# Patient Record
Sex: FEMALE | Race: WHITE | Employment: OTHER | URBAN - NONMETROPOLITAN AREA
[De-identification: names, ages, dates, MRNs, and addresses within clinical notes are randomized per-mention and may not be internally consistent; named-entity substitution may affect disease eponyms.]

---

## 2017-01-19 ENCOUNTER — HOSPITAL ENCOUNTER (OUTPATIENT)
Dept: PAIN MANAGEMENT | Age: 57
Discharge: HOME | End: 2017-01-19
Payer: COMMERCIAL

## 2017-01-19 VITALS
RESPIRATION RATE: 18 BRPM | SYSTOLIC BLOOD PRESSURE: 185 MMHG | HEIGHT: 67 IN | TEMPERATURE: 97.5 F | HEART RATE: 85 BPM | OXYGEN SATURATION: 97 % | WEIGHT: 230 LBS | BODY MASS INDEX: 36.1 KG/M2 | DIASTOLIC BLOOD PRESSURE: 98 MMHG

## 2017-01-19 DIAGNOSIS — G89.29 CHRONIC PAIN OF BOTH KNEES: ICD-10-CM

## 2017-01-19 DIAGNOSIS — M25.561 CHRONIC PAIN OF BOTH KNEES: ICD-10-CM

## 2017-01-19 DIAGNOSIS — M25.562 CHRONIC PAIN OF BOTH KNEES: ICD-10-CM

## 2017-01-19 DIAGNOSIS — B02.21 NEURALGIA, GENICULATE: ICD-10-CM

## 2017-01-19 DIAGNOSIS — M51.9 LUMBAR DISC DISEASE: ICD-10-CM

## 2017-01-19 PROCEDURE — 99213 OFFICE O/P EST LOW 20 MIN: CPT

## 2017-01-19 RX ORDER — HYDROCODONE BITARTRATE AND ACETAMINOPHEN 10; 325 MG/1; MG/1
1 TABLET ORAL 3 TIMES DAILY PRN
Qty: 90 TABLET | Refills: 0 | Status: SHIPPED | OUTPATIENT
Start: 2017-01-19 | End: 2017-02-20 | Stop reason: SDUPTHER

## 2017-01-19 RX ORDER — ZOLPIDEM TARTRATE 12.5 MG/1
12.5 TABLET, FILM COATED, EXTENDED RELEASE ORAL NIGHTLY PRN
Qty: 30 TABLET | Refills: 2 | Status: SHIPPED | OUTPATIENT
Start: 2017-01-19 | End: 2017-04-21 | Stop reason: SDUPTHER

## 2017-01-19 RX ORDER — TIZANIDINE 4 MG/1
4 TABLET ORAL 3 TIMES DAILY PRN
Qty: 45 TABLET | Refills: 2 | Status: SHIPPED | OUTPATIENT
Start: 2017-01-19 | End: 2017-08-08 | Stop reason: SDUPTHER

## 2017-01-19 RX ORDER — ZOLPIDEM TARTRATE 12.5 MG/1
12.5 TABLET, FILM COATED, EXTENDED RELEASE ORAL NIGHTLY PRN
Qty: 30 TABLET | Refills: 2 | Status: SHIPPED | OUTPATIENT
Start: 2017-01-19 | End: 2017-01-19 | Stop reason: SDUPTHER

## 2017-01-19 RX ORDER — PREGABALIN 150 MG/1
150 CAPSULE ORAL 3 TIMES DAILY
Qty: 90 CAPSULE | Refills: 3 | Status: SHIPPED | OUTPATIENT
Start: 2017-01-19 | End: 2017-08-08 | Stop reason: SDUPTHER

## 2017-01-19 RX ORDER — PREGABALIN 150 MG/1
150 CAPSULE ORAL 3 TIMES DAILY
Qty: 90 CAPSULE | Refills: 3 | Status: SHIPPED | OUTPATIENT
Start: 2017-01-19 | End: 2017-01-19 | Stop reason: SDUPTHER

## 2017-01-19 ASSESSMENT — PAIN DESCRIPTION - ONSET: ONSET: ON-GOING

## 2017-01-19 ASSESSMENT — PAIN DESCRIPTION - ORIENTATION: ORIENTATION: LOWER

## 2017-01-19 ASSESSMENT — PAIN DESCRIPTION - LOCATION: LOCATION: BACK;HIP;KNEE;SHOULDER

## 2017-01-19 ASSESSMENT — PAIN SCALES - GENERAL: PAINLEVEL_OUTOF10: 5

## 2017-01-19 ASSESSMENT — PAIN DESCRIPTION - PROGRESSION: CLINICAL_PROGRESSION: NOT CHANGED

## 2017-01-19 ASSESSMENT — PAIN DESCRIPTION - PAIN TYPE: TYPE: CHRONIC PAIN

## 2017-01-19 ASSESSMENT — PAIN DESCRIPTION - DESCRIPTORS: DESCRIPTORS: CONSTANT;ACHING;RADIATING

## 2017-01-19 ASSESSMENT — PAIN DESCRIPTION - FREQUENCY: FREQUENCY: CONTINUOUS

## 2017-02-21 RX ORDER — HYDROCODONE BITARTRATE AND ACETAMINOPHEN 10; 325 MG/1; MG/1
1 TABLET ORAL 3 TIMES DAILY PRN
Qty: 90 TABLET | Refills: 0 | Status: SHIPPED | OUTPATIENT
Start: 2017-02-23 | End: 2017-03-21 | Stop reason: SDUPTHER

## 2017-03-21 ENCOUNTER — HOSPITAL ENCOUNTER (OUTPATIENT)
Dept: PAIN MANAGEMENT | Age: 57
Discharge: HOME | End: 2017-03-21
Payer: COMMERCIAL

## 2017-03-21 VITALS
RESPIRATION RATE: 20 BRPM | SYSTOLIC BLOOD PRESSURE: 158 MMHG | BODY MASS INDEX: 38.3 KG/M2 | WEIGHT: 244 LBS | TEMPERATURE: 97.9 F | HEIGHT: 67 IN | DIASTOLIC BLOOD PRESSURE: 66 MMHG | OXYGEN SATURATION: 94 % | HEART RATE: 76 BPM

## 2017-03-21 DIAGNOSIS — M51.9 LUMBAR DISC DISEASE: ICD-10-CM

## 2017-03-21 DIAGNOSIS — M51.36 LUMBAR DEGENERATIVE DISC DISEASE: ICD-10-CM

## 2017-03-21 DIAGNOSIS — B02.21 NEURALGIA, GENICULATE: ICD-10-CM

## 2017-03-21 PROCEDURE — 6360000002 HC RX W HCPCS

## 2017-03-21 PROCEDURE — 80307 DRUG TEST PRSMV CHEM ANLYZR: CPT

## 2017-03-21 PROCEDURE — 20553 NJX 1/MLT TRIGGER POINTS 3/>: CPT

## 2017-03-21 PROCEDURE — 2580000003 HC RX 258

## 2017-03-21 PROCEDURE — 2500000003 HC RX 250 WO HCPCS

## 2017-03-21 PROCEDURE — 3209999900 FLUORO FOR SURGICAL PROCEDURES

## 2017-03-21 PROCEDURE — 62323 NJX INTERLAMINAR LMBR/SAC: CPT

## 2017-03-21 RX ORDER — TRIAMCINOLONE ACETONIDE 40 MG/ML
INJECTION, SUSPENSION INTRA-ARTICULAR; INTRAMUSCULAR
Status: COMPLETED | OUTPATIENT
Start: 2017-03-21 | End: 2017-03-21

## 2017-03-21 RX ORDER — BUPIVACAINE HYDROCHLORIDE 5 MG/ML
INJECTION, SOLUTION EPIDURAL; INTRACAUDAL
Status: COMPLETED | OUTPATIENT
Start: 2017-03-21 | End: 2017-03-21

## 2017-03-21 RX ORDER — METHYLPREDNISOLONE ACETATE 80 MG/ML
INJECTION, SUSPENSION INTRA-ARTICULAR; INTRALESIONAL; INTRAMUSCULAR; SOFT TISSUE
Status: COMPLETED | OUTPATIENT
Start: 2017-03-21 | End: 2017-03-21

## 2017-03-21 RX ORDER — 0.9 % SODIUM CHLORIDE 0.9 %
VIAL (ML) INJECTION
Status: COMPLETED | OUTPATIENT
Start: 2017-03-21 | End: 2017-03-21

## 2017-03-21 RX ORDER — LIDOCAINE HYDROCHLORIDE 10 MG/ML
INJECTION, SOLUTION EPIDURAL; INFILTRATION; INTRACAUDAL; PERINEURAL
Status: COMPLETED | OUTPATIENT
Start: 2017-03-21 | End: 2017-03-21

## 2017-03-21 RX ORDER — HYDROCODONE BITARTRATE AND ACETAMINOPHEN 10; 325 MG/1; MG/1
1 TABLET ORAL 3 TIMES DAILY PRN
Qty: 90 TABLET | Refills: 0 | Status: SHIPPED | OUTPATIENT
Start: 2017-03-29 | End: 2017-04-21 | Stop reason: SDUPTHER

## 2017-03-21 RX ORDER — BUPIVACAINE HYDROCHLORIDE 2.5 MG/ML
INJECTION, SOLUTION EPIDURAL; INFILTRATION; INTRACAUDAL
Status: COMPLETED | OUTPATIENT
Start: 2017-03-21 | End: 2017-03-21

## 2017-03-21 RX ADMIN — BUPIVACAINE HYDROCHLORIDE 9.5 ML: 5 INJECTION, SOLUTION EPIDURAL; INTRACAUDAL at 16:24

## 2017-03-21 RX ADMIN — METHYLPREDNISOLONE ACETATE 80 MG: 80 INJECTION, SUSPENSION INTRA-ARTICULAR; INTRALESIONAL; INTRAMUSCULAR; SOFT TISSUE at 16:24

## 2017-03-21 RX ADMIN — TRIAMCINOLONE ACETONIDE 20 MG: 40 INJECTION, SUSPENSION INTRA-ARTICULAR; INTRAMUSCULAR at 16:25

## 2017-03-21 RX ADMIN — LIDOCAINE HYDROCHLORIDE 3 ML: 10 INJECTION, SOLUTION EPIDURAL; INFILTRATION; INTRACAUDAL; PERINEURAL at 16:22

## 2017-03-21 RX ADMIN — Medication 4 ML: at 16:24

## 2017-03-21 RX ADMIN — BUPIVACAINE HYDROCHLORIDE 5 ML: 2.5 INJECTION, SOLUTION EPIDURAL; INFILTRATION; INTRACAUDAL at 16:24

## 2017-03-21 ASSESSMENT — PAIN DESCRIPTION - ORIENTATION: ORIENTATION: LOWER

## 2017-03-21 ASSESSMENT — PAIN DESCRIPTION - DESCRIPTORS: DESCRIPTORS: CONSTANT;ACHING;RADIATING

## 2017-03-21 ASSESSMENT — PAIN SCALES - GENERAL: PAINLEVEL_OUTOF10: 6

## 2017-03-21 ASSESSMENT — PAIN DESCRIPTION - PROGRESSION: CLINICAL_PROGRESSION: NOT CHANGED

## 2017-03-21 ASSESSMENT — PAIN DESCRIPTION - PAIN TYPE: TYPE: CHRONIC PAIN

## 2017-03-21 ASSESSMENT — PAIN DESCRIPTION - LOCATION: LOCATION: BACK;HIP;LEG;NECK

## 2017-03-21 ASSESSMENT — PAIN DESCRIPTION - FREQUENCY: FREQUENCY: CONTINUOUS

## 2017-03-21 ASSESSMENT — PAIN DESCRIPTION - ONSET: ONSET: ON-GOING

## 2017-03-29 ENCOUNTER — HOSPITAL ENCOUNTER (OUTPATIENT)
Dept: PAIN MANAGEMENT | Age: 57
Discharge: HOME | End: 2017-03-29
Payer: COMMERCIAL

## 2017-03-29 VITALS
OXYGEN SATURATION: 96 % | SYSTOLIC BLOOD PRESSURE: 152 MMHG | WEIGHT: 233 LBS | DIASTOLIC BLOOD PRESSURE: 65 MMHG | TEMPERATURE: 97.9 F | BODY MASS INDEX: 36.57 KG/M2 | HEIGHT: 67 IN | HEART RATE: 82 BPM | RESPIRATION RATE: 18 BRPM

## 2017-03-29 DIAGNOSIS — B02.21 NEURALGIA, GENICULATE: ICD-10-CM

## 2017-03-29 DIAGNOSIS — G89.29 CHRONIC PAIN OF BOTH KNEES: ICD-10-CM

## 2017-03-29 DIAGNOSIS — M51.9 LUMBAR DISC DISEASE: ICD-10-CM

## 2017-03-29 DIAGNOSIS — M25.562 CHRONIC PAIN OF BOTH KNEES: ICD-10-CM

## 2017-03-29 DIAGNOSIS — M25.561 CHRONIC PAIN OF BOTH KNEES: ICD-10-CM

## 2017-03-29 PROBLEM — G51.8 NEURALGIA, GENICULATE: Chronic | Status: ACTIVE | Noted: 2017-03-29

## 2017-03-29 LAB
AMPHETAMINES, URINE: NEGATIVE NG/ML
BARBITURATES, URINE: NEGATIVE NG/ML
BENZODIAZEPINES, URINE: NEGATIVE NG/ML
CANNABINOIDS, URINE: NEGATIVE NG/ML
COCAINE METABOLITE, URINE: NEGATIVE NG/ML
CODEINE, URINE: NEGATIVE
CREATININE, URINE: 66.5 MG/DL (ref 20–300)
ETHANOL U, QUAN: NEGATIVE %
FENTANYL URINE: NEGATIVE PG/ML
HYDROCODONE, UR CONF: 1580 NG/ML
HYDROCODONE, URINE: POSITIVE
HYDROMORPHONE, URINE: NEGATIVE
MEPERIDINE, UR: NEGATIVE NG/ML
METHADONE SCREEN, URINE: NEGATIVE NG/ML
MORPHINE URINE: NEGATIVE
OPIATES, URINE: ABNORMAL NG/ML
OPIATES, URINE: POSITIVE NG/ML
OXYCODONE/OXYMORPHONE, UR: NEGATIVE NG/ML
PH, URINE: 5.7 (ref 4.5–8.9)
PHENCYCLIDINE, URINE: NEGATIVE NG/ML
PROPOXYPHENE, URINE: NEGATIVE NG/ML

## 2017-03-29 PROCEDURE — 20610 DRAIN/INJ JOINT/BURSA W/O US: CPT

## 2017-03-29 PROCEDURE — 2500000003 HC RX 250 WO HCPCS

## 2017-03-29 ASSESSMENT — PAIN - FUNCTIONAL ASSESSMENT: PAIN_FUNCTIONAL_ASSESSMENT: 0-10

## 2017-03-29 ASSESSMENT — PAIN DESCRIPTION - DESCRIPTORS: DESCRIPTORS: ACHING;CONSTANT;THROBBING;NUMBNESS

## 2017-03-29 ASSESSMENT — ACTIVITIES OF DAILY LIVING (ADL): EFFECT OF PAIN ON DAILY ACTIVITIES: DAILY CHORES AND ACTIVITIES

## 2017-04-24 RX ORDER — ZOLPIDEM TARTRATE 12.5 MG/1
12.5 TABLET, FILM COATED, EXTENDED RELEASE ORAL NIGHTLY PRN
Qty: 30 TABLET | Refills: 2 | OUTPATIENT
Start: 2017-04-24 | End: 2017-08-08 | Stop reason: SDUPTHER

## 2017-04-24 RX ORDER — HYDROCODONE BITARTRATE AND ACETAMINOPHEN 10; 325 MG/1; MG/1
1 TABLET ORAL 3 TIMES DAILY PRN
Qty: 90 TABLET | Refills: 0 | Status: SHIPPED | OUTPATIENT
Start: 2017-04-28 | End: 2017-08-08 | Stop reason: SDUPTHER

## 2017-08-08 ENCOUNTER — HOSPITAL ENCOUNTER (OUTPATIENT)
Dept: PAIN MANAGEMENT | Age: 57
Discharge: HOME | End: 2017-08-08
Payer: COMMERCIAL

## 2017-08-08 VITALS
SYSTOLIC BLOOD PRESSURE: 127 MMHG | TEMPERATURE: 96.3 F | OXYGEN SATURATION: 95 % | DIASTOLIC BLOOD PRESSURE: 57 MMHG | HEART RATE: 79 BPM | HEIGHT: 67 IN | RESPIRATION RATE: 18 BRPM | BODY MASS INDEX: 37.2 KG/M2 | WEIGHT: 237 LBS

## 2017-08-08 DIAGNOSIS — M51.36 LUMBAR DEGENERATIVE DISC DISEASE: ICD-10-CM

## 2017-08-08 DIAGNOSIS — M51.36 DDD (DEGENERATIVE DISC DISEASE), LUMBAR: Chronic | ICD-10-CM

## 2017-08-08 PROCEDURE — 20552 NJX 1/MLT TRIGGER POINT 1/2: CPT

## 2017-08-08 PROCEDURE — 2500000003 HC RX 250 WO HCPCS

## 2017-08-08 PROCEDURE — 62323 NJX INTERLAMINAR LMBR/SAC: CPT

## 2017-08-08 PROCEDURE — 3209999900 FLUORO FOR SURGICAL PROCEDURES

## 2017-08-08 PROCEDURE — 2580000003 HC RX 258

## 2017-08-08 PROCEDURE — 6360000002 HC RX W HCPCS

## 2017-08-08 RX ORDER — BUPIVACAINE HYDROCHLORIDE 5 MG/ML
INJECTION, SOLUTION EPIDURAL; INTRACAUDAL
Status: COMPLETED | OUTPATIENT
Start: 2017-08-08 | End: 2017-08-08

## 2017-08-08 RX ORDER — METHYLPREDNISOLONE ACETATE 80 MG/ML
INJECTION, SUSPENSION INTRA-ARTICULAR; INTRALESIONAL; INTRAMUSCULAR; SOFT TISSUE
Status: COMPLETED | OUTPATIENT
Start: 2017-08-08 | End: 2017-08-08

## 2017-08-08 RX ORDER — TRIAMCINOLONE ACETONIDE 40 MG/ML
INJECTION, SUSPENSION INTRA-ARTICULAR; INTRAMUSCULAR
Status: COMPLETED | OUTPATIENT
Start: 2017-08-08 | End: 2017-08-08

## 2017-08-08 RX ORDER — 0.9 % SODIUM CHLORIDE 0.9 %
VIAL (ML) INJECTION
Status: COMPLETED | OUTPATIENT
Start: 2017-08-08 | End: 2017-08-08

## 2017-08-08 RX ORDER — BUPIVACAINE HYDROCHLORIDE 2.5 MG/ML
INJECTION, SOLUTION EPIDURAL; INFILTRATION; INTRACAUDAL
Status: COMPLETED | OUTPATIENT
Start: 2017-08-08 | End: 2017-08-08

## 2017-08-08 RX ORDER — LIDOCAINE HYDROCHLORIDE 10 MG/ML
INJECTION, SOLUTION EPIDURAL; INFILTRATION; INTRACAUDAL; PERINEURAL
Status: COMPLETED | OUTPATIENT
Start: 2017-08-08 | End: 2017-08-08

## 2017-08-08 RX ADMIN — BUPIVACAINE HYDROCHLORIDE 5 ML: 2.5 INJECTION, SOLUTION EPIDURAL; INFILTRATION; INTRACAUDAL at 16:37

## 2017-08-08 RX ADMIN — LIDOCAINE HYDROCHLORIDE 3 ML: 10 INJECTION, SOLUTION EPIDURAL; INFILTRATION; INTRACAUDAL; PERINEURAL at 16:36

## 2017-08-08 RX ADMIN — TRIAMCINOLONE ACETONIDE 10 MG: 40 INJECTION, SUSPENSION INTRA-ARTICULAR; INTRAMUSCULAR at 16:39

## 2017-08-08 RX ADMIN — BUPIVACAINE HYDROCHLORIDE 4.5 ML: 5 INJECTION, SOLUTION EPIDURAL; INTRACAUDAL at 16:38

## 2017-08-08 RX ADMIN — METHYLPREDNISOLONE ACETATE 80 MG: 80 INJECTION, SUSPENSION INTRA-ARTICULAR; INTRALESIONAL; INTRAMUSCULAR; SOFT TISSUE at 16:38

## 2017-08-08 RX ADMIN — Medication 4 ML: at 16:37

## 2017-08-08 ASSESSMENT — PAIN - FUNCTIONAL ASSESSMENT
PAIN_FUNCTIONAL_ASSESSMENT: 0-10

## 2017-08-08 ASSESSMENT — PAIN DESCRIPTION - DESCRIPTORS: DESCRIPTORS: ACHING;CONSTANT;THROBBING;NUMBNESS

## 2017-08-08 ASSESSMENT — ACTIVITIES OF DAILY LIVING (ADL): EFFECT OF PAIN ON DAILY ACTIVITIES: ADL'S

## 2017-09-21 ENCOUNTER — HOSPITAL ENCOUNTER (OUTPATIENT)
Dept: PAIN MANAGEMENT | Age: 57
Discharge: HOME | End: 2017-09-21
Payer: COMMERCIAL

## 2017-09-21 VITALS
DIASTOLIC BLOOD PRESSURE: 77 MMHG | SYSTOLIC BLOOD PRESSURE: 149 MMHG | HEART RATE: 74 BPM | BODY MASS INDEX: 37.04 KG/M2 | RESPIRATION RATE: 18 BRPM | TEMPERATURE: 97.8 F | OXYGEN SATURATION: 95 % | HEIGHT: 67 IN | WEIGHT: 236 LBS

## 2017-09-21 DIAGNOSIS — M25.562 CHRONIC PAIN OF BOTH KNEES: ICD-10-CM

## 2017-09-21 DIAGNOSIS — G89.29 CHRONIC PAIN OF BOTH KNEES: ICD-10-CM

## 2017-09-21 DIAGNOSIS — M51.9 LUMBAR DISC DISEASE: ICD-10-CM

## 2017-09-21 DIAGNOSIS — M79.18 MYOFASCIAL PAIN: ICD-10-CM

## 2017-09-21 DIAGNOSIS — M51.36 DDD (DEGENERATIVE DISC DISEASE), LUMBAR: ICD-10-CM

## 2017-09-21 DIAGNOSIS — B02.21 NEURALGIA, GENICULATE: ICD-10-CM

## 2017-09-21 DIAGNOSIS — M25.561 CHRONIC PAIN OF BOTH KNEES: ICD-10-CM

## 2017-09-21 PROCEDURE — 99214 OFFICE O/P EST MOD 30 MIN: CPT

## 2017-09-21 RX ORDER — HYDROCODONE BITARTRATE AND ACETAMINOPHEN 10; 325 MG/1; MG/1
1 TABLET ORAL 3 TIMES DAILY PRN
Qty: 90 TABLET | Refills: 0 | Status: SHIPPED | OUTPATIENT
Start: 2017-09-21 | End: 2017-11-08 | Stop reason: SDUPTHER

## 2017-09-21 ASSESSMENT — PAIN DESCRIPTION - FREQUENCY: FREQUENCY: CONTINUOUS

## 2017-09-21 ASSESSMENT — PAIN DESCRIPTION - DIRECTION: RADIATING_TOWARDS: INTO BILATERAL HIPS

## 2017-09-21 ASSESSMENT — PAIN DESCRIPTION - PAIN TYPE: TYPE: CHRONIC PAIN

## 2017-09-21 ASSESSMENT — PAIN DESCRIPTION - ORIENTATION: ORIENTATION: LOWER;RIGHT;LEFT

## 2017-09-21 ASSESSMENT — PAIN DESCRIPTION - ONSET: ONSET: ON-GOING

## 2017-09-21 ASSESSMENT — PAIN DESCRIPTION - LOCATION: LOCATION: BACK;HIP;NECK

## 2017-09-21 ASSESSMENT — ACTIVITIES OF DAILY LIVING (ADL): EFFECT OF PAIN ON DAILY ACTIVITIES: LIMITS ACTIVITY

## 2017-09-21 ASSESSMENT — PAIN DESCRIPTION - PROGRESSION: CLINICAL_PROGRESSION: NOT CHANGED

## 2017-09-21 ASSESSMENT — PAIN DESCRIPTION - DESCRIPTORS: DESCRIPTORS: ACHING;CONSTANT;THROBBING

## 2017-09-21 ASSESSMENT — PAIN SCALES - GENERAL: PAINLEVEL_OUTOF10: 5

## 2017-11-08 RX ORDER — ZOLPIDEM TARTRATE 12.5 MG/1
12.5 TABLET, FILM COATED, EXTENDED RELEASE ORAL NIGHTLY PRN
Qty: 30 TABLET | Refills: 2 | Status: SHIPPED | OUTPATIENT
Start: 2017-11-09 | End: 2018-01-08 | Stop reason: SDUPTHER

## 2017-11-08 RX ORDER — HYDROCODONE BITARTRATE AND ACETAMINOPHEN 10; 325 MG/1; MG/1
1 TABLET ORAL 3 TIMES DAILY PRN
Qty: 90 TABLET | Refills: 0 | Status: SHIPPED | OUTPATIENT
Start: 2017-11-09 | End: 2018-01-08 | Stop reason: SDUPTHER

## 2017-11-14 ENCOUNTER — HOSPITAL ENCOUNTER (OUTPATIENT)
Dept: PAIN MANAGEMENT | Age: 57
Discharge: HOME | End: 2017-11-14
Payer: COMMERCIAL

## 2017-11-14 VITALS
HEIGHT: 67 IN | DIASTOLIC BLOOD PRESSURE: 40 MMHG | RESPIRATION RATE: 16 BRPM | BODY MASS INDEX: 38.14 KG/M2 | WEIGHT: 243 LBS | OXYGEN SATURATION: 94 % | HEART RATE: 72 BPM | TEMPERATURE: 98.6 F | SYSTOLIC BLOOD PRESSURE: 117 MMHG

## 2017-11-14 DIAGNOSIS — B02.21 NEURALGIA, GENICULATE: ICD-10-CM

## 2017-11-14 DIAGNOSIS — M51.9 LUMBAR DISC DISEASE: ICD-10-CM

## 2017-11-14 DIAGNOSIS — M51.16 DISPLACEMENT OF LUMBAR DISC WITH RADICULOPATHY: Chronic | ICD-10-CM

## 2017-11-14 DIAGNOSIS — M51.36 LUMBAR DEGENERATIVE DISC DISEASE: ICD-10-CM

## 2017-11-14 DIAGNOSIS — M51.36 DDD (DEGENERATIVE DISC DISEASE), LUMBAR: ICD-10-CM

## 2017-11-14 PROCEDURE — 3209999900 FLUORO FOR SURGICAL PROCEDURES

## 2017-11-14 PROCEDURE — 20552 NJX 1/MLT TRIGGER POINT 1/2: CPT

## 2017-11-14 PROCEDURE — 62323 NJX INTERLAMINAR LMBR/SAC: CPT

## 2017-11-14 PROCEDURE — 2580000003 HC RX 258

## 2017-11-14 PROCEDURE — 2500000003 HC RX 250 WO HCPCS

## 2017-11-14 PROCEDURE — 20553 NJX 1/MLT TRIGGER POINTS 3/>: CPT

## 2017-11-14 PROCEDURE — 6360000002 HC RX W HCPCS

## 2017-11-14 RX ORDER — BUPIVACAINE HYDROCHLORIDE 5 MG/ML
INJECTION, SOLUTION EPIDURAL; INTRACAUDAL
Status: COMPLETED | OUTPATIENT
Start: 2017-11-14 | End: 2017-11-14

## 2017-11-14 RX ORDER — LIDOCAINE HYDROCHLORIDE 10 MG/ML
INJECTION, SOLUTION EPIDURAL; INFILTRATION; INTRACAUDAL; PERINEURAL
Status: COMPLETED | OUTPATIENT
Start: 2017-11-14 | End: 2017-11-14

## 2017-11-14 RX ORDER — METHYLPREDNISOLONE ACETATE 80 MG/ML
INJECTION, SUSPENSION INTRA-ARTICULAR; INTRALESIONAL; INTRAMUSCULAR; SOFT TISSUE
Status: COMPLETED | OUTPATIENT
Start: 2017-11-14 | End: 2017-11-14

## 2017-11-14 RX ORDER — BUPIVACAINE HYDROCHLORIDE 2.5 MG/ML
INJECTION, SOLUTION EPIDURAL; INFILTRATION; INTRACAUDAL
Status: COMPLETED | OUTPATIENT
Start: 2017-11-14 | End: 2017-11-14

## 2017-11-14 RX ORDER — TRIAMCINOLONE ACETONIDE 40 MG/ML
INJECTION, SUSPENSION INTRA-ARTICULAR; INTRAMUSCULAR
Status: COMPLETED | OUTPATIENT
Start: 2017-11-14 | End: 2017-11-14

## 2017-11-14 RX ORDER — 0.9 % SODIUM CHLORIDE 0.9 %
VIAL (ML) INJECTION
Status: COMPLETED | OUTPATIENT
Start: 2017-11-14 | End: 2017-11-14

## 2017-11-14 RX ADMIN — Medication 4 ML: at 15:06

## 2017-11-14 RX ADMIN — BUPIVACAINE HYDROCHLORIDE 5 ML: 2.5 INJECTION, SOLUTION EPIDURAL; INFILTRATION; INTRACAUDAL at 15:06

## 2017-11-14 RX ADMIN — METHYLPREDNISOLONE ACETATE 80 MG: 80 INJECTION, SUSPENSION INTRA-ARTICULAR; INTRALESIONAL; INTRAMUSCULAR; SOFT TISSUE at 15:07

## 2017-11-14 RX ADMIN — BUPIVACAINE HYDROCHLORIDE 4.5 ML: 5 INJECTION, SOLUTION EPIDURAL; INTRACAUDAL at 15:07

## 2017-11-14 RX ADMIN — LIDOCAINE HYDROCHLORIDE 3 ML: 10 INJECTION, SOLUTION EPIDURAL; INFILTRATION; INTRACAUDAL; PERINEURAL at 15:04

## 2017-11-14 RX ADMIN — TRIAMCINOLONE ACETONIDE 10 MG: 40 INJECTION, SUSPENSION INTRA-ARTICULAR; INTRAMUSCULAR at 15:08

## 2017-11-14 ASSESSMENT — PAIN DESCRIPTION - LOCATION: LOCATION: BACK;HIP;NECK

## 2017-11-14 ASSESSMENT — PAIN - FUNCTIONAL ASSESSMENT: PAIN_FUNCTIONAL_ASSESSMENT: 0-10

## 2017-11-14 ASSESSMENT — PAIN DESCRIPTION - PAIN TYPE: TYPE: CHRONIC PAIN

## 2017-11-14 ASSESSMENT — PAIN DESCRIPTION - FREQUENCY: FREQUENCY: CONTINUOUS

## 2017-11-14 ASSESSMENT — PAIN DESCRIPTION - ONSET: ONSET: ON-GOING

## 2017-11-14 ASSESSMENT — PAIN SCALES - GENERAL: PAINLEVEL_OUTOF10: 7

## 2017-11-14 ASSESSMENT — PAIN DESCRIPTION - DESCRIPTORS: DESCRIPTORS: ACHING;CONSTANT;THROBBING

## 2017-11-14 ASSESSMENT — PAIN DESCRIPTION - ORIENTATION: ORIENTATION: RIGHT;LEFT;LOWER;UPPER

## 2017-11-14 NOTE — PROGRESS NOTES
Nursing Admission Record    Current Issues / Falls / ER Visits:  No    Percentage of Pain Relief after Last Procedure:  50 %    How long lasted:  2 months    Radiology exams received during the last 12 months: No        MRI exams received in the past 2 years:  No  Physical therapy during the last 6 months: No    Labs during the last 12 months: Yes    Education Provided:  [x] Review of Robel Castro  [] Agreement Review  [] Compliance Issues Discussed    [] Cognitive Behavior Needs [] Exercise [] Review of Test [] Financial Issues  [] Tobacco/Alcohol Use [x] Teaching [] New Patient [] Picture Obtained    Physician Plan:  [] Outgoing Referral  [] Pharmacy Consult  [] Test Ordered   [] Obtained Test Results / Consult Notes  [] UDS due at next visit, verified per EPIC      [] Suspected Physical Abuse or Suicide Risk assessed - IF YES COMPLETE QUESTIONS BELOW    If any of the following questions are answered yes - contact attending physician for referral:    Has been considering harming self to escape stress, pain problems? [] YES  [] NO  Has a suicide plan? [] YES  [] NO  Has attempted suicide in the past?   [] YES  [] NO  Has a close friend or family member who committed suicide? [] YES  [] NO    Patient Referred To :      Additional Notes:    Assessment Completed by:  Electronically signed by Ellen Garcia RN on 11/14/2017 at 2:46 PM

## 2017-11-14 NOTE — PROCEDURES
([] Productive []Unproductive)  HCG Required: [x]No []Yes   Results: []Negative []Positive  Knowledge Level:        [x]Patient/Other verbalized understanding of pre-procedure instructions. [x]Assessment of post-op care needs (transportation, responsible caregiver)        [x]Able to discuss health care problems and how to deal with it. Factors that Affect Teaching:        Language Barrier: [x]No []Yes - why:        Hearing Loss:        [x]No []Yes            Corrective Device:  []Yes [x]No        Vision Loss:           [x]No []Yes            Corrective Device:  []Yes [x]No        Memory Loss:       [x]No []Yes            []Short Term []Long Term  Motivational Level:  [x]Asks Questions                  []Extremely Anxious       [x]Seems Interested               []Seems Uninterested                  []Denies need for Education  Risk for Injury:  [x]Patient oriented to person, place and time  []History of frequent falls/loss of balance  Nutritional:  []Change in appetite   []Weight Gain   []Weight Loss  Functional:      Nursing Admission Record   Current Issues / Falls / ER Visits:  No   Percentage of Pain Relief after Last Procedure:  50 %    How long lasted:  2 months   Radiology exams received during the last 12 months: No      MRI exams received in the past 2 years:  No  Physical therapy during the last 6 months: No   Labs during the last 12 months: Yes       COMMENTS:  Patient complains of Lower back pain that radiates out to the hips, and neck pain. Patient feels that she gained 50% relief that has lasted her 2 months from last injections. Discussed the risk and benefits of procedure with patient. Will proceed today Lumbar Facet and Trigger Point Injections. PLAN:  [x] Will return to office in 3 month(s) for   [x] Planned Procedure  [] Office Visit.   [] Prescriptions were given today   [] No prescriptions needed today  [] Patient is to call with any questions or concerns which may arise prior to the

## 2017-11-14 NOTE — PROCEDURES
DATE: 11/14/2017      REASON FOR VISIT: Principal Problem:    Displacement of lumbar disc with radiculopathy        PROCEDURE: Lumbar Epidural Steroid Injection. [] Moderate Sedation    DESCRIPTION OF PROCEDURE:              After obtaining informed consent, the patient was taken to the procedure room, positioned prone, and sterilely prepped.  The procedure was performed under fluoroscopic guidance.  First 5 ml of 1% Xylocaine was used at [facility] local anesthesia.  A 19-gauge Imalogixtead needle was advanced to the epidural space.  The was confirmed on the fluoroscope with an injection of [x] 2ml  [] ml Contrast.  Then, [x] 5ml of 0.25% Marcaine, [x] 4ml of Normal Saline, and [x] 80 mg of Depo Medrol was gently injected.      There were no complications. There were no complications. DIAGNOSES:  [] Low Back Pain  [x] *Lumbar Radiculopathy  [x] *Lumbar Degenerative Disc Disease  [] *Lumbar Spinal Stenosis  [] *Lumbar Postlaminectomy Syndrome  [] Other      REASON FOR VISIT: Myofascial Pain Syndrome  PROCEDURE: Trigger Point Injection                          [] Moderate Sedation    DESCRIPTION OF PROCEDURE:    After obtaining informed consent 5 ml of 0.5% Marcaine with 10 mg of Kenalog  Was divided into tense areas of muscle spasm in the   [x] Right   [] Left   [] Bilateral   [] Splenius   [] Rhomboids   [x] Trapezius   [] Latissimus   [] Erector Spinae                 [] Supraspinatus   [] Infraspinatus   [] Paraspinous   [] Other     There were no complications. DIAGNOSES:    [] Muscle tension and spasms   [x] *Myofascial Pain Syndrome   [] Cervicalgia  [] Cephalgia   [] Other       COMMENTS:  Patient complains of           PLAN:  [x] Will return to office in 3 month(s) for   [x] Planned Procedure  [] Office Visit. [] Prescriptions were given today   [] No prescriptions needed today  [] Patient is to call with any questions or concerns which may arise prior to the next office visit.             [] Over 50%of today's appointment was given to discussion, evaluation and counseling. [] Over 50% of today's appointment was given to discussion, evaluation and counseling.

## 2018-01-08 ENCOUNTER — HOSPITAL ENCOUNTER (OUTPATIENT)
Dept: PAIN MANAGEMENT | Age: 58
Discharge: HOME | End: 2018-01-08
Payer: COMMERCIAL

## 2018-01-08 VITALS
BODY MASS INDEX: 38.61 KG/M2 | HEART RATE: 80 BPM | HEIGHT: 67 IN | TEMPERATURE: 97.8 F | DIASTOLIC BLOOD PRESSURE: 64 MMHG | WEIGHT: 246 LBS | RESPIRATION RATE: 18 BRPM | OXYGEN SATURATION: 97 % | SYSTOLIC BLOOD PRESSURE: 146 MMHG

## 2018-01-08 DIAGNOSIS — M25.562 CHRONIC PAIN OF BOTH KNEES: ICD-10-CM

## 2018-01-08 DIAGNOSIS — M51.36 DDD (DEGENERATIVE DISC DISEASE), LUMBAR: ICD-10-CM

## 2018-01-08 DIAGNOSIS — M79.18 MYOFASCIAL PAIN: Primary | ICD-10-CM

## 2018-01-08 DIAGNOSIS — M25.561 CHRONIC PAIN OF BOTH KNEES: ICD-10-CM

## 2018-01-08 DIAGNOSIS — G89.29 CHRONIC PAIN OF BOTH KNEES: ICD-10-CM

## 2018-01-08 DIAGNOSIS — M51.16 DISPLACEMENT OF LUMBAR DISC WITH RADICULOPATHY: ICD-10-CM

## 2018-01-08 DIAGNOSIS — B02.21 NEURALGIA, GENICULATE: ICD-10-CM

## 2018-01-08 DIAGNOSIS — M51.9 LUMBAR DISC DISEASE: ICD-10-CM

## 2018-01-08 PROCEDURE — 99213 OFFICE O/P EST LOW 20 MIN: CPT

## 2018-01-08 RX ORDER — HYDROCODONE BITARTRATE AND ACETAMINOPHEN 10; 325 MG/1; MG/1
1 TABLET ORAL 3 TIMES DAILY PRN
Qty: 90 TABLET | Refills: 0 | Status: SHIPPED | OUTPATIENT
Start: 2018-01-08 | End: 2018-02-13 | Stop reason: SDUPTHER

## 2018-01-08 RX ORDER — PREGABALIN 150 MG/1
150 CAPSULE ORAL 3 TIMES DAILY
Qty: 90 CAPSULE | Refills: 2 | Status: SHIPPED | OUTPATIENT
Start: 2018-01-08 | End: 2018-05-15 | Stop reason: SDUPTHER

## 2018-01-08 RX ORDER — ZOLPIDEM TARTRATE 12.5 MG/1
12.5 TABLET, FILM COATED, EXTENDED RELEASE ORAL NIGHTLY PRN
Qty: 30 TABLET | Refills: 2 | Status: SHIPPED | OUTPATIENT
Start: 2018-01-08 | End: 2018-05-10 | Stop reason: SDUPTHER

## 2018-01-08 ASSESSMENT — PAIN DESCRIPTION - ORIENTATION: ORIENTATION: LOWER;UPPER;RIGHT;LEFT

## 2018-01-08 ASSESSMENT — PAIN DESCRIPTION - PROGRESSION: CLINICAL_PROGRESSION: NOT CHANGED

## 2018-01-08 ASSESSMENT — PAIN DESCRIPTION - ONSET: ONSET: ON-GOING

## 2018-01-08 ASSESSMENT — PAIN DESCRIPTION - DIRECTION: RADIATING_TOWARDS: INTO BILATERAL HIPS

## 2018-01-08 ASSESSMENT — ACTIVITIES OF DAILY LIVING (ADL): EFFECT OF PAIN ON DAILY ACTIVITIES: LIMITS ACTIVITY

## 2018-01-08 ASSESSMENT — PAIN SCALES - GENERAL: PAINLEVEL_OUTOF10: 7

## 2018-01-08 ASSESSMENT — PAIN DESCRIPTION - LOCATION: LOCATION: BACK;NECK;SHOULDER;KNEE

## 2018-01-08 ASSESSMENT — PAIN DESCRIPTION - DESCRIPTORS: DESCRIPTORS: ACHING;CONSTANT;THROBBING

## 2018-01-08 ASSESSMENT — PAIN DESCRIPTION - PAIN TYPE: TYPE: CHRONIC PAIN

## 2018-01-08 ASSESSMENT — PAIN DESCRIPTION - FREQUENCY: FREQUENCY: CONTINUOUS

## 2018-01-08 NOTE — PROGRESS NOTES
Noni Mays/Sangeetha  Patient Pain Assessment    Primary / Referring Physician: Yasmin Enriquez    Chief complaint:   Chief Complaint   Patient presents with    Neck Pain    Shoulder Pain     right    Lower Back Pain    Knee Pain     bilateral       History of Present Illness -   Santiago Troncoso is a 61 y.o. female that presents to the office for follow-up of lumbar epidural injection level L4-5 and cervical trigger point injections right sided. Patient reports she obtained 75% relief for 1 month from injections. She still had improve through the second month and beyond but to a lesser degree. Current Pain Assessment  Pain Assessment  Pain Assessment: 0-10  Pain Level: 7  Pain Type: Chronic pain  Pain Location: Back, Neck, Shoulder, Knee  Pain Orientation: Lower, Upper, Right, Left  Pain Radiating Towards: into bilateral hips  Pain Descriptors: Aching, Constant, Throbbing  Pain Frequency: Continuous  Pain Onset: On-going  Clinical Progression: Not changed  Effect of Pain on Daily Activities: limits activity  Patient's Stated Pain Goal: No pain  Pain Intervention(s): Medication (see eMar), Repositioned, Rest  Response to Pain Intervention: Patient Satisfied  Multiple Pain Sites: Yes  POSS Score (Patient Ctrl Analgesia): 1      Pain medication effectiveness:   Reports that pain medication helps to increase activities of daily living    Issues of concern (physical, mental, social) or changes in condition since last visit per patient report: none    BMI: Body mass index is 38.53 kg/m². low fat, low carb diet discussed for weight reduction    Activity: discussed exercise as beneficial to pain reduction, encouraged stretching exercise with a focus on torso strengthening.      Acute Bowel or Bladder Changes: no    Side Effects of Medication: no    Social History  Social History     Social History    Marital status:      Spouse name: Mignon Peacock Number of children: N/A    Years of education: N/A     Social

## 2018-01-29 RX ORDER — TIZANIDINE 4 MG/1
4 TABLET ORAL 3 TIMES DAILY PRN
Qty: 45 TABLET | Refills: 2 | Status: SHIPPED | OUTPATIENT
Start: 2018-01-29 | End: 2018-05-15 | Stop reason: SDUPTHER

## 2018-02-13 ENCOUNTER — HOSPITAL ENCOUNTER (OUTPATIENT)
Dept: PAIN MANAGEMENT | Age: 58
Discharge: HOME | End: 2018-02-13
Payer: COMMERCIAL

## 2018-02-13 VITALS
DIASTOLIC BLOOD PRESSURE: 64 MMHG | HEART RATE: 76 BPM | RESPIRATION RATE: 20 BRPM | WEIGHT: 254 LBS | TEMPERATURE: 97.2 F | HEIGHT: 67 IN | SYSTOLIC BLOOD PRESSURE: 141 MMHG | BODY MASS INDEX: 39.87 KG/M2 | OXYGEN SATURATION: 98 %

## 2018-02-13 DIAGNOSIS — M25.562 ACUTE PAIN OF BOTH KNEES: Primary | ICD-10-CM

## 2018-02-13 DIAGNOSIS — M51.36 DDD (DEGENERATIVE DISC DISEASE), LUMBAR: ICD-10-CM

## 2018-02-13 DIAGNOSIS — M51.9 LUMBAR DISC DISEASE: ICD-10-CM

## 2018-02-13 DIAGNOSIS — M25.561 ACUTE PAIN OF BOTH KNEES: Primary | ICD-10-CM

## 2018-02-13 DIAGNOSIS — B02.21 NEURALGIA, GENICULATE: ICD-10-CM

## 2018-02-13 DIAGNOSIS — M54.59 LUMBAR FACET JOINT PAIN: ICD-10-CM

## 2018-02-13 DIAGNOSIS — M51.16 DISPLACEMENT OF LUMBAR DISC WITH RADICULOPATHY: ICD-10-CM

## 2018-02-13 PROCEDURE — 2580000003 HC RX 258

## 2018-02-13 PROCEDURE — 3209999900 FLUORO FOR SURGICAL PROCEDURES

## 2018-02-13 PROCEDURE — 2500000003 HC RX 250 WO HCPCS

## 2018-02-13 PROCEDURE — 62323 NJX INTERLAMINAR LMBR/SAC: CPT

## 2018-02-13 PROCEDURE — 6360000002 HC RX W HCPCS

## 2018-02-13 PROCEDURE — 20553 NJX 1/MLT TRIGGER POINTS 3/>: CPT

## 2018-02-13 RX ORDER — LIDOCAINE HYDROCHLORIDE 10 MG/ML
INJECTION, SOLUTION EPIDURAL; INFILTRATION; INTRACAUDAL; PERINEURAL
Status: COMPLETED | OUTPATIENT
Start: 2018-02-13 | End: 2018-02-13

## 2018-02-13 RX ORDER — BUPIVACAINE HYDROCHLORIDE 5 MG/ML
INJECTION, SOLUTION EPIDURAL; INTRACAUDAL
Status: COMPLETED | OUTPATIENT
Start: 2018-02-13 | End: 2018-02-13

## 2018-02-13 RX ORDER — 0.9 % SODIUM CHLORIDE 0.9 %
VIAL (ML) INJECTION
Status: COMPLETED | OUTPATIENT
Start: 2018-02-13 | End: 2018-02-13

## 2018-02-13 RX ORDER — METHYLPREDNISOLONE ACETATE 80 MG/ML
INJECTION, SUSPENSION INTRA-ARTICULAR; INTRALESIONAL; INTRAMUSCULAR; SOFT TISSUE
Status: COMPLETED | OUTPATIENT
Start: 2018-02-13 | End: 2018-02-13

## 2018-02-13 RX ORDER — BUPIVACAINE HYDROCHLORIDE 2.5 MG/ML
INJECTION, SOLUTION EPIDURAL; INFILTRATION; INTRACAUDAL
Status: COMPLETED | OUTPATIENT
Start: 2018-02-13 | End: 2018-02-13

## 2018-02-13 RX ORDER — TRIAMCINOLONE ACETONIDE 40 MG/ML
INJECTION, SUSPENSION INTRA-ARTICULAR; INTRAMUSCULAR
Status: COMPLETED | OUTPATIENT
Start: 2018-02-13 | End: 2018-02-13

## 2018-02-13 RX ADMIN — BUPIVACAINE HYDROCHLORIDE 5 ML: 2.5 INJECTION, SOLUTION EPIDURAL; INFILTRATION; INTRACAUDAL at 16:41

## 2018-02-13 RX ADMIN — METHYLPREDNISOLONE ACETATE 80 MG: 80 INJECTION, SUSPENSION INTRA-ARTICULAR; INTRALESIONAL; INTRAMUSCULAR; SOFT TISSUE at 16:42

## 2018-02-13 RX ADMIN — BUPIVACAINE HYDROCHLORIDE 9.5 ML: 5 INJECTION, SOLUTION EPIDURAL; INTRACAUDAL at 16:42

## 2018-02-13 RX ADMIN — TRIAMCINOLONE ACETONIDE 20 MG: 40 INJECTION, SUSPENSION INTRA-ARTICULAR; INTRAMUSCULAR at 16:42

## 2018-02-13 RX ADMIN — LIDOCAINE HYDROCHLORIDE 3 ML: 10 INJECTION, SOLUTION EPIDURAL; INFILTRATION; INTRACAUDAL; PERINEURAL at 16:39

## 2018-02-13 RX ADMIN — Medication 4 ML: at 16:41

## 2018-02-13 ASSESSMENT — PAIN DESCRIPTION - DESCRIPTORS: DESCRIPTORS: ACHING;CONSTANT;THROBBING

## 2018-02-13 ASSESSMENT — ACTIVITIES OF DAILY LIVING (ADL): EFFECT OF PAIN ON DAILY ACTIVITIES: LIMITS ACTIVITIES

## 2018-02-13 ASSESSMENT — PAIN - FUNCTIONAL ASSESSMENT: PAIN_FUNCTIONAL_ASSESSMENT: 0-10

## 2018-02-13 NOTE — PROCEDURES
DATE: 2/13/2018      REASON FOR VISIT: Principal Problem:    DDD (degenerative disc disease), lumbar  Resolved Problems:    * No resolved hospital problems. *       Procedure:  Level of Consciousness: [x]Alert [x]Oriented []Disoriented []Lethargic  Anxiety Level: [x]Calm []Anxious []Depressed []Other  Skin: [x]Warm [x]Dry []Cool []Moist []Intact []Other  Cardiovascular: []Palpitations: [x]Never []Occasionally []Frequently  Chest Pain: [x]No []Yes  Respiratory:  [x]Unlabored []Labored []Cough ([] Productive []Unproductive)  HCG Required: [x]No []Yes   Results: []Negative []Positive  Knowledge Level:        [x]Patient/Other verbalized understanding of pre-procedure instructions. [x]Assessment of post-op care needs (transportation, responsible caregiver)        [x]Able to discuss health care problems and how to deal with it.   Factors that Affect Teaching:        Language Barrier: [x]No []Yes - why:        Hearing Loss:        [x]No []Yes            Corrective Device:  []Yes []No        Vision Loss:           [x]No []Yes            Corrective Device:  []Yes []No        Memory Loss:       [x]No []Yes            []Short Term []Long Term  Motivational Level:  [x]Asks Questions                  []Extremely Anxious       [x]Seems Interested               []Seems Uninterested                  [x]Denies need for Education  Risk for Injury:  [x]Patient oriented to person, place and time  []History of frequent falls/loss of balance  Nutritional:  []Change in appetite   []Weight Gain   []Weight Loss  Functional:  Nursing Admission Record     Current Issues / Marcelline Barthel / ER Visits:  No     Percentage of Pain Relief after Last Procedure:  75 %    How long lasted:  2 months     Radiology exams received during the last 12 months: Yes       [facility]18                                              [facility]       Imaging on chart: No         Imaging records requested: No  MRI exams received in the past 2 years:  No  Physical therapy during the last 6 months: No       Labs during the last 12 months: Yes     Education Provided:  [x] Review of Asad Sarah  [x] Agreement Review  [] Compliance Issues Discussed    [] Cognitive Behavior Needs [x] Exercise [] Review of Test [] Financial Issues  [] Tobacco/Alcohol Use [x] Teaching [] New Patient [] Picture Obtained     Physician Plan:  [] Outgoing Referral  [] Pharmacy Consult  [] Test Ordered   [] Obtained Test Results / Consult Notes  [] UDS due at next visit, verified per EPIC    PROCEDURE: Lumbar Epidural Steroid Injection. [] Moderate Sedation    DESCRIPTION OF PROCEDURE:              After obtaining informed consent, the patient was taken to the procedure room, positioned prone, and sterilely prepped. The procedure was performed under fluoroscopic guidance. First 5 ml of 1% Xylocaine was used at L3 - 4 for local anesthesia. A 19-gauge Whatser needle was advanced to the epidural space. The was confirmed on the fluoroscope with an injection of [x] 2ml  [] ml Contrast.  Then, [x] 5ml of 0.25% Marcaine, [x] 4ml of Normal Saline, and [x] 80 mg of Depo Medrol was gently injected. There were no complications. DIAGNOSES:  [] Low Back Pain  [x] *Lumbar Radiculopathy  [x] *Lumbar Degenerative Disc Disease  [x] *Lumbar Spinal Stenosis  [] *Lumbar Postlaminectomy Syndrome  [] Other        PROCEDURE: Trigger Point Injection                          [] Moderate Sedation    DESCRIPTION OF PROCEDURE:    After obtaining informed consent 7 ml of 0.5% Marcaine with 15 mg of Kenalog  Was divided into tense areas of muscle spasm in the   [x] Right   [] Left   [x] Bilateral   [x] Splenius   [x] Rhomboids   [x] Trapezius   [] Latissimus   [] Erector Spinae                 [] Supraspinatus   [] Infraspinatus   [] Paraspinous   [] Other     There were no complications.     DIAGNOSES:    [] Muscle tension and spasms   [x] *Myofascial Pain Syndrome   [] Cervicalgia  [] Cephalgia   [] Other     PLAN:  [x]

## 2018-02-13 NOTE — PROGRESS NOTES
Procedure:  Level of Consciousness: [x]Alert [x]Oriented []Disoriented []Lethargic  Anxiety Level: [x]Calm []Anxious []Depressed []Other  Skin: [x]Warm [x]Dry []Cool []Moist []Intact []Other  Cardiovascular: []Palpitations: [x]Never []Occasionally []Frequently  Chest Pain: [x]No []Yes  Respiratory:  [x]Unlabored []Labored []Cough ([] Productive []Unproductive)  HCG Required: [x]No []Yes   Results: []Negative []Positive  Knowledge Level:        [x]Patient/Other verbalized understanding of pre-procedure instructions. [x]Assessment of post-op care needs (transportation, responsible caregiver)        [x]Able to discuss health care problems and how to deal with it.   Factors that Affect Teaching:        Language Barrier: [x]No []Yes - why:        Hearing Loss:        [x]No []Yes            Corrective Device:  []Yes []No        Vision Loss:           [x]No []Yes            Corrective Device:  []Yes []No        Memory Loss:       [x]No []Yes            []Short Term []Long Term  Motivational Level:  [x]Asks Questions                  []Extremely Anxious       [x]Seems Interested               []Seems Uninterested                  [x]Denies need for Education  Risk for Injury:  [x]Patient oriented to person, place and time  []History of frequent falls/loss of balance  Nutritional:  []Change in appetite   []Weight Gain   []Weight Loss  Functional:  []Requires assistance with ADL'sNursing Admission Record    Current Issues / Dorthey Damme / ER Visits:  No    Percentage of Pain Relief after Last Procedure:  75 %    How long lasted:  2 months    Radiology exams received during the last 12 months: Yes       [facility]18                                              [facility]       Imaging on chart: No         Imaging records requested: No  MRI exams received in the past 2 years:  No  Physical therapy during the last 6 months: No       Labs during the last 12 months: Yes    Education Provided:  [x] Review of Vandana Ruff  [x] Agreement

## 2018-04-04 ENCOUNTER — HOSPITAL ENCOUNTER (OUTPATIENT)
Dept: PAIN MANAGEMENT | Age: 58
Discharge: HOME | End: 2018-04-04
Payer: COMMERCIAL

## 2018-04-04 VITALS
HEIGHT: 67 IN | SYSTOLIC BLOOD PRESSURE: 184 MMHG | BODY MASS INDEX: 39.55 KG/M2 | WEIGHT: 252 LBS | RESPIRATION RATE: 20 BRPM | HEART RATE: 74 BPM | DIASTOLIC BLOOD PRESSURE: 78 MMHG | TEMPERATURE: 97.5 F | OXYGEN SATURATION: 96 %

## 2018-04-04 DIAGNOSIS — M25.562 CHRONIC PAIN OF BOTH KNEES: Primary | ICD-10-CM

## 2018-04-04 DIAGNOSIS — M25.561 CHRONIC PAIN OF BOTH KNEES: Primary | ICD-10-CM

## 2018-04-04 DIAGNOSIS — G89.29 CHRONIC PAIN OF BOTH KNEES: Primary | ICD-10-CM

## 2018-04-04 PROCEDURE — 99214 OFFICE O/P EST MOD 30 MIN: CPT

## 2018-04-04 PROCEDURE — 80307 DRUG TEST PRSMV CHEM ANLYZR: CPT

## 2018-04-04 ASSESSMENT — PAIN SCALES - GENERAL: PAINLEVEL_OUTOF10: 5

## 2018-04-04 ASSESSMENT — PAIN DESCRIPTION - PAIN TYPE: TYPE: CHRONIC PAIN

## 2018-04-04 ASSESSMENT — PAIN DESCRIPTION - LOCATION: LOCATION: BACK

## 2018-04-04 ASSESSMENT — ACTIVITIES OF DAILY LIVING (ADL): EFFECT OF PAIN ON DAILY ACTIVITIES: LIMITS ACTIVITIES

## 2018-04-04 ASSESSMENT — PAIN DESCRIPTION - ONSET: ONSET: ON-GOING

## 2018-04-04 ASSESSMENT — PAIN DESCRIPTION - PROGRESSION: CLINICAL_PROGRESSION: GRADUALLY WORSENING

## 2018-04-04 ASSESSMENT — PAIN DESCRIPTION - DESCRIPTORS: DESCRIPTORS: ACHING

## 2018-04-04 ASSESSMENT — PAIN DESCRIPTION - FREQUENCY: FREQUENCY: CONTINUOUS

## 2018-04-11 LAB
AMPHETAMINES, URINE: NEGATIVE NG/ML
BARBITURATES, URINE: NEGATIVE NG/ML
BENZODIAZEPINES, URINE: NEGATIVE NG/ML
CANNABINOIDS, URINE: NEGATIVE NG/ML
COCAINE METABOLITE, URINE: NEGATIVE NG/ML
CREATININE, URINE: 55.5 MG/DL (ref 20–300)
ETHANOL U, QUAN: NEGATIVE %
FENTANYL URINE: NEGATIVE PG/ML
MEPERIDINE, UR: NEGATIVE NG/ML
METHADONE SCREEN, URINE: NEGATIVE NG/ML
OPIATES, URINE: NEGATIVE NG/ML
OPIATES, URINE: NORMAL NG/ML
OXYCODONE/OXYMORPHONE, UR: NEGATIVE NG/ML
PH, URINE: 5.8 (ref 4.5–8.9)
PHENCYCLIDINE, URINE: NEGATIVE NG/ML
PROPOXYPHENE, URINE: NEGATIVE NG/ML

## 2018-05-11 RX ORDER — ZOLPIDEM TARTRATE 12.5 MG/1
12.5 TABLET, FILM COATED, EXTENDED RELEASE ORAL NIGHTLY PRN
Qty: 30 TABLET | Refills: 2 | OUTPATIENT
Start: 2018-05-11 | End: 2018-05-15 | Stop reason: SDUPTHER

## 2018-05-15 ENCOUNTER — HOSPITAL ENCOUNTER (OUTPATIENT)
Dept: CT IMAGING | Age: 58
Discharge: HOME | End: 2018-05-15
Payer: COMMERCIAL

## 2018-05-15 ENCOUNTER — HOSPITAL ENCOUNTER (OUTPATIENT)
Dept: PAIN MANAGEMENT | Age: 58
Discharge: HOME | End: 2018-05-15
Payer: COMMERCIAL

## 2018-05-15 VITALS
DIASTOLIC BLOOD PRESSURE: 62 MMHG | HEIGHT: 67 IN | WEIGHT: 254 LBS | TEMPERATURE: 98.2 F | OXYGEN SATURATION: 97 % | SYSTOLIC BLOOD PRESSURE: 155 MMHG | RESPIRATION RATE: 20 BRPM | HEART RATE: 74 BPM | BODY MASS INDEX: 39.87 KG/M2

## 2018-05-15 DIAGNOSIS — M51.36 DDD (DEGENERATIVE DISC DISEASE), LUMBAR: ICD-10-CM

## 2018-05-15 DIAGNOSIS — M25.561 CHRONIC PAIN OF BOTH KNEES: ICD-10-CM

## 2018-05-15 DIAGNOSIS — M25.562 CHRONIC PAIN OF BOTH KNEES: ICD-10-CM

## 2018-05-15 DIAGNOSIS — M51.9 LUMBAR DISC DISEASE: ICD-10-CM

## 2018-05-15 DIAGNOSIS — M51.16 DISPLACEMENT OF LUMBAR DISC WITH RADICULOPATHY: ICD-10-CM

## 2018-05-15 DIAGNOSIS — G89.29 CHRONIC PAIN OF BOTH KNEES: ICD-10-CM

## 2018-05-15 DIAGNOSIS — B02.21 NEURALGIA, GENICULATE: ICD-10-CM

## 2018-05-15 DIAGNOSIS — M51.36 LUMBAR DEGENERATIVE DISC DISEASE: ICD-10-CM

## 2018-05-15 LAB
GFR NON-AFRICAN AMERICAN: >60
PERFORMED ON: NORMAL
POC CREATININE: 0.9 MG/DL (ref 0.3–1.3)
POC SAMPLE TYPE: NORMAL

## 2018-05-15 PROCEDURE — 2500000003 HC RX 250 WO HCPCS

## 2018-05-15 PROCEDURE — 82565 ASSAY OF CREATININE: CPT

## 2018-05-15 PROCEDURE — 6360000002 HC RX W HCPCS

## 2018-05-15 PROCEDURE — 2580000003 HC RX 258

## 2018-05-15 PROCEDURE — 62323 NJX INTERLAMINAR LMBR/SAC: CPT

## 2018-05-15 PROCEDURE — 73701 CT LOWER EXTREMITY W/DYE: CPT

## 2018-05-15 PROCEDURE — 6360000004 HC RX CONTRAST MEDICATION: Performed by: ANESTHESIOLOGY

## 2018-05-15 PROCEDURE — 3209999900 FLUORO FOR SURGICAL PROCEDURES

## 2018-05-15 PROCEDURE — 20553 NJX 1/MLT TRIGGER POINTS 3/>: CPT

## 2018-05-15 PROCEDURE — 20552 NJX 1/MLT TRIGGER POINT 1/2: CPT

## 2018-05-15 PROCEDURE — 73702 CT LWR EXTREMITY W/O&W/DYE: CPT

## 2018-05-15 RX ORDER — BUPIVACAINE HYDROCHLORIDE 2.5 MG/ML
INJECTION, SOLUTION EPIDURAL; INFILTRATION; INTRACAUDAL
Status: COMPLETED | OUTPATIENT
Start: 2018-05-15 | End: 2018-05-15

## 2018-05-15 RX ORDER — TRIAMCINOLONE ACETONIDE 40 MG/ML
INJECTION, SUSPENSION INTRA-ARTICULAR; INTRAMUSCULAR
Status: COMPLETED | OUTPATIENT
Start: 2018-05-15 | End: 2018-05-15

## 2018-05-15 RX ORDER — LIDOCAINE HYDROCHLORIDE 20 MG/ML
INJECTION, SOLUTION EPIDURAL; INFILTRATION; INTRACAUDAL; PERINEURAL
Status: COMPLETED | OUTPATIENT
Start: 2018-05-15 | End: 2018-05-15

## 2018-05-15 RX ORDER — BUPIVACAINE HYDROCHLORIDE 5 MG/ML
INJECTION, SOLUTION EPIDURAL; INTRACAUDAL
Status: COMPLETED | OUTPATIENT
Start: 2018-05-15 | End: 2018-05-15

## 2018-05-15 RX ORDER — 0.9 % SODIUM CHLORIDE 0.9 %
VIAL (ML) INJECTION
Status: COMPLETED | OUTPATIENT
Start: 2018-05-15 | End: 2018-05-15

## 2018-05-15 RX ORDER — METHYLPREDNISOLONE ACETATE 80 MG/ML
INJECTION, SUSPENSION INTRA-ARTICULAR; INTRALESIONAL; INTRAMUSCULAR; SOFT TISSUE
Status: COMPLETED | OUTPATIENT
Start: 2018-05-15 | End: 2018-05-15

## 2018-05-15 RX ADMIN — METHYLPREDNISOLONE ACETATE 80 MG: 80 INJECTION, SUSPENSION INTRA-ARTICULAR; INTRALESIONAL; INTRAMUSCULAR; SOFT TISSUE at 16:36

## 2018-05-15 RX ADMIN — IOPAMIDOL 90 ML: 755 INJECTION, SOLUTION INTRAVENOUS at 15:44

## 2018-05-15 RX ADMIN — BUPIVACAINE HYDROCHLORIDE 9.5 ML: 5 INJECTION, SOLUTION EPIDURAL; INTRACAUDAL at 16:36

## 2018-05-15 RX ADMIN — BUPIVACAINE HYDROCHLORIDE 5 ML: 2.5 INJECTION, SOLUTION EPIDURAL; INFILTRATION; INTRACAUDAL at 16:35

## 2018-05-15 RX ADMIN — LIDOCAINE HYDROCHLORIDE 3 ML: 20 INJECTION, SOLUTION EPIDURAL; INFILTRATION; INTRACAUDAL; PERINEURAL at 16:34

## 2018-05-15 RX ADMIN — Medication 4 ML: at 16:35

## 2018-05-15 RX ADMIN — TRIAMCINOLONE ACETONIDE 20 MG: 40 INJECTION, SUSPENSION INTRA-ARTICULAR; INTRAMUSCULAR at 16:37

## 2018-05-15 ASSESSMENT — PAIN DESCRIPTION - DESCRIPTORS: DESCRIPTORS: ACHING;CONSTANT;THROBBING

## 2018-05-15 ASSESSMENT — PAIN - FUNCTIONAL ASSESSMENT: PAIN_FUNCTIONAL_ASSESSMENT: 0-10

## 2018-05-15 ASSESSMENT — ACTIVITIES OF DAILY LIVING (ADL): EFFECT OF PAIN ON DAILY ACTIVITIES: LIMITS ADL'S

## 2018-06-28 DIAGNOSIS — M51.36 DDD (DEGENERATIVE DISC DISEASE), LUMBAR: Primary | ICD-10-CM

## 2018-06-28 RX ORDER — HYDROCODONE BITARTRATE AND ACETAMINOPHEN 10; 325 MG/1; MG/1
1 TABLET ORAL EVERY 6 HOURS PRN
Qty: 90 TABLET | Refills: 0 | Status: SHIPPED | OUTPATIENT
Start: 2018-07-02 | End: 2018-08-16 | Stop reason: SDUPTHER

## 2018-07-11 ENCOUNTER — HOSPITAL ENCOUNTER (OUTPATIENT)
Dept: PAIN MANAGEMENT | Age: 58
Discharge: HOME | End: 2018-07-11
Payer: COMMERCIAL

## 2018-07-11 VITALS
DIASTOLIC BLOOD PRESSURE: 60 MMHG | SYSTOLIC BLOOD PRESSURE: 101 MMHG | BODY MASS INDEX: 39.24 KG/M2 | HEIGHT: 67 IN | TEMPERATURE: 97.5 F | RESPIRATION RATE: 18 BRPM | WEIGHT: 250 LBS | HEART RATE: 78 BPM | OXYGEN SATURATION: 97 %

## 2018-07-11 DIAGNOSIS — M51.9 LUMBAR DISC DISEASE: ICD-10-CM

## 2018-07-11 DIAGNOSIS — G89.29 CHRONIC PAIN OF BOTH KNEES: ICD-10-CM

## 2018-07-11 DIAGNOSIS — M79.18 MYOFASCIAL PAIN: ICD-10-CM

## 2018-07-11 DIAGNOSIS — M51.36 DDD (DEGENERATIVE DISC DISEASE), LUMBAR: ICD-10-CM

## 2018-07-11 DIAGNOSIS — M25.561 CHRONIC PAIN OF BOTH KNEES: ICD-10-CM

## 2018-07-11 DIAGNOSIS — G47.09 OTHER INSOMNIA: ICD-10-CM

## 2018-07-11 DIAGNOSIS — B02.21 NEURALGIA, GENICULATE: ICD-10-CM

## 2018-07-11 DIAGNOSIS — M54.2 CERVICALGIA: ICD-10-CM

## 2018-07-11 DIAGNOSIS — M25.562 CHRONIC PAIN OF BOTH KNEES: ICD-10-CM

## 2018-07-11 DIAGNOSIS — M51.16 DISPLACEMENT OF LUMBAR DISC WITH RADICULOPATHY: ICD-10-CM

## 2018-07-11 DIAGNOSIS — F40.240 CLAUSTROPHOBIA: ICD-10-CM

## 2018-07-11 DIAGNOSIS — M54.12 CERVICAL RADICULOPATHY: Primary | ICD-10-CM

## 2018-07-11 PROCEDURE — 99213 OFFICE O/P EST LOW 20 MIN: CPT

## 2018-07-11 PROCEDURE — 99214 OFFICE O/P EST MOD 30 MIN: CPT

## 2018-07-11 PROCEDURE — 99214 OFFICE O/P EST MOD 30 MIN: CPT | Performed by: NURSE PRACTITIONER

## 2018-07-11 RX ORDER — DIAZEPAM 5 MG/1
10 TABLET ORAL DAILY PRN
Qty: 4 TABLET | Refills: 0 | Status: SHIPPED | OUTPATIENT
Start: 2018-07-11 | End: 2018-08-31

## 2018-07-11 ASSESSMENT — PAIN DESCRIPTION - PROGRESSION: CLINICAL_PROGRESSION: NOT CHANGED

## 2018-07-11 ASSESSMENT — PAIN DESCRIPTION - FREQUENCY: FREQUENCY: CONTINUOUS

## 2018-07-11 ASSESSMENT — PAIN SCALES - GENERAL: PAINLEVEL_OUTOF10: 4

## 2018-07-11 ASSESSMENT — PAIN DESCRIPTION - DIRECTION: RADIATING_TOWARDS: INTO BILATERAL LOWER EXTREMITIES

## 2018-07-11 ASSESSMENT — PAIN DESCRIPTION - LOCATION: LOCATION: BACK;NECK

## 2018-07-11 ASSESSMENT — PAIN DESCRIPTION - PAIN TYPE: TYPE: CHRONIC PAIN

## 2018-07-11 ASSESSMENT — PAIN DESCRIPTION - DESCRIPTORS: DESCRIPTORS: ACHING;CONSTANT;THROBBING

## 2018-07-11 ASSESSMENT — ENCOUNTER SYMPTOMS
BACK PAIN: 1
CONSTIPATION: 0

## 2018-07-11 ASSESSMENT — PAIN DESCRIPTION - ONSET: ONSET: ON-GOING

## 2018-07-11 ASSESSMENT — ACTIVITIES OF DAILY LIVING (ADL): EFFECT OF PAIN ON DAILY ACTIVITIES: LIMITS ACTIVITY

## 2018-07-11 ASSESSMENT — PAIN DESCRIPTION - ORIENTATION: ORIENTATION: LOWER;UPPER

## 2018-07-11 NOTE — PROGRESS NOTES
Nursing Admission Record    Current Issues / Falls / ER Visits:  No    Percentage of Pain Relief after Last Procedure:  50 %    How long lasted:  2 months    Radiology exams received during the last 12 months: Yes       When 1/2018                                              Where [address] [place] [address] on chart: Yes         Imaging records requested: No  MRI exams received in the past 2 years:  No  Physical therapy during the last 6 months: Yes       When: currently                                             Where  [facility] CHILDREN during the last 12 months: Yes    Education Provided:  [x] Review of Jackelin Barraza  [x] Agreement Review  [] Compliance Issues Discussed    [] Cognitive Behavior Needs [x] Exercise [] Review of Test [] Financial Issues  [x] Tobacco/Alcohol Use [x] Teaching [] New Patient [] Picture Obtained    Physician Plan:  [] Outgoing Referral  [] Pharmacy Consult  [] Test Ordered   [] Obtained Test Results / Consult Notes  [] UDS due at next visit, verified per EPIC      [] Suspected Physical Abuse or Suicide Risk assessed - IF YES COMPLETE QUESTIONS BELOW    If any of the following questions are answered yes - contact attending physician for referral:    Has been considering harming self to escape stress, pain problems? [] YES  [x] NO  Has a suicide plan? [] YES  [x] NO  Has attempted suicide in the past?   [] YES  [x] NO  Has a close friend or family member who committed suicide? [] YES  [x] NO    Patient Referred To :      Additional Notes:    Assessment Completed by:  Electronically signed by Renetta Lockett RN on 7/11/2018 at 2:49 PM

## 2018-07-17 ENCOUNTER — TELEPHONE (OUTPATIENT)
Dept: PAIN MANAGEMENT | Age: 58
End: 2018-07-17

## 2018-08-01 ENCOUNTER — HOSPITAL ENCOUNTER (OUTPATIENT)
Dept: MRI IMAGING | Age: 58
Discharge: HOME | End: 2018-08-01
Payer: COMMERCIAL

## 2018-08-01 DIAGNOSIS — M54.12 CERVICAL RADICULOPATHY: ICD-10-CM

## 2018-08-01 DIAGNOSIS — M79.18 MYOFASCIAL PAIN: ICD-10-CM

## 2018-08-01 DIAGNOSIS — M51.36 DDD (DEGENERATIVE DISC DISEASE), LUMBAR: ICD-10-CM

## 2018-08-01 DIAGNOSIS — M51.9 LUMBAR DISC DISEASE: ICD-10-CM

## 2018-08-01 PROCEDURE — 72156 MRI NECK SPINE W/O & W/DYE: CPT

## 2018-08-01 PROCEDURE — 6360000004 HC RX CONTRAST MEDICATION

## 2018-08-01 PROCEDURE — A9577 INJ MULTIHANCE: HCPCS

## 2018-08-01 PROCEDURE — 72148 MRI LUMBAR SPINE W/O DYE: CPT

## 2018-08-01 RX ADMIN — GADOBENATE DIMEGLUMINE 20 ML: 529 INJECTION, SOLUTION INTRAVENOUS at 19:18

## 2018-08-16 ENCOUNTER — HOSPITAL ENCOUNTER (OUTPATIENT)
Dept: PAIN MANAGEMENT | Age: 58
Discharge: HOME | End: 2018-08-16
Payer: COMMERCIAL

## 2018-08-16 VITALS
OXYGEN SATURATION: 97 % | HEIGHT: 67 IN | TEMPERATURE: 98 F | HEART RATE: 74 BPM | DIASTOLIC BLOOD PRESSURE: 75 MMHG | WEIGHT: 250 LBS | SYSTOLIC BLOOD PRESSURE: 172 MMHG | RESPIRATION RATE: 18 BRPM | BODY MASS INDEX: 39.24 KG/M2

## 2018-08-16 DIAGNOSIS — B02.21 NEURALGIA, GENICULATE: ICD-10-CM

## 2018-08-16 DIAGNOSIS — M51.36 DDD (DEGENERATIVE DISC DISEASE), LUMBAR: ICD-10-CM

## 2018-08-16 DIAGNOSIS — M51.9 LUMBAR DISC DISEASE: ICD-10-CM

## 2018-08-16 DIAGNOSIS — M51.16 DISPLACEMENT OF LUMBAR DISC WITH RADICULOPATHY: ICD-10-CM

## 2018-08-16 PROCEDURE — 20553 NJX 1/MLT TRIGGER POINTS 3/>: CPT | Performed by: NURSE PRACTITIONER

## 2018-08-16 PROCEDURE — 20553 NJX 1/MLT TRIGGER POINTS 3/>: CPT

## 2018-08-16 PROCEDURE — 2500000003 HC RX 250 WO HCPCS

## 2018-08-16 RX ORDER — BUPIVACAINE HYDROCHLORIDE 5 MG/ML
INJECTION, SOLUTION EPIDURAL; INTRACAUDAL
Status: COMPLETED | OUTPATIENT
Start: 2018-08-16 | End: 2018-08-16

## 2018-08-16 RX ORDER — TIZANIDINE 4 MG/1
4 TABLET ORAL 2 TIMES DAILY PRN
Qty: 45 TABLET | Refills: 2 | Status: SHIPPED | OUTPATIENT
Start: 2018-08-16 | End: 2019-03-25

## 2018-08-16 RX ORDER — LIDOCAINE HYDROCHLORIDE 10 MG/ML
INJECTION, SOLUTION EPIDURAL; INFILTRATION; INTRACAUDAL; PERINEURAL
Status: COMPLETED | OUTPATIENT
Start: 2018-08-16 | End: 2018-08-16

## 2018-08-16 RX ORDER — PREGABALIN 150 MG/1
150 CAPSULE ORAL 3 TIMES DAILY
Qty: 90 CAPSULE | Refills: 2 | Status: SHIPPED | OUTPATIENT
Start: 2018-08-16 | End: 2019-03-25

## 2018-08-16 RX ORDER — HYDROCODONE BITARTRATE AND ACETAMINOPHEN 10; 325 MG/1; MG/1
1 TABLET ORAL EVERY 6 HOURS PRN
Qty: 90 TABLET | Refills: 0 | Status: SHIPPED | OUTPATIENT
Start: 2018-08-16 | End: 2018-10-15 | Stop reason: SDUPTHER

## 2018-08-16 RX ADMIN — LIDOCAINE HYDROCHLORIDE 5 ML: 10 INJECTION, SOLUTION EPIDURAL; INFILTRATION; INTRACAUDAL; PERINEURAL at 16:31

## 2018-08-16 RX ADMIN — BUPIVACAINE HYDROCHLORIDE 5 ML: 5 INJECTION, SOLUTION EPIDURAL; INTRACAUDAL at 16:36

## 2018-08-16 RX ADMIN — BUPIVACAINE HYDROCHLORIDE 5 ML: 5 INJECTION, SOLUTION EPIDURAL; INTRACAUDAL at 16:31

## 2018-08-16 RX ADMIN — LIDOCAINE HYDROCHLORIDE 5 ML: 10 INJECTION, SOLUTION EPIDURAL; INFILTRATION; INTRACAUDAL; PERINEURAL at 16:36

## 2018-08-16 ASSESSMENT — PAIN - FUNCTIONAL ASSESSMENT: PAIN_FUNCTIONAL_ASSESSMENT: 0-10

## 2018-08-16 ASSESSMENT — PAIN DESCRIPTION - DESCRIPTORS: DESCRIPTORS: ACHING;CONSTANT;THROBBING

## 2018-08-16 ASSESSMENT — ACTIVITIES OF DAILY LIVING (ADL): EFFECT OF PAIN ON DAILY ACTIVITIES: LIMITS ACTIVITIES

## 2018-08-16 NOTE — PROCEDURES
[facility] Physical & Pain Medicine    Patient Name: Shell Sin    : 1960                    Age: 61 y.o. Sex: female    Date: 2018    Pre-op Diagnosis: Myofascial Pain/ Muscle Spasms/ Cervicalgia    Post-op Diagnosis: Myofascial Pain/ Muscle Spasms/ Cervicalgia    Procedure: Cervical Trigger Point Injections    Performing Procedure: ELAINE Moncada - BC, DAMASO    Previously Had Procedure:  [x] Yes   [] No  What Percentage Did It Help: 50%  How Long Was Relief Obtained: 2 months    Patient Vitals for the past 24 hrs:   BP Temp Temp src Pulse Resp SpO2 Height Weight   18 1613 (!) 172/75 98 °F (36.7 °C) Oral 74 18 97 % 5' 7\" (1.702 m) 250 lb (113.4 kg)       Description of Procedure:    After a brief physical assessment and failure to improve with conservative measures the patient presented for Cervical Trigger Point Injections The indications, limitations and possible complications were discussed with the patient and the patient elected to proceed with the procedure. After voluntary, informed and signed consent obtained the patient was placed in a seated position. Appropriate time out was obtained per policy. The area of maximal tenderness was palpated over the  [x] Right  [] Left  [] Bilateral Cervical  [x] Splenius  [x] Trapezius  [x] Rhomboid. The skin overlying these areas was marked with a skin marker. The skin overlying the proposed injection sites were then sprayed with Gebauer's Solution while protecting patient eyes. The areas were then prepped in a sterile fashion with Chloro-Prep.  Each trigger point of the  [x] Right   [] Left  [] Bilateral Cervical  [x] Splenius  [x] Trapezius  [x] Rhomboid was injected with approximately 2 ml of a solution of 5 ml of 1% Lidocaine Plain and 5 ml of 0.5% Marcaine Plain    [] with Decadron 0.5 ml (10mg/ml)   [] with Toradol 0.5 ml (15mg/ml)  (approximately 20 ml total) using a 25 gauge 1 1/2 inch

## 2018-08-24 ENCOUNTER — HOSPITAL ENCOUNTER (OUTPATIENT)
Dept: PAIN MANAGEMENT | Age: 58
Discharge: HOME | End: 2018-08-24
Payer: COMMERCIAL

## 2018-08-24 VITALS
SYSTOLIC BLOOD PRESSURE: 147 MMHG | RESPIRATION RATE: 18 BRPM | TEMPERATURE: 97.6 F | DIASTOLIC BLOOD PRESSURE: 54 MMHG | HEART RATE: 73 BPM | OXYGEN SATURATION: 94 %

## 2018-08-24 DIAGNOSIS — M51.36 LUMBAR DEGENERATIVE DISC DISEASE: ICD-10-CM

## 2018-08-24 PROCEDURE — 62323 NJX INTERLAMINAR LMBR/SAC: CPT

## 2018-08-24 PROCEDURE — 3209999900 FLUORO FOR SURGICAL PROCEDURES

## 2018-08-24 PROCEDURE — 2500000003 HC RX 250 WO HCPCS

## 2018-08-24 PROCEDURE — 2580000003 HC RX 258

## 2018-08-24 PROCEDURE — 6360000002 HC RX W HCPCS

## 2018-08-24 RX ORDER — LIDOCAINE HYDROCHLORIDE 10 MG/ML
INJECTION, SOLUTION EPIDURAL; INFILTRATION; INTRACAUDAL; PERINEURAL
Status: COMPLETED | OUTPATIENT
Start: 2018-08-24 | End: 2018-08-24

## 2018-08-24 RX ORDER — METHYLPREDNISOLONE ACETATE 80 MG/ML
INJECTION, SUSPENSION INTRA-ARTICULAR; INTRALESIONAL; INTRAMUSCULAR; SOFT TISSUE
Status: COMPLETED | OUTPATIENT
Start: 2018-08-24 | End: 2018-08-24

## 2018-08-24 RX ORDER — 0.9 % SODIUM CHLORIDE 0.9 %
VIAL (ML) INJECTION
Status: COMPLETED | OUTPATIENT
Start: 2018-08-24 | End: 2018-08-24

## 2018-08-24 RX ADMIN — Medication 3 ML: at 09:13

## 2018-08-24 RX ADMIN — LIDOCAINE HYDROCHLORIDE 5 ML: 10 INJECTION, SOLUTION EPIDURAL; INFILTRATION; INTRACAUDAL; PERINEURAL at 09:12

## 2018-08-24 RX ADMIN — METHYLPREDNISOLONE ACETATE 80 MG: 80 INJECTION, SUSPENSION INTRA-ARTICULAR; INTRALESIONAL; INTRAMUSCULAR; SOFT TISSUE at 09:12

## 2018-08-24 RX ADMIN — Medication 2 ML: at 09:12

## 2018-08-24 NOTE — PROGRESS NOTES
Procedure:  Level of Consciousness: [x]Alert []Oriented []Disoriented []Lethargic  Anxiety Level: [x]Calm []Anxious []Depressed []Other  Skin: [x]Warm [x]Dry []Cool []Moist []Intact []Other  Cardiovascular: []Palpitations: [x]Never []Occasionally []Frequently  Chest Pain: [x]No []Yes  Respiratory:  []Unlabored []Labored []Cough ([] Productive []Unproductive)  HCG Required: [x]No []Yes   Results: []Negative []Positive  Knowledge Level:        []Patient/Other verbalized understanding of pre-procedure instructions. []Assessment of post-op care needs (transportation, responsible caregiver)        []Able to discuss health care problems and how to deal with it.   Factors that Affect Teaching:        Language Barrier: []No []Yes - why:        Hearing Loss:        []No []Yes            Corrective Device:  []Yes []No        Vision Loss:           []No []Yes            Corrective Device:  []Yes []No        Memory Loss:       []No []Yes            []Short Term []Long Term  Motivational Level:  [x]Asks Questions                  []Extremely Anxious       []Seems Interested               []Seems Uninterested                  []Denies need for Education  Risk for Injury:  []Patient oriented to person, place and time  []History of frequent falls/loss of balance  Nutritional:  []Change in appetite   []Weight Gain   []Weight Loss  Functional:  []Requires assistance with ADL's

## 2018-08-27 ENCOUNTER — TELEPHONE (OUTPATIENT)
Dept: PAIN MANAGEMENT | Age: 58
End: 2018-08-27

## 2018-08-27 NOTE — TELEPHONE ENCOUNTER
Patient had a LESI with Dr. Maida Cooper on Friday 8/24/18. Called patient to follow-up on what helped, what didn't, and how the patient is feeling. No answer, nor did voicemail . Patient may return phone call to nurse line at [phone], option 3. Please leave message.

## 2018-10-15 ENCOUNTER — HOSPITAL ENCOUNTER (OUTPATIENT)
Dept: PAIN MANAGEMENT | Age: 58
Discharge: HOME | End: 2018-10-15
Payer: COMMERCIAL

## 2018-10-15 VITALS
BODY MASS INDEX: 39.24 KG/M2 | DIASTOLIC BLOOD PRESSURE: 69 MMHG | HEART RATE: 84 BPM | TEMPERATURE: 97.3 F | RESPIRATION RATE: 18 BRPM | SYSTOLIC BLOOD PRESSURE: 148 MMHG | HEIGHT: 67 IN | OXYGEN SATURATION: 97 % | WEIGHT: 250 LBS

## 2018-10-15 DIAGNOSIS — M25.562 CHRONIC PAIN OF BOTH KNEES: ICD-10-CM

## 2018-10-15 DIAGNOSIS — M51.36 DDD (DEGENERATIVE DISC DISEASE), LUMBAR: ICD-10-CM

## 2018-10-15 DIAGNOSIS — M51.9 LUMBAR DISC DISEASE: ICD-10-CM

## 2018-10-15 DIAGNOSIS — M48.02 FORAMINAL STENOSIS OF CERVICAL REGION: ICD-10-CM

## 2018-10-15 DIAGNOSIS — M25.561 CHRONIC PAIN OF BOTH KNEES: ICD-10-CM

## 2018-10-15 DIAGNOSIS — M47.816 FACET ARTHRITIS OF LUMBAR REGION: ICD-10-CM

## 2018-10-15 DIAGNOSIS — B02.21 NEURALGIA, GENICULATE: ICD-10-CM

## 2018-10-15 DIAGNOSIS — G89.29 CHRONIC PAIN OF BOTH KNEES: ICD-10-CM

## 2018-10-15 DIAGNOSIS — M48.00 CENTRAL STENOSIS OF SPINAL CANAL: ICD-10-CM

## 2018-10-15 DIAGNOSIS — M51.16 DISPLACEMENT OF LUMBAR DISC WITH RADICULOPATHY: ICD-10-CM

## 2018-10-15 PROCEDURE — 99214 OFFICE O/P EST MOD 30 MIN: CPT

## 2018-10-15 PROCEDURE — 99214 OFFICE O/P EST MOD 30 MIN: CPT | Performed by: NURSE PRACTITIONER

## 2018-10-15 RX ORDER — HYDROCODONE BITARTRATE AND ACETAMINOPHEN 10; 325 MG/1; MG/1
1 TABLET ORAL EVERY 6 HOURS PRN
Qty: 90 TABLET | Refills: 0 | Status: SHIPPED | OUTPATIENT
Start: 2018-10-19 | End: 2019-03-25

## 2018-10-15 ASSESSMENT — PAIN DESCRIPTION - ONSET: ONSET: ON-GOING

## 2018-10-15 ASSESSMENT — PAIN DESCRIPTION - PAIN TYPE: TYPE: CHRONIC PAIN

## 2018-10-15 ASSESSMENT — PAIN DESCRIPTION - ORIENTATION: ORIENTATION: LOWER;UPPER

## 2018-10-15 ASSESSMENT — PAIN DESCRIPTION - FREQUENCY: FREQUENCY: CONTINUOUS

## 2018-10-15 ASSESSMENT — ENCOUNTER SYMPTOMS
CONSTIPATION: 0
BACK PAIN: 1

## 2018-10-15 ASSESSMENT — PAIN DESCRIPTION - LOCATION: LOCATION: BACK;NECK

## 2018-10-15 ASSESSMENT — PAIN DESCRIPTION - DESCRIPTORS: DESCRIPTORS: ACHING;CONSTANT;THROBBING

## 2018-10-15 ASSESSMENT — ACTIVITIES OF DAILY LIVING (ADL): EFFECT OF PAIN ON DAILY ACTIVITIES: LIMITS ACTIVITY

## 2018-10-15 ASSESSMENT — PAIN SCALES - GENERAL: PAINLEVEL_OUTOF10: 6

## 2018-10-15 ASSESSMENT — PAIN DESCRIPTION - DIRECTION: RADIATING_TOWARDS: INTO BILATERAL HIPS

## 2018-10-15 ASSESSMENT — PAIN DESCRIPTION - PROGRESSION: CLINICAL_PROGRESSION: NOT CHANGED

## 2018-10-15 NOTE — PROGRESS NOTES
Pain as bad as you can imagine              Score____6_______    2. What number best describes how, during the past week, pain has interfered with you enjoyment of life? 0           1           2         3          4       5        6        7        8         9      10   Does not                                                                                              Completely     interfere                                                                                                 interferes            Score____10_______    3. What number best describes how, during the past week, pain has interfered with you general activity?        0     1               2           3          4         5        6        7       8         9       10   Does not                                                                                               Completely    interfere                                                                                                  interferes           Score___10________                                                                                Total Score ÷ 3 = Score                                                                                                 Score___8.7_____
extrinsic pressure on the   thecal sac opposite C3-4 and C4-5 due to disc bulging. There is no evidence of abnormal enhancement of the vertebral bodies,   intervertebral discs or the thecal sac at any level. No abnormal   enhancement in the visualized soft tissues. The visualized indigo, the medulla oblongata and cerebellum appear   normal.       Impression   A moderate spinal stenosis at C4-5. The right neural   foraminal stenosis at C4-5 and C7-T1. Anterior spinal fusion at C5, C6 and C7. Normal alignment. Cervical spondylosis. Signed by Dr Hunter Wyatt on 8/2/2018 8:07 AM         Narrative   Examination. MRI LUMBAR SPINE WO CONTRAST   History: The patient complains of low back pain and bilateral leg   weakness. No history of trauma surgery or malignancy. The multiplanar, multisequence MR imaging of the lumbar spine is   performed. There is no previous study for comparison. There is normal curve and alignment. The vertebral body heights are   normal. The bone marrow signal of the vertebral bodies and the   posterior elements are normal.   There is moderate loss of signal of the intervertebral disc throughout   the lumbar spine without loss of disc heights. L1, space L1-2. There is mild disc bulging. There is bilateral facet   arthropathy. The neural foramina and spinal canal are patent. L2, space L2-3. A mild disc bulging. There is bilateral facet   arthropathy and a mild ligamentum hypertrophy. The neural foramina   spinal canal are patent. L3, space L3-4. There is moderate diffuse disc bulging. There is   bilateral facet arthropathy and ligamentum hypertrophy. The neural   foramina and spinal canal are patent. L4, space L4-5. There is moderate diffuse disc bulging. There is   bilateral facet arthropathy and mild ligamentum hypertrophy. The   neural foramina and spinal canal are patent. L5, space L5-S1. There is mild disc bulging. There is bilateral facet   arthropathy.  The neural foramina

## 2018-11-02 ENCOUNTER — HOSPITAL ENCOUNTER (OUTPATIENT)
Dept: PAIN MANAGEMENT | Age: 58
Discharge: HOME | End: 2018-11-02
Payer: COMMERCIAL

## 2018-11-02 VITALS
SYSTOLIC BLOOD PRESSURE: 141 MMHG | OXYGEN SATURATION: 92 % | RESPIRATION RATE: 18 BRPM | HEART RATE: 77 BPM | DIASTOLIC BLOOD PRESSURE: 61 MMHG | TEMPERATURE: 96.6 F

## 2018-11-02 DIAGNOSIS — M47.816 LUMBAR FACET JOINT SYNDROME: ICD-10-CM

## 2018-11-02 PROCEDURE — 64493 INJ PARAVERT F JNT L/S 1 LEV: CPT

## 2018-11-02 PROCEDURE — 2500000003 HC RX 250 WO HCPCS

## 2018-11-02 PROCEDURE — 64494 INJ PARAVERT F JNT L/S 2 LEV: CPT

## 2018-11-02 PROCEDURE — 3209999900 FLUORO FOR SURGICAL PROCEDURES

## 2018-11-02 PROCEDURE — 6360000002 HC RX W HCPCS

## 2018-11-02 RX ORDER — LIDOCAINE HYDROCHLORIDE 10 MG/ML
INJECTION, SOLUTION EPIDURAL; INFILTRATION; INTRACAUDAL; PERINEURAL
Status: COMPLETED | OUTPATIENT
Start: 2018-11-02 | End: 2018-11-02

## 2018-11-02 RX ORDER — BUPIVACAINE HYDROCHLORIDE 5 MG/ML
INJECTION, SOLUTION EPIDURAL; INTRACAUDAL
Status: COMPLETED | OUTPATIENT
Start: 2018-11-02 | End: 2018-11-02

## 2018-11-02 RX ORDER — METHYLPREDNISOLONE ACETATE 80 MG/ML
INJECTION, SUSPENSION INTRA-ARTICULAR; INTRALESIONAL; INTRAMUSCULAR; SOFT TISSUE
Status: COMPLETED | OUTPATIENT
Start: 2018-11-02 | End: 2018-11-02

## 2018-11-02 RX ADMIN — BUPIVACAINE HYDROCHLORIDE 5 ML: 5 INJECTION, SOLUTION EPIDURAL; INTRACAUDAL at 09:12

## 2018-11-02 RX ADMIN — METHYLPREDNISOLONE ACETATE 80 MG: 80 INJECTION, SUSPENSION INTRA-ARTICULAR; INTRALESIONAL; INTRAMUSCULAR; SOFT TISSUE at 09:12

## 2018-11-02 RX ADMIN — LIDOCAINE HYDROCHLORIDE 20 ML: 10 INJECTION, SOLUTION EPIDURAL; INFILTRATION; INTRACAUDAL; PERINEURAL at 09:11

## 2018-11-08 ENCOUNTER — TELEPHONE (OUTPATIENT)
Dept: PAIN MANAGEMENT | Age: 58
End: 2018-11-08

## 2018-11-12 DIAGNOSIS — F19.982 DRUG-INDUCED INSOMNIA (HCC): Primary | ICD-10-CM

## 2018-11-12 RX ORDER — ZOLPIDEM TARTRATE 12.5 MG/1
12.5 TABLET, FILM COATED, EXTENDED RELEASE ORAL NIGHTLY PRN
Qty: 30 TABLET | Refills: 2 | Status: SHIPPED | OUTPATIENT
Start: 2018-11-12 | End: 2019-02-12 | Stop reason: SDUPTHER

## 2018-12-17 ENCOUNTER — HOSPITAL ENCOUNTER (OUTPATIENT)
Dept: PAIN MANAGEMENT | Age: 58
Discharge: HOME | End: 2018-12-17
Payer: COMMERCIAL

## 2018-12-17 VITALS
SYSTOLIC BLOOD PRESSURE: 117 MMHG | TEMPERATURE: 98.2 F | OXYGEN SATURATION: 96 % | HEART RATE: 78 BPM | BODY MASS INDEX: 38.45 KG/M2 | DIASTOLIC BLOOD PRESSURE: 75 MMHG | HEIGHT: 67 IN | RESPIRATION RATE: 18 BRPM | WEIGHT: 245 LBS

## 2018-12-17 DIAGNOSIS — M25.562 CHRONIC PAIN OF BOTH KNEES: ICD-10-CM

## 2018-12-17 DIAGNOSIS — M51.36 DDD (DEGENERATIVE DISC DISEASE), LUMBAR: ICD-10-CM

## 2018-12-17 DIAGNOSIS — G89.29 CHRONIC PAIN OF BOTH KNEES: ICD-10-CM

## 2018-12-17 DIAGNOSIS — M51.36 BULGING LUMBAR DISC: ICD-10-CM

## 2018-12-17 DIAGNOSIS — M51.16 DISPLACEMENT OF LUMBAR DISC WITH RADICULOPATHY: ICD-10-CM

## 2018-12-17 DIAGNOSIS — B02.21 NEURALGIA, GENICULATE: ICD-10-CM

## 2018-12-17 DIAGNOSIS — M25.561 CHRONIC PAIN OF BOTH KNEES: ICD-10-CM

## 2018-12-17 DIAGNOSIS — M51.9 LUMBAR DISC DISEASE: ICD-10-CM

## 2018-12-17 PROCEDURE — 99213 OFFICE O/P EST LOW 20 MIN: CPT

## 2018-12-17 PROCEDURE — 99213 OFFICE O/P EST LOW 20 MIN: CPT | Performed by: NURSE PRACTITIONER

## 2018-12-17 RX ORDER — HYDROCODONE BITARTRATE AND ACETAMINOPHEN 10; 325 MG/1; MG/1
1 TABLET ORAL EVERY 8 HOURS PRN
Qty: 90 TABLET | Refills: 0 | Status: SHIPPED | OUTPATIENT
Start: 2018-12-17 | End: 2019-02-12 | Stop reason: SDUPTHER

## 2018-12-17 RX ORDER — PREGABALIN 150 MG/1
150 CAPSULE ORAL 3 TIMES DAILY
Qty: 90 CAPSULE | Refills: 2 | Status: SHIPPED | OUTPATIENT
Start: 2018-12-17 | End: 2019-03-25 | Stop reason: SDUPTHER

## 2018-12-17 RX ORDER — LISINOPRIL 20 MG/1
20 TABLET ORAL DAILY
COMMUNITY

## 2018-12-17 RX ORDER — TIZANIDINE 4 MG/1
4 TABLET ORAL 2 TIMES DAILY
Qty: 60 TABLET | Refills: 2 | Status: SHIPPED | OUTPATIENT
Start: 2018-12-17 | End: 2019-03-25 | Stop reason: SDUPTHER

## 2018-12-17 ASSESSMENT — PAIN DESCRIPTION - PAIN TYPE: TYPE: CHRONIC PAIN

## 2018-12-17 ASSESSMENT — PAIN DESCRIPTION - DIRECTION: RADIATING_TOWARDS: INTO LEFT HIP

## 2018-12-17 ASSESSMENT — ENCOUNTER SYMPTOMS
BACK PAIN: 1
CONSTIPATION: 0

## 2018-12-17 ASSESSMENT — PAIN DESCRIPTION - FREQUENCY: FREQUENCY: CONTINUOUS

## 2018-12-17 ASSESSMENT — ACTIVITIES OF DAILY LIVING (ADL): EFFECT OF PAIN ON DAILY ACTIVITIES: LIMITS ACTIVITY

## 2018-12-17 ASSESSMENT — PAIN DESCRIPTION - DESCRIPTORS: DESCRIPTORS: ACHING;CONSTANT;DULL;RADIATING

## 2018-12-17 ASSESSMENT — PAIN SCALES - GENERAL: PAINLEVEL_OUTOF10: 7

## 2018-12-17 ASSESSMENT — PAIN DESCRIPTION - ORIENTATION: ORIENTATION: LOWER

## 2018-12-17 ASSESSMENT — PAIN DESCRIPTION - PROGRESSION: CLINICAL_PROGRESSION: NOT CHANGED

## 2018-12-17 ASSESSMENT — PAIN DESCRIPTION - LOCATION: LOCATION: BACK

## 2018-12-17 ASSESSMENT — PAIN DESCRIPTION - ONSET: ONSET: ON-GOING

## 2018-12-17 NOTE — PROGRESS NOTES
CONTRAST  History: The patient complains of neck pain and bilateral hand  numbness. She has a previous history of cervical spinal fusion. The multiplanar, multisequence MR imaging of the cervical spine is  performed before and after intravenous contrast enhancement. There is no previous study for comparison. There is loss of cervical lordosis with a mild reversal.  The alignment is normal.  There is hardware interbody fusion of C5, C6 and C7. There is normal  alignment. There are magnetic susceptibility artifacts obscuring the  bony and soft tissue structures of these vertebrae and the adjacent  soft tissues. The bone marrow signal of the remaining cervical spinal vertebrae and  the posterior elements appear normal.  The atlantoaxial articulation is normal in intact. The ligaments are  intact with calcification of the transverse ligament. There is loss of height and signal of the intervertebral disks at C3-4  and C4-5 representing chronic degenerative process. C2, space C2-3. There is mild disc bulging. There is bilateral facet  arthropathy. The neural foramina and spinal canal are patent. C3, space C3-4. There is moderate diffuse disc bulging. Moderately  prominent uncinate spurs bilaterally are seen. There is bilateral  facet arthropathy. The neural foramina spinal canal are patent. C4, space C4-5. Moderate diffuse disc bulging. There are posterior  osteophytes and uncinate spurs. There is moderate spinal stenosis and  right neural foraminal stenosis. C5, space C5-C6. There are posterior osteophytes. There is bilateral  facet arthropathy. The neural foramina spinal canal are patent. C6, space C6-C7. There are posterior osteophytes and bilateral facet  arthropathy. The neural foramina spinal canal are patent. C7, space C7-T1. There is mild disc bulging. There are posterior  osteophytes, uncinate spurs and asymmetrical right facetal  arthropathy. There is a resultant right neural foraminal stenosis.

## 2019-02-08 ENCOUNTER — HOSPITAL ENCOUNTER (OUTPATIENT)
Dept: PAIN MANAGEMENT | Age: 59
Discharge: HOME | End: 2019-02-08
Payer: COMMERCIAL

## 2019-02-08 VITALS
TEMPERATURE: 96.9 F | DIASTOLIC BLOOD PRESSURE: 59 MMHG | HEART RATE: 68 BPM | SYSTOLIC BLOOD PRESSURE: 133 MMHG | RESPIRATION RATE: 20 BRPM | OXYGEN SATURATION: 98 %

## 2019-02-08 DIAGNOSIS — M54.12 CERVICAL NEURALGIA: ICD-10-CM

## 2019-02-08 PROCEDURE — 64494 INJ PARAVERT F JNT L/S 2 LEV: CPT

## 2019-02-08 PROCEDURE — 64493 INJ PARAVERT F JNT L/S 1 LEV: CPT

## 2019-02-08 PROCEDURE — 2500000003 HC RX 250 WO HCPCS

## 2019-02-08 PROCEDURE — 6360000002 HC RX W HCPCS

## 2019-02-08 PROCEDURE — 3209999900 FLUORO FOR SURGICAL PROCEDURES

## 2019-02-08 RX ORDER — BUPIVACAINE HYDROCHLORIDE 5 MG/ML
INJECTION, SOLUTION EPIDURAL; INTRACAUDAL
Status: COMPLETED | OUTPATIENT
Start: 2019-02-08 | End: 2019-02-08

## 2019-02-08 RX ORDER — LIDOCAINE HYDROCHLORIDE 10 MG/ML
INJECTION, SOLUTION EPIDURAL; INFILTRATION; INTRACAUDAL; PERINEURAL
Status: COMPLETED | OUTPATIENT
Start: 2019-02-08 | End: 2019-02-08

## 2019-02-08 RX ORDER — METHYLPREDNISOLONE ACETATE 80 MG/ML
INJECTION, SUSPENSION INTRA-ARTICULAR; INTRALESIONAL; INTRAMUSCULAR; SOFT TISSUE
Status: COMPLETED | OUTPATIENT
Start: 2019-02-08 | End: 2019-02-08

## 2019-02-08 RX ADMIN — LIDOCAINE HYDROCHLORIDE 20 ML: 10 INJECTION, SOLUTION EPIDURAL; INFILTRATION; INTRACAUDAL; PERINEURAL at 10:31

## 2019-02-08 RX ADMIN — METHYLPREDNISOLONE ACETATE 80 MG: 80 INJECTION, SUSPENSION INTRA-ARTICULAR; INTRALESIONAL; INTRAMUSCULAR; SOFT TISSUE at 10:32

## 2019-02-08 RX ADMIN — BUPIVACAINE HYDROCHLORIDE 5 ML: 5 INJECTION, SOLUTION EPIDURAL; INTRACAUDAL at 10:32

## 2019-02-08 ASSESSMENT — PAIN - FUNCTIONAL ASSESSMENT: PAIN_FUNCTIONAL_ASSESSMENT: 0-10

## 2019-02-11 ENCOUNTER — TELEPHONE (OUTPATIENT)
Dept: PAIN MANAGEMENT | Age: 59
End: 2019-02-11

## 2019-02-11 NOTE — TELEPHONE ENCOUNTER
Marcus Cannon was called as a follow up from her bilateral lumbar facet injection at the levels of l4-5 and l5-s1 bilateral.  This injection was done on Friday, February 8th. Marcus Cannon did not answer but I left a message.

## 2019-02-12 DIAGNOSIS — F19.982 DRUG-INDUCED INSOMNIA (HCC): ICD-10-CM

## 2019-02-12 DIAGNOSIS — M51.36 DDD (DEGENERATIVE DISC DISEASE), LUMBAR: ICD-10-CM

## 2019-02-12 DIAGNOSIS — M51.16 DISPLACEMENT OF LUMBAR DISC WITH RADICULOPATHY: ICD-10-CM

## 2019-02-13 RX ORDER — HYDROCODONE BITARTRATE AND ACETAMINOPHEN 10; 325 MG/1; MG/1
1 TABLET ORAL EVERY 8 HOURS PRN
Qty: 90 TABLET | Refills: 0 | Status: SHIPPED | OUTPATIENT
Start: 2019-02-13 | End: 2019-03-25 | Stop reason: SDUPTHER

## 2019-02-13 RX ORDER — ZOLPIDEM TARTRATE 12.5 MG/1
12.5 TABLET, FILM COATED, EXTENDED RELEASE ORAL NIGHTLY PRN
Qty: 30 TABLET | Refills: 2 | Status: SHIPPED | OUTPATIENT
Start: 2019-02-13 | End: 2019-03-25 | Stop reason: SDUPTHER

## 2019-02-28 ENCOUNTER — OFFICE VISIT (OUTPATIENT)
Dept: OTOLARYNGOLOGY | Facility: CLINIC | Age: 59
End: 2019-02-28

## 2019-02-28 VITALS — BODY MASS INDEX: 39.08 KG/M2 | HEIGHT: 67 IN | WEIGHT: 249 LBS | RESPIRATION RATE: 17 BRPM

## 2019-02-28 DIAGNOSIS — H92.02 LEFT EAR PAIN: Primary | ICD-10-CM

## 2019-02-28 PROCEDURE — 99203 OFFICE O/P NEW LOW 30 MIN: CPT | Performed by: OTOLARYNGOLOGY

## 2019-02-28 RX ORDER — HYDROCODONE BITARTRATE AND ACETAMINOPHEN 10; 325 MG/1; MG/1
TABLET ORAL
COMMUNITY
Start: 2018-10-19 | End: 2019-03-15

## 2019-02-28 RX ORDER — PREGABALIN 150 MG/1
150 CAPSULE ORAL
COMMUNITY
Start: 2018-08-16 | End: 2019-03-17

## 2019-02-28 RX ORDER — LISINOPRIL 20 MG/1
TABLET ORAL
Status: ON HOLD | COMMUNITY
Start: 2019-02-05 | End: 2020-08-30 | Stop reason: SDUPTHER

## 2019-02-28 RX ORDER — ZOLPIDEM TARTRATE 12.5 MG/1
12.5 TABLET, FILM COATED, EXTENDED RELEASE ORAL
COMMUNITY
Start: 2019-02-13 | End: 2020-02-13

## 2019-02-28 RX ORDER — TIZANIDINE 4 MG/1
5 TABLET ORAL DAILY
COMMUNITY
Start: 2018-08-16

## 2019-02-28 RX ORDER — GLIPIZIDE 10 MG/1
10 TABLET ORAL
Status: ON HOLD | COMMUNITY
End: 2020-08-27

## 2019-02-28 RX ORDER — DIPHENHYDRAMINE HCL 50 MG
50 CAPSULE ORAL EVERY 6 HOURS PRN
Status: ON HOLD | COMMUNITY
End: 2020-08-27

## 2019-03-03 NOTE — PROGRESS NOTES
Subjective   Ade Wilson is a 58 y.o. female.   This is a consultation from Marta ANDREWS  History of Present Illness   Patient is here for evaluation of left-sided otalgia.  States she has had intermittent trouble with her left ear since she had what she describes as an ear infection with rupture at age 14.  Will have acute flareups of ear pain typically not associated with decreased hearing.  Most recent episode lasted about 3 weeks.  She was treated with antibiotics and steroids.  States she is not having much pain today.  She says she can go for weeks between flareups, but when the flareups happen the pain is severe.  Does have a history of a neck fusion operation and apparently is having additional symptoms such that she states she needs to be reevaluated for possible additional surgery.      The following portions of the patient's history were reviewed and updated as appropriate: allergies, current medications, past family history, past medical history, past social history, past surgical history and problem list.      Ade Wilson reports that she has quit smoking. she has never used smokeless tobacco.  Patient is not a tobacco user and has not been counseled for use of tobacco products    Family History   Problem Relation Age of Onset   • Diabetes Mother    • Cancer Father         lung   • Heart disease Maternal Grandfather        Allergies   Allergen Reactions   • Tramadol Hives and Itching         Current Outpatient Medications:   •  diphenhydrAMINE (BENADRYL) 50 MG capsule, Take 50 mg by mouth Every 6 (Six) Hours As Needed for Itching., Disp: , Rfl:   •  glipiZIDE (GLUCOTROL) 10 MG tablet, Take 10 mg by mouth 2 (Two) Times a Day Before Meals., Disp: , Rfl:   •  HYDROcodone-acetaminophen (NORCO)  MG per tablet, Take  by mouth., Disp: , Rfl:   •  pregabalin (LYRICA) 150 MG capsule, Take 150 mg by mouth., Disp: , Rfl:   •  tiZANidine (ZANAFLEX) 4 MG tablet, Take 4 mg by mouth., Disp: , Rfl:    •  zolpidem CR (AMBIEN CR) 12.5 MG CR tablet, Take 12.5 mg by mouth., Disp: , Rfl:   •  aspirin 81 MG tablet, Take 81 mg by mouth., Disp: , Rfl:   •  lisinopril (PRINIVIL,ZESTRIL) 20 MG tablet, , Disp: , Rfl:   •  metFORMIN (GLUCOPHAGE) 500 MG tablet, , Disp: , Rfl:     Past Medical History:   Diagnosis Date   • Diabetes (CMS/HCC)    • Hypertension          Review of Systems   HENT: Positive for ear pain.    Respiratory: Positive for cough.    Musculoskeletal: Positive for arthralgias, back pain and neck pain.   Neurological: Positive for numbness and headaches.   Hematological: Bruises/bleeds easily.   All other systems reviewed and are negative.          Objective   Physical Exam  General: Well-developed well-nourished female in no acute distress.  Alert and oriented x-3. Head: Normocephalic. Face: Symmetrical strength and appearance. PERRL. EOMI. Voice:Strong. Speech:Fluent  Ears: External ears no deformity, canals no discharge, tympanic membranes intact clear and mobile bilaterally.  Nose: Nares show no discharge mass polyp or purulence.  Boggy mucosa is present.  No gross external deformity.  Septum: Midline  Oral cavity: Lips and gums without lesions.  Tongue and floor of mouth without lesions.  Parotid and submandibular ducts unobstructed.  No mucosal lesions on the buccal mucosa or vestibule of the mouth.  Pharynx: No erythema exudate mass or ulcer  Neck: No lymphadenopathy.  No thyromegaly.  Trachea and larynx midline.  No masses in the parotid or submandibular glands.  TMJ is mildly tender to palpation on the left.        Assessment/Plan   Ade was seen today for earache.    Diagnoses and all orders for this visit:    Left ear pain      Plan: Explained my findings to the patient.  The fact that when she has her painful flareups her hearing is not subjectively decreased suggest that these are not flareups of otitis media but more likely either musculoskeletal (TMJ) or cervical neuropathy.  I told her I  could not say this with certainty unless I saw her while she was symptomatic and I advised her to call me the next time she had a flare.  We did discuss temporomandibular joint precautions including use of a soft diet as well as dental evaluation and I advised her to discuss her symptoms with her neurosurgeon if she does indeed contemplate additional surgery on her neck.  I will see her again as needed with her next flareup.    My thanks to Ms. Browning for this consultation

## 2019-03-25 ENCOUNTER — HOSPITAL ENCOUNTER (OUTPATIENT)
Dept: PAIN MANAGEMENT | Age: 59
Discharge: HOME | End: 2019-03-25
Payer: COMMERCIAL

## 2019-03-25 VITALS
DIASTOLIC BLOOD PRESSURE: 76 MMHG | HEART RATE: 85 BPM | RESPIRATION RATE: 18 BRPM | HEIGHT: 67 IN | WEIGHT: 245 LBS | SYSTOLIC BLOOD PRESSURE: 135 MMHG | BODY MASS INDEX: 38.45 KG/M2 | TEMPERATURE: 98.6 F | OXYGEN SATURATION: 94 %

## 2019-03-25 DIAGNOSIS — B02.21 NEURALGIA, GENICULATE: ICD-10-CM

## 2019-03-25 DIAGNOSIS — M25.561 CHRONIC PAIN OF BOTH KNEES: ICD-10-CM

## 2019-03-25 DIAGNOSIS — M51.16 DISPLACEMENT OF LUMBAR DISC WITH RADICULOPATHY: ICD-10-CM

## 2019-03-25 DIAGNOSIS — M25.562 CHRONIC PAIN OF BOTH KNEES: ICD-10-CM

## 2019-03-25 DIAGNOSIS — M51.36 DDD (DEGENERATIVE DISC DISEASE), LUMBAR: ICD-10-CM

## 2019-03-25 DIAGNOSIS — M51.36 BULGING LUMBAR DISC: ICD-10-CM

## 2019-03-25 DIAGNOSIS — G89.29 CHRONIC PAIN OF BOTH KNEES: ICD-10-CM

## 2019-03-25 DIAGNOSIS — M51.9 LUMBAR DISC DISEASE: ICD-10-CM

## 2019-03-25 DIAGNOSIS — F19.982 DRUG-INDUCED INSOMNIA (HCC): ICD-10-CM

## 2019-03-25 PROCEDURE — 99213 OFFICE O/P EST LOW 20 MIN: CPT | Performed by: NURSE PRACTITIONER

## 2019-03-25 PROCEDURE — 99213 OFFICE O/P EST LOW 20 MIN: CPT

## 2019-03-25 RX ORDER — ZOLPIDEM TARTRATE 12.5 MG/1
12.5 TABLET, FILM COATED, EXTENDED RELEASE ORAL NIGHTLY PRN
Qty: 30 TABLET | Refills: 2 | Status: SHIPPED | OUTPATIENT
Start: 2019-03-25 | End: 2019-08-06 | Stop reason: SDUPTHER

## 2019-03-25 RX ORDER — PREGABALIN 150 MG/1
150 CAPSULE ORAL 3 TIMES DAILY
Qty: 90 CAPSULE | Refills: 2 | Status: SHIPPED | OUTPATIENT
Start: 2019-03-25 | End: 2019-08-06 | Stop reason: SDUPTHER

## 2019-03-25 RX ORDER — HYDROCODONE BITARTRATE AND ACETAMINOPHEN 10; 325 MG/1; MG/1
1 TABLET ORAL EVERY 8 HOURS PRN
Qty: 90 TABLET | Refills: 0 | Status: SHIPPED | OUTPATIENT
Start: 2019-03-25 | End: 2019-04-22 | Stop reason: SDUPTHER

## 2019-03-25 RX ORDER — TIZANIDINE 4 MG/1
4 TABLET ORAL 2 TIMES DAILY
Qty: 60 TABLET | Refills: 2 | Status: SHIPPED | OUTPATIENT
Start: 2019-03-25 | End: 2019-08-06 | Stop reason: SDUPTHER

## 2019-03-25 ASSESSMENT — ENCOUNTER SYMPTOMS
BACK PAIN: 1
CONSTIPATION: 0

## 2019-03-25 ASSESSMENT — ACTIVITIES OF DAILY LIVING (ADL): EFFECT OF PAIN ON DAILY ACTIVITIES: LIMITS ACTIVITY

## 2019-03-25 ASSESSMENT — PAIN SCALES - GENERAL: PAINLEVEL_OUTOF10: 6

## 2019-03-25 ASSESSMENT — PAIN DESCRIPTION - FREQUENCY: FREQUENCY: CONTINUOUS

## 2019-03-25 ASSESSMENT — PAIN DESCRIPTION - PROGRESSION: CLINICAL_PROGRESSION: NOT CHANGED

## 2019-03-25 ASSESSMENT — PAIN DESCRIPTION - ORIENTATION: ORIENTATION: LOWER

## 2019-03-25 ASSESSMENT — PAIN - FUNCTIONAL ASSESSMENT: PAIN_FUNCTIONAL_ASSESSMENT: PREVENTS OR INTERFERES SOME ACTIVE ACTIVITIES AND ADLS

## 2019-03-25 ASSESSMENT — PAIN DESCRIPTION - ONSET: ONSET: ON-GOING

## 2019-03-25 ASSESSMENT — PAIN DESCRIPTION - LOCATION: LOCATION: BACK

## 2019-03-25 ASSESSMENT — PAIN DESCRIPTION - PAIN TYPE: TYPE: CHRONIC PAIN

## 2019-03-25 ASSESSMENT — PAIN DESCRIPTION - DESCRIPTORS: DESCRIPTORS: ACHING;CONSTANT;DULL

## 2019-04-22 DIAGNOSIS — M51.16 DISPLACEMENT OF LUMBAR DISC WITH RADICULOPATHY: ICD-10-CM

## 2019-04-23 RX ORDER — HYDROCODONE BITARTRATE AND ACETAMINOPHEN 10; 325 MG/1; MG/1
1 TABLET ORAL EVERY 8 HOURS PRN
Qty: 90 TABLET | Refills: 0 | Status: SHIPPED | OUTPATIENT
Start: 2019-04-24 | End: 2019-08-06 | Stop reason: SDUPTHER

## 2019-05-10 ENCOUNTER — HOSPITAL ENCOUNTER (OUTPATIENT)
Dept: PAIN MANAGEMENT | Age: 59
Discharge: HOME | End: 2019-05-10
Payer: COMMERCIAL

## 2019-05-10 VITALS
RESPIRATION RATE: 18 BRPM | TEMPERATURE: 97.8 F | OXYGEN SATURATION: 96 % | SYSTOLIC BLOOD PRESSURE: 131 MMHG | HEART RATE: 74 BPM | DIASTOLIC BLOOD PRESSURE: 58 MMHG

## 2019-05-10 DIAGNOSIS — G89.29 CHRONIC PAIN OF BOTH KNEES: ICD-10-CM

## 2019-05-10 DIAGNOSIS — M25.561 CHRONIC PAIN OF BOTH KNEES: ICD-10-CM

## 2019-05-10 DIAGNOSIS — M51.16 DISPLACEMENT OF LUMBAR DISC WITH RADICULOPATHY: ICD-10-CM

## 2019-05-10 DIAGNOSIS — M25.562 CHRONIC PAIN OF BOTH KNEES: ICD-10-CM

## 2019-05-10 DIAGNOSIS — M51.9 LUMBAR DISC DISEASE: ICD-10-CM

## 2019-05-10 DIAGNOSIS — M51.36 BULGING LUMBAR DISC: ICD-10-CM

## 2019-05-10 DIAGNOSIS — M51.36 DDD (DEGENERATIVE DISC DISEASE), LUMBAR: ICD-10-CM

## 2019-05-10 DIAGNOSIS — M47.816 LUMBAR FACET JOINT SYNDROME: ICD-10-CM

## 2019-05-10 DIAGNOSIS — B02.21 NEURALGIA, GENICULATE: ICD-10-CM

## 2019-05-10 PROCEDURE — 6360000002 HC RX W HCPCS

## 2019-05-10 PROCEDURE — 3209999900 FLUORO FOR SURGICAL PROCEDURES

## 2019-05-10 PROCEDURE — 64494 INJ PARAVERT F JNT L/S 2 LEV: CPT

## 2019-05-10 PROCEDURE — 2500000003 HC RX 250 WO HCPCS

## 2019-05-10 PROCEDURE — 64493 INJ PARAVERT F JNT L/S 1 LEV: CPT

## 2019-05-10 RX ORDER — METHYLPREDNISOLONE ACETATE 80 MG/ML
INJECTION, SUSPENSION INTRA-ARTICULAR; INTRALESIONAL; INTRAMUSCULAR; SOFT TISSUE
Status: COMPLETED | OUTPATIENT
Start: 2019-05-10 | End: 2019-05-10

## 2019-05-10 RX ORDER — LIDOCAINE HYDROCHLORIDE 10 MG/ML
INJECTION, SOLUTION EPIDURAL; INFILTRATION; INTRACAUDAL; PERINEURAL
Status: COMPLETED | OUTPATIENT
Start: 2019-05-10 | End: 2019-05-10

## 2019-05-10 RX ORDER — BUPIVACAINE HYDROCHLORIDE 5 MG/ML
INJECTION, SOLUTION EPIDURAL; INTRACAUDAL
Status: COMPLETED | OUTPATIENT
Start: 2019-05-10 | End: 2019-05-10

## 2019-05-10 RX ADMIN — METHYLPREDNISOLONE ACETATE 80 MG: 80 INJECTION, SUSPENSION INTRA-ARTICULAR; INTRALESIONAL; INTRAMUSCULAR; SOFT TISSUE at 10:49

## 2019-05-10 RX ADMIN — LIDOCAINE HYDROCHLORIDE 20 ML: 10 INJECTION, SOLUTION EPIDURAL; INFILTRATION; INTRACAUDAL; PERINEURAL at 10:48

## 2019-05-10 RX ADMIN — BUPIVACAINE HYDROCHLORIDE 5 ML: 5 INJECTION, SOLUTION EPIDURAL; INTRACAUDAL at 10:48

## 2019-05-10 ASSESSMENT — PAIN - FUNCTIONAL ASSESSMENT
PAIN_FUNCTIONAL_ASSESSMENT: 0-10
PAIN_FUNCTIONAL_ASSESSMENT: 0-10

## 2019-05-10 ASSESSMENT — ACTIVITIES OF DAILY LIVING (ADL): EFFECT OF PAIN ON DAILY ACTIVITIES: DAILY CHORES AND ACTIVITIES

## 2019-05-10 ASSESSMENT — PAIN DESCRIPTION - DESCRIPTORS: DESCRIPTORS: ACHING;THROBBING;RADIATING

## 2019-08-06 ENCOUNTER — HOSPITAL ENCOUNTER (OUTPATIENT)
Dept: PAIN MANAGEMENT | Age: 59
Discharge: HOME | End: 2019-08-06
Payer: COMMERCIAL

## 2019-08-06 VITALS
TEMPERATURE: 97.7 F | HEART RATE: 73 BPM | OXYGEN SATURATION: 99 % | RESPIRATION RATE: 18 BRPM | BODY MASS INDEX: 39.55 KG/M2 | WEIGHT: 252 LBS | HEIGHT: 67 IN | DIASTOLIC BLOOD PRESSURE: 79 MMHG | SYSTOLIC BLOOD PRESSURE: 147 MMHG

## 2019-08-06 DIAGNOSIS — M79.18 BILATERAL MYOFASCIAL PAIN: Primary | ICD-10-CM

## 2019-08-06 DIAGNOSIS — M48.00 CENTRAL STENOSIS OF SPINAL CANAL: Primary | ICD-10-CM

## 2019-08-06 DIAGNOSIS — F19.982 DRUG-INDUCED INSOMNIA (HCC): ICD-10-CM

## 2019-08-06 DIAGNOSIS — M51.16 DISPLACEMENT OF LUMBAR DISC WITH RADICULOPATHY: ICD-10-CM

## 2019-08-06 PROCEDURE — 99214 OFFICE O/P EST MOD 30 MIN: CPT

## 2019-08-06 PROCEDURE — 99214 OFFICE O/P EST MOD 30 MIN: CPT | Performed by: NURSE PRACTITIONER

## 2019-08-06 RX ORDER — PREGABALIN 150 MG/1
150 CAPSULE ORAL 3 TIMES DAILY
Qty: 90 CAPSULE | Refills: 2 | Status: SHIPPED | OUTPATIENT
Start: 2019-08-06 | End: 2019-10-31 | Stop reason: SDUPTHER

## 2019-08-06 RX ORDER — ZOLPIDEM TARTRATE 12.5 MG/1
12.5 TABLET, FILM COATED, EXTENDED RELEASE ORAL NIGHTLY PRN
Qty: 30 TABLET | Refills: 2 | Status: SHIPPED | OUTPATIENT
Start: 2019-08-06 | End: 2019-10-31 | Stop reason: SDUPTHER

## 2019-08-06 RX ORDER — HYDROCODONE BITARTRATE AND ACETAMINOPHEN 10; 325 MG/1; MG/1
1 TABLET ORAL EVERY 8 HOURS PRN
Qty: 90 TABLET | Refills: 0 | Status: SHIPPED | OUTPATIENT
Start: 2019-08-06 | End: 2019-10-07 | Stop reason: SDUPTHER

## 2019-08-06 RX ORDER — TIZANIDINE 4 MG/1
4 TABLET ORAL 2 TIMES DAILY
Qty: 60 TABLET | Refills: 2 | Status: SHIPPED | OUTPATIENT
Start: 2019-08-06 | End: 2019-10-31 | Stop reason: SDUPTHER

## 2019-08-06 ASSESSMENT — ENCOUNTER SYMPTOMS
BACK PAIN: 1
CONSTIPATION: 0

## 2019-08-06 ASSESSMENT — PAIN SCALES - GENERAL: PAINLEVEL_OUTOF10: 7

## 2019-08-06 ASSESSMENT — PAIN DESCRIPTION - ONSET: ONSET: ON-GOING

## 2019-08-06 ASSESSMENT — PAIN DESCRIPTION - FREQUENCY: FREQUENCY: CONTINUOUS

## 2019-08-06 ASSESSMENT — PAIN DESCRIPTION - ORIENTATION: ORIENTATION: LOWER;UPPER

## 2019-08-06 ASSESSMENT — PAIN DESCRIPTION - LOCATION: LOCATION: BACK;NECK

## 2019-08-06 ASSESSMENT — PAIN DESCRIPTION - PAIN TYPE: TYPE: CHRONIC PAIN

## 2019-08-06 ASSESSMENT — PAIN DESCRIPTION - PROGRESSION: CLINICAL_PROGRESSION: NOT CHANGED

## 2019-08-06 ASSESSMENT — PAIN DESCRIPTION - DESCRIPTORS: DESCRIPTORS: ACHING;THROBBING

## 2019-08-06 ASSESSMENT — PAIN - FUNCTIONAL ASSESSMENT: PAIN_FUNCTIONAL_ASSESSMENT: PREVENTS OR INTERFERES SOME ACTIVE ACTIVITIES AND ADLS

## 2019-08-06 ASSESSMENT — ACTIVITIES OF DAILY LIVING (ADL): EFFECT OF PAIN ON DAILY ACTIVITIES: LIMITS ACTIVITIES

## 2019-08-06 NOTE — PROGRESS NOTES
Gastrointestinal: Negative for constipation. Musculoskeletal: Positive for arthralgias, back pain, myalgias, neck pain and neck stiffness. Psychiatric/Behavioral: Positive for sleep disturbance. Negative for agitation, self-injury and suicidal ideas. The patient is not nervous/anxious. 14 point ROS negative besides that noted in HPI    Physical Exam:    Vitals:    08/06/19 1621   BP: (!) 147/79   Pulse: 73   Resp: 18   Temp: 97.7 °F (36.5 °C)   TempSrc: Oral   SpO2: 99%   Weight: 252 lb (114.3 kg)   Height: 5' 7\" (1.702 m)       Body mass index is 39.47 kg/m². Physical Exam   Constitutional: She is oriented to person, place, and time. She appears well-developed and well-nourished. No distress. HENT:   Head: Normocephalic. Right Ear: External ear normal.   Left Ear: External ear normal.   Nose: Nose normal.   Eyes: Pupils are equal, round, and reactive to light. Conjunctivae and EOM are normal.   Neck: Normal range of motion. Neck supple. Cardiovascular: Normal rate. Pulmonary/Chest: Effort normal.   Abdominal: Soft. Bowel sounds are normal. There is no hepatosplenomegaly. Musculoskeletal:        Right shoulder: She exhibits decreased range of motion and tenderness. Cervical back: She exhibits decreased range of motion, tenderness, pain and spasm. Lumbar back: She exhibits decreased range of motion, tenderness, pain and spasm. Right hand: She exhibits tenderness (with numbness and tingling at times). No change in assessment  Neer test (+) for increased shoulder pain  Hawkin's test (+) for increased shoulder pain  Empty Can test (+) for increased shoulder pain,  Positive on right side  Pain is elicited with flex and extension. Patient has more pain getting from seated to standing position than from standing to seated position. Neurological: She is alert and oriented to person, place, and time. She has normal strength and normal reflexes.  No cranial nerve deficit or sensory deficit. Skin: Skin is warm and dry. Psychiatric: She has a normal mood and affect. Her behavior is normal. Judgment and thought content normal.   Nursing note and vitals reviewed. LAST UDS: salvia today    Labs:  Lab Results   Component Value Date     11/30/2014    K 3.8 11/30/2014    CL 99 11/30/2014    CO2 24 11/30/2014    BUN 11 11/30/2014    CREATININE 0.9 05/15/2018    CREATININE 0.6 11/30/2014    GLUCOSE 212 11/30/2014    CALCIUM 9.7 11/30/2014        Lab Results   Component Value Date    WBC 12.05 (H) 11/30/2014    HGB 13.0 11/30/2014    HCT 40.4 11/30/2014    MCV 67.4 (L) 11/30/2014     11/30/2014       Recent Imaging:  MRI CERVICAL SPINE W WO CONTRAST  History: The patient complains of neck pain and bilateral hand  numbness. She has a previous history of cervical spinal fusion. The multiplanar, multisequence MR imaging of the cervical spine is  performed before and after intravenous contrast enhancement. There is no previous study for comparison. There is loss of cervical lordosis with a mild reversal.  The alignment is normal.  There is hardware interbody fusion of C5, C6 and C7. There is normal  alignment. There are magnetic susceptibility artifacts obscuring the  bony and soft tissue structures of these vertebrae and the adjacent  soft tissues. The bone marrow signal of the remaining cervical spinal vertebrae and  the posterior elements appear normal.  The atlantoaxial articulation is normal in intact. The ligaments are  intact with calcification of the transverse ligament. There is loss of height and signal of the intervertebral disks at C3-4  and C4-5 representing chronic degenerative process. C2, space C2-3. There is mild disc bulging. There is bilateral facet  arthropathy. The neural foramina and spinal canal are patent. C3, space C3-4. There is moderate diffuse disc bulging. Moderately  prominent uncinate spurs bilaterally are seen.  There is bilateral  facet

## 2019-08-07 ENCOUNTER — TELEPHONE (OUTPATIENT)
Dept: NEUROSURGERY | Age: 59
End: 2019-08-07

## 2019-08-07 NOTE — TELEPHONE ENCOUNTER
First attempt to call and schedule a new patient appointment but no answer so I left a voicemail to call back

## 2019-08-09 ENCOUNTER — TELEPHONE (OUTPATIENT)
Dept: NEUROSURGERY | Age: 59
End: 2019-08-09

## 2019-08-12 ENCOUNTER — TELEPHONE (OUTPATIENT)
Dept: NEUROSURGERY | Age: 59
End: 2019-08-12

## 2019-08-16 ENCOUNTER — HOSPITAL ENCOUNTER (OUTPATIENT)
Dept: PAIN MANAGEMENT | Age: 59
Discharge: HOME | End: 2019-08-16
Payer: COMMERCIAL

## 2019-08-16 VITALS
SYSTOLIC BLOOD PRESSURE: 116 MMHG | RESPIRATION RATE: 20 BRPM | DIASTOLIC BLOOD PRESSURE: 59 MMHG | HEART RATE: 65 BPM | OXYGEN SATURATION: 95 % | TEMPERATURE: 97.4 F

## 2019-08-16 DIAGNOSIS — R52 PAIN MANAGEMENT: ICD-10-CM

## 2019-08-16 PROCEDURE — 2580000003 HC RX 258

## 2019-08-16 PROCEDURE — 3209999900 FLUORO FOR SURGICAL PROCEDURES

## 2019-08-16 PROCEDURE — 62321 NJX INTERLAMINAR CRV/THRC: CPT

## 2019-08-16 PROCEDURE — 6360000002 HC RX W HCPCS

## 2019-08-16 PROCEDURE — 2500000003 HC RX 250 WO HCPCS

## 2019-08-16 RX ORDER — 0.9 % SODIUM CHLORIDE 0.9 %
VIAL (ML) INJECTION
Status: COMPLETED | OUTPATIENT
Start: 2019-08-16 | End: 2019-08-16

## 2019-08-16 RX ORDER — METHYLPREDNISOLONE ACETATE 80 MG/ML
INJECTION, SUSPENSION INTRA-ARTICULAR; INTRALESIONAL; INTRAMUSCULAR; SOFT TISSUE
Status: COMPLETED | OUTPATIENT
Start: 2019-08-16 | End: 2019-08-16

## 2019-08-16 RX ORDER — LIDOCAINE HYDROCHLORIDE 10 MG/ML
INJECTION, SOLUTION EPIDURAL; INFILTRATION; INTRACAUDAL; PERINEURAL
Status: COMPLETED | OUTPATIENT
Start: 2019-08-16 | End: 2019-08-16

## 2019-08-16 RX ADMIN — METHYLPREDNISOLONE ACETATE 80 MG: 80 INJECTION, SUSPENSION INTRA-ARTICULAR; INTRALESIONAL; INTRAMUSCULAR; SOFT TISSUE at 08:07

## 2019-08-16 RX ADMIN — LIDOCAINE HYDROCHLORIDE 5 ML: 10 INJECTION, SOLUTION EPIDURAL; INFILTRATION; INTRACAUDAL; PERINEURAL at 08:07

## 2019-08-16 RX ADMIN — Medication 5 ML: at 08:07

## 2019-08-16 ASSESSMENT — PAIN - FUNCTIONAL ASSESSMENT: PAIN_FUNCTIONAL_ASSESSMENT: 0-10

## 2019-08-19 ENCOUNTER — TELEPHONE (OUTPATIENT)
Dept: PAIN MANAGEMENT | Age: 59
End: 2019-08-19

## 2019-08-30 ENCOUNTER — HOSPITAL ENCOUNTER (OUTPATIENT)
Dept: PAIN MANAGEMENT | Age: 59
Discharge: HOME | End: 2019-08-30
Payer: COMMERCIAL

## 2019-08-30 VITALS
HEART RATE: 92 BPM | DIASTOLIC BLOOD PRESSURE: 56 MMHG | TEMPERATURE: 97.8 F | RESPIRATION RATE: 18 BRPM | OXYGEN SATURATION: 95 % | SYSTOLIC BLOOD PRESSURE: 140 MMHG

## 2019-08-30 DIAGNOSIS — B02.21 NEURALGIA, GENICULATE: ICD-10-CM

## 2019-08-30 DIAGNOSIS — M25.561 CHRONIC PAIN OF BOTH KNEES: ICD-10-CM

## 2019-08-30 DIAGNOSIS — M25.562 CHRONIC PAIN OF BOTH KNEES: ICD-10-CM

## 2019-08-30 DIAGNOSIS — M51.36 BULGING LUMBAR DISC: ICD-10-CM

## 2019-08-30 DIAGNOSIS — R52 PAIN MANAGEMENT: ICD-10-CM

## 2019-08-30 DIAGNOSIS — M51.9 LUMBAR DISC DISEASE: ICD-10-CM

## 2019-08-30 DIAGNOSIS — G89.29 CHRONIC PAIN OF BOTH KNEES: ICD-10-CM

## 2019-08-30 DIAGNOSIS — M51.16 DISPLACEMENT OF LUMBAR DISC WITH RADICULOPATHY: ICD-10-CM

## 2019-08-30 PROCEDURE — 2500000003 HC RX 250 WO HCPCS

## 2019-08-30 PROCEDURE — 3209999900 FLUORO FOR SURGICAL PROCEDURES

## 2019-08-30 PROCEDURE — 6360000002 HC RX W HCPCS

## 2019-08-30 PROCEDURE — 64494 INJ PARAVERT F JNT L/S 2 LEV: CPT

## 2019-08-30 PROCEDURE — 64493 INJ PARAVERT F JNT L/S 1 LEV: CPT

## 2019-08-30 RX ORDER — LIDOCAINE HYDROCHLORIDE 10 MG/ML
INJECTION, SOLUTION EPIDURAL; INFILTRATION; INTRACAUDAL; PERINEURAL
Status: COMPLETED | OUTPATIENT
Start: 2019-08-30 | End: 2019-08-30

## 2019-08-30 RX ORDER — METHYLPREDNISOLONE ACETATE 80 MG/ML
INJECTION, SUSPENSION INTRA-ARTICULAR; INTRALESIONAL; INTRAMUSCULAR; SOFT TISSUE
Status: COMPLETED | OUTPATIENT
Start: 2019-08-30 | End: 2019-08-30

## 2019-08-30 RX ORDER — BUPIVACAINE HYDROCHLORIDE 5 MG/ML
INJECTION, SOLUTION EPIDURAL; INTRACAUDAL
Status: COMPLETED | OUTPATIENT
Start: 2019-08-30 | End: 2019-08-30

## 2019-08-30 RX ADMIN — BUPIVACAINE HYDROCHLORIDE 5 ML: 5 INJECTION, SOLUTION EPIDURAL; INTRACAUDAL at 08:09

## 2019-08-30 RX ADMIN — LIDOCAINE HYDROCHLORIDE 20 ML: 10 INJECTION, SOLUTION EPIDURAL; INFILTRATION; INTRACAUDAL; PERINEURAL at 08:09

## 2019-08-30 RX ADMIN — METHYLPREDNISOLONE ACETATE 80 MG: 80 INJECTION, SUSPENSION INTRA-ARTICULAR; INTRALESIONAL; INTRAMUSCULAR; SOFT TISSUE at 08:10

## 2019-08-30 ASSESSMENT — PAIN - FUNCTIONAL ASSESSMENT
PAIN_FUNCTIONAL_ASSESSMENT: 0-10
PAIN_FUNCTIONAL_ASSESSMENT: 0-10

## 2019-08-30 NOTE — INTERVAL H&P NOTE
H&P Update       Patient examined. There has been no change.     Electronically signed by Karlos Guerra on 8/30/19 at 8:13 AM

## 2019-09-03 ENCOUNTER — TELEPHONE (OUTPATIENT)
Dept: PAIN MANAGEMENT | Age: 59
End: 2019-09-03

## 2019-10-07 DIAGNOSIS — M51.16 DISPLACEMENT OF LUMBAR DISC WITH RADICULOPATHY: ICD-10-CM

## 2019-10-07 RX ORDER — HYDROCODONE BITARTRATE AND ACETAMINOPHEN 10; 325 MG/1; MG/1
1 TABLET ORAL EVERY 8 HOURS PRN
Qty: 90 TABLET | Refills: 0 | Status: SHIPPED | OUTPATIENT
Start: 2019-10-07 | End: 2019-10-31 | Stop reason: SDUPTHER

## 2019-10-11 DIAGNOSIS — M48.02 SPINAL STENOSIS IN CERVICAL REGION: Primary | ICD-10-CM

## 2019-10-15 ENCOUNTER — TELEPHONE (OUTPATIENT)
Dept: NEUROSURGERY | Age: 59
End: 2019-10-15

## 2019-10-17 ENCOUNTER — TELEPHONE (OUTPATIENT)
Dept: NEUROSURGERY | Age: 59
End: 2019-10-17

## 2019-10-18 ENCOUNTER — TELEPHONE (OUTPATIENT)
Dept: NEUROSURGERY | Age: 59
End: 2019-10-18

## 2019-10-31 ENCOUNTER — HOSPITAL ENCOUNTER (OUTPATIENT)
Dept: PAIN MANAGEMENT | Age: 59
Discharge: HOME | End: 2019-10-31
Payer: COMMERCIAL

## 2019-10-31 VITALS
RESPIRATION RATE: 18 BRPM | HEIGHT: 67 IN | SYSTOLIC BLOOD PRESSURE: 128 MMHG | HEART RATE: 79 BPM | WEIGHT: 256.5 LBS | OXYGEN SATURATION: 96 % | TEMPERATURE: 97.7 F | BODY MASS INDEX: 40.26 KG/M2 | DIASTOLIC BLOOD PRESSURE: 75 MMHG

## 2019-10-31 DIAGNOSIS — M25.561 CHRONIC PAIN OF BOTH KNEES: ICD-10-CM

## 2019-10-31 DIAGNOSIS — F19.982 DRUG-INDUCED INSOMNIA (HCC): ICD-10-CM

## 2019-10-31 DIAGNOSIS — M51.9 LUMBAR DISC DISEASE: ICD-10-CM

## 2019-10-31 DIAGNOSIS — M51.16 DISPLACEMENT OF LUMBAR DISC WITH RADICULOPATHY: ICD-10-CM

## 2019-10-31 DIAGNOSIS — M79.18 BILATERAL MYOFASCIAL PAIN: ICD-10-CM

## 2019-10-31 DIAGNOSIS — M25.562 CHRONIC PAIN OF BOTH KNEES: ICD-10-CM

## 2019-10-31 DIAGNOSIS — G89.29 CHRONIC PAIN OF BOTH KNEES: ICD-10-CM

## 2019-10-31 DIAGNOSIS — B02.21 NEURALGIA, GENICULATE: ICD-10-CM

## 2019-10-31 DIAGNOSIS — M51.36 BULGING LUMBAR DISC: ICD-10-CM

## 2019-10-31 PROCEDURE — 99214 OFFICE O/P EST MOD 30 MIN: CPT | Performed by: NURSE PRACTITIONER

## 2019-10-31 PROCEDURE — 99215 OFFICE O/P EST HI 40 MIN: CPT

## 2019-10-31 RX ORDER — HYDROCODONE BITARTRATE AND ACETAMINOPHEN 10; 325 MG/1; MG/1
1 TABLET ORAL EVERY 8 HOURS PRN
Qty: 90 TABLET | Refills: 0 | Status: SHIPPED | OUTPATIENT
Start: 2019-11-06 | End: 2019-12-17 | Stop reason: SDUPTHER

## 2019-10-31 RX ORDER — TIZANIDINE 4 MG/1
4 TABLET ORAL 2 TIMES DAILY
Qty: 60 TABLET | Refills: 2 | Status: SHIPPED | OUTPATIENT
Start: 2019-10-31 | End: 2019-12-17 | Stop reason: SDUPTHER

## 2019-10-31 RX ORDER — PREGABALIN 150 MG/1
150 CAPSULE ORAL 3 TIMES DAILY
Qty: 90 CAPSULE | Refills: 2 | Status: SHIPPED | OUTPATIENT
Start: 2019-10-31 | End: 2019-12-17 | Stop reason: SDUPTHER

## 2019-10-31 RX ORDER — ZOLPIDEM TARTRATE 12.5 MG/1
12.5 TABLET, FILM COATED, EXTENDED RELEASE ORAL NIGHTLY PRN
Qty: 30 TABLET | Refills: 2 | Status: SHIPPED | OUTPATIENT
Start: 2019-10-31 | End: 2019-12-17 | Stop reason: SDUPTHER

## 2019-10-31 ASSESSMENT — ACTIVITIES OF DAILY LIVING (ADL): EFFECT OF PAIN ON DAILY ACTIVITIES: LIMITS ACTIVITY

## 2019-10-31 ASSESSMENT — PAIN DESCRIPTION - ONSET: ONSET: ON-GOING

## 2019-10-31 ASSESSMENT — PAIN DESCRIPTION - FREQUENCY: FREQUENCY: CONTINUOUS

## 2019-10-31 ASSESSMENT — PAIN SCALES - GENERAL: PAINLEVEL_OUTOF10: 6

## 2019-10-31 ASSESSMENT — PAIN DESCRIPTION - PAIN TYPE: TYPE: CHRONIC PAIN

## 2019-10-31 ASSESSMENT — ENCOUNTER SYMPTOMS
CONSTIPATION: 0
BACK PAIN: 1

## 2019-10-31 ASSESSMENT — PAIN DESCRIPTION - LOCATION: LOCATION: BACK;NECK

## 2019-10-31 ASSESSMENT — PAIN DESCRIPTION - DESCRIPTORS: DESCRIPTORS: ACHING;THROBBING

## 2019-10-31 ASSESSMENT — PAIN - FUNCTIONAL ASSESSMENT: PAIN_FUNCTIONAL_ASSESSMENT: PREVENTS OR INTERFERES SOME ACTIVE ACTIVITIES AND ADLS

## 2019-10-31 ASSESSMENT — PAIN DESCRIPTION - PROGRESSION: CLINICAL_PROGRESSION: NOT CHANGED

## 2019-10-31 ASSESSMENT — PAIN DESCRIPTION - ORIENTATION: ORIENTATION: UPPER

## 2019-12-02 ENCOUNTER — TELEPHONE (OUTPATIENT)
Dept: PAIN MANAGEMENT | Age: 59
End: 2019-12-02

## 2019-12-10 ENCOUNTER — TELEPHONE (OUTPATIENT)
Dept: PAIN MANAGEMENT | Age: 59
End: 2019-12-10

## 2019-12-17 ENCOUNTER — HOSPITAL ENCOUNTER (OUTPATIENT)
Dept: PAIN MANAGEMENT | Age: 59
Discharge: HOME | End: 2019-12-17
Payer: COMMERCIAL

## 2019-12-17 VITALS
RESPIRATION RATE: 18 BRPM | DIASTOLIC BLOOD PRESSURE: 68 MMHG | TEMPERATURE: 97.7 F | BODY MASS INDEX: 40.18 KG/M2 | OXYGEN SATURATION: 96 % | HEART RATE: 78 BPM | SYSTOLIC BLOOD PRESSURE: 123 MMHG | WEIGHT: 256 LBS | HEIGHT: 67 IN

## 2019-12-17 DIAGNOSIS — M51.9 LUMBAR DISC DISEASE: ICD-10-CM

## 2019-12-17 DIAGNOSIS — M25.562 CHRONIC PAIN OF BOTH KNEES: ICD-10-CM

## 2019-12-17 DIAGNOSIS — M51.16 DISPLACEMENT OF LUMBAR DISC WITH RADICULOPATHY: ICD-10-CM

## 2019-12-17 DIAGNOSIS — M25.561 CHRONIC PAIN OF BOTH KNEES: ICD-10-CM

## 2019-12-17 DIAGNOSIS — F19.982 DRUG-INDUCED INSOMNIA (HCC): ICD-10-CM

## 2019-12-17 DIAGNOSIS — M51.36 BULGING LUMBAR DISC: ICD-10-CM

## 2019-12-17 DIAGNOSIS — G89.29 CHRONIC PAIN OF BOTH KNEES: ICD-10-CM

## 2019-12-17 DIAGNOSIS — B02.21 NEURALGIA, GENICULATE: ICD-10-CM

## 2019-12-17 DIAGNOSIS — M79.18 BILATERAL MYOFASCIAL PAIN: ICD-10-CM

## 2019-12-17 PROCEDURE — 99213 OFFICE O/P EST LOW 20 MIN: CPT | Performed by: NURSE PRACTITIONER

## 2019-12-17 PROCEDURE — 99215 OFFICE O/P EST HI 40 MIN: CPT

## 2019-12-17 RX ORDER — PREGABALIN 150 MG/1
150 CAPSULE ORAL 3 TIMES DAILY
Qty: 90 CAPSULE | Refills: 2 | Status: SHIPPED | OUTPATIENT
Start: 2019-12-17 | End: 2020-03-25 | Stop reason: SDUPTHER

## 2019-12-17 RX ORDER — HYDROCODONE BITARTRATE AND ACETAMINOPHEN 10; 325 MG/1; MG/1
1 TABLET ORAL EVERY 6 HOURS PRN
Qty: 120 TABLET | Refills: 0 | Status: SHIPPED | OUTPATIENT
Start: 2019-12-17 | End: 2020-01-30 | Stop reason: SDUPTHER

## 2019-12-17 RX ORDER — ALPRAZOLAM 0.25 MG/1
0.25 TABLET ORAL ONCE
Qty: 1 TABLET | Refills: 0 | Status: SHIPPED | OUTPATIENT
Start: 2019-12-17 | End: 2019-12-17

## 2019-12-17 RX ORDER — ZOLPIDEM TARTRATE 12.5 MG/1
12.5 TABLET, FILM COATED, EXTENDED RELEASE ORAL NIGHTLY PRN
Qty: 30 TABLET | Refills: 2 | Status: SHIPPED | OUTPATIENT
Start: 2019-12-17 | End: 2020-01-30 | Stop reason: SDUPTHER

## 2019-12-17 RX ORDER — TIZANIDINE 4 MG/1
4 TABLET ORAL 2 TIMES DAILY
Qty: 60 TABLET | Refills: 2 | Status: SHIPPED | OUTPATIENT
Start: 2019-12-17 | End: 2020-03-25 | Stop reason: SDUPTHER

## 2019-12-17 ASSESSMENT — PAIN DESCRIPTION - LOCATION: LOCATION: BACK

## 2019-12-17 ASSESSMENT — PAIN DESCRIPTION - PAIN TYPE: TYPE: CHRONIC PAIN

## 2019-12-17 ASSESSMENT — PAIN SCALES - GENERAL: PAINLEVEL_OUTOF10: 7

## 2019-12-17 ASSESSMENT — PAIN DESCRIPTION - ORIENTATION: ORIENTATION: LOWER

## 2019-12-18 ENCOUNTER — TELEPHONE (OUTPATIENT)
Dept: PAIN MANAGEMENT | Age: 59
End: 2019-12-18

## 2019-12-25 ASSESSMENT — ENCOUNTER SYMPTOMS
BACK PAIN: 1
CONSTIPATION: 0

## 2019-12-26 ENCOUNTER — HOSPITAL ENCOUNTER (OUTPATIENT)
Dept: MRI IMAGING | Age: 59
Discharge: HOME | End: 2019-12-26
Payer: COMMERCIAL

## 2019-12-26 DIAGNOSIS — M51.16 DISPLACEMENT OF LUMBAR DISC WITH RADICULOPATHY: ICD-10-CM

## 2019-12-26 PROCEDURE — 72148 MRI LUMBAR SPINE W/O DYE: CPT

## 2020-01-30 ENCOUNTER — HOSPITAL ENCOUNTER (OUTPATIENT)
Dept: PAIN MANAGEMENT | Age: 60
Discharge: HOME | End: 2020-01-30
Payer: COMMERCIAL

## 2020-01-30 VITALS
BODY MASS INDEX: 41.47 KG/M2 | HEART RATE: 84 BPM | OXYGEN SATURATION: 99 % | DIASTOLIC BLOOD PRESSURE: 70 MMHG | SYSTOLIC BLOOD PRESSURE: 144 MMHG | HEIGHT: 67 IN | RESPIRATION RATE: 18 BRPM | WEIGHT: 264.25 LBS | TEMPERATURE: 97.3 F

## 2020-01-30 DIAGNOSIS — Z91.89 COMPLIANCE WITH MEDICATION REGIMEN: ICD-10-CM

## 2020-01-30 PROBLEM — M53.3 SI (SACROILIAC) JOINT DYSFUNCTION: Status: ACTIVE | Noted: 2020-01-30

## 2020-01-30 PROCEDURE — 99214 OFFICE O/P EST MOD 30 MIN: CPT

## 2020-01-30 PROCEDURE — 99214 OFFICE O/P EST MOD 30 MIN: CPT | Performed by: NURSE PRACTITIONER

## 2020-01-30 RX ORDER — AMITRIPTYLINE HYDROCHLORIDE 25 MG/1
TABLET, FILM COATED ORAL
Qty: 75 TABLET | Refills: 1 | Status: SHIPPED | OUTPATIENT
Start: 2020-01-30 | End: 2020-04-21

## 2020-01-30 RX ORDER — HYDROCODONE BITARTRATE AND ACETAMINOPHEN 10; 325 MG/1; MG/1
1 TABLET ORAL EVERY 6 HOURS PRN
Qty: 120 TABLET | Refills: 0 | Status: SHIPPED | OUTPATIENT
Start: 2020-01-30 | End: 2020-03-25 | Stop reason: SDUPTHER

## 2020-01-30 RX ORDER — ZOLPIDEM TARTRATE 12.5 MG/1
12.5 TABLET, FILM COATED, EXTENDED RELEASE ORAL NIGHTLY PRN
Qty: 30 TABLET | Refills: 2 | Status: SHIPPED | OUTPATIENT
Start: 2020-01-30 | End: 2020-03-25 | Stop reason: SDUPTHER

## 2020-01-30 ASSESSMENT — PAIN DESCRIPTION - PAIN TYPE: TYPE: CHRONIC PAIN

## 2020-01-30 ASSESSMENT — PAIN DESCRIPTION - ORIENTATION: ORIENTATION: LOWER

## 2020-01-30 ASSESSMENT — PAIN SCALES - GENERAL: PAINLEVEL_OUTOF10: 9

## 2020-01-30 ASSESSMENT — PAIN DESCRIPTION - LOCATION: LOCATION: BACK

## 2020-01-30 NOTE — PROGRESS NOTES
Nursing Admission Record    Current Issues / Falls / ER Visits: ov mri follow up     Injection In the Past?  When 08/2019                                              Where [facility]  What Lumbar facets L4-5, L5-S1 and KIM C6-7    Was Percentage of Pain Relief after Last Procedure: n/a      Opioids Prescribed: Yes Norco 10mg Q8hrs prn #90   Was Medication Brought to Appt: No    Hx of any Neck/Back Surgeries? Yes neck surgery    Is Patient currently taking a blood thinner?  Yes aspirin    MRI exams received in the past 2 years:  Yes Lumbar spine and cervical spine       When 12/26/19 lumbar and 2018 cervical                                               Where [facility]       Imaging on chart: Yes         Imaging records requested: No    CT exam received during the last 12 months: No       When na                                              Where na       Imaging on chart: No         Imaging records requested: No    X-ray exam received during the last 12 months: No       When na                                              Where na       Imaging on chart: No         Imaging records requested: No    Nerve Conduction Study/EMG:  No       When na                                              Where na       Imaging on chart: No         Imaging records requested: No    Physical therapy: No       When: na                        Where  na    Behavior Health No       When: na                        Where na    Labs  No       When: na                                             Where  na    PEG Score: 9.3    Last PEG Score: 7.7    Annual ORT Score: 1    Annual PHQ Score: 13    Last UDS Results: 8/6/19 compliant    Education Provided:  [x] Review of Wing Sykes  [] Agreement Review  [x] PEG Score Calculated [] PHQ Score Calculated [] ORT Score Calculated    [] Compliance Issues Discussed [] Cognitive Behavior Needs [x] Exercise [x] Review of Test [] Financial Issues  [x] Tobacco/Alcohol 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 [] YES  [x] NO  Has a suicide plan? [] YES  [x] NO  Has attempted suicide in the past?   [] YES  [x] NO  Has a close friend or family member who committed suicide?   [] YES  [x] NO    Assessment Completed by:  Electronically signed by Regla Adamson RN on 1/30/2020 at 3:38 PM

## 2020-01-30 NOTE — PROGRESS NOTES
[facility] Physical & Pain Medicine  Office Note    Patient Name: Bhavani Luis    MR #: [id number]    Account #: [de-identified]    : 1960    Age: 60 y.o. Sex: female    Date: 2020    PCP: Catia Greenwood    Chief Complaint:   Chief Complaint   Patient presents with    Lower Back Pain       History of Present Illness:     Bhavani Luis is a 60 y.o. female who presents to the office for follow-up of MRI and worsening low back pain. Discussed reviewed with patient that she does have ligamentous hypertrophy with just chronic degenerative changes. Discussed patient's worsening low back pain and symptoms. Patient states that low back does wrap into the groin at times. Patient has pain with flexion and extension. Low back pain cramps and makes it difficulty for her to do anything after she works. Patient states that all she can tolerate is working and going to bed. Her quality of life has diminished and it takes all she can do to work therefore things in her house are not getting done. Back Pain   This is a chronic problem. The current episode started more than 1 year ago. The problem occurs constantly. The pain is present in the lumbar spine, sacro-iliac and gluteal. The quality of the pain is described as aching, cramping, shooting and stabbing. Radiates to: left groin. The pain is worse during the day (due to activity). The symptoms are aggravated by bending, standing and twisting (lifting). Associated symptoms include weakness. Pertinent negatives include no bladder incontinence, bowel incontinence, headaches, numbness or tingling. Neck Pain    This is a chronic problem. The current episode started more than 1 year ago. The problem occurs constantly. The problem has been waxing and waning. The pain is present in the midline. The quality of the pain is described as aching. The symptoms are aggravated by bending (looking up and down). Stiffness is present in the morning.  Associated symptoms include weakness. Pertinent negatives include no headaches, numbness or tingling. Last Procedure:   Injection In the Past?  When 08/2019                                              Where [facility]  What Lumbar facets L4-5, L5-S1 and KIM C6-7    Screening Tools:  PEG Score: 9.3     Last PEG Score: 7.7     Annual ORT Score: 1     Annual PHQ Score: 13    Current Pain Assessment  Pain Assessment  Pain Assessment: 0-10  Pain Level: 9  Patient's Stated Pain Goal: No pain  Pain Type: Chronic pain  Pain Location: Back  Pain Orientation: Lower  POSS Score (Patient Ctrl Analgesia): 1    Past Visit HPI:  12/17/19   presents to the office for annual exam with primary complaints of worsening cervical radiculopathy with pain going down left arm and into last 2 fingers getting numb. Patient is also having worsening low back pain. She states that constant flexion extension as demanding by her job that she will start having pain in her low back and that her legs would become weak. She has had a couple of occasions where she has had electrical sensations going up her back and making it difficult to breathe. Last office appointment patient was scheduled to have MRI of Lumbar spine and will need MRI of cervical spine in the futue. Patient attempted to have MRI at her local hospital however patient barely fit inside of MRI machine. Patient stated that MRI machine was pulling at her arms and that she could not breathe and started having anxiety attack. MRI had to be stopped. Patient was brought back into the office to schedule MRI with sedation.   However patient states she does not need IV sedation if she can get in the open MRI.     Employment: factory    Past Medical Histoy  Past Medical History:   Diagnosis Date    Arthritis     Diabetes mellitus ([address]     Hypertension     Thyroid disease        Surgery History  Past Surgical History:   Procedure Laterality Date   [address]    heart cath   [employer] Problems:    * No resolved hospital problems. *      Plan:  1. Start amitriptyline 25 mg taper for sleep and radicular pain patient may take with Ambien  2. Continue hydrocodone and Ambien as previously prescribed  3. Schedule left SI with Dr. Rubens Shetty using fluoroscopy  4. Patient to follow-up post procedure or sooner if needed    Discussion: Discussed exam findings and plan of care with patient. Patient agreed with POC and questions were asked and answered. Activity: Discussed exercise as beneficial to pain reduction, encouraged daily stretching with a focus on torso strengthening. Goal:  Pain Management Goals of Therapy:   [] Resolution in pain  [x] Decrease in pain level  [x] Improvement in ADL's  [x] Increase in activities with less pain  [] Decrease in medication      Controlled substance monitoring:    [x] Discussed medication side effects, risk of tolerance and/or dependence, and/or alternative treatment  [] Discussed the detrimental effects of long term narcotic use in younger patients especially women of childbearing years.   [x] No signs and symptoms of potential drug abuse or diversion were identified  [] Signs of potential drug abuse or diversion were identified   [] ORT Score   [] UDS non-compliant   [] See Note  [] Random urine drug screen sent today  [x] Random urine drug screen not completed today   [] Deferred New Patient  [x] Compliant  8/6/19  [] Not Compliant see note  [] Medication agreement with provider signed today  [x] Medication agreement with provider on file under media   [] Medication regimen effective and continued   [] New patient continuing current medication while developing POC   [x] On going assessment and evaluation of medication regimen  [] Medication regimen not effective see plan for changes  [x] Parish Coker reviewed & on file under media  [id number]    EMR dragon/transcription disclaimer: Much of this encounter note is electronic transcription/translation of spoken language

## 2020-02-04 ENCOUNTER — TELEPHONE (OUTPATIENT)
Dept: PAIN MANAGEMENT | Age: 60
End: 2020-02-04

## 2020-02-04 LAB
AMPHETAMINES, URINE: NEGATIVE NG/ML
BARBITURATES, URINE: NEGATIVE NG/ML
BENZODIAZEPINES, URINE: NEGATIVE NG/ML
CANNABINOIDS, URINE: NEGATIVE NG/ML
COCAINE METABOLITE, URINE: NEGATIVE NG/ML
CODEINE, URINE: NEGATIVE
CREATININE, URINE: 110.1 MG/DL (ref 20–300)
FENTANYL URINE: NEGATIVE PG/ML
HYDROCODONE, UR CONF: 2741 NG/ML
HYDROCODONE, URINE: POSITIVE
HYDROMORPHONE, URINE: NEGATIVE
MEPERIDINE, UR: NEGATIVE NG/ML
METHADONE SCREEN, URINE: NEGATIVE NG/ML
MORPHINE URINE: NEGATIVE
OPIATES, URINE: ABNORMAL NG/ML
OPIATES, URINE: POSITIVE NG/ML
OXYCODONE/OXYMORPHONE, UR: NEGATIVE NG/ML
PH, URINE: 5.6 (ref 4.5–8.9)
PHENCYCLIDINE, URINE: NEGATIVE NG/ML
PROPOXYPHENE, URINE: NEGATIVE NG/ML

## 2020-02-21 PROBLEM — G89.29 CHRONIC BILATERAL LOW BACK PAIN WITHOUT SCIATICA: Status: ACTIVE | Noted: 2020-02-21

## 2020-02-21 PROBLEM — M54.50 CHRONIC BILATERAL LOW BACK PAIN WITHOUT SCIATICA: Status: ACTIVE | Noted: 2020-02-21

## 2020-02-21 ASSESSMENT — ENCOUNTER SYMPTOMS
CONSTIPATION: 0
BACK PAIN: 1
BOWEL INCONTINENCE: 0

## 2020-03-25 ENCOUNTER — HOSPITAL ENCOUNTER (OUTPATIENT)
Dept: PAIN MANAGEMENT | Age: 60
Discharge: HOME | End: 2020-03-25
Payer: COMMERCIAL

## 2020-03-25 VITALS
HEART RATE: 70 BPM | SYSTOLIC BLOOD PRESSURE: 132 MMHG | RESPIRATION RATE: 18 BRPM | TEMPERATURE: 98.2 F | HEIGHT: 67 IN | OXYGEN SATURATION: 97 % | BODY MASS INDEX: 40.97 KG/M2 | WEIGHT: 261 LBS | DIASTOLIC BLOOD PRESSURE: 76 MMHG

## 2020-03-25 PROCEDURE — 99214 OFFICE O/P EST MOD 30 MIN: CPT | Performed by: NURSE PRACTITIONER

## 2020-03-25 PROCEDURE — G0463 HOSPITAL OUTPT CLINIC VISIT: HCPCS

## 2020-03-25 PROCEDURE — 99213 OFFICE O/P EST LOW 20 MIN: CPT

## 2020-03-25 RX ORDER — TIZANIDINE 4 MG/1
4 TABLET ORAL 2 TIMES DAILY
Qty: 60 TABLET | Refills: 2 | Status: SHIPPED | OUTPATIENT
Start: 2020-03-25 | End: 2020-06-29 | Stop reason: SDUPTHER

## 2020-03-25 RX ORDER — ZOLPIDEM TARTRATE 12.5 MG/1
12.5 TABLET, FILM COATED, EXTENDED RELEASE ORAL NIGHTLY PRN
Qty: 30 TABLET | Refills: 2 | Status: SHIPPED | OUTPATIENT
Start: 2020-03-25 | End: 2020-06-29 | Stop reason: SDUPTHER

## 2020-03-25 RX ORDER — HYDROCODONE BITARTRATE AND ACETAMINOPHEN 10; 325 MG/1; MG/1
1 TABLET ORAL EVERY 6 HOURS PRN
Qty: 120 TABLET | Refills: 0 | Status: SHIPPED | OUTPATIENT
Start: 2020-03-25 | End: 2020-04-21 | Stop reason: SDUPTHER

## 2020-03-25 RX ORDER — PREGABALIN 150 MG/1
150 CAPSULE ORAL 3 TIMES DAILY
Qty: 90 CAPSULE | Refills: 2 | Status: SHIPPED | OUTPATIENT
Start: 2020-03-25 | End: 2020-06-29 | Stop reason: SDUPTHER

## 2020-03-25 ASSESSMENT — PAIN DESCRIPTION - LOCATION: LOCATION: BACK;HIP

## 2020-03-25 ASSESSMENT — PAIN SCALES - GENERAL: PAINLEVEL_OUTOF10: 7

## 2020-03-25 ASSESSMENT — ENCOUNTER SYMPTOMS
BACK PAIN: 1
BOWEL INCONTINENCE: 0
CONSTIPATION: 0

## 2020-03-25 ASSESSMENT — PAIN DESCRIPTION - PAIN TYPE: TYPE: CHRONIC PAIN

## 2020-03-25 ASSESSMENT — PAIN DESCRIPTION - ORIENTATION: ORIENTATION: LOWER;LEFT;RIGHT

## 2020-03-25 NOTE — PROGRESS NOTES
nervous/anxious. 14 point ROS negative besides that noted in HPI    Physical Exam:    Vitals:    03/25/20 1315   BP: 132/76   Pulse: 70   Resp: 18   Temp: 98.2 °F (36.8 °C)   TempSrc: Temporal   SpO2: 97%   Weight: 261 lb (118.4 kg)   Height: 5' 7\" (1.702 m)       Body mass index is 40.88 kg/m². Physical Exam  Vitals signs and nursing note reviewed. Constitutional:       General: She is not in acute distress. Appearance: She is well-developed. HENT:      Head: Normocephalic. Right Ear: External ear normal.      Left Ear: External ear normal.      Nose: Nose normal.   Eyes:      Conjunctiva/sclera: Conjunctivae normal.      Pupils: Pupils are equal, round, and reactive to light. Neck:      Vascular: No JVD. Trachea: No tracheal deviation. Cardiovascular:      Rate and Rhythm: Normal rate. Pulmonary:      Effort: Pulmonary effort is normal.   Abdominal:      General: There is no distension. Tenderness: There is no abdominal tenderness. Musculoskeletal:      Cervical back: She exhibits decreased range of motion, tenderness and spasm. Lumbar back: She exhibits decreased range of motion, tenderness, pain and spasm. Comments: + thigh thrust, compression and teresa's positive on left. Patient is extremely tender in Left SI area. Skin:     General: Skin is warm and dry. Neurological:      Mental Status: She is alert and oriented to person, place, and time. Cranial Nerves: No cranial nerve deficit. Psychiatric:         Behavior: Behavior normal.         Thought Content: Thought content normal.         Judgment: Judgment normal.     MRI exams received in the past 2 years:  Yes Lumbar spine and cervical spine       When 12/26/19 lumbar and 2018 cervical                                               Where [facility]       Imaging on chart: Yes         Imaging records requested:  No     CT exam received during the last 12 months: No       When na Where na       Imaging on chart: No         Imaging records requested: No     X-ray exam received during the last 12 months: No       When na                                              Where na       Imaging on chart: No         Imaging records requested: No     Nerve Conduction Study/EMG:  No       When na                                              Where na       Imaging on chart: No         Imaging records requested: No     Physical therapy: No       When: na                                               Where  na     Behavior Health No       When: na                                               Where na     Labs  No       When: na                                             Where  na    Labs:  Lab Results   Component Value Date     11/30/2014    K 3.8 11/30/2014    CL 99 11/30/2014    CO2 24 11/30/2014    BUN 11 11/30/2014    CREATININE 0.9 05/15/2018    CREATININE 0.6 11/30/2014    GLUCOSE 212 11/30/2014    CALCIUM 9.7 11/30/2014        Lab Results   Component Value Date    WBC 12.05 (H) 11/30/2014    HGB 13.0 11/30/2014    HCT 40.4 11/30/2014    MCV 67.4 (L) 11/30/2014     11/30/2014     Assessment:  Principal Problem:    Chronic bilateral low back pain without sciatica  Active Problems:    Bilateral knee pain    Bulging lumbar disc    Central stenosis of spinal canal    Foraminal stenosis of cervical region    Facet arthritis of lumbar region    SI (sacroiliac) joint dysfunction  Resolved Problems:    * No resolved hospital problems. *      Plan:  1. Stop amitriptyline 25 mg patient did not like the way the medication made her feel. 2. Reorder hydrocodone, pregabalin, tizandidine and Ambien as previously prescribed with refill sent today. 3. Will Schedule left SI with US for next Wednesday. Patient has not had this injection previously but she was scheduled for this injection long before the COVID-19 pandemic. There was a miscommunication in her appointment.  Patient requested sooner office appointment due to progressively worsening pain. Patient states that pain is affecting their ability to complete ADL's and to continue to work and significantly affecting their quality of life. Patient made the states like I cannot continue like this or I don't know how much more I can take. Patient was requesting that something be done for their pain. When patient asked if pain was significant enough to return for a pain relieving measure that could result in the possible exposure to COVID-19, patient did not hesitate to answer \"yes\". 4. Will discuss case with CMO for guidance to determine that this is an urgent need. 5. Patient to follow-up post procedure or sooner if needed      Discussion: Discussed exam findings and plan of care with patient. Patient agreed with POC and questions were asked and answered. Activity: Discussed exercise as beneficial to pain reduction, encouraged daily stretching with a focus on torso strengthening. Goal:  Pain Management Goals of Therapy:   [] Resolution in pain  [x] Decrease in pain level  [x] Improvement in ADL's  [x] Increase in activities with less pain  [] Decrease in medication      Controlled substance monitoring:    [x] Discussed medication side effects, risk of tolerance and/or dependence, and/or alternative treatment  [] Discussed the detrimental effects of long term narcotic use in younger patients especially women of childbearing years.   [x] No signs and symptoms of potential drug abuse or diversion were identified  [] Signs of potential drug abuse or diversion were identified   [] ORT Score   [] UDS non-compliant   [] See Note  [] Random urine drug screen sent today  [x] Random urine drug screen not completed today   [] Deferred New Patient  [x] Compliant  1/30/2020  [] Not Compliant see note  [] Medication agreement with provider signed today  [x] Medication agreement with provider on file under media   [] Medication regimen effective and continued   [] New patient continuing current medication while developing POC   [x] On going assessment and evaluation of medication regimen  [] Medication regimen not effective see plan for changes  [x] Parish Coker reviewed & on file under media      EMR dragon/transcription disclaimer: Much of this encounter note is electronic transcription/translation of spoken language to printed tach. Electronic translation of spoken language may be erroneous, or at times, nonsensical words or phrases may be inadvertently transcribed.  Although, I have reviewed the note for such errors, some may still exist.

## 2020-03-25 NOTE — PROGRESS NOTES
Nursing Admission Record    Current Issues / Falls / ER Visits:  Patient no showed procedure appointment    Opioids Prescribed: Norco 10mg Q6HRS PRN #120    Was Medication Brought to Appt: No    Hx of any Neck/Back Surgeries? Yes, Neck Surgery    Is Patient currently taking a blood thinner? Yes, Aspirin    MRI exams received in the past 2 years:  Yes Lumbar spine and cervical spine       When 12/26/19 lumbar and 2018 cervical                                               Where [facility]       Imaging on chart: Yes         Imaging records requested: No     CT exam received during the last 12 months: No       When na                                              Where na       Imaging on chart: No         Imaging records requested: No     X-ray exam received during the last 12 months: No       When na                                              Where na       Imaging on chart: No         Imaging records requested: No     Nerve Conduction Study/EMG:  No       When na                                              Where na       Imaging on chart: No         Imaging records requested:  No     Physical therapy: No       When: na                                               Where  na     Behavior Health No       When: na                                               Where na     Labs  No       When: na                                             Where  na    PEG Score: 8.7    Last PEG Score: 9.3    Annual ORT Score: 1    Annual PHQ Score: 13    Last UDS Results: 1/30/2020 compliant    Education Provided:  [x] Review of Wing Sykes  [] Agreement Review  [x] PEG Score Calculated [] PHQ Score Calculated [] ORT Score Calculated    [] Compliance Issues Discussed [] Cognitive Behavior Needs [x] Exercise [] Review of Test [] Financial Issues  [x] 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 [] YES  [] NO  Has a suicide plan? [] YES  [] NO  Has attempted suicide in the past?   [] YES  [] NO  Has a close friend or family member who committed suicide?   [] YES  [] NO    Assessment Completed by:  Electronically signed by Bri Sams RN on 3/25/2020 at 12:48 PM

## 2020-04-01 ENCOUNTER — HOSPITAL ENCOUNTER (OUTPATIENT)
Dept: PAIN MANAGEMENT | Age: 60
Discharge: HOME | End: 2020-04-01
Payer: COMMERCIAL

## 2020-04-01 VITALS
SYSTOLIC BLOOD PRESSURE: 157 MMHG | TEMPERATURE: 97.5 F | RESPIRATION RATE: 18 BRPM | HEART RATE: 72 BPM | DIASTOLIC BLOOD PRESSURE: 66 MMHG | OXYGEN SATURATION: 93 %

## 2020-04-01 PROCEDURE — 2500000003 HC RX 250 WO HCPCS

## 2020-04-01 PROCEDURE — 20552 NJX 1/MLT TRIGGER POINT 1/2: CPT | Performed by: NURSE PRACTITIONER

## 2020-04-01 PROCEDURE — 20611 DRAIN/INJ JOINT/BURSA W/US: CPT

## 2020-04-01 PROCEDURE — 6360000002 HC RX W HCPCS

## 2020-04-01 PROCEDURE — 76942 ECHO GUIDE FOR BIOPSY: CPT | Performed by: NURSE PRACTITIONER

## 2020-04-01 RX ORDER — BUPIVACAINE HYDROCHLORIDE 5 MG/ML
INJECTION, SOLUTION EPIDURAL; INTRACAUDAL
Status: COMPLETED | OUTPATIENT
Start: 2020-04-01 | End: 2020-04-01

## 2020-04-01 RX ORDER — LIDOCAINE HYDROCHLORIDE 10 MG/ML
INJECTION, SOLUTION EPIDURAL; INFILTRATION; INTRACAUDAL; PERINEURAL
Status: COMPLETED | OUTPATIENT
Start: 2020-04-01 | End: 2020-04-01

## 2020-04-01 RX ORDER — TRIAMCINOLONE ACETONIDE 40 MG/ML
INJECTION, SUSPENSION INTRA-ARTICULAR; INTRAMUSCULAR
Status: COMPLETED | OUTPATIENT
Start: 2020-04-01 | End: 2020-04-01

## 2020-04-01 RX ADMIN — TRIAMCINOLONE ACETONIDE 40 MG: 40 INJECTION, SUSPENSION INTRA-ARTICULAR; INTRAMUSCULAR at 11:01

## 2020-04-01 RX ADMIN — BUPIVACAINE HYDROCHLORIDE 2 ML: 5 INJECTION, SOLUTION EPIDURAL; INTRACAUDAL at 11:01

## 2020-04-01 RX ADMIN — LIDOCAINE HYDROCHLORIDE 2 ML: 10 INJECTION, SOLUTION EPIDURAL; INFILTRATION; INTRACAUDAL; PERINEURAL at 11:01

## 2020-04-01 ASSESSMENT — PAIN - FUNCTIONAL ASSESSMENT: PAIN_FUNCTIONAL_ASSESSMENT: 0-10

## 2020-04-01 NOTE — LETTER
[facility]  [address]  [address]  Phone: [phone]  Fax: [phone] [facility] KEIKO Kimble - CNP        April 1, 2020     Patient: Ramila Wharton   YOB: 1960   Date of Visit: 4/1/2020       To Whom it May Concern:    Ramila Wharton was seen in my clinic on 4/1/2020. She may return to work on 4/2/2020. If you have any questions or concerns, please don't hesitate to call.     Sincerely,         KEIKO Quarles - CNP

## 2020-04-01 NOTE — PROGRESS NOTES
Subjective:   Patient presented to the office for Left SI injection. Patient stated that she had fallen last night. She stated that her left leg folded beneath her and her right leg went forward. Patient is having difficulty ambulating. She is having worsening pain in her left hip that feels like needles poking her. Patient states that her thigh feels like the muscle is pulled. Her knee is painful baring weight. Objective:  Left knee is swollen and erythremic. Abrasion noted on knee over the healed scare from TKR. Knee is tender to palpation. ROM with pain. Thigh is taut. Left hip is taut. Assessment:  Worrisome for potential fracture due to tenderness and swelling with difficulty with ambulation. Plan:   Work excuse for 3 weeks to allow patient to elevate and ice knee. X-rays of left knee and left hip- patient to get at local hospital and instructed to have report sent to this office. Will call patient with results  Continue medications  Patient to present to PCP if pain worsens or conditions change. Patient to follow up in 3 weeks to reassess.

## 2020-04-01 NOTE — LETTER
[facility]  [address]  [address]  Phone: [phone]  Fax: [phone] [facility] KEIKO Moore CNP        April 1, 2020     Patient: Sriram Lobato   YOB: 1960   Date of Visit: 4/1/2020       To Whom it May Concern:    Sriram Lobato was seen in my clinic on 4/1/2020. She may return to work on 4/2/2020. If you have any questions or concerns, please don't hesitate to call.     Sincerely,         KEIKO Valdez - CNP

## 2020-04-21 ENCOUNTER — HOSPITAL ENCOUNTER (OUTPATIENT)
Dept: PAIN MANAGEMENT | Age: 60
Discharge: HOME | End: 2020-04-21
Payer: COMMERCIAL

## 2020-04-21 VITALS
OXYGEN SATURATION: 93 % | SYSTOLIC BLOOD PRESSURE: 110 MMHG | TEMPERATURE: 96.6 F | HEART RATE: 74 BPM | BODY MASS INDEX: 40.97 KG/M2 | DIASTOLIC BLOOD PRESSURE: 69 MMHG | HEIGHT: 67 IN | RESPIRATION RATE: 18 BRPM | WEIGHT: 261 LBS

## 2020-04-21 PROCEDURE — G0463 HOSPITAL OUTPT CLINIC VISIT: HCPCS

## 2020-04-21 PROCEDURE — 99213 OFFICE O/P EST LOW 20 MIN: CPT

## 2020-04-21 PROCEDURE — 99213 OFFICE O/P EST LOW 20 MIN: CPT | Performed by: NURSE PRACTITIONER

## 2020-04-21 RX ORDER — HYDROCODONE BITARTRATE AND ACETAMINOPHEN 10; 325 MG/1; MG/1
1 TABLET ORAL EVERY 6 HOURS PRN
Qty: 120 TABLET | Refills: 0 | Status: SHIPPED | OUTPATIENT
Start: 2020-04-24 | End: 2020-06-01 | Stop reason: SDUPTHER

## 2020-04-21 ASSESSMENT — PAIN DESCRIPTION - LOCATION: LOCATION: KNEE

## 2020-04-21 ASSESSMENT — ENCOUNTER SYMPTOMS: CONSTIPATION: 0

## 2020-04-21 ASSESSMENT — PAIN SCALES - GENERAL: PAINLEVEL_OUTOF10: 5

## 2020-04-21 ASSESSMENT — PAIN DESCRIPTION - PAIN TYPE: TYPE: CHRONIC PAIN

## 2020-04-21 ASSESSMENT — PAIN DESCRIPTION - ORIENTATION: ORIENTATION: LEFT

## 2020-04-21 NOTE — PROGRESS NOTES
[facility] Physical & Pain Medicine  Office Note    Patient Name: Juan Hope    MR #: [id number]    Account #: [de-identified]    : 1960    Age: 60 y.o. Sex: female    Date: 2020    PCP: Desiree Alegre    Chief Complaint:   Chief Complaint   Patient presents with    Knee Pain     Left    Neck Pain    Headache       History of Present Illness:     Juan Hope is a 60 y.o. female who presents for 3 week follow up post fall. Patient presented for a Left SI injection on 2020. Patient had fallen the night before. She was having severe knee pain. Patient was assessed and she was given work release to allow time to heal. Patient is presenting today to see if she can return to work. Knee Pain   This is a recurrent problem. The current episode started 1 to 4 weeks ago. The problem occurs constantly. The problem has been gradually improving. Associated symptoms include arthralgias, joint swelling and myalgias. The symptoms are aggravated by walking, standing and twisting. Last Procedure:     na    Screening Tools:    PEG Score: 7.7     Last PEG Score: 8.7     Annual ORT Score: 1     Annual PHQ Score: 13    Current Pain Assessment  Pain Assessment  Pain Assessment: 0-10  Pain Level: 5  Pain Type: Chronic pain  Pain Location: Knee  Pain Orientation: Left    Past Visit HPI:  3/25/2020  present for sooner appointment related to missed procedure. Patient states that she received a phone call the day before her injection stating the time was 3:30. Patient stated that she was in her managers office when she got the phone call. Patient had that the appointment was at 1100. Patient had taken 1/2 day off but had to take the entire day off work. Patient came for her appointment on 2020 but when she registered she was told that everyone was gone for the day. Patient states that low back pain is getting worse. Patient states that pain is affecting her ability to work.  She is having Patient attempted to have MRI at her local hospital however patient barely fit inside of MRI machine. Patient stated that MRI machine was pulling at her arms and that she could not breathe and started having anxiety attack. MRI had to be stopped. Patient was brought back into the office to schedule MRI with sedation. However patient states she does not need IV sedation if she can get in the open MRI.     Employment: factory    Past Medical Histoy  Past Medical History:   Diagnosis Date    Arthritis     Bilateral sacroiliitis ([address]     Diabetes mellitus ([facility] [address]     Hypertension     Thyroid disease        Surgery History  Past Surgical History:   Procedure Laterality Date    CARDIAC SURGERY  1998    heart cath    CERVICAL SPINE SURGERY      HYSTERECTOMY      JOINT REPLACEMENT      bilateral knees        Family History  family history is not on file. Social History    Social History     Tobacco Use    Smoking status: Former Smoker    Smokeless tobacco: Never Used   Substance Use Topics    Alcohol use: No       Allergies  Tape [adhesive tape] and Tramadol     Current Medications  Current Outpatient Medications   Medication Sig Dispense Refill    HYDROcodone-acetaminophen (NORCO)  MG per tablet Take 1 tablet by mouth every 6 hours as needed for Pain for up to 30 days. 120 tablet 0    pregabalin (LYRICA) 150 MG capsule Take 1 capsule by mouth three times daily for 90 days. 90 capsule 2    tiZANidine (ZANAFLEX) 4 MG tablet Take 1 tablet by mouth 2 times daily 60 tablet 2    zolpidem (AMBIEN CR) 12.5 MG extended release tablet Take 1 tablet by mouth nightly as needed for Sleep.  Must last 30 days 30 tablet 2    lisinopril (PRINIVIL;ZESTRIL) 20 MG tablet Take 20 mg by mouth daily      Insulin Lispro (HUMALOG KWIKPEN SC) Inject into the skin      levothyroxine (SYNTHROID) 25 MCG tablet Take 25 mcg by mouth daily       metFORMIN ER (GLUCOPHAGE-XR) 500 MG XR tablet Take 500 mg by mouth 2 times daily normal.         Judgment: Judgment normal.     MRI exams received in the past 2 years:  Yes Lumbar spine and cervical spine       When 12/26/19 lumbar and 2018 cervical                                               Where [facility]       Imaging on chart: Yes         Imaging records requested: No     CT exam received during the last 12 months: No       When na                                              Where na       Imaging on chart: No         Imaging records requested: No     X-ray exam received during the last 12 months: Yes, XR Left Hip/Pelvis & XR Left Knee       When 2020                                              [facility]       Imaging on chart: Yes         Imaging records requested: No     Nerve Conduction Study/EMG:  No       When na                                              Where na       Imaging on chart: No         Imaging records requested: No     Physical therapy: No       When: na                                               Where  na     Behavior Health No       When: na                                               Where na     Labs  No       When: na                                             Where  na    Labs:  Lab Results   Component Value Date     11/30/2014    K 3.8 11/30/2014    CL 99 11/30/2014    CO2 24 11/30/2014    BUN 11 11/30/2014    CREATININE 0.9 05/15/2018    CREATININE 0.6 11/30/2014    GLUCOSE 212 11/30/2014    CALCIUM 9.7 11/30/2014        Lab Results   Component Value Date    WBC 12.05 (H) 11/30/2014    HGB 13.0 11/30/2014    HCT 40.4 11/30/2014    MCV 67.4 (L) 11/30/2014     11/30/2014     Assessment:  Principal Problem:    Fall  Active Problems:    Bilateral knee pain    Lumbar disc disease    Bulging lumbar disc    Displacement of lumbar disc with radiculopathy    Chronic bilateral low back pain without sciatica  Resolved Problems:    * No resolved hospital problems. *      Plan:  1.  Continue Norco as previously prescribed with refill

## 2020-04-21 NOTE — PROGRESS NOTES
Nursing Admission Record    Current Issues / Falls / ER Visits: Follow up after fall injuring knee-patient reports knee pain is improving    Was Percentage of Pain Relief after Left Sacroiliac Joint Injection on 4/1/2020:  60 %    How long lasted:  6 weeks N/A  Were you able to decrease pain medication after treatment? N/A  Were you able to increase activity after treatment? N/A  Did you have any adverse reactions to treatment? No    Opioids Prescribed: Norco 10mg Q6HRS PRN #120    Was Medication Brought to Appt: N/A    Hx of any Neck/Back Surgeries? Yes, Neck Surgery    Is Patient currently taking a blood thinner?  None    MRI exams received in the past 2 years:  Yes MRI Lumbar Spine & MRI Cervical Spine       When 2019 & 2018                                              Where [facility]       Imaging on chart: Yes         Imaging records requested: No    CT exam received during the last 12 months: No       When na                                              Where na       Imaging on chart: No         Imaging records requested: No    X-ray exam received during the last 12 months: Yes, XR Left Hip/Pelvis & XR Left Knee       When 2020                                              Where [facility])       Imaging on chart: Yes         Imaging records requested: No    Nerve Conduction Study/EMG:  No       When na                                              Where na       Imaging on chart: No         Imaging records requested: No    Physical therapy: No       When: na                        Where  na    Behavior Health No       When: na                        Where  na    Labs  No       When: na                                             Where  na    PEG Score: 7.7    Last PEG Score: 8.7    Annual ORT Score: 1    Annual PHQ Score: 13    Last UDS Results: 1/30/2020 compliant    Education Provided:  [x] Review of Tamiko Alonso  [] Agreement Review  [x] PEG Score Calculated [] PHQ Score Calculated [] ORT Score

## 2020-06-01 ENCOUNTER — HOSPITAL ENCOUNTER (OUTPATIENT)
Dept: PAIN MANAGEMENT | Age: 60
Discharge: HOME | End: 2020-06-01
Payer: COMMERCIAL

## 2020-06-01 VITALS
OXYGEN SATURATION: 97 % | BODY MASS INDEX: 41.16 KG/M2 | TEMPERATURE: 97.5 F | HEART RATE: 73 BPM | HEIGHT: 67 IN | RESPIRATION RATE: 18 BRPM | DIASTOLIC BLOOD PRESSURE: 83 MMHG | SYSTOLIC BLOOD PRESSURE: 177 MMHG | WEIGHT: 262.25 LBS

## 2020-06-01 PROCEDURE — 99214 OFFICE O/P EST MOD 30 MIN: CPT

## 2020-06-01 PROCEDURE — 99214 OFFICE O/P EST MOD 30 MIN: CPT | Performed by: NURSE PRACTITIONER

## 2020-06-01 PROCEDURE — G0463 HOSPITAL OUTPT CLINIC VISIT: HCPCS

## 2020-06-01 RX ORDER — HYDROCODONE BITARTRATE AND ACETAMINOPHEN 10; 325 MG/1; MG/1
1 TABLET ORAL EVERY 6 HOURS PRN
Qty: 120 TABLET | Refills: 0 | Status: SHIPPED | OUTPATIENT
Start: 2020-06-01 | End: 2020-06-29 | Stop reason: SDUPTHER

## 2020-06-01 ASSESSMENT — ENCOUNTER SYMPTOMS
BACK PAIN: 1
CONSTIPATION: 0

## 2020-06-01 ASSESSMENT — PAIN DESCRIPTION - LOCATION: LOCATION: BACK;KNEE

## 2020-06-01 ASSESSMENT — PAIN SCALES - GENERAL: PAINLEVEL_OUTOF10: 5

## 2020-06-01 ASSESSMENT — PAIN DESCRIPTION - PAIN TYPE: TYPE: CHRONIC PAIN

## 2020-06-01 ASSESSMENT — PAIN DESCRIPTION - ORIENTATION: ORIENTATION: RIGHT;LEFT;LOWER

## 2020-06-01 NOTE — PROGRESS NOTES
Nursing Admission Record    Current Issues / Falls / ER Visits:  8 week f/u    Was Percentage of Pain Relief after Left Sacroiliac Joint Injection on 4/1/2020:  60 %    How long lasted:  6 weeks Yes  Were you able to decrease pain medication after treatment? Yes  Were you able to increase activity after treatment? Yes  Did you have any adverse reactions to treatment? No    Opioids Prescribed: Norco 10mg Q6HRS PRN #120    Was Medication Brought to Appt: N/A     Hx of any Neck/Back Surgeries? Yes, Neck Surgery     Is Patient currently taking a blood thinner? None     MRI exams received in the past 2 years:  Yes MRI Lumbar Spine & MRI Cervical Spine       When 2019 & 2018                                              Where [facility]       Imaging on chart: Yes         Imaging records requested: No     CT exam received during the last 12 months: No       When na                                              Where na       Imaging on chart: No         Imaging records requested: No     X-ray exam received during the last 12 months: Yes, XR Left Hip/Pelvis & XR Left Knee       When 2020                                              Where [facility]       Imaging on chart: Yes         Imaging records requested: No     Nerve Conduction Study/EMG:  No       When na                                              Where na       Imaging on chart: No         Imaging records requested:  No     Physical therapy: No       When: na                                               Where  na     Behavior Health No       When: na                                               Where  na     Labs  No       When: na                                             Where  na    PEG Score: 10    Last PEG Score: 7.7    Annual ORT Score: 1    Annual PHQ Score: 13    Last UDS Results: UDS Today    Education Provided:  [x] Review of Jaison Bridges  [] Agreement Review  [x] PEG Score Calculated [] PHQ Score Calculated [] ORT Score Calculated    [] Compliance Issues Discussed [] Cognitive Behavior Needs [x] Exercise [] Review of Test [] Financial Issues  [x] 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 [] YES  [] NO  Has a suicide plan? [] YES  [] NO  Has attempted suicide in the past?   [] YES  [] NO  Has a close friend or family member who committed suicide?   [] YES  [] NO    Assessment Completed by:  Electronically signed by Jose Gonsalez RN on 6/1/2020 at 1:53 PM

## 2020-06-01 NOTE — LETTER
[facility]  [address]  [address]  Phone: [phone]  Fax: [phone]    KEIKO Lugo CNP        June 1, 2020     Patient: Theresa Ng   YOB: 1960   Date of Visit: 6/1/2020       To Whom It May Concern: It is my medical opinion that Theresa Ng should remain out of work until further notice. Patient will be re-evaluated on 6/29/2020. .    If you have any questions or concerns, please don't hesitate to call.     Sincerely,        KEIKO Lugo CNP

## 2020-06-03 ENCOUNTER — TELEPHONE (OUTPATIENT)
Dept: PAIN MANAGEMENT | Age: 60
End: 2020-06-03

## 2020-06-03 NOTE — TELEPHONE ENCOUNTER
Received call from [facility] at [place] [address] in [place]. They are needing PT order for [facility].   Order faxed to [phone]

## 2020-06-22 ENCOUNTER — TELEPHONE (OUTPATIENT)
Dept: PAIN MANAGEMENT | Age: 60
End: 2020-06-22

## 2020-06-29 ENCOUNTER — HOSPITAL ENCOUNTER (OUTPATIENT)
Dept: PAIN MANAGEMENT | Age: 60
Discharge: HOME | End: 2020-06-29
Payer: COMMERCIAL

## 2020-06-29 VITALS
WEIGHT: 258.5 LBS | OXYGEN SATURATION: 96 % | SYSTOLIC BLOOD PRESSURE: 141 MMHG | RESPIRATION RATE: 18 BRPM | HEIGHT: 67 IN | HEART RATE: 69 BPM | BODY MASS INDEX: 40.57 KG/M2 | DIASTOLIC BLOOD PRESSURE: 75 MMHG | TEMPERATURE: 97.2 F

## 2020-06-29 PROBLEM — Z02.89 PAIN MANAGEMENT CONTRACT AGREEMENT: Status: ACTIVE | Noted: 2020-06-29

## 2020-06-29 PROCEDURE — G0463 HOSPITAL OUTPT CLINIC VISIT: HCPCS

## 2020-06-29 PROCEDURE — 99214 OFFICE O/P EST MOD 30 MIN: CPT | Performed by: NURSE PRACTITIONER

## 2020-06-29 PROCEDURE — 99213 OFFICE O/P EST LOW 20 MIN: CPT

## 2020-06-29 RX ORDER — PREGABALIN 150 MG/1
150 CAPSULE ORAL 3 TIMES DAILY
Qty: 90 CAPSULE | Refills: 2 | Status: SHIPPED | OUTPATIENT
Start: 2020-06-29 | End: 2020-09-28 | Stop reason: SDUPTHER

## 2020-06-29 RX ORDER — HYDROCODONE BITARTRATE AND ACETAMINOPHEN 10; 325 MG/1; MG/1
1 TABLET ORAL EVERY 6 HOURS PRN
Qty: 120 TABLET | Refills: 0 | Status: SHIPPED | OUTPATIENT
Start: 2020-07-01 | End: 2020-09-10 | Stop reason: SDUPTHER

## 2020-06-29 RX ORDER — TIZANIDINE 4 MG/1
4 TABLET ORAL 2 TIMES DAILY
Qty: 60 TABLET | Refills: 2 | Status: SHIPPED | OUTPATIENT
Start: 2020-06-29 | End: 2020-09-28 | Stop reason: SDUPTHER

## 2020-06-29 RX ORDER — ZOLPIDEM TARTRATE 12.5 MG/1
12.5 TABLET, FILM COATED, EXTENDED RELEASE ORAL NIGHTLY PRN
Qty: 30 TABLET | Refills: 2 | Status: SHIPPED | OUTPATIENT
Start: 2020-06-29 | End: 2020-10-16 | Stop reason: SDUPTHER

## 2020-06-29 ASSESSMENT — ENCOUNTER SYMPTOMS
CONSTIPATION: 0
BACK PAIN: 1

## 2020-06-29 ASSESSMENT — PAIN DESCRIPTION - ORIENTATION: ORIENTATION: LOWER;LEFT

## 2020-06-29 ASSESSMENT — PAIN SCALES - GENERAL: PAINLEVEL_OUTOF10: 6

## 2020-06-29 ASSESSMENT — PAIN DESCRIPTION - PAIN TYPE: TYPE: CHRONIC PAIN

## 2020-06-29 ASSESSMENT — PAIN DESCRIPTION - LOCATION: LOCATION: BACK;KNEE

## 2020-06-29 NOTE — PROGRESS NOTES
[facility] Physical & Pain Medicine  Office Note    Patient Name: Kenroy Alejandro    MR #: [id number]    Account #: [de-identified]    : 1960    Age: 60 y.o. Sex: female    Date: 2020    PCP: Saloni Nickerson    Chief Complaint:   Chief Complaint   Patient presents with    Lower Back Pain    Knee Pain     Left       History of Present Illness:     Kenroy Alejandro is a 60 y.o. female who presents today for evaluation of return to work. Patient's condition has significantly improved as PEG score was a 10 at last appointment and is now 7.3. Physical therapy recommends another 4 weeks of treatment prior to returning back to work. Patient's pain is more localized to the SI area as she has had significant improvement in knee pain. Of note: Disability paperwork has not been received by this office. Patient instructed that NP will be going out of town if paperwork is received by tomorrow paperwork will be completed otherwise paperwork will not be completed until NP is back in town mid July. Knee Pain   This is a recurrent problem. The current episode started more than 1 month ago. The problem occurs constantly. The problem has been gradually improving. Associated symptoms include arthralgias, joint swelling, myalgias and weakness. Associated symptoms comments: Abrasion to left knee almost healed. The symptoms are aggravated by walking, standing and twisting. Back Pain   This is a chronic (exacerbation) problem. The current episode started more than 1 month ago. The problem occurs constantly. The problem has been gradually improving since onset. The pain is present in the lumbar spine and sacro-iliac. The quality of the pain is described as cramping and aching. The symptoms are aggravated by bending, position, standing and twisting. Associated symptoms include weakness. Pertinent negatives include no bladder incontinence, bowel incontinence or leg pain.      Last Procedure:     NA    Screening Tools:    PEG Score: 7.3     Last PEG Score: 10     Annual ORT Score: 1     Annual PHQ Score: 13    Current Pain Assessment  Pain Assessment  Pain Assessment: 0-10  Pain Level: 6  Pain Type: Chronic pain  Pain Location: Back, Knee  Pain Orientation: Lower, Left    Past Visit HPI:  6/1/2020  presents for follow-up of acute on chronic knee pain and low back pain. Back on April 1, 2020 patient had presented for a left SI injection for low back pain. The evening prior to the injection patient had fallen and hurt her left knee. Patient was in an extreme amount of pain. Patient was sent for imaging and was taken off of work. Patient started doing some therapeutic home exercises and was off work for approximately 3 weeks. Her pain gradually improved. Patient stated being off work for that 3 weeks she felt better than she had felt in a long time as the pain had resolved. Patient was able to activities of daily living and household chores with minimal pain. When patient received the injection for her low back on 4/1/2020 she was unable to determine how effective the injection was as her knee pain was so severe that it overpowered her low back pain. Patient was seen on 4/21/2020 and she was released to go back to work the following week. Patient states that after returning to work, her knee pain has gradually worsened. Her quality of life has deteriorated. Patient states that the weekends she spends recuperating so she can return to work the following week. Patient is stating that her thighs feel weak. Patient has difficulty with ambulation. Patient does walk bent over and cannot fully extend her knees. This is causing her back pain to worsen. Patient feels that the injections did help with the low back but between the fall and the positions in which she stands to work, the injections for the low back did not last very long. Patient is having difficulty sleeping at night.   Patient is worried that she may have to retire as she does not know if her body can continue. Patient states that her housework is behind as once she gets home from work it is difficult for her to do household chores. Knee Pain is a recurrent problem. The current episode started more than 1 month ago. The problem occurs constantly. The problem has been gradually worsening. Associated symptoms include arthralgias, joint swelling and myalgias. Associated symptoms comments: Abrasion to left knee almost healed. The symptoms are aggravated by walking, standing and twisting. Back Pain is a chronic problem. The current episode started more than 1 year ago. The problem occurs constantly. The problem has been gradually worsening since onset. The pain is present in the lumbar spine and sacro-iliac. The quality of the pain is described as aching and cramping. The symptoms are aggravated by twisting, standing, bending and position. 4/21/2020  presents for 3 week follow up post fall. Patient presented for a Left SI injection on 4/1/2020. Patient had fallen the night before. She was having severe knee pain. Patient was assessed and she was given work release to allow time to heal. Patient is presenting today to see if she can return to work. Knee Pain is a recurrent problem. The current episode started 1 to 4 weeks ago. The problem occurs constantly. The problem has been gradually improving. Associated symptoms include arthralgias, joint swelling and myalgias. The symptoms are aggravated by walking, standing and twisting.     3/25/2020  present for sooner appointment related to missed procedure. Patient states that she received a phone call the day before her injection stating the time was 3:30. Patient stated that she was in her managers office when she got the phone call. Patient had that the appointment was at 1100. Patient had taken 1/2 day off but had to take the entire day off work.  Patient came for her appointment on 2/14/2020 but when she registered she was told that everyone was gone for the day. Patient states that low back pain is getting worse. Patient states that pain is affecting her ability to work. She is having difficulty sleeping and she does not know how much longer she can tolerate this pain. Back Pain is a chronic problem. The current episode started more than 1 year ago. The problem occurs constantly. The problem has been rapidly worsening since onset. The pain is present in the lumbar spine and sacro-iliac. The quality of the pain is described as aching, cramping, shooting and stabbing. Radiates to: left groin. The pain is worse during the day (due to activity). The symptoms are aggravated by bending, standing and twisting (lifting). Associated symptoms include headaches. Pertinent negatives include no bladder incontinence or bowel incontinence. 1/30/2020  presents to the office for follow-up of MRI and worsening low back pain. Discussed reviewed with patient that she does have ligamentous hypertrophy with just chronic degenerative changes. Discussed patient's worsening low back pain and symptoms. Patient states that low back does wrap into the groin at times. Patient has pain with flexion and extension. Low back pain cramps and makes it difficulty for her to do anything after she works. Patient states that all she can tolerate is working and going to bed. Her quality of life has diminished and it takes all she can do to work therefore things in her house are not getting done. 12/17/19   presents to the office for annual exam with primary complaints of worsening cervical radiculopathy with pain going down left arm and into last 2 fingers getting numb. Patient is also having worsening low back pain. She states that constant flexion extension as demanding by her job that she will start having pain in her low back and that her legs would become weak.   She has had a couple of occasions where she has had electrical Normocephalic. Right Ear: External ear normal.      Left Ear: External ear normal.      Nose: Nose normal.   Eyes:      Conjunctiva/sclera: Conjunctivae normal.      Pupils: Pupils are equal, round, and reactive to light. Neck:      Vascular: No JVD. Trachea: No tracheal deviation. Cardiovascular:      Rate and Rhythm: Normal rate. Pulmonary:      Effort: Pulmonary effort is normal.   Abdominal:      General: There is no distension. Tenderness: There is no abdominal tenderness. Musculoskeletal:      Right knee: She exhibits decreased range of motion. Tenderness found. Medial joint line tenderness noted. Left knee: She exhibits decreased range of motion and swelling. Tenderness found. Cervical back: She exhibits decreased range of motion, tenderness and spasm. Comments: Muscles of lumbar spine are tight, tender to palpation with tender palatable knots noted. Left SI TTP, positive distraction, positive thigh thrust, positive Rigo's   Skin:     General: Skin is warm and dry. Neurological:      Mental Status: She is alert and oriented to person, place, and time. Cranial Nerves: No cranial nerve deficit. Gait: Gait abnormal.   Psychiatric:         Behavior: Behavior normal.         Thought Content: Thought content normal.         Judgment: Judgment normal.     MRI exams received in the past 2 years:  Yes Lumbar spine and cervical spine       When 12/26/19 lumbar and 2018 cervical                                               Where [facility]       Imaging on chart: Yes         Imaging records requested: No     CT exam received during the last 12 months: No       When na                                              Where na       Imaging on chart: No         Imaging records requested:  No     X-ray exam received during the last 12 months: Yes, XR Left Hip/Pelvis & XR Left Knee       When 2020                                              Where [facility] Imaging on chart: Yes         Imaging records requested: No     Nerve Conduction Study/EMG:  No       When na                                              Where na       Imaging on chart: No         Imaging records requested: No     Physical therapy: No       When: na                                               Where  na     Behavior Health No       When: na                                               Where na     Labs  No       When: na                                             Where  na    Labs:  Lab Results   Component Value Date     11/30/2014    K 3.8 11/30/2014    CL 99 11/30/2014    CO2 24 11/30/2014    BUN 11 11/30/2014    CREATININE 0.9 05/15/2018    CREATININE 0.6 11/30/2014    GLUCOSE 212 11/30/2014    CALCIUM 9.7 11/30/2014        Lab Results   Component Value Date    WBC 12.05 (H) 11/30/2014    HGB 13.0 11/30/2014    HCT 40.4 11/30/2014    MCV 67.4 (L) 11/30/2014     11/30/2014     Assessment:  Principal Problem:    Chronic bilateral low back pain without sciatica  Active Problems:    Bilateral knee pain    Lumbar disc disease    Bulging lumbar disc    Displacement of lumbar disc with radiculopathy    Other insomnia    Facet arthritis of lumbar region    SI (sacroiliac) joint dysfunction    Pain management contract agreement    Bilateral myofascial pain  Resolved Problems:    * No resolved hospital problems. *      Plan:  Chronic bilateral low back pain without sciatica   Lumbar disc disease     Bulging lumbar disc     Displacement of lumbar disc with radiculopathy   Facet arthritis of lumbar region    Continue with physical therapy. Patient's condition has significantly improved however agree with physical therapy patient needs to remain off work for another few weeks. NP is worried is some that if patient returns to work too soon that she may have a setback as she did previously.   Will reevaluate at procedure appointment on 7/15/2020 regarding return to work    Continue hydrocodone 10 1 every 6 hours as needed for pain with refill date of 7/1/2020 sent at the time of the appointment    Continue pregabalin 150 mg 3 times a day with prescription and refill sent at the time of the appointment    SI (sacroiliac) joint dysfunction     Schedule left SI under US with lumbar trigger points to be scheduled with NP. Bilateral knee pain     Continue above treatments    Bilateral myofascial pain    Continue tizanidine 4 mg 3 times a day with prescription and refill sent at the time of the appointment    Other insomnia      Continue zolpidem 12.5 mg nightly as needed with prescription and refill sent at the time of the appointment      Pain management contract agreement    -Follow up post procedure    -Random UDS as indicated by ORT score or if indicated by abberent behaviors      Discussion: Discussed exam findings and plan of care with patient. Patient agreed with POC and questions were asked and answered. Activity: Discussed exercise as beneficial to pain reduction, encouraged daily stretching with a focus on torso strengthening. Goal:  Pain Management Goals of Therapy:   [] Resolution in pain  [x] Decrease in pain level  [x] Improvement in ADL's  [x] Increase in activities with less pain  [] Decrease in medication      Controlled substance monitoring:    [x] Discussed medication side effects, risk of tolerance and/or dependence, and/or alternative treatment  [] Discussed the detrimental effects of long term narcotic use in younger patients especially women of childbearing years.   [x] No signs and symptoms of potential drug abuse or diversion were identified  [] Signs of potential drug abuse or diversion were identified   [] ORT Score   [] UDS non-compliant   [] See Note  [] Random urine drug screen sent today  [x] Random urine drug screen not completed today   [] Deferred New Patient  [x] Compliant 6/1/2020  [] Not Compliant see note  [] Medication agreement with provider signed

## 2020-06-29 NOTE — PROGRESS NOTES
Nursing Admission Record    Current Issues / Falls / ER Visits:  Patient here today for evaluation to return to work    Opioids Prescribed: Norco 10mg Q6HRS PRN #120    Was Medication Brought to Appt: N/A     Hx of any Neck/Back Surgeries? Yes, Neck Surgery     Is Patient currently taking a blood thinner? None     MRI exams received in the past 2 years:  Yes MRI Lumbar Spine & MRI Cervical Spine       When 2019 & 1657                                              [facility]       Imaging on chart: Yes         Imaging records requested: No     CT exam received during the last 12 months: No       When na                                              Where na       Imaging on chart: No         Imaging records requested: No     X-ray exam received during the last 12 months: Yes, XR Left Hip/Pelvis & XR Left Knee       When 0412                                              [facility])       Imaging on chart: Yes         Imaging records requested: No     Nerve Conduction Study/EMG:  No       When na                                              Where na       Imaging on chart: No         Imaging records requested:  No     Physical therapy: No       When: na                                               Where  na     Behavior Health No       When: na                                               Where  na     Labs  No       When: na                                             Where  na    PEG Score: 7.3    Last PEG Score: 10    Annual ORT Score: 1    Annual PHQ Score: 13    Last UDS Results: 6/1/2020 - compliant    Education Provided:  [x] Review of [place]  [] Agreement Review  [x] PEG Score Calculated [] PHQ Score Calculated [] ORT Score Calculated    [] Compliance Issues Discussed [] Cognitive Behavior Needs [x] Exercise [] Review of Test [] Financial Issues  [x] Tobacco/Alcohol 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

## 2020-07-15 ENCOUNTER — HOSPITAL ENCOUNTER (OUTPATIENT)
Dept: PAIN MANAGEMENT | Age: 60
Discharge: HOME | End: 2020-07-15
Payer: COMMERCIAL

## 2020-07-15 VITALS
OXYGEN SATURATION: 93 % | SYSTOLIC BLOOD PRESSURE: 141 MMHG | TEMPERATURE: 95.5 F | HEART RATE: 69 BPM | DIASTOLIC BLOOD PRESSURE: 61 MMHG | RESPIRATION RATE: 18 BRPM

## 2020-07-15 PROBLEM — M79.18 BILATERAL MYOFASCIAL PAIN: Status: ACTIVE | Noted: 2020-07-15

## 2020-07-15 PROCEDURE — 76942 ECHO GUIDE FOR BIOPSY: CPT | Performed by: NURSE PRACTITIONER

## 2020-07-15 PROCEDURE — 6360000002 HC RX W HCPCS

## 2020-07-15 PROCEDURE — 20611 DRAIN/INJ JOINT/BURSA W/US: CPT

## 2020-07-15 PROCEDURE — 20553 NJX 1/MLT TRIGGER POINTS 3/>: CPT

## 2020-07-15 PROCEDURE — 20553 NJX 1/MLT TRIGGER POINTS 3/>: CPT | Performed by: NURSE PRACTITIONER

## 2020-07-15 PROCEDURE — 2500000003 HC RX 250 WO HCPCS

## 2020-07-15 RX ORDER — BUPIVACAINE HYDROCHLORIDE 5 MG/ML
2 INJECTION, SOLUTION EPIDURAL; INTRACAUDAL ONCE
Status: DISCONTINUED | OUTPATIENT
Start: 2020-07-15 | End: 2020-07-17 | Stop reason: HOSPADM

## 2020-07-15 RX ORDER — BUPIVACAINE HYDROCHLORIDE 5 MG/ML
10 INJECTION, SOLUTION EPIDURAL; INTRACAUDAL ONCE
Status: DISCONTINUED | OUTPATIENT
Start: 2020-07-15 | End: 2020-07-17 | Stop reason: HOSPADM

## 2020-07-15 RX ORDER — LIDOCAINE HYDROCHLORIDE 10 MG/ML
2 INJECTION, SOLUTION EPIDURAL; INFILTRATION; INTRACAUDAL; PERINEURAL ONCE
Status: DISCONTINUED | OUTPATIENT
Start: 2020-07-15 | End: 2020-07-17 | Stop reason: HOSPADM

## 2020-07-15 RX ORDER — TRIAMCINOLONE ACETONIDE 40 MG/ML
40 INJECTION, SUSPENSION INTRA-ARTICULAR; INTRAMUSCULAR ONCE
Status: DISCONTINUED | OUTPATIENT
Start: 2020-07-15 | End: 2020-07-17 | Stop reason: HOSPADM

## 2020-07-15 RX ORDER — LIDOCAINE HYDROCHLORIDE 10 MG/ML
10 INJECTION, SOLUTION EPIDURAL; INFILTRATION; INTRACAUDAL; PERINEURAL ONCE
Status: DISCONTINUED | OUTPATIENT
Start: 2020-07-15 | End: 2020-07-17 | Stop reason: HOSPADM

## 2020-07-15 ASSESSMENT — PAIN DESCRIPTION - DESCRIPTORS: DESCRIPTORS: SHOOTING

## 2020-07-15 ASSESSMENT — PAIN DESCRIPTION - LOCATION: LOCATION: BACK

## 2020-07-15 ASSESSMENT — PAIN DESCRIPTION - PAIN TYPE: TYPE: CHRONIC PAIN

## 2020-07-15 ASSESSMENT — PAIN DESCRIPTION - ORIENTATION: ORIENTATION: LOWER

## 2020-07-15 NOTE — PROCEDURES
[facility] Physical & Pain Medicine    Patient Name: Tariq Ramey    : 1960                    Age: 60 y.o. Sex: female    Date: July 15, 2020    Pre-op Diagnosis: Myofascial Pain/ Muscle Spasms    Post-op Diagnosis: Myofascial Pain/ Muscle Spasms    Procedure: Lumbar Trigger Point Injections    Performing Procedure: Max Ochoa ACAGNP - BC; [facility]-BC    Patient Vitals for the past 24 hrs:   BP Temp Temp src Pulse Resp SpO2   07/15/20 1403 (!) 141/61 95.5 °F (35.3 °C) Temporal 69 18 93 %       Description of Procedure:    After a brief physical assessment and failure to improve with conservative measures the patient presented for Lumbar Trigger Point Injections The indications, limitations and possible complications were discussed with the patient and the patient elected to proceed with the procedure. After voluntary, informed and signed consent obtained the patient was placed in a seated position. Appropriate time out was obtained per policy. The area of maximal tenderness was palpated over the bilaterally Lumbar Muscles - Lower Latissimus,  Erector Spinae, Lumbar Paraspinous. The skin overlying these areas was marked with a skin marker. The areas were prepped using aseptic technique with CHG prep. The skin overlying the proposed injection sites were then sprayed with Gebauer's Solution while protecting patient eyes. Each trigger point of the Lumbar Muscles - Lower Latissimus,  Erector Spinae, Lumbar Paraspinous was injected after negative aspiration was injected with approximately 1-2 ml of a solution of 5 ml of 1% Lidocaine Plain and 5 ml of 0.5% Marcaine Plain after negative aspiration    Discharge: The patient tolerated the procedure well. There were no complications during the procedure and the patient was discharged home with discharge instructions.     The patient has been instructed to contact the office should there be any complications or questions to arise between appointment. Plan:    The patient will follow up in the office in approximately 6 weeks.      Allison Felton, KEIKO - CNP, 7/15/2020 at 3:05 PM

## 2020-07-16 ENCOUNTER — TELEPHONE (OUTPATIENT)
Dept: PAIN MANAGEMENT | Age: 60
End: 2020-07-16

## 2020-07-16 ASSESSMENT — ENCOUNTER SYMPTOMS: BOWEL INCONTINENCE: 0

## 2020-07-16 NOTE — PROGRESS NOTES
[facility] Physical & Pain Medicine     Patient Name: Keysha Cage     : 1960                     Age: 60 y.o.     Sex: female     Date: July 15, 2020        Subjective:  Patient presents today for lumbar trigger point injections along with left SI injection as well as reevaluation to return to work. Patient states that condition was improving she was able to do activities of daily living as well as household chores with less pain. Patient has been undergoing physical therapy for low back and knee strengthening. Patient stated that she was doing exceptionally well until the last 2 treatments. Patient stated that she had had an increase in neck pain from the leg press as she felt it was more to do with the amount of weight. Patient stated that she mentioned this to physical therapist however they put more weight on the next day. Patient stated that she has had an increase in pain the last 2 days however she feels that the injections today are targeting the areas of increased pain. She does feel that she will be able to return to work. Objective    Patient is ambulating better than at previous visits. Patient does have noted circular bruising on the left lower back hip area. Patient states this is from the cupping and dry needling received at physical therapy. Assessment:    Exacerbation of chronic low back pain status post fall improved  Exacerbation of chronic bilateral knee pain status post fall improved    Plan:     Patient may return to work on  with no restrictions. Patient was instructed that if she continues to have increase in low back pain and pain is exacerbated by work to contact office for evaluation.     Electronically signed by KEIKO Stovall CNP on 20 at 10:22 AM PAVAN

## 2020-08-27 ENCOUNTER — APPOINTMENT (OUTPATIENT)
Dept: CT IMAGING | Facility: HOSPITAL | Age: 60
End: 2020-08-27

## 2020-08-27 ENCOUNTER — HOSPITAL ENCOUNTER (OUTPATIENT)
Facility: HOSPITAL | Age: 60
Discharge: HOME | End: 2020-08-30
Attending: HOSPITALIST | Admitting: INTERNAL MEDICINE

## 2020-08-27 DIAGNOSIS — I20.8 CHRONIC STABLE ANGINA (HCC): Primary | ICD-10-CM

## 2020-08-27 PROCEDURE — G0378 HOSPITAL OBSERVATION PER HR: HCPCS

## 2020-08-27 PROCEDURE — 71275 CT ANGIOGRAPHY CHEST: CPT

## 2020-08-27 RX ORDER — ZOLPIDEM TARTRATE 5 MG/1
5 TABLET ORAL NIGHTLY PRN
COMMUNITY
End: 2021-07-27

## 2020-08-27 RX ORDER — INSULIN GLARGINE 100 [IU]/ML
50 INJECTION, SOLUTION SUBCUTANEOUS DAILY
COMMUNITY

## 2020-08-27 RX ADMIN — IOPAMIDOL 76 ML: 755 INJECTION, SOLUTION INTRAVENOUS at 23:16

## 2020-08-28 ENCOUNTER — APPOINTMENT (OUTPATIENT)
Dept: CARDIOLOGY | Facility: HOSPITAL | Age: 60
End: 2020-08-28

## 2020-08-28 ENCOUNTER — APPOINTMENT (OUTPATIENT)
Dept: NUCLEAR MEDICINE | Facility: HOSPITAL | Age: 60
End: 2020-08-28

## 2020-08-28 PROBLEM — I20.8 STABLE ANGINA PECTORIS (HCC): Status: ACTIVE | Noted: 2020-08-28

## 2020-08-28 PROBLEM — I10 BENIGN ESSENTIAL HTN: Status: ACTIVE | Noted: 2020-08-28

## 2020-08-28 PROBLEM — E11.65 TYPE 2 DIABETES MELLITUS WITH HYPERGLYCEMIA, WITH LONG-TERM CURRENT USE OF INSULIN (HCC): Status: ACTIVE | Noted: 2020-08-28

## 2020-08-28 PROBLEM — Z79.4 TYPE 2 DIABETES MELLITUS WITH HYPERGLYCEMIA, WITH LONG-TERM CURRENT USE OF INSULIN (HCC): Status: ACTIVE | Noted: 2020-08-28

## 2020-08-28 PROBLEM — E78.1 HYPERTRIGLYCERIDEMIA: Status: ACTIVE | Noted: 2020-08-28

## 2020-08-28 PROBLEM — R07.9 CHEST PAIN: Status: ACTIVE | Noted: 2020-08-28

## 2020-08-28 PROBLEM — E66.01 MORBID OBESITY WITH BMI OF 40.0-44.9, ADULT (HCC): Status: ACTIVE | Noted: 2020-08-28

## 2020-08-28 LAB
ANION GAP SERPL CALCULATED.3IONS-SCNC: 12 MMOL/L (ref 5–15)
BH CV STRESS BP STAGE 1: NORMAL
BH CV STRESS COMMENTS STAGE 1: NORMAL
BH CV STRESS DOSE REGADENOSON STAGE 1: 0.4
BH CV STRESS DURATION MIN STAGE 1: 0
BH CV STRESS DURATION SEC STAGE 1: 10
BH CV STRESS HR STAGE 1: 82
BH CV STRESS PROTOCOL 1: NORMAL
BH CV STRESS RECOVERY BP: NORMAL MMHG
BH CV STRESS RECOVERY HR: 76 BPM
BH CV STRESS STAGE 1: 1
BUN SERPL-MCNC: 25 MG/DL (ref 8–23)
BUN/CREAT SERPL: 33.8 (ref 7–25)
CALCIUM SPEC-SCNC: 9.3 MG/DL (ref 8.6–10.5)
CHLORIDE SERPL-SCNC: 103 MMOL/L (ref 98–107)
CHOLEST SERPL-MCNC: 181 MG/DL (ref 0–200)
CO2 SERPL-SCNC: 24 MMOL/L (ref 22–29)
CREAT SERPL-MCNC: 0.74 MG/DL (ref 0.57–1)
DEPRECATED RDW RBC AUTO: 40.8 FL (ref 37–54)
ERYTHROCYTE [DISTWIDTH] IN BLOOD BY AUTOMATED COUNT: 18.5 % (ref 12.3–15.4)
GFR SERPL CREATININE-BSD FRML MDRD: 80 ML/MIN/1.73
GLUCOSE BLDC GLUCOMTR-MCNC: 109 MG/DL (ref 70–130)
GLUCOSE BLDC GLUCOMTR-MCNC: 142 MG/DL (ref 70–130)
GLUCOSE BLDC GLUCOMTR-MCNC: 243 MG/DL (ref 70–130)
GLUCOSE BLDC GLUCOMTR-MCNC: 280 MG/DL (ref 70–130)
GLUCOSE SERPL-MCNC: 100 MG/DL (ref 65–99)
HCT VFR BLD AUTO: 37.6 % (ref 34–46.6)
HDLC SERPL-MCNC: 47 MG/DL (ref 40–60)
HGB BLD-MCNC: 11.8 G/DL (ref 12–15.9)
LDLC SERPL CALC-MCNC: 88 MG/DL (ref 0–100)
LDLC/HDLC SERPL: 1.87 {RATIO}
LV EF NUC BP: 75 %
MAGNESIUM SERPL-MCNC: 1.9 MG/DL (ref 1.6–2.4)
MAXIMAL PREDICTED HEART RATE: 160 BPM
MCH RBC QN AUTO: 21.3 PG (ref 26.6–33)
MCHC RBC AUTO-ENTMCNC: 31.4 G/DL (ref 31.5–35.7)
MCV RBC AUTO: 67.9 FL (ref 79–97)
PERCENT MAX PREDICTED HR: 55.63 %
PHOSPHATE SERPL-MCNC: 3.7 MG/DL (ref 2.5–4.5)
PLATELET # BLD AUTO: 310 10*3/MM3 (ref 140–450)
PMV BLD AUTO: 11.2 FL (ref 6–12)
POTASSIUM SERPL-SCNC: 4.1 MMOL/L (ref 3.5–5.2)
RBC # BLD AUTO: 5.54 10*6/MM3 (ref 3.77–5.28)
SODIUM SERPL-SCNC: 139 MMOL/L (ref 136–145)
STRESS BASELINE BP: NORMAL MMHG
STRESS BASELINE HR: 62 BPM
STRESS PERCENT HR: 65 %
STRESS POST ESTIMATED WORKLOAD: 1 METS
STRESS POST PEAK BP: NORMAL MMHG
STRESS POST PEAK HR: 89 BPM
STRESS TARGET HR: 136 BPM
TRIGL SERPL-MCNC: 231 MG/DL (ref 0–150)
TROPONIN T SERPL-MCNC: <0.01 NG/ML (ref 0–0.03)
VLDLC SERPL-MCNC: 46.2 MG/DL
WBC # BLD AUTO: 14.38 10*3/MM3 (ref 3.4–10.8)

## 2020-08-28 PROCEDURE — 25010000002 ENOXAPARIN PER 10 MG: Performed by: HOSPITALIST

## 2020-08-28 PROCEDURE — A9500 TC99M SESTAMIBI: HCPCS | Performed by: HOSPITALIST

## 2020-08-28 PROCEDURE — 78452 HT MUSCLE IMAGE SPECT MULT: CPT

## 2020-08-28 PROCEDURE — 83735 ASSAY OF MAGNESIUM: CPT | Performed by: HOSPITALIST

## 2020-08-28 PROCEDURE — 93017 CV STRESS TEST TRACING ONLY: CPT

## 2020-08-28 PROCEDURE — 63710000001 INSULIN ASPART PER 5 UNITS: Performed by: HOSPITALIST

## 2020-08-28 PROCEDURE — 99204 OFFICE O/P NEW MOD 45 MIN: CPT | Performed by: NURSE PRACTITIONER

## 2020-08-28 PROCEDURE — 0 TECHNETIUM SESTAMIBI: Performed by: HOSPITALIST

## 2020-08-28 PROCEDURE — G0378 HOSPITAL OBSERVATION PER HR: HCPCS

## 2020-08-28 PROCEDURE — 25010000002 REGADENOSON 0.4 MG/5ML SOLUTION: Performed by: HOSPITALIST

## 2020-08-28 PROCEDURE — 93016 CV STRESS TEST SUPVJ ONLY: CPT | Performed by: INTERNAL MEDICINE

## 2020-08-28 PROCEDURE — 80048 BASIC METABOLIC PNL TOTAL CA: CPT | Performed by: HOSPITALIST

## 2020-08-28 PROCEDURE — 85027 COMPLETE CBC AUTOMATED: CPT | Performed by: HOSPITALIST

## 2020-08-28 PROCEDURE — 0 IOPAMIDOL PER 1 ML: Performed by: HOSPITALIST

## 2020-08-28 PROCEDURE — 78452 HT MUSCLE IMAGE SPECT MULT: CPT | Performed by: INTERNAL MEDICINE

## 2020-08-28 PROCEDURE — 84484 ASSAY OF TROPONIN QUANT: CPT | Performed by: HOSPITALIST

## 2020-08-28 PROCEDURE — 82962 GLUCOSE BLOOD TEST: CPT

## 2020-08-28 PROCEDURE — 84100 ASSAY OF PHOSPHORUS: CPT | Performed by: HOSPITALIST

## 2020-08-28 PROCEDURE — 93018 CV STRESS TEST I&R ONLY: CPT | Performed by: INTERNAL MEDICINE

## 2020-08-28 PROCEDURE — 96372 THER/PROPH/DIAG INJ SC/IM: CPT

## 2020-08-28 PROCEDURE — 80061 LIPID PANEL: CPT | Performed by: HOSPITALIST

## 2020-08-28 PROCEDURE — 63710000001 INSULIN DETEMIR PER 5 UNITS: Performed by: NURSE PRACTITIONER

## 2020-08-28 RX ORDER — HYDROCODONE BITARTRATE AND ACETAMINOPHEN 5; 325 MG/1; MG/1
1 TABLET ORAL EVERY 4 HOURS PRN
Status: DISCONTINUED | OUTPATIENT
Start: 2020-08-28 | End: 2020-08-30 | Stop reason: HOSPADM

## 2020-08-28 RX ORDER — ZOLPIDEM TARTRATE 5 MG/1
5 TABLET ORAL NIGHTLY PRN
Status: DISCONTINUED | OUTPATIENT
Start: 2020-08-28 | End: 2020-08-30 | Stop reason: HOSPADM

## 2020-08-28 RX ORDER — ACETAMINOPHEN 160 MG/5ML
650 SOLUTION ORAL EVERY 4 HOURS PRN
Status: DISCONTINUED | OUTPATIENT
Start: 2020-08-28 | End: 2020-08-28 | Stop reason: SDUPTHER

## 2020-08-28 RX ORDER — ACETAMINOPHEN 650 MG/1
650 SUPPOSITORY RECTAL EVERY 4 HOURS PRN
Status: DISCONTINUED | OUTPATIENT
Start: 2020-08-28 | End: 2020-08-30 | Stop reason: HOSPADM

## 2020-08-28 RX ORDER — BISACODYL 5 MG/1
5 TABLET, DELAYED RELEASE ORAL DAILY PRN
Status: DISCONTINUED | OUTPATIENT
Start: 2020-08-28 | End: 2020-08-30 | Stop reason: HOSPADM

## 2020-08-28 RX ORDER — ATORVASTATIN CALCIUM 20 MG/1
20 TABLET, FILM COATED ORAL NIGHTLY
Status: DISCONTINUED | OUTPATIENT
Start: 2020-08-28 | End: 2020-08-29

## 2020-08-28 RX ORDER — FAMOTIDINE 40 MG/1
40 TABLET, FILM COATED ORAL DAILY
Status: DISCONTINUED | OUTPATIENT
Start: 2020-08-28 | End: 2020-08-30 | Stop reason: HOSPADM

## 2020-08-28 RX ORDER — SODIUM CHLORIDE 0.9 % (FLUSH) 0.9 %
10 SYRINGE (ML) INJECTION AS NEEDED
Status: DISCONTINUED | OUTPATIENT
Start: 2020-08-28 | End: 2020-08-30 | Stop reason: HOSPADM

## 2020-08-28 RX ORDER — SODIUM CHLORIDE 0.9 % (FLUSH) 0.9 %
10 SYRINGE (ML) INJECTION ONCE
Status: COMPLETED | OUTPATIENT
Start: 2020-08-28 | End: 2020-08-28

## 2020-08-28 RX ORDER — ASPIRIN 81 MG/1
81 TABLET ORAL DAILY
Status: DISCONTINUED | OUTPATIENT
Start: 2020-08-28 | End: 2020-08-30 | Stop reason: HOSPADM

## 2020-08-28 RX ORDER — SODIUM CHLORIDE 9 MG/ML
75 INJECTION, SOLUTION INTRAVENOUS CONTINUOUS
Status: DISCONTINUED | OUTPATIENT
Start: 2020-08-29 | End: 2020-08-29

## 2020-08-28 RX ORDER — ACETAMINOPHEN 325 MG/1
650 TABLET ORAL EVERY 4 HOURS PRN
Status: DISCONTINUED | OUTPATIENT
Start: 2020-08-28 | End: 2020-08-29

## 2020-08-28 RX ORDER — MORPHINE SULFATE 2 MG/ML
2 INJECTION, SOLUTION INTRAMUSCULAR; INTRAVENOUS EVERY 4 HOURS PRN
Status: DISCONTINUED | OUTPATIENT
Start: 2020-08-28 | End: 2020-08-30 | Stop reason: HOSPADM

## 2020-08-28 RX ORDER — ONDANSETRON 2 MG/ML
4 INJECTION INTRAMUSCULAR; INTRAVENOUS EVERY 6 HOURS PRN
Status: DISCONTINUED | OUTPATIENT
Start: 2020-08-28 | End: 2020-08-30 | Stop reason: HOSPADM

## 2020-08-28 RX ORDER — ONDANSETRON 4 MG/1
4 TABLET, FILM COATED ORAL EVERY 6 HOURS PRN
Status: DISCONTINUED | OUTPATIENT
Start: 2020-08-28 | End: 2020-08-30 | Stop reason: HOSPADM

## 2020-08-28 RX ORDER — NICOTINE POLACRILEX 4 MG
15 LOZENGE BUCCAL
Status: DISCONTINUED | OUTPATIENT
Start: 2020-08-28 | End: 2020-08-30 | Stop reason: HOSPADM

## 2020-08-28 RX ORDER — DEXTROSE MONOHYDRATE 25 G/50ML
25 INJECTION, SOLUTION INTRAVENOUS
Status: DISCONTINUED | OUTPATIENT
Start: 2020-08-28 | End: 2020-08-30 | Stop reason: HOSPADM

## 2020-08-28 RX ORDER — SODIUM CHLORIDE 0.9 % (FLUSH) 0.9 %
10 SYRINGE (ML) INJECTION EVERY 12 HOURS SCHEDULED
Status: DISCONTINUED | OUTPATIENT
Start: 2020-08-28 | End: 2020-08-30 | Stop reason: HOSPADM

## 2020-08-28 RX ADMIN — ATORVASTATIN CALCIUM 20 MG: 20 TABLET, FILM COATED ORAL at 20:14

## 2020-08-28 RX ADMIN — HYDROCODONE BITARTRATE AND ACETAMINOPHEN 1 TABLET: 5; 325 TABLET ORAL at 06:17

## 2020-08-28 RX ADMIN — INSULIN ASPART 6 UNITS: 100 INJECTION, SOLUTION INTRAVENOUS; SUBCUTANEOUS at 16:41

## 2020-08-28 RX ADMIN — ASPIRIN 81 MG: 81 TABLET, COATED ORAL at 16:43

## 2020-08-28 RX ADMIN — ZOLPIDEM TARTRATE 5 MG: 5 TABLET ORAL at 21:24

## 2020-08-28 RX ADMIN — SODIUM CHLORIDE, PRESERVATIVE FREE 10 ML: 5 INJECTION INTRAVENOUS at 11:49

## 2020-08-28 RX ADMIN — SODIUM CHLORIDE, PRESERVATIVE FREE 10 ML: 5 INJECTION INTRAVENOUS at 09:09

## 2020-08-28 RX ADMIN — ACETAMINOPHEN 650 MG: 325 TABLET, FILM COATED ORAL at 12:35

## 2020-08-28 RX ADMIN — REGADENOSON 0.4 MG: 0.08 INJECTION, SOLUTION INTRAVENOUS at 11:48

## 2020-08-28 RX ADMIN — ENOXAPARIN SODIUM 40 MG: 40 INJECTION SUBCUTANEOUS at 09:09

## 2020-08-28 RX ADMIN — INSULIN DETEMIR 25 UNITS: 100 INJECTION, SOLUTION SUBCUTANEOUS at 21:24

## 2020-08-28 RX ADMIN — HYDROCODONE BITARTRATE AND ACETAMINOPHEN 1 TABLET: 5; 325 TABLET ORAL at 20:14

## 2020-08-28 RX ADMIN — TECHNETIUM TC 99M SESTAMIBI 1 DOSE: 1 INJECTION INTRAVENOUS at 10:20

## 2020-08-28 RX ADMIN — TECHNETIUM TC 99M SESTAMIBI 1 DOSE: 1 INJECTION INTRAVENOUS at 11:49

## 2020-08-29 ENCOUNTER — APPOINTMENT (OUTPATIENT)
Dept: CARDIOLOGY | Facility: HOSPITAL | Age: 60
End: 2020-08-29

## 2020-08-29 DIAGNOSIS — R06.09 DYSPNEA ON EXERTION: Primary | ICD-10-CM

## 2020-08-29 PROBLEM — I20.8 CHRONIC STABLE ANGINA (HCC): Status: ACTIVE | Noted: 2020-08-27

## 2020-08-29 LAB
ANION GAP SERPL CALCULATED.3IONS-SCNC: 12 MMOL/L (ref 5–15)
BASOPHILS # BLD AUTO: 0.06 10*3/MM3 (ref 0–0.2)
BASOPHILS NFR BLD AUTO: 0.4 % (ref 0–1.5)
BH CV ECHO MEAS - ACS: 1.9 CM
BH CV ECHO MEAS - AO ISTHMUS: 2.8 CM
BH CV ECHO MEAS - AO MAX PG (FULL): 8 MMHG
BH CV ECHO MEAS - AO MAX PG: 20.1 MMHG
BH CV ECHO MEAS - AO MEAN PG (FULL): 5 MMHG
BH CV ECHO MEAS - AO MEAN PG: 10 MMHG
BH CV ECHO MEAS - AO ROOT AREA (BSA CORRECTED): 2.1
BH CV ECHO MEAS - AO ROOT AREA: 11.3 CM^2
BH CV ECHO MEAS - AO ROOT DIAM: 3.8 CM
BH CV ECHO MEAS - AO V2 MAX: 224 CM/SEC
BH CV ECHO MEAS - AO V2 MEAN: 144 CM/SEC
BH CV ECHO MEAS - AO V2 VTI: 42.5 CM
BH CV ECHO MEAS - ASC AORTA: 3.5 CM
BH CV ECHO MEAS - AVA(I,A): 2.4 CM^2
BH CV ECHO MEAS - AVA(I,D): 2.4 CM^2
BH CV ECHO MEAS - AVA(V,A): 2.4 CM^2
BH CV ECHO MEAS - AVA(V,D): 2.4 CM^2
BH CV ECHO MEAS - BSA(HAYCOCK): 1.9 M^2
BH CV ECHO MEAS - BSA: 1.8 M^2
BH CV ECHO MEAS - BZI_BMI: 33.5 KILOGRAMS/M^2
BH CV ECHO MEAS - BZI_METRIC_HEIGHT: 157.5 CM
BH CV ECHO MEAS - BZI_METRIC_WEIGHT: 83 KG
BH CV ECHO MEAS - EDV(CUBED): 143.1 ML
BH CV ECHO MEAS - EDV(MOD-SP2): 53.1 ML
BH CV ECHO MEAS - EDV(MOD-SP4): 65.2 ML
BH CV ECHO MEAS - EDV(TEICH): 131.2 ML
BH CV ECHO MEAS - EF(CUBED): 90 %
BH CV ECHO MEAS - EF(MOD-SP2): 77.4 %
BH CV ECHO MEAS - EF(MOD-SP4): 72.5 %
BH CV ECHO MEAS - EF(TEICH): 84.2 %
BH CV ECHO MEAS - ESV(CUBED): 14.3 ML
BH CV ECHO MEAS - ESV(MOD-SP2): 12 ML
BH CV ECHO MEAS - ESV(MOD-SP4): 17.9 ML
BH CV ECHO MEAS - ESV(TEICH): 20.8 ML
BH CV ECHO MEAS - FS: 53.5 %
BH CV ECHO MEAS - IVS/LVPW: 0.93
BH CV ECHO MEAS - IVSD: 0.89 CM
BH CV ECHO MEAS - LA DIMENSION: 4.4 CM
BH CV ECHO MEAS - LA/AO: 1.2
BH CV ECHO MEAS - LV DIASTOLIC VOL/BSA (35-75): 35.4 ML/M^2
BH CV ECHO MEAS - LV IVRT: 0.1 SEC
BH CV ECHO MEAS - LV MASS(C)D: 175.7 GRAMS
BH CV ECHO MEAS - LV MASS(C)DI: 95.5 GRAMS/M^2
BH CV ECHO MEAS - LV MAX PG: 12.1 MMHG
BH CV ECHO MEAS - LV MEAN PG: 5 MMHG
BH CV ECHO MEAS - LV SYSTOLIC VOL/BSA (12-30): 9.7 ML/M^2
BH CV ECHO MEAS - LV V1 MAX: 174 CM/SEC
BH CV ECHO MEAS - LV V1 MEAN: 101 CM/SEC
BH CV ECHO MEAS - LV V1 VTI: 33.1 CM
BH CV ECHO MEAS - LVIDD: 5.2 CM
BH CV ECHO MEAS - LVIDS: 2.4 CM
BH CV ECHO MEAS - LVLD AP2: 6.8 CM
BH CV ECHO MEAS - LVLD AP4: 8.3 CM
BH CV ECHO MEAS - LVLS AP2: 5.1 CM
BH CV ECHO MEAS - LVLS AP4: 6.7 CM
BH CV ECHO MEAS - LVOT AREA (M): 3.1 CM^2
BH CV ECHO MEAS - LVOT AREA: 3.1 CM^2
BH CV ECHO MEAS - LVOT DIAM: 2 CM
BH CV ECHO MEAS - LVPWD: 0.96 CM
BH CV ECHO MEAS - MV A MAX VEL: 99 CM/SEC
BH CV ECHO MEAS - MV DEC SLOPE: 593 CM/SEC^2
BH CV ECHO MEAS - MV E MAX VEL: 89.7 CM/SEC
BH CV ECHO MEAS - MV E/A: 0.91
BH CV ECHO MEAS - MV MAX PG: 7.5 MMHG
BH CV ECHO MEAS - MV MEAN PG: 2 MMHG
BH CV ECHO MEAS - MV P1/2T MAX VEL: 112 CM/SEC
BH CV ECHO MEAS - MV P1/2T: 55.3 MSEC
BH CV ECHO MEAS - MV V2 MAX: 137 CM/SEC
BH CV ECHO MEAS - MV V2 MEAN: 69.3 CM/SEC
BH CV ECHO MEAS - MV V2 VTI: 34.1 CM
BH CV ECHO MEAS - MVA P1/2T LCG: 2 CM^2
BH CV ECHO MEAS - MVA(P1/2T): 4 CM^2
BH CV ECHO MEAS - MVA(VTI): 3 CM^2
BH CV ECHO MEAS - PA MAX PG (FULL): 6 MMHG
BH CV ECHO MEAS - PA MAX PG: 11.6 MMHG
BH CV ECHO MEAS - PA V2 MAX: 170 CM/SEC
BH CV ECHO MEAS - RAP SYSTOLE: 5 MMHG
BH CV ECHO MEAS - RV MAX PG: 5.6 MMHG
BH CV ECHO MEAS - RV MEAN PG: 3 MMHG
BH CV ECHO MEAS - RV V1 MAX: 118 CM/SEC
BH CV ECHO MEAS - RV V1 MEAN: 75.9 CM/SEC
BH CV ECHO MEAS - RV V1 VTI: 22.8 CM
BH CV ECHO MEAS - RVDD: 2.4 CM
BH CV ECHO MEAS - RVSP: 25.1 MMHG
BH CV ECHO MEAS - SI(AO): 261.9 ML/M^2
BH CV ECHO MEAS - SI(CUBED): 69.9 ML/M^2
BH CV ECHO MEAS - SI(LVOT): 56.5 ML/M^2
BH CV ECHO MEAS - SI(MOD-SP2): 22.3 ML/M^2
BH CV ECHO MEAS - SI(MOD-SP4): 25.7 ML/M^2
BH CV ECHO MEAS - SI(TEICH): 60 ML/M^2
BH CV ECHO MEAS - SV(AO): 482 ML
BH CV ECHO MEAS - SV(CUBED): 128.7 ML
BH CV ECHO MEAS - SV(LVOT): 104 ML
BH CV ECHO MEAS - SV(MOD-SP2): 41.1 ML
BH CV ECHO MEAS - SV(MOD-SP4): 47.3 ML
BH CV ECHO MEAS - SV(TEICH): 110.4 ML
BH CV ECHO MEAS - TR MAX VEL: 224 CM/SEC
BUN SERPL-MCNC: 22 MG/DL (ref 8–23)
BUN/CREAT SERPL: 27.2 (ref 7–25)
CALCIUM SPEC-SCNC: 10.2 MG/DL (ref 8.6–10.5)
CHLORIDE SERPL-SCNC: 99 MMOL/L (ref 98–107)
CO2 SERPL-SCNC: 24 MMOL/L (ref 22–29)
CREAT SERPL-MCNC: 0.81 MG/DL (ref 0.57–1)
DEPRECATED RDW RBC AUTO: 39.4 FL (ref 37–54)
EOSINOPHIL # BLD AUTO: 0.28 10*3/MM3 (ref 0–0.4)
EOSINOPHIL NFR BLD AUTO: 2 % (ref 0.3–6.2)
ERYTHROCYTE [DISTWIDTH] IN BLOOD BY AUTOMATED COUNT: 18.7 % (ref 12.3–15.4)
GFR SERPL CREATININE-BSD FRML MDRD: 72 ML/MIN/1.73
GLUCOSE BLDC GLUCOMTR-MCNC: 194 MG/DL (ref 70–130)
GLUCOSE BLDC GLUCOMTR-MCNC: 236 MG/DL (ref 70–130)
GLUCOSE BLDC GLUCOMTR-MCNC: 259 MG/DL (ref 70–130)
GLUCOSE SERPL-MCNC: 270 MG/DL (ref 65–99)
HCT VFR BLD AUTO: 39.5 % (ref 34–46.6)
HGB BLD-MCNC: 12.4 G/DL (ref 12–15.9)
IMM GRANULOCYTES # BLD AUTO: 0.05 10*3/MM3 (ref 0–0.05)
IMM GRANULOCYTES NFR BLD AUTO: 0.3 % (ref 0–0.5)
LYMPHOCYTES # BLD AUTO: 3.17 10*3/MM3 (ref 0.7–3.1)
LYMPHOCYTES NFR BLD AUTO: 22.1 % (ref 19.6–45.3)
MAXIMAL PREDICTED HEART RATE: 160 BPM
MCH RBC QN AUTO: 20.8 PG (ref 26.6–33)
MCHC RBC AUTO-ENTMCNC: 31.4 G/DL (ref 31.5–35.7)
MCV RBC AUTO: 66.4 FL (ref 79–97)
MONOCYTES # BLD AUTO: 1.08 10*3/MM3 (ref 0.1–0.9)
MONOCYTES NFR BLD AUTO: 7.5 % (ref 5–12)
NEUTROPHILS NFR BLD AUTO: 67.7 % (ref 42.7–76)
NEUTROPHILS NFR BLD AUTO: 9.69 10*3/MM3 (ref 1.7–7)
NRBC BLD AUTO-RTO: 0 /100 WBC (ref 0–0.2)
PLATELET # BLD AUTO: 363 10*3/MM3 (ref 140–450)
PMV BLD AUTO: 11.1 FL (ref 6–12)
POTASSIUM SERPL-SCNC: 4.5 MMOL/L (ref 3.5–5.2)
RBC # BLD AUTO: 5.95 10*6/MM3 (ref 3.77–5.28)
SODIUM SERPL-SCNC: 135 MMOL/L (ref 136–145)
STRESS TARGET HR: 136 BPM
WBC # BLD AUTO: 14.33 10*3/MM3 (ref 3.4–10.8)

## 2020-08-29 PROCEDURE — 63710000001 ONDANSETRON PER 8 MG: Performed by: HOSPITALIST

## 2020-08-29 PROCEDURE — G0378 HOSPITAL OBSERVATION PER HR: HCPCS

## 2020-08-29 PROCEDURE — 25010000002 HEPARIN (PORCINE) PER 1000 UNITS: Performed by: INTERNAL MEDICINE

## 2020-08-29 PROCEDURE — 93306 TTE W/DOPPLER COMPLETE: CPT | Performed by: INTERNAL MEDICINE

## 2020-08-29 PROCEDURE — S0260 H&P FOR SURGERY: HCPCS | Performed by: INTERNAL MEDICINE

## 2020-08-29 PROCEDURE — 96374 THER/PROPH/DIAG INJ IV PUSH: CPT

## 2020-08-29 PROCEDURE — C1887 CATHETER, GUIDING: HCPCS | Performed by: INTERNAL MEDICINE

## 2020-08-29 PROCEDURE — 63710000001 INSULIN ASPART PER 5 UNITS: Performed by: HOSPITALIST

## 2020-08-29 PROCEDURE — C1894 INTRO/SHEATH, NON-LASER: HCPCS | Performed by: INTERNAL MEDICINE

## 2020-08-29 PROCEDURE — 82962 GLUCOSE BLOOD TEST: CPT

## 2020-08-29 PROCEDURE — 93306 TTE W/DOPPLER COMPLETE: CPT

## 2020-08-29 PROCEDURE — 85025 COMPLETE CBC W/AUTO DIFF WBC: CPT | Performed by: INTERNAL MEDICINE

## 2020-08-29 PROCEDURE — 93005 ELECTROCARDIOGRAM TRACING: CPT | Performed by: INTERNAL MEDICINE

## 2020-08-29 PROCEDURE — 25010000002 MIDAZOLAM PER 1 MG: Performed by: INTERNAL MEDICINE

## 2020-08-29 PROCEDURE — 93458 L HRT ARTERY/VENTRICLE ANGIO: CPT | Performed by: INTERNAL MEDICINE

## 2020-08-29 PROCEDURE — 93571 IV DOP VEL&/PRESS C FLO 1ST: CPT | Performed by: INTERNAL MEDICINE

## 2020-08-29 PROCEDURE — 94799 UNLISTED PULMONARY SVC/PX: CPT

## 2020-08-29 PROCEDURE — 25010000002 MORPHINE PER 10 MG: Performed by: NURSE PRACTITIONER

## 2020-08-29 PROCEDURE — 63710000001 INSULIN DETEMIR PER 5 UNITS: Performed by: INTERNAL MEDICINE

## 2020-08-29 PROCEDURE — 99226 PR SBSQ OBSERVATION CARE/DAY 35 MINUTES: CPT | Performed by: INTERNAL MEDICINE

## 2020-08-29 PROCEDURE — 25010000002 ADENOSINE (DIAGNOSTIC) PER 30 MG: Performed by: INTERNAL MEDICINE

## 2020-08-29 PROCEDURE — 80048 BASIC METABOLIC PNL TOTAL CA: CPT | Performed by: INTERNAL MEDICINE

## 2020-08-29 PROCEDURE — C1769 GUIDE WIRE: HCPCS | Performed by: INTERNAL MEDICINE

## 2020-08-29 PROCEDURE — 96361 HYDRATE IV INFUSION ADD-ON: CPT

## 2020-08-29 PROCEDURE — 25010000002 FENTANYL CITRATE (PF) 100 MCG/2ML SOLUTION: Performed by: INTERNAL MEDICINE

## 2020-08-29 PROCEDURE — 0 IOPAMIDOL PER 1 ML: Performed by: INTERNAL MEDICINE

## 2020-08-29 PROCEDURE — 63710000001 INSULIN ASPART PER 5 UNITS: Performed by: INTERNAL MEDICINE

## 2020-08-29 PROCEDURE — 93010 ELECTROCARDIOGRAM REPORT: CPT | Performed by: INTERNAL MEDICINE

## 2020-08-29 RX ORDER — FENTANYL CITRATE 50 UG/ML
INJECTION, SOLUTION INTRAMUSCULAR; INTRAVENOUS AS NEEDED
Status: DISCONTINUED | OUTPATIENT
Start: 2020-08-29 | End: 2020-08-29 | Stop reason: HOSPADM

## 2020-08-29 RX ORDER — LIDOCAINE HYDROCHLORIDE 20 MG/ML
INJECTION, SOLUTION INFILTRATION; PERINEURAL AS NEEDED
Status: DISCONTINUED | OUTPATIENT
Start: 2020-08-29 | End: 2020-08-29 | Stop reason: HOSPADM

## 2020-08-29 RX ORDER — METOPROLOL SUCCINATE 25 MG/1
25 TABLET, EXTENDED RELEASE ORAL
Status: DISCONTINUED | OUTPATIENT
Start: 2020-08-29 | End: 2020-08-30

## 2020-08-29 RX ORDER — ATORVASTATIN CALCIUM 40 MG/1
40 TABLET, FILM COATED ORAL NIGHTLY
Status: DISCONTINUED | OUTPATIENT
Start: 2020-08-29 | End: 2020-08-30 | Stop reason: HOSPADM

## 2020-08-29 RX ORDER — HEPARIN SODIUM 1000 [USP'U]/ML
INJECTION, SOLUTION INTRAVENOUS; SUBCUTANEOUS AS NEEDED
Status: DISCONTINUED | OUTPATIENT
Start: 2020-08-29 | End: 2020-08-29 | Stop reason: HOSPADM

## 2020-08-29 RX ORDER — ACETAMINOPHEN 325 MG/1
650 TABLET ORAL EVERY 4 HOURS PRN
Status: DISCONTINUED | OUTPATIENT
Start: 2020-08-29 | End: 2020-08-30 | Stop reason: HOSPADM

## 2020-08-29 RX ORDER — SODIUM CHLORIDE 9 MG/ML
200 INJECTION, SOLUTION INTRAVENOUS CONTINUOUS
Status: DISPENSED | OUTPATIENT
Start: 2020-08-29 | End: 2020-08-29

## 2020-08-29 RX ORDER — ASPIRIN 81 MG/1
TABLET, CHEWABLE ORAL AS NEEDED
Status: DISCONTINUED | OUTPATIENT
Start: 2020-08-29 | End: 2020-08-29 | Stop reason: HOSPADM

## 2020-08-29 RX ORDER — MIDAZOLAM HYDROCHLORIDE 1 MG/ML
INJECTION INTRAMUSCULAR; INTRAVENOUS AS NEEDED
Status: DISCONTINUED | OUTPATIENT
Start: 2020-08-29 | End: 2020-08-29 | Stop reason: HOSPADM

## 2020-08-29 RX ADMIN — SODIUM CHLORIDE 200 ML/HR: 900 INJECTION, SOLUTION INTRAVENOUS at 15:33

## 2020-08-29 RX ADMIN — HYDROCODONE BITARTRATE AND ACETAMINOPHEN 1 TABLET: 5; 325 TABLET ORAL at 12:59

## 2020-08-29 RX ADMIN — INSULIN ASPART 4 UNITS: 100 INJECTION, SOLUTION INTRAVENOUS; SUBCUTANEOUS at 17:44

## 2020-08-29 RX ADMIN — ZOLPIDEM TARTRATE 5 MG: 5 TABLET ORAL at 20:53

## 2020-08-29 RX ADMIN — ASPIRIN 81 MG: 81 TABLET, COATED ORAL at 07:53

## 2020-08-29 RX ADMIN — FAMOTIDINE 40 MG: 40 TABLET, FILM COATED ORAL at 07:52

## 2020-08-29 RX ADMIN — MORPHINE SULFATE 2 MG: 2 INJECTION, SOLUTION INTRAMUSCULAR; INTRAVENOUS at 06:37

## 2020-08-29 RX ADMIN — INSULIN ASPART 2 UNITS: 100 INJECTION, SOLUTION INTRAVENOUS; SUBCUTANEOUS at 12:02

## 2020-08-29 RX ADMIN — HYDROCODONE BITARTRATE AND ACETAMINOPHEN 1 TABLET: 5; 325 TABLET ORAL at 19:53

## 2020-08-29 RX ADMIN — INSULIN DETEMIR 25 UNITS: 100 INJECTION, SOLUTION SUBCUTANEOUS at 20:53

## 2020-08-29 RX ADMIN — SODIUM CHLORIDE, PRESERVATIVE FREE 10 ML: 5 INJECTION INTRAVENOUS at 08:02

## 2020-08-29 RX ADMIN — ATORVASTATIN CALCIUM 40 MG: 40 TABLET, FILM COATED ORAL at 19:54

## 2020-08-29 RX ADMIN — TIZANIDINE 5 MG: 4 TABLET ORAL at 20:53

## 2020-08-29 RX ADMIN — ONDANSETRON HYDROCHLORIDE 4 MG: 4 TABLET, FILM COATED ORAL at 03:56

## 2020-08-29 RX ADMIN — SERTRALINE HYDROCHLORIDE 50 MG: 50 TABLET ORAL at 19:54

## 2020-08-29 RX ADMIN — INSULIN ASPART 6 UNITS: 100 INJECTION, SOLUTION INTRAVENOUS; SUBCUTANEOUS at 07:54

## 2020-08-29 RX ADMIN — METOPROLOL SUCCINATE 25 MG: 25 TABLET, FILM COATED, EXTENDED RELEASE ORAL at 14:13

## 2020-08-29 RX ADMIN — SODIUM CHLORIDE 75 ML/HR: 9 INJECTION, SOLUTION INTRAVENOUS at 06:32

## 2020-08-30 VITALS
HEIGHT: 67 IN | OXYGEN SATURATION: 95 % | SYSTOLIC BLOOD PRESSURE: 148 MMHG | WEIGHT: 254.2 LBS | RESPIRATION RATE: 18 BRPM | BODY MASS INDEX: 39.9 KG/M2 | HEART RATE: 69 BPM | TEMPERATURE: 97 F | DIASTOLIC BLOOD PRESSURE: 76 MMHG

## 2020-08-30 LAB
ANION GAP SERPL CALCULATED.3IONS-SCNC: 9 MMOL/L (ref 5–15)
BASOPHILS # BLD AUTO: 0.03 10*3/MM3 (ref 0–0.2)
BASOPHILS NFR BLD AUTO: 0.3 % (ref 0–1.5)
BUN SERPL-MCNC: 13 MG/DL (ref 8–23)
BUN/CREAT SERPL: 19.7 (ref 7–25)
CALCIUM SPEC-SCNC: 9.3 MG/DL (ref 8.6–10.5)
CHLORIDE SERPL-SCNC: 104 MMOL/L (ref 98–107)
CO2 SERPL-SCNC: 26 MMOL/L (ref 22–29)
CREAT SERPL-MCNC: 0.66 MG/DL (ref 0.57–1)
DEPRECATED RDW RBC AUTO: 40.2 FL (ref 37–54)
EOSINOPHIL # BLD AUTO: 0.26 10*3/MM3 (ref 0–0.4)
EOSINOPHIL NFR BLD AUTO: 2.6 % (ref 0.3–6.2)
ERYTHROCYTE [DISTWIDTH] IN BLOOD BY AUTOMATED COUNT: 18 % (ref 12.3–15.4)
GFR SERPL CREATININE-BSD FRML MDRD: 91 ML/MIN/1.73
GLUCOSE BLDC GLUCOMTR-MCNC: 194 MG/DL (ref 70–130)
GLUCOSE BLDC GLUCOMTR-MCNC: 207 MG/DL (ref 70–130)
GLUCOSE BLDC GLUCOMTR-MCNC: 347 MG/DL (ref 70–130)
GLUCOSE SERPL-MCNC: 235 MG/DL (ref 65–99)
HCT VFR BLD AUTO: 38.2 % (ref 34–46.6)
HGB BLD-MCNC: 11.6 G/DL (ref 12–15.9)
IMM GRANULOCYTES # BLD AUTO: 0.04 10*3/MM3 (ref 0–0.05)
IMM GRANULOCYTES NFR BLD AUTO: 0.4 % (ref 0–0.5)
LYMPHOCYTES # BLD AUTO: 2.28 10*3/MM3 (ref 0.7–3.1)
LYMPHOCYTES NFR BLD AUTO: 23.1 % (ref 19.6–45.3)
MCH RBC QN AUTO: 20.8 PG (ref 26.6–33)
MCHC RBC AUTO-ENTMCNC: 30.4 G/DL (ref 31.5–35.7)
MCV RBC AUTO: 68.3 FL (ref 79–97)
MONOCYTES # BLD AUTO: 0.75 10*3/MM3 (ref 0.1–0.9)
MONOCYTES NFR BLD AUTO: 7.6 % (ref 5–12)
NEUTROPHILS NFR BLD AUTO: 6.49 10*3/MM3 (ref 1.7–7)
NEUTROPHILS NFR BLD AUTO: 66 % (ref 42.7–76)
NRBC BLD AUTO-RTO: 0 /100 WBC (ref 0–0.2)
PLATELET # BLD AUTO: 309 10*3/MM3 (ref 140–450)
PMV BLD AUTO: 10.4 FL (ref 6–12)
POTASSIUM SERPL-SCNC: 4.5 MMOL/L (ref 3.5–5.2)
RBC # BLD AUTO: 5.59 10*6/MM3 (ref 3.77–5.28)
SODIUM SERPL-SCNC: 139 MMOL/L (ref 136–145)
WBC # BLD AUTO: 9.85 10*3/MM3 (ref 3.4–10.8)

## 2020-08-30 PROCEDURE — 93005 ELECTROCARDIOGRAM TRACING: CPT | Performed by: INTERNAL MEDICINE

## 2020-08-30 PROCEDURE — 80048 BASIC METABOLIC PNL TOTAL CA: CPT | Performed by: INTERNAL MEDICINE

## 2020-08-30 PROCEDURE — 99214 OFFICE O/P EST MOD 30 MIN: CPT | Performed by: INTERNAL MEDICINE

## 2020-08-30 PROCEDURE — 82962 GLUCOSE BLOOD TEST: CPT

## 2020-08-30 PROCEDURE — 85025 COMPLETE CBC W/AUTO DIFF WBC: CPT | Performed by: INTERNAL MEDICINE

## 2020-08-30 PROCEDURE — 93010 ELECTROCARDIOGRAM REPORT: CPT | Performed by: INTERNAL MEDICINE

## 2020-08-30 PROCEDURE — G0378 HOSPITAL OBSERVATION PER HR: HCPCS

## 2020-08-30 PROCEDURE — 63710000001 INSULIN ASPART PER 5 UNITS: Performed by: INTERNAL MEDICINE

## 2020-08-30 RX ORDER — ATORVASTATIN CALCIUM 40 MG/1
40 TABLET, FILM COATED ORAL NIGHTLY
Qty: 30 TABLET | Refills: 0 | Status: SHIPPED | OUTPATIENT
Start: 2020-08-30

## 2020-08-30 RX ORDER — AMLODIPINE BESYLATE 5 MG/1
5 TABLET ORAL
Status: DISCONTINUED | OUTPATIENT
Start: 2020-08-30 | End: 2020-08-30 | Stop reason: HOSPADM

## 2020-08-30 RX ORDER — LISINOPRIL 20 MG/1
20 TABLET ORAL DAILY
Qty: 30 TABLET | Refills: 0 | Status: SHIPPED | OUTPATIENT
Start: 2020-08-30

## 2020-08-30 RX ORDER — ASPIRIN 81 MG/1
81 TABLET ORAL DAILY
Qty: 90 TABLET | Refills: 0 | Status: SHIPPED | OUTPATIENT
Start: 2020-08-31 | End: 2020-11-29

## 2020-08-30 RX ORDER — ATORVASTATIN CALCIUM 40 MG/1
40 TABLET, FILM COATED ORAL NIGHTLY
Qty: 90 TABLET | Refills: 0 | Status: CANCELLED | OUTPATIENT
Start: 2020-08-30 | End: 2020-11-28

## 2020-08-30 RX ORDER — AMLODIPINE BESYLATE 5 MG/1
5 TABLET ORAL
Qty: 30 TABLET | Refills: 0 | Status: SHIPPED | OUTPATIENT
Start: 2020-08-31

## 2020-08-30 RX ADMIN — INSULIN ASPART 2 UNITS: 100 INJECTION, SOLUTION INTRAVENOUS; SUBCUTANEOUS at 08:27

## 2020-08-30 RX ADMIN — ASPIRIN 81 MG: 81 TABLET, COATED ORAL at 08:26

## 2020-08-30 RX ADMIN — FAMOTIDINE 40 MG: 40 TABLET, FILM COATED ORAL at 08:26

## 2020-08-30 RX ADMIN — HYDROCODONE BITARTRATE AND ACETAMINOPHEN 1 TABLET: 5; 325 TABLET ORAL at 06:23

## 2020-08-30 RX ADMIN — INSULIN ASPART 4 UNITS: 100 INJECTION, SOLUTION INTRAVENOUS; SUBCUTANEOUS at 11:22

## 2020-08-30 RX ADMIN — SODIUM CHLORIDE, PRESERVATIVE FREE 10 ML: 5 INJECTION INTRAVENOUS at 08:27

## 2020-08-30 RX ADMIN — AMLODIPINE BESYLATE 5 MG: 5 TABLET ORAL at 10:42

## 2020-08-31 ENCOUNTER — DOCUMENTATION (OUTPATIENT)
Dept: CARDIAC REHAB | Facility: HOSPITAL | Age: 60
End: 2020-08-31

## 2020-09-01 ENCOUNTER — PROCEDURE VISIT (OUTPATIENT)
Dept: PULMONOLOGY | Facility: CLINIC | Age: 60
End: 2020-09-01

## 2020-09-01 ENCOUNTER — OFFICE VISIT (OUTPATIENT)
Dept: CARDIOLOGY | Facility: CLINIC | Age: 60
End: 2020-09-01

## 2020-09-01 ENCOUNTER — TELEPHONE (OUTPATIENT)
Dept: PULMONOLOGY | Facility: CLINIC | Age: 60
End: 2020-09-01

## 2020-09-01 DIAGNOSIS — R06.09 DYSPNEA ON EXERTION: ICD-10-CM

## 2020-09-01 DIAGNOSIS — R06.02 SHORTNESS OF BREATH: Primary | ICD-10-CM

## 2020-09-01 PROCEDURE — 94060 EVALUATION OF WHEEZING: CPT | Performed by: INTERNAL MEDICINE

## 2020-09-01 PROCEDURE — 94727 GAS DIL/WSHOT DETER LNG VOL: CPT | Performed by: INTERNAL MEDICINE

## 2020-09-01 PROCEDURE — 94618 PULMONARY STRESS TESTING: CPT | Performed by: INTERNAL MEDICINE

## 2020-09-01 PROCEDURE — 94729 DIFFUSING CAPACITY: CPT | Performed by: INTERNAL MEDICINE

## 2020-09-01 NOTE — PROGRESS NOTES
Ade Wilson  Procedure: 6 Minute Walk Test   Indication:anna    Pretest: BP:134/70               HR:103               Sa02:95               Dyspnea:2               Fatigue:4    Post Test: BP:138/76               HR:115               Sa02:95               Dyspnea:5               Fatigue:5    First 6MWT:yes    Supplemental oxygen during test:no    Stopped before 6 minutes:no    Pauses:none    Results in distance walked:426.46 m    Did individual experience any pain or discomfort:chest    Attestation:  I was immediately available for the above test and agree with the data.     NYHA Functional Class I.    6MWT speed today is:  Community Ambulator (>0.8 m/s)        Gigi Santos MD PhD    -----------------------------------------------------------------------------------------------------------------  [facility] performs 6MWT to the ATS guidelines for 6MWT (2002). Predicted distance is from:      -------------------------------------------------------------------------------------------------------------

## 2020-09-01 NOTE — PROCEDURES
Pulmonary Function Test  Performed by: Elizabeth Fowler MD  Authorized by: Gigi Santos MD PhD      Pre Drug    FVC: 105%   FEV1: 106%   FEV1/FVC: 79%   T%   DLCO: 99%     Post Drug    FVC: 102%   FEV1: 107%   FEV1/FVC: 82%      Change in % (calculated):    FVC: -2   FEV1: 0    Interpretation   Spirometry   Spirometry shows normal results. The patient's response to bronchodilators has insignificant change.   Review of FVL curve   Effort is normal.   Lung Volume Measurements  Measurements show: reduced lung volumes consistent with restriction.   Diffusion Capacity  The patient's diffusion capacity is normal.    Overall comments: Reduced TLC suggests early restriction.

## 2020-09-01 NOTE — TELEPHONE ENCOUNTER
Called pt to advise that PFT machine is not in working order at this time and we may need to push appt back to a later date or reschedule completely. Pt advised she stayed with her son overnight here in [place] so if she can get in later today that would be fine. Advised I would speak with the ladies up front and see what we can do. Pt voiced understanding and satisfaction.

## 2020-09-01 NOTE — SIGNIFICANT NOTE
Patient came to [address] [place] [address] requesting a return to work slip.  Per AVS, the patient may return on 9/2 with no restrictions.  A formal return to work letter was printed for the patient.

## 2020-09-13 RX ORDER — HYDROCODONE BITARTRATE AND ACETAMINOPHEN 10; 325 MG/1; MG/1
1 TABLET ORAL EVERY 6 HOURS PRN
Qty: 120 TABLET | Refills: 0 | Status: SHIPPED | OUTPATIENT
Start: 2020-09-13 | End: 2020-09-28 | Stop reason: SDUPTHER

## 2020-09-28 ENCOUNTER — HOSPITAL ENCOUNTER (OUTPATIENT)
Dept: PAIN MANAGEMENT | Age: 60
Discharge: HOME | End: 2020-09-28
Payer: COMMERCIAL

## 2020-09-28 VITALS
TEMPERATURE: 97.9 F | HEIGHT: 67 IN | DIASTOLIC BLOOD PRESSURE: 75 MMHG | OXYGEN SATURATION: 97 % | SYSTOLIC BLOOD PRESSURE: 150 MMHG | HEART RATE: 80 BPM | WEIGHT: 262.25 LBS | BODY MASS INDEX: 41.16 KG/M2

## 2020-09-28 PROCEDURE — 99214 OFFICE O/P EST MOD 30 MIN: CPT | Performed by: NURSE PRACTITIONER

## 2020-09-28 PROCEDURE — G0463 HOSPITAL OUTPT CLINIC VISIT: HCPCS

## 2020-09-28 PROCEDURE — 99213 OFFICE O/P EST LOW 20 MIN: CPT

## 2020-09-28 RX ORDER — INSULIN GLARGINE 100 [IU]/ML
50 INJECTION, SOLUTION SUBCUTANEOUS DAILY
COMMUNITY
End: 2021-12-23

## 2020-09-28 RX ORDER — NABUMETONE 500 MG/1
500 TABLET, FILM COATED ORAL 2 TIMES DAILY
Qty: 60 TABLET | Refills: 2 | Status: SHIPPED | OUTPATIENT
Start: 2020-09-28 | End: 2020-09-28 | Stop reason: CLARIF

## 2020-09-28 RX ORDER — ATORVASTATIN CALCIUM 40 MG/1
40 TABLET, FILM COATED ORAL NIGHTLY
COMMUNITY
Start: 2020-08-30

## 2020-09-28 RX ORDER — HYDROCODONE BITARTRATE AND ACETAMINOPHEN 10; 325 MG/1; MG/1
1 TABLET ORAL EVERY 6 HOURS PRN
Qty: 120 TABLET | Refills: 0 | Status: SHIPPED | OUTPATIENT
Start: 2020-10-14 | End: 2020-12-15 | Stop reason: SDUPTHER

## 2020-09-28 RX ORDER — NABUMETONE 500 MG/1
500 TABLET, FILM COATED ORAL 2 TIMES DAILY
Qty: 60 TABLET | Refills: 2 | Status: SHIPPED | OUTPATIENT
Start: 2020-09-28 | End: 2021-01-18 | Stop reason: SDUPTHER

## 2020-09-28 RX ORDER — TIZANIDINE 4 MG/1
4 TABLET ORAL 2 TIMES DAILY
Qty: 60 TABLET | Refills: 2 | Status: SHIPPED | OUTPATIENT
Start: 2020-09-28 | End: 2021-06-07 | Stop reason: SDUPTHER

## 2020-09-28 RX ORDER — AMLODIPINE BESYLATE 5 MG/1
5 TABLET ORAL DAILY
COMMUNITY
Start: 2020-08-31

## 2020-09-28 RX ORDER — PREGABALIN 150 MG/1
150 CAPSULE ORAL 3 TIMES DAILY
Qty: 60 CAPSULE | Refills: 2 | Status: SHIPPED | OUTPATIENT
Start: 2020-09-28 | End: 2021-01-18 | Stop reason: SDUPTHER

## 2020-09-28 RX ORDER — ASPIRIN 81 MG/1
1 TABLET ORAL DAILY
COMMUNITY
Start: 2020-08-30 | End: 2021-12-23

## 2020-09-28 ASSESSMENT — ENCOUNTER SYMPTOMS
BOWEL INCONTINENCE: 0
BACK PAIN: 1
CONSTIPATION: 0

## 2020-09-28 ASSESSMENT — PAIN SCALES - GENERAL: PAINLEVEL_OUTOF10: 6

## 2020-09-28 ASSESSMENT — PAIN DESCRIPTION - ORIENTATION: ORIENTATION: LOWER

## 2020-09-28 ASSESSMENT — PAIN DESCRIPTION - PAIN TYPE: TYPE: CHRONIC PAIN

## 2020-09-28 ASSESSMENT — PAIN DESCRIPTION - LOCATION: LOCATION: BACK

## 2020-09-28 NOTE — PROGRESS NOTES
[facility] Physical & Pain Medicine  Office Note    Patient Name: Starr Kee    MR #: [id number]    Account #: [de-identified]    : 1960    Age: 63 y.o. Sex: female    Date: 2020    PCP: Rut Gallardo    Chief Complaint:   Chief Complaint   Patient presents with    Lower Back Pain       History of Present Illness:     Starr Kee is a 63 y.o. female who presents for chronic complaints of low back pain and knee pain. Patient has started having worsening neck pain. She is having numbness more on the right than left especially with lifting. Patient started having tingling radiating down from her neck. Patient was given Toradol and prednisone from her PCP. This helped but the pain worsened once the medication worse off. Knee Pain   This is a recurrent problem. The current episode started more than 1 month ago. The problem occurs constantly. The problem has been gradually improving. Associated symptoms include arthralgias, joint swelling, myalgias, neck pain and weakness. Associated symptoms comments: Abrasion to left knee almost healed. The symptoms are aggravated by standing, twisting and walking. Back Pain   This is a chronic (exacerbation) problem. The current episode started more than 1 month ago. The problem occurs constantly. The problem has been gradually improving since onset. The pain is present in the lumbar spine and sacro-iliac. The quality of the pain is described as cramping and aching. The symptoms are aggravated by bending, position, standing and twisting. Associated symptoms include weakness. Pertinent negatives include no bladder incontinence, bowel incontinence or leg pain. Last Procedure:     Was Percentage of Pain Relief after Lumbar Trigger Point Injections and Left Sacroiliac Joint Injection on 7/15/2020:  60 %    How long lasted:  6 weeks Yes  Were you able to decrease pain medication after treatment? Yes  Were you able to increase activity after treatment? Yes  Did you have any adverse reactions to treatment? No    Screening Tools:    PEG Score: 8.3     Last PEG Score: 7.3     Annual ORT Score: 1     Annual PHQ Score: 13    Current Pain Assessment  Pain Assessment  Pain Assessment: 0-10  Pain Level: 6  Pain Type: Chronic pain  Pain Location: Back  Pain Orientation: Lower    Past Visit HPI:  7/15/2020  Patient presents today for lumbar trigger point injections along with left SI injection as well as reevaluation to return to work. Patient states that condition was improving she was able to do activities of daily living as well as household chores with less pain. Patient has been undergoing physical therapy for low back and knee strengthening. Patient stated that she was doing exceptionally well until the last 2 treatments. Patient stated that she had had an increase in neck pain from the leg press as she felt it was more to do with the amount of weight. Patient stated that she mentioned this to physical therapist however they put more weight on the next day. Patient stated that she has had an increase in pain the last 2 days however she feels that the injections today are targeting the areas of increased pain. She does feel that she will be able to return to work. 6/29/2020  presents today for evaluation of return to work. Patient's condition has significantly improved as PEG score was a 10 at last appointment and is now 7.3. Physical therapy recommends another 4 weeks of treatment prior to returning back to work. Patient's pain is more localized to the SI area as she has had significant improvement in knee pain. Of note: Disability paperwork has not been received by this office. Patient instructed that NP will be going out of town if paperwork is received by tomorrow paperwork will be completed otherwise paperwork will not be completed until NP is back in town mid July.       6/1/2020  presents for follow-up of acute on chronic knee pain and low Associated symptoms include arthralgias, joint swelling and myalgias. Associated symptoms comments: Abrasion to left knee almost healed. The symptoms are aggravated by walking, standing and twisting. Back Pain is a chronic problem. The current episode started more than 1 year ago. The problem occurs constantly. The problem has been gradually worsening since onset. The pain is present in the lumbar spine and sacro-iliac. The quality of the pain is described as aching and cramping. The symptoms are aggravated by twisting, standing, bending and position. 4/21/2020  presents for 3 week follow up post fall. Patient presented for a Left SI injection on 4/1/2020. Patient had fallen the night before. She was having severe knee pain. Patient was assessed and she was given work release to allow time to heal. Patient is presenting today to see if she can return to work. Knee Pain is a recurrent problem. The current episode started 1 to 4 weeks ago. The problem occurs constantly. The problem has been gradually improving. Associated symptoms include arthralgias, joint swelling and myalgias. The symptoms are aggravated by walking, standing and twisting.     3/25/2020  present for sooner appointment related to missed procedure. Patient states that she received a phone call the day before her injection stating the time was 3:30. Patient stated that she was in her managers office when she got the phone call. Patient had that the appointment was at 1100. Patient had taken 1/2 day off but had to take the entire day off work. Patient came for her appointment on 2/14/2020 but when she registered she was told that everyone was gone for the day. Patient states that low back pain is getting worse. Patient states that pain is affecting her ability to work. She is having difficulty sleeping and she does not know how much longer she can tolerate this pain. Back Pain is a chronic problem.  The current episode started more than 1 year ago. The problem occurs constantly. The problem has been rapidly worsening since onset. The pain is present in the lumbar spine and sacro-iliac. The quality of the pain is described as aching, cramping, shooting and stabbing. Radiates to: left groin. The pain is worse during the day (due to activity). The symptoms are aggravated by bending, standing and twisting (lifting). Associated symptoms include headaches. Pertinent negatives include no bladder incontinence or bowel incontinence. 1/30/2020  presents to the office for follow-up of MRI and worsening low back pain. Discussed reviewed with patient that she does have ligamentous hypertrophy with just chronic degenerative changes. Discussed patient's worsening low back pain and symptoms. Patient states that low back does wrap into the groin at times. Patient has pain with flexion and extension. Low back pain cramps and makes it difficulty for her to do anything after she works. Patient states that all she can tolerate is working and going to bed. Her quality of life has diminished and it takes all she can do to work therefore things in her house are not getting done. 12/17/19   presents to the office for annual exam with primary complaints of worsening cervical radiculopathy with pain going down left arm and into last 2 fingers getting numb. Patient is also having worsening low back pain. She states that constant flexion extension as demanding by her job that she will start having pain in her low back and that her legs would become weak. She has had a couple of occasions where she has had electrical sensations going up her back and making it difficult to breathe. Last office appointment patient was scheduled to have MRI of Lumbar spine and will need MRI of cervical spine in the futue. Patient attempted to have MRI at her local hospital however patient barely fit inside of MRI machine.   Patient stated that MRI machine was pulling at her arms and that she could not breathe and started having anxiety attack. MRI had to be stopped. Patient was brought back into the office to schedule MRI with sedation. However patient states she does not need IV sedation if she can get in the open MRI.     Employment: factory    Past Medical Histoy  Past Medical History:   Diagnosis Date    Arthritis     [facility] [address]     Diabetes mellitus ([facility] [address]     Hypertension     Thyroid disease        Surgery History  Past Surgical History:   Procedure Laterality Date    CARDIAC SURGERY  1998    heart cath    CERVICAL SPINE SURGERY      HYSTERECTOMY      JOINT REPLACEMENT      bilateral knees        Family History  family history is not on file. Social History    Social History     Tobacco Use    Smoking status: Former Smoker    Smokeless tobacco: Never Used   Substance Use Topics    Alcohol use: No       Allergies  Tape [adhesive tape] and Tramadol     Current Medications  Current Outpatient Medications   Medication Sig Dispense Refill    aspirin EC 81 MG EC tablet Take 1 tablet by mouth daily      amLODIPine (NORVASC) 5 MG tablet Take 5 mg by mouth daily      atorvastatin (LIPITOR) 40 MG tablet Take 40 mg by mouth nightly      sertraline (ZOLOFT) 50 MG tablet Take 50 mg by mouth daily      metFORMIN (GLUCOPHAGE) 1000 MG tablet Take 1,000 mg by mouth 2 times daily      nabumetone (RELAFEN) 500 MG tablet Take 1 tablet by mouth 2 times daily 60 tablet 2    tiZANidine (ZANAFLEX) 4 MG tablet Take 1 tablet by mouth 2 times daily 60 tablet 2    diclofenac (FLECTOR) 1.3 % patch Place 1 patch onto the skin 2 times daily 60 patch 2    HYDROcodone-acetaminophen (NORCO)  MG per tablet Take 1 tablet by mouth every 6 hours as needed for Pain for up to 30 days. Intended supply: 30 days 120 tablet 0    pregabalin (LYRICA) 150 MG capsule Take 1 capsule by mouth 3 times daily for 90 days.  60 capsule 2    lisinopril (PRINIVIL;ZESTRIL) 20 MG tablet Take 20 mg by mouth daily      Insulin Lispro (HUMALOG KWIKPEN SC) Inject into the skin      levothyroxine (SYNTHROID) 25 MCG tablet Take 25 mcg by mouth daily       zolpidem (AMBIEN CR) 12.5 MG extended release tablet Take 1 tablet by mouth nightly as needed for Sleep. Must last 30 days 30 tablet 2    insulin glargine (LANTUS) 100 UNIT/ML injection vial Inject 50 Units into the skin daily       No current facility-administered medications for this encounter. Review of Systems:  Review of Systems   Constitutional: Positive for activity change. Gastrointestinal: Negative for bowel incontinence and constipation. Genitourinary: Negative for bladder incontinence. Musculoskeletal: Positive for arthralgias, back pain, gait problem, joint swelling, myalgias, neck pain and neck stiffness. Neurological: Positive for weakness. Gait disturbance   Psychiatric/Behavioral: Positive for sleep disturbance. Negative for agitation, self-injury and suicidal ideas. The patient is not nervous/anxious. 14 point ROS negative besides that noted in HPI    Physical Exam:    Vitals:    09/28/20 1315   BP: (!) 150/75   Pulse: 80   Temp: 97.9 °F (36.6 °C)   SpO2: 97%   Weight: 262 lb 4 oz (119 kg)   Height: 5' 7\" (1.702 m)       Body mass index is 41.07 kg/m². Physical Exam  Vitals signs and nursing note reviewed. Constitutional:       General: She is not in acute distress. Appearance: She is well-developed. HENT:      Head: Normocephalic. Right Ear: External ear normal.      Left Ear: External ear normal.      Nose: Nose normal.   Eyes:      Conjunctiva/sclera: Conjunctivae normal.      Pupils: Pupils are equal, round, and reactive to light. Neck:      Vascular: No JVD. Trachea: No tracheal deviation. Cardiovascular:      Rate and Rhythm: Normal rate. Pulmonary:      Effort: Pulmonary effort is normal.   Abdominal:      General: There is no distension. Tenderness:  There is no abdominal tenderness. Musculoskeletal:      Right knee: She exhibits decreased range of motion. Tenderness found. Medial joint line tenderness noted. Left knee: She exhibits decreased range of motion and swelling. Tenderness found. Cervical back: She exhibits decreased range of motion, tenderness and spasm. Comments: bilaterally Trigger points - tender palpable knots noted in the cervical spine    bilaterally Trigger points - tender palpable knots noted in the thoracic spine    bilaterally Trigger points - tender palpable knots noted in the lumbar spine     Patient points to on left SI joint as the source of low back pain  TTP of SI joint on left  positive Rigo's on left, positive Distraction on left, positive Thigh Thrust on left     Skin:     General: Skin is warm and dry. Neurological:      Mental Status: She is alert and oriented to person, place, and time. Cranial Nerves: No cranial nerve deficit. Gait: Gait abnormal.   Psychiatric:         Behavior: Behavior normal.         Thought Content: Thought content normal.         Judgment: Judgment normal.     MRI exams received in the past 2 years:  Yes Lumbar spine and cervical spine       When 12/26/19 lumbar and 2018 cervical                                               Where [facility]       Imaging on chart: Yes         Imaging records requested: No     CT exam received during the last 12 months: No       When na                                              Where na       Imaging on chart: No         Imaging records requested: No     X-ray exam received during the last 12 months: Yes, XR Left Hip/Pelvis & XR Left Knee       When 2020                                              Where [facility])       Imaging on chart: Yes         Imaging records requested:  No     Nerve Conduction Study/EMG:  No       When na                                              Where na       Imaging on chart: No         Imaging records requested: No     Physical therapy: Yes       When: 6/2020                                               Where  [facility]        Behavior Health No       When: na                                               Where na     Labs  No       When: na                                             Where  na    Labs:  Lab Results   Component Value Date     11/30/2014    K 3.8 11/30/2014    CL 99 11/30/2014    CO2 24 11/30/2014    BUN 11 11/30/2014    CREATININE 0.9 05/15/2018    CREATININE 0.6 11/30/2014    GLUCOSE 212 11/30/2014    CALCIUM 9.7 11/30/2014        Lab Results   Component Value Date    WBC 12.05 (H) 11/30/2014    HGB 13.0 11/30/2014    HCT 40.4 11/30/2014    MCV 67.4 (L) 11/30/2014     11/30/2014     Assessment:  Active Problems:    Bilateral knee pain    Neuralgia, geniculate    Lumbar disc disease    Bulging lumbar disc    Displacement of lumbar disc with radiculopathy    Other insomnia    Cervical radiculopathy    Cervicalgia    Central stenosis of spinal canal    Foraminal stenosis of cervical region    Facet arthritis of lumbar region    SI (sacroiliac) joint dysfunction    Chronic bilateral low back pain without sciatica    Pain management contract agreement    Bilateral myofascial pain  Resolved Problems:    * No resolved hospital problems. *      Plan:  Chronic bilateral low back pain without sciatica  Displacement of lumbar disc with radiculopathy  - pregabalin (LYRICA) 150 MG capsule; Take 1 capsule by mouth 3 times daily for 90 days. Dispense: 60 capsule; Refill: 2    - diclofenac (FLECTOR) 1.3 % patch; Place 1 patch onto the skin 2 times daily  Dispense: 60 patch; Refill: 2    - nabumetone (RELAFEN) 500 MG tablet; Take 1 tablet by mouth 2 times daily  Dispense: 60 tablet; Refill: 2    - HYDROcodone-acetaminophen (NORCO)  MG per tablet; Take 1 tablet by mouth every 6 hours as needed for Pain for up to 30 days. Intended supply: 30 days  Dispense: 120 tablet;  Refill: 0    Bilateral myofascial pain  - tiZANidine (ZANAFLEX) 4 MG tablet; Take 1 tablet by mouth 2 times daily  Dispense: 60 tablet; Refill: 2    Other insomnia  Continue zolpidem 12.5 mg nightly as needed with no refill needed at the time of the appointment      Schedule cervical and thoracic trigger points for myofacial pain. Patient encouraged to use flector patches on neck at times when pain is the worst. Patient encouraged to do stretches. [x] Follow up    [] 4 weeks   [x] 6-8 weeks   [] 10-12 weeks   [] 3 months  [x] Post procedure to evaluate effectiveness of treatment  [] To evaluate medications changes made at office visit. [] To review diagnostics ordered at last visit. [] To evaluate response to therapy    [x] For management of controlled substance  [x] Random UDS as indicated by ORT score or if indicated by abberent behaviors      Discussion: Discussed exam findings and plan of care with patient. Patient agreed with POC and questions were asked and answered. Activity: Discussed exercise as beneficial to pain reduction, encouraged daily stretching with a focus on torso strengthening. Goal:  Pain Management Goals of Therapy:   [] Resolution in pain  [x] Decrease in pain level  [x] Improvement in ADL's  [x] Increase in activities with less pain  [] Decrease in medication      Controlled substance monitoring:    [x] Discussed medication side effects, risk of tolerance and/or dependence, and/or alternative treatment  [] Discussed the detrimental effects of long term narcotic use in younger patients especially women of childbearing years.   [x] No signs and symptoms of potential drug abuse or diversion were identified  [] Signs of potential drug abuse or diversion were identified   [] ORT Score   [] UDS non-compliant   [] See Note  [] Random urine drug screen sent today  [x] Random urine drug screen not completed today   [] Deferred New Patient  [x] Compliant 6/1/2020  [] Not Compliant see note  [] Medication agreement with provider signed today  [x] Medication agreement with provider on file under media   [] Medication regimen effective and continued   [] New patient continuing current medication while developing POC   [x] On going assessment and evaluation of medication regimen  [] Medication regimen not effective see plan for changes  [x] Rome Albarado reviewed & on file under media      EMR dragon/transcription disclaimer: Much of this encounter note is electronic transcription/translation of spoken language to printed tach. Electronic translation of spoken language may be erroneous, or at times, nonsensical words or phrases may be inadvertently transcribed.  Although, I have reviewed the note for such errors, some may still exist.

## 2020-09-28 NOTE — PROGRESS NOTES
Nursing Admission Record    Current Issues / Falls / ER Visits: Follow up procedure    Was Percentage of Pain Relief after Lumbar Trigger Point Injections and Left Sacroiliac Joint Injection on 7/15/2020:  60 %    How long lasted:  6 weeks Yes  Were you able to decrease pain medication after treatment? Yes  Were you able to increase activity after treatment? Yes  Did you have any adverse reactions to treatment? No    Opioids Prescribed: Norco 10mg Q6HRS PRN #120    Was Medication Brought to Appt: N/A     Hx of any Neck/Back Surgeries? Yes, Neck Surgery     Is Patient currently taking a blood thinner? None     MRI exams received in the past 2 years:  Yes MRI Lumbar Spine & MRI Cervical Spine       [facility] 2019 & 5993                                              [facility]       Imaging on chart: Yes         Imaging records requested: No     CT exam received during the last 12 months: No       When na                                              Where na       Imaging on chart: No         Imaging records requested: No     X-ray exam received during the last 12 months: Yes, XR Left Hip/Pelvis & XR Left Knee       When 7509                                              [facility])       Imaging on chart: Yes         Imaging records requested: No     Nerve Conduction Study/EMG:  No       When na                                              Where na       Imaging on chart: No         Imaging records requested:  No     Physical therapy: Yes       When: 6/2020                                               Where  [facility]     Behavior Health No       When: na                                               Where  na     Labs  No       When: na                                             Where  na    PEG Score: 8.3    Last PEG Score: 7.3    Annual ORT Score: 1    Annual PHQ Score: 13    Last UDS Results: 6/1/2020 compliant    Education Provided:  [x] Review of Andrea Ariza  [] Agreement Review  [x] PEG Score Calculated [] PHQ Score Calculated [] ORT Score Calculated    [] Compliance Issues Discussed [] Cognitive Behavior Needs [x] Exercise [] Review of Test [] Financial Issues  [x] 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 [] YES  [] NO  Has a suicide plan? [] YES  [] NO  Has attempted suicide in the past?   [] YES  [] NO  Has a close friend or family member who committed suicide?   [] YES  [] NO    Assessment Completed by:  Electronically signed by Krystle Johnson RN on 9/28/2020 at 1:05 PM

## 2020-10-07 ENCOUNTER — HOSPITAL ENCOUNTER (OUTPATIENT)
Dept: PAIN MANAGEMENT | Age: 60
Discharge: HOME | End: 2020-10-07
Payer: COMMERCIAL

## 2020-10-07 VITALS
OXYGEN SATURATION: 95 % | DIASTOLIC BLOOD PRESSURE: 70 MMHG | HEART RATE: 75 BPM | RESPIRATION RATE: 18 BRPM | SYSTOLIC BLOOD PRESSURE: 144 MMHG | TEMPERATURE: 97.3 F

## 2020-10-07 PROCEDURE — 20553 NJX 1/MLT TRIGGER POINTS 3/>: CPT | Performed by: NURSE PRACTITIONER

## 2020-10-07 PROCEDURE — 20553 NJX 1/MLT TRIGGER POINTS 3/>: CPT

## 2020-10-07 PROCEDURE — 2500000003 HC RX 250 WO HCPCS

## 2020-10-07 RX ORDER — BUPIVACAINE HYDROCHLORIDE 5 MG/ML
15 INJECTION, SOLUTION EPIDURAL; INTRACAUDAL ONCE
Status: DISCONTINUED | OUTPATIENT
Start: 2020-10-07 | End: 2020-10-09 | Stop reason: HOSPADM

## 2020-10-07 RX ORDER — LIDOCAINE HYDROCHLORIDE 10 MG/ML
15 INJECTION, SOLUTION EPIDURAL; INFILTRATION; INTRACAUDAL; PERINEURAL ONCE
Status: DISCONTINUED | OUTPATIENT
Start: 2020-10-07 | End: 2020-10-09 | Stop reason: HOSPADM

## 2020-10-07 NOTE — PROCEDURES
[facility] Physical & Pain Medicine    Patient Name: Bobby Manuel    : 1960                    Age: 60 y.o. Sex: female    Date: 2020    Pre-op Diagnosis: Myofascial Pain/ Muscle Spasms/ Cervicalgia    Post-op Diagnosis: Myofascial Pain/ Muscle Spasms/ Cervicalgia    Procedure: Cervical Trigger Point Injections    Performing Procedure: ELAINE Díaz, VA-BC    Patient Vitals for the past 24 hrs:   BP Temp Temp src Pulse Resp SpO2   10/07/20 1307 (!) 144/70 97.3 °F (36.3 °C) Temporal 75 18 95 %       Description of Procedure:    After a brief physical assessment and failure to improve with conservative measures the patient presented for Cervical Trigger Point Injections The indications, limitations and possible complications were discussed with the patient and the patient elected to proceed with the procedure. After voluntary, informed and signed consent obtained the patient was placed in a seated position. Appropriate time out was obtained per policy. The area of maximal tenderness was palpated over the  bilaterally Cervical muscles - Splenius  Trapezius  Rhomboid The skin overlying these areas was marked with a skin marker. The skin overlying the proposed injection sites were then sprayed with Gebauer's Solution while protecting patient eyes. The areas were prepped using aseptic technique with CHG prep.  Each trigger point of the Cervical muscles - Splenius  Trapezius  Rhomboid was injected with approximately 1- 2 ml of a solution of 5 ml of 1% Lidocaine Plain and 5 ml of 0.5% Marcaine Plain after negative aspiration    Pre-op Diagnosis: Myofascial Pain/ Muscle Spasms    Post-op Diagnosis: Myofascial Pain/ Muscle Spasms    Procedure: Thoracic Trigger Point Injections     Performing Procedure: ELAINE Díaz, VA-BC    Patient Vitals for the past 24 hrs:   BP Temp Temp src Pulse Resp SpO2   10/07/20 1307 (!) 144/70 97.3 °F (36.3 °C) Temporal 75

## 2020-10-14 ENCOUNTER — TELEPHONE (OUTPATIENT)
Dept: PAIN MANAGEMENT | Age: 60
End: 2020-10-14

## 2020-10-16 RX ORDER — ZOLPIDEM TARTRATE 12.5 MG/1
12.5 TABLET, FILM COATED, EXTENDED RELEASE ORAL NIGHTLY PRN
Qty: 30 TABLET | Refills: 2 | Status: SHIPPED | OUTPATIENT
Start: 2020-10-16 | End: 2021-01-18 | Stop reason: SDUPTHER

## 2020-12-15 RX ORDER — HYDROCODONE BITARTRATE AND ACETAMINOPHEN 10; 325 MG/1; MG/1
1 TABLET ORAL EVERY 6 HOURS PRN
Qty: 120 TABLET | Refills: 0 | Status: SHIPPED | OUTPATIENT
Start: 2020-12-15 | End: 2021-01-18 | Stop reason: SDUPTHER

## 2021-01-18 DIAGNOSIS — M54.50 CHRONIC BILATERAL LOW BACK PAIN WITHOUT SCIATICA: ICD-10-CM

## 2021-01-18 DIAGNOSIS — M48.00 CENTRAL STENOSIS OF SPINAL CANAL: ICD-10-CM

## 2021-01-18 DIAGNOSIS — M51.16 DISPLACEMENT OF LUMBAR DISC WITH RADICULOPATHY: ICD-10-CM

## 2021-01-18 DIAGNOSIS — F19.982 DRUG-INDUCED INSOMNIA (HCC): ICD-10-CM

## 2021-01-18 DIAGNOSIS — G89.29 CHRONIC BILATERAL LOW BACK PAIN WITHOUT SCIATICA: ICD-10-CM

## 2021-01-18 RX ORDER — NABUMETONE 500 MG/1
500 TABLET, FILM COATED ORAL 2 TIMES DAILY
Qty: 60 TABLET | Refills: 2 | Status: SHIPPED | OUTPATIENT
Start: 2021-01-18 | End: 2021-06-07 | Stop reason: SDUPTHER

## 2021-01-18 RX ORDER — ZOLPIDEM TARTRATE 12.5 MG/1
12.5 TABLET, FILM COATED, EXTENDED RELEASE ORAL NIGHTLY PRN
Qty: 30 TABLET | Refills: 2 | Status: SHIPPED | OUTPATIENT
Start: 2021-01-18 | End: 2021-06-07 | Stop reason: SDUPTHER

## 2021-01-18 RX ORDER — PREGABALIN 150 MG/1
150 CAPSULE ORAL 3 TIMES DAILY
Qty: 60 CAPSULE | Refills: 2 | Status: SHIPPED | OUTPATIENT
Start: 2021-01-18 | End: 2021-01-25

## 2021-01-18 RX ORDER — HYDROCODONE BITARTRATE AND ACETAMINOPHEN 10; 325 MG/1; MG/1
1 TABLET ORAL EVERY 6 HOURS PRN
Qty: 120 TABLET | Refills: 0 | Status: SHIPPED | OUTPATIENT
Start: 2021-01-18 | End: 2021-02-17

## 2021-01-25 DIAGNOSIS — M51.16 DISPLACEMENT OF LUMBAR DISC WITH RADICULOPATHY: ICD-10-CM

## 2021-01-25 RX ORDER — PREGABALIN 150 MG/1
150 CAPSULE ORAL 3 TIMES DAILY
Qty: 90 CAPSULE | Refills: 2 | Status: SHIPPED | OUTPATIENT
Start: 2021-01-25 | End: 2021-06-07 | Stop reason: SDUPTHER

## 2021-03-23 ENCOUNTER — TELEPHONE (OUTPATIENT)
Dept: PAIN MANAGEMENT | Age: 61
End: 2021-03-23

## 2021-06-07 ENCOUNTER — TELEPHONE (OUTPATIENT)
Dept: NEUROSURGERY | Age: 61
End: 2021-06-07

## 2021-06-07 ENCOUNTER — HOSPITAL ENCOUNTER (OUTPATIENT)
Dept: PAIN MANAGEMENT | Age: 61
Discharge: HOME | End: 2021-06-07
Payer: COMMERCIAL

## 2021-06-07 VITALS
DIASTOLIC BLOOD PRESSURE: 73 MMHG | HEART RATE: 83 BPM | OXYGEN SATURATION: 95 % | BODY MASS INDEX: 40.34 KG/M2 | SYSTOLIC BLOOD PRESSURE: 135 MMHG | TEMPERATURE: 98.6 F | WEIGHT: 257 LBS | RESPIRATION RATE: 20 BRPM | HEIGHT: 67 IN

## 2021-06-07 DIAGNOSIS — M25.562 CHRONIC PAIN OF BOTH KNEES: ICD-10-CM

## 2021-06-07 DIAGNOSIS — G89.29 CHRONIC BILATERAL LOW BACK PAIN WITHOUT SCIATICA: Primary | ICD-10-CM

## 2021-06-07 DIAGNOSIS — B02.21 NEURALGIA, GENICULATE: ICD-10-CM

## 2021-06-07 DIAGNOSIS — M51.16 DISPLACEMENT OF LUMBAR DISC WITH RADICULOPATHY: ICD-10-CM

## 2021-06-07 DIAGNOSIS — M54.50 CHRONIC BILATERAL LOW BACK PAIN WITHOUT SCIATICA: Primary | ICD-10-CM

## 2021-06-07 DIAGNOSIS — G47.09 OTHER INSOMNIA: ICD-10-CM

## 2021-06-07 DIAGNOSIS — M48.02 FORAMINAL STENOSIS OF CERVICAL REGION: ICD-10-CM

## 2021-06-07 DIAGNOSIS — G89.29 CHRONIC PAIN OF BOTH KNEES: ICD-10-CM

## 2021-06-07 DIAGNOSIS — M47.816 FACET ARTHRITIS OF LUMBAR REGION: ICD-10-CM

## 2021-06-07 DIAGNOSIS — M79.18 BILATERAL MYOFASCIAL PAIN: ICD-10-CM

## 2021-06-07 DIAGNOSIS — M51.9 LUMBAR DISC DISEASE: ICD-10-CM

## 2021-06-07 DIAGNOSIS — M51.36 BULGING LUMBAR DISC: ICD-10-CM

## 2021-06-07 DIAGNOSIS — M48.00 CENTRAL STENOSIS OF SPINAL CANAL: ICD-10-CM

## 2021-06-07 DIAGNOSIS — M25.561 CHRONIC PAIN OF BOTH KNEES: ICD-10-CM

## 2021-06-07 DIAGNOSIS — M54.12 CERVICAL RADICULOPATHY: ICD-10-CM

## 2021-06-07 DIAGNOSIS — M54.2 CERVICALGIA: ICD-10-CM

## 2021-06-07 PROCEDURE — 99214 OFFICE O/P EST MOD 30 MIN: CPT

## 2021-06-07 PROCEDURE — 99214 OFFICE O/P EST MOD 30 MIN: CPT | Performed by: NURSE PRACTITIONER

## 2021-06-07 RX ORDER — TIZANIDINE 4 MG/1
4 TABLET ORAL 2 TIMES DAILY
Qty: 60 TABLET | Refills: 2 | Status: SHIPPED | OUTPATIENT
Start: 2021-06-07 | End: 2021-09-23 | Stop reason: SDUPTHER

## 2021-06-07 RX ORDER — HYDROCODONE BITARTRATE AND ACETAMINOPHEN 10; 325 MG/1; MG/1
1 TABLET ORAL EVERY 6 HOURS PRN
Qty: 120 TABLET | Refills: 0 | Status: SHIPPED | OUTPATIENT
Start: 2021-06-07 | End: 2021-09-09 | Stop reason: SDUPTHER

## 2021-06-07 RX ORDER — PREGABALIN 150 MG/1
150 CAPSULE ORAL 3 TIMES DAILY
Qty: 90 CAPSULE | Refills: 2 | Status: SHIPPED | OUTPATIENT
Start: 2021-06-07 | End: 2021-09-23 | Stop reason: SDUPTHER

## 2021-06-07 RX ORDER — NABUMETONE 500 MG/1
500 TABLET, FILM COATED ORAL 2 TIMES DAILY
Qty: 60 TABLET | Refills: 2 | Status: SHIPPED | OUTPATIENT
Start: 2021-06-07 | End: 2021-09-23 | Stop reason: SDUPTHER

## 2021-06-07 RX ORDER — ZOLPIDEM TARTRATE 12.5 MG/1
12.5 TABLET, FILM COATED, EXTENDED RELEASE ORAL NIGHTLY PRN
Qty: 30 TABLET | Refills: 2 | Status: SHIPPED | OUTPATIENT
Start: 2021-06-07 | End: 2021-09-09 | Stop reason: SDUPTHER

## 2021-06-07 ASSESSMENT — PAIN DESCRIPTION - PAIN TYPE: TYPE: CHRONIC PAIN

## 2021-06-07 ASSESSMENT — ENCOUNTER SYMPTOMS
CONSTIPATION: 0
BACK PAIN: 1
BOWEL INCONTINENCE: 0

## 2021-06-07 ASSESSMENT — PAIN SCALES - GENERAL: PAINLEVEL_OUTOF10: 10

## 2021-06-07 ASSESSMENT — PAIN DESCRIPTION - LOCATION: LOCATION: BACK;NECK

## 2021-06-07 ASSESSMENT — PAIN DESCRIPTION - ORIENTATION: ORIENTATION: UPPER;MID;LOWER

## 2021-06-07 NOTE — TELEPHONE ENCOUNTER
1st attempt to call patient to schedule appointment, left voicemail with call back number [phone]

## 2021-06-07 NOTE — PROGRESS NOTES
[facility] Physical & Pain Medicine  Office Note    Patient Name: Faviola Estevez    MR #: [id number]    Account #: [de-identified]    : 1960    Age: 63 y.o. Sex: female    Date: 2021    PCP: Constanza Reeys    Chief Complaint:   Chief Complaint   Patient presents with    Back Pain    Neck Pain       History of Present Illness:     Faviola Estevez is a 63 y.o. female who presents for 3 month follow up. Patient has been out of her pain medication due to a no showed appointment. Per patient agreement, if a patient no show's appointment medications are withheld. Patient states that neck pain is worsening She is having new radicular symptoms. She states that her last fingers are going numb. She has been dropping items. Back Pain  This is a chronic problem. The current episode started more than 1 month ago. The problem occurs constantly. The problem has been waxing and waning since onset. The pain is present in the lumbar spine and sacro-iliac. The quality of the pain is described as cramping and aching. The symptoms are aggravated by bending, position, standing and twisting. Associated symptoms include numbness and weakness. Pertinent negatives include no bladder incontinence, bowel incontinence or leg pain. Neck Pain   This is a chronic problem. The current episode started more than 1 year ago. The problem occurs constantly. The problem has been gradually worsening. The quality of the pain is described as aching and cramping. The symptoms are aggravated by twisting and bending. Associated symptoms include numbness and weakness. Pertinent negatives include no leg pain.      Screening Tools:    PEG Score: 10     Last PEG Score: 8.3     Annual ORT Score: 1     Annual PHQ Score: 13    Current Pain Assessment  Pain Assessment  Pain Assessment: 0-10  Pain Level: 10  Pain Type: Chronic pain  Pain Location: Back, Neck  Pain Orientation: Upper, Mid, Lower  POSS Score (Patient Ctrl Analgesia): 1    Past Visit HPI:  7/15/2020  Patient presents today for lumbar trigger point injections along with left SI injection as well as reevaluation to return to work. Patient states that condition was improving she was able to do activities of daily living as well as household chores with less pain. Patient has been undergoing physical therapy for low back and knee strengthening. Patient stated that she was doing exceptionally well until the last 2 treatments. Patient stated that she had had an increase in neck pain from the leg press as she felt it was more to do with the amount of weight. Patient stated that she mentioned this to physical therapist however they put more weight on the next day. Patient stated that she has had an increase in pain the last 2 days however she feels that the injections today are targeting the areas of increased pain. She does feel that she will be able to return to work. 6/29/2020  presents today for evaluation of return to work. Patient's condition has significantly improved as PEG score was a 10 at last appointment and is now 7.3. Physical therapy recommends another 4 weeks of treatment prior to returning back to work. Patient's pain is more localized to the SI area as she has had significant improvement in knee pain. Of note: Disability paperwork has not been received by this office. Patient instructed that NP will be going out of town if paperwork is received by tomorrow paperwork will be completed otherwise paperwork will not be completed until NP is back in town mid July. 6/1/2020  presents for follow-up of acute on chronic knee pain and low back pain. Back on April 1, 2020 patient had presented for a left SI injection for low back pain. The evening prior to the injection patient had fallen and hurt her left knee. Patient was in an extreme amount of pain. Patient was sent for imaging and was taken off of work.   Patient started doing some therapeutic home exercises and was off work for approximately 3 weeks. Her pain gradually improved. Patient stated being off work for that 3 weeks she felt better than she had felt in a long time as the pain had resolved. Patient was able to activities of daily living and household chores with minimal pain. When patient received the injection for her low back on 4/1/2020 she was unable to determine how effective the injection was as her knee pain was so severe that it overpowered her low back pain. Patient was seen on 4/21/2020 and she was released to go back to work the following week. Patient states that after returning to work, her knee pain has gradually worsened. Her quality of life has deteriorated. Patient states that the weekends she spends recuperating so she can return to work the following week. Patient is stating that her thighs feel weak. Patient has difficulty with ambulation. Patient does walk bent over and cannot fully extend her knees. This is causing her back pain to worsen. Patient feels that the injections did help with the low back but between the fall and the positions in which she stands to work, the injections for the low back did not last very long. Patient is having difficulty sleeping at night. Patient is worried that she may have to retire as she does not know if her body can continue. Patient states that her housework is behind as once she gets home from work it is difficult for her to do household chores. Knee Pain is a recurrent problem. The current episode started more than 1 month ago. The problem occurs constantly. The problem has been gradually worsening. Associated symptoms include arthralgias, joint swelling and myalgias. Associated symptoms comments: Abrasion to left knee almost healed. The symptoms are aggravated by walking, standing and twisting. Back Pain is a chronic problem. The current episode started more than 1 year ago. The problem occurs constantly.  The problem has been gradually worsening since onset. The pain is present in the lumbar spine and sacro-iliac. The quality of the pain is described as aching and cramping. The symptoms are aggravated by twisting, standing, bending and position. 4/21/2020  presents for 3 week follow up post fall. Patient presented for a Left SI injection on 4/1/2020. Patient had fallen the night before. She was having severe knee pain. Patient was assessed and she was given work release to allow time to heal. Patient is presenting today to see if she can return to work. Knee Pain is a recurrent problem. The current episode started 1 to 4 weeks ago. The problem occurs constantly. The problem has been gradually improving. Associated symptoms include arthralgias, joint swelling and myalgias. The symptoms are aggravated by walking, standing and twisting.     3/25/2020  present for sooner appointment related to missed procedure. Patient states that she received a phone call the day before her injection stating the time was 3:30. Patient stated that she was in her managers office when she got the phone call. Patient had that the appointment was at 1100. Patient had taken 1/2 day off but had to take the entire day off work. Patient came for her appointment on 2/14/2020 but when she registered she was told that everyone was gone for the day. Patient states that low back pain is getting worse. Patient states that pain is affecting her ability to work. She is having difficulty sleeping and she does not know how much longer she can tolerate this pain. Back Pain is a chronic problem. The current episode started more than 1 year ago. The problem occurs constantly. The problem has been rapidly worsening since onset. The pain is present in the lumbar spine and sacro-iliac. The quality of the pain is described as aching, cramping, shooting and stabbing. Radiates to: left groin. The pain is worse during the day (due to activity).  The symptoms are aggravated by bending, standing and twisting (lifting). Associated symptoms include headaches. Pertinent negatives include no bladder incontinence or bowel incontinence. 1/30/2020  presents to the office for follow-up of MRI and worsening low back pain. Discussed reviewed with patient that she does have ligamentous hypertrophy with just chronic degenerative changes. Discussed patient's worsening low back pain and symptoms. Patient states that low back does wrap into the groin at times. Patient has pain with flexion and extension. Low back pain cramps and makes it difficulty for her to do anything after she works. Patient states that all she can tolerate is working and going to bed. Her quality of life has diminished and it takes all she can do to work therefore things in her house are not getting done. 12/17/19   presents to the office for annual exam with primary complaints of worsening cervical radiculopathy with pain going down left arm and into last 2 fingers getting numb. Patient is also having worsening low back pain. She states that constant flexion extension as demanding by her job that she will start having pain in her low back and that her legs would become weak. She has had a couple of occasions where she has had electrical sensations going up her back and making it difficult to breathe. Last office appointment patient was scheduled to have MRI of Lumbar spine and will need MRI of cervical spine in the futue. Patient attempted to have MRI at her local hospital however patient barely fit inside of MRI machine. Patient stated that MRI machine was pulling at her arms and that she could not breathe and started having anxiety attack. MRI had to be stopped. Patient was brought back into the office to schedule MRI with sedation.   However patient states she does not need IV sedation if she can get in the open MRI.     Employment: factory    Past Medical Histoy  Past Medical History: Diagnosis Date    Arthritis     [facility]ilateral [facility]ro[facility] ([facility] [address]     Diabetes mellitus ([facility] [address]     Hypertension     Thyroid disease        Surgery History  Past Surgical History:   Procedure Laterality Date    CARDIAC SURGERY  1998    heart cath    CERVICAL SPINE SURGERY      HYSTERECTOMY      JOINT REPLACEMENT      bilateral knees        Family History  family history is not on file. Social History    Social History     Tobacco Use    Smoking status: Former Smoker    Smokeless tobacco: Never Used   Substance Use Topics    Alcohol use: No       Allergies  Tape [adhesive tape] and Tramadol     Current Medications  Current Outpatient Medications   Medication Sig Dispense Refill    pregabalin (LYRICA) 150 MG capsule Take 1 capsule by mouth 3 times daily for 90 days. 90 capsule 2    nabumetone (RELAFEN) 500 MG tablet Take 1 tablet by mouth 2 times daily 60 tablet 2    zolpidem (AMBIEN CR) 12.5 MG extended release tablet Take 1 tablet by mouth nightly as needed for Sleep. Must last 30 days 30 tablet 2    aspirin EC 81 MG EC tablet Take 1 tablet by mouth daily      amLODIPine (NORVASC) 5 MG tablet Take 5 mg by mouth daily      atorvastatin (LIPITOR) 40 MG tablet Take 40 mg by mouth nightly      insulin glargine (LANTUS) 100 UNIT/ML injection vial Inject 50 Units into the skin daily      sertraline (ZOLOFT) 50 MG tablet Take 50 mg by mouth daily      metFORMIN (GLUCOPHAGE) 1000 MG tablet Take 1,000 mg by mouth 2 times daily      tiZANidine (ZANAFLEX) 4 MG tablet Take 1 tablet by mouth 2 times daily 60 tablet 2    diclofenac (FLECTOR) 1.3 % patch Place 1 patch onto the skin 2 times daily 60 patch 2    lisinopril (PRINIVIL;ZESTRIL) 20 MG tablet Take 20 mg by mouth daily      Insulin Lispro (HUMALOG KWIKPEN SC) Inject into the skin      levothyroxine (SYNTHROID) 25 MCG tablet Take 25 mcg by mouth daily        No current facility-administered medications for this encounter.        Review of Systems:  Review of Systems   Constitutional: Positive for activity change. Gastrointestinal: Negative for bowel incontinence and constipation. Genitourinary: Negative for bladder incontinence. Musculoskeletal: Positive for arthralgias, back pain, gait problem, myalgias, neck pain and neck stiffness. Neurological: Positive for weakness and numbness. Gait disturbance   Psychiatric/Behavioral: Positive for sleep disturbance. Negative for agitation, self-injury and suicidal ideas. The patient is not nervous/anxious. 14 point ROS negative besides that noted in HPI    Physical Exam:    Vitals:    06/07/21 1500   BP: 135/73   Pulse: 83   Resp: 20   Temp: 98.6 °F (37 °C)   TempSrc: Temporal   SpO2: 95%   Weight: 257 lb (116.6 kg)   Height: 5' 7\" (1.702 m)       Body mass index is 40.25 kg/m². Physical Exam  Vitals and nursing note reviewed. Constitutional:       General: She is not in acute distress. Appearance: She is well-developed. HENT:      Head: Normocephalic. Right Ear: External ear normal.      Left Ear: External ear normal.      Nose: Nose normal.   Eyes:      Conjunctiva/sclera: Conjunctivae normal.      Pupils: Pupils are equal, round, and reactive to light. Neck:      Vascular: No JVD. Trachea: No tracheal deviation. Cardiovascular:      Rate and Rhythm: Normal rate. Pulmonary:      Effort: Pulmonary effort is normal.   Abdominal:      General: There is no distension. Tenderness: There is no abdominal tenderness. Musculoskeletal:      Cervical back: Spasms (tender palpable knots noted) and tenderness present. Pain with movement present. Decreased range of motion. Lumbar back: Spasms (tender palpable knots noted) and tenderness present.       Comments: Reese's (-) bilaterally    Patient points to on left SI joint as the source of low back pain  TTP of SI joint on left  positive Rigo's on left, positive Distraction on left, positive Thigh Thrust on left     Skin:     General: Skin is warm and dry. Neurological:      Mental Status: She is alert and oriented to person, place, and time. Cranial Nerves: No cranial nerve deficit. Gait: Gait abnormal.   Psychiatric:         Behavior: Behavior normal.         Thought Content: Thought content normal.         Judgment: Judgment normal.        Labs:  Lab Results   Component Value Date     11/30/2014    K 3.8 11/30/2014    CL 99 11/30/2014    CO2 24 11/30/2014    BUN 11 11/30/2014    CREATININE 0.9 05/15/2018    CREATININE 0.6 11/30/2014    GLUCOSE 212 11/30/2014    CALCIUM 9.7 11/30/2014        Lab Results   Component Value Date    WBC 12.05 (H) 11/30/2014    HGB 13.0 11/30/2014    HCT 40.4 11/30/2014    MCV 67.4 (L) 11/30/2014     11/30/2014     Assessment:  Active Problems:    Bilateral knee pain    Neuralgia, geniculate    Lumbar disc disease    Bulging lumbar disc    Displacement of lumbar disc with radiculopathy    Cervical radiculopathy    Cervicalgia    Central stenosis of spinal canal    Foraminal stenosis of cervical region    Facet arthritis of lumbar region    SI (sacroiliac) joint dysfunction    Chronic bilateral low back pain without sciatica    Pain management contract agreement    Bilateral myofascial pain  Resolved Problems:    * No resolved hospital problems. *      Plan:  Chronic bilateral low back pain without sciatica  - HYDROcodone-acetaminophen (NORCO)  MG per tablet; Take 1 tablet by mouth every 6 hours as needed for Pain for up to 30 days. Intended supply: 30 days  Dispense: 120 tablet; Refill: 0    Cervical radiculopathy  - pregabalin (LYRICA) 150 MG capsule; Take 1 capsule by mouth 3 times daily for 90 days. Dispense: 90 capsule; Refill: 2    Other insomnia  - zolpidem (AMBIEN CR) 12.5 MG extended release tablet; Take 1 tablet by mouth nightly as needed for Sleep for up to 90 days. Dispense: 30 tablet;  Refill: 2    Facet arthritis of lumbar region  - nabumetone (RELAFEN) 500 MG tablet; Take 1 tablet by mouth 2 times daily  Dispense: 60 tablet; Refill: 2    Bilateral myofascial pain  - tiZANidine (ZANAFLEX) 4 MG tablet; Take 1 tablet by mouth 2 times daily  Dispense: 60 tablet; Refill: 2    Schedule bilateral lumbar trigger point injections. Foraminal stenosis of cervical region  Cervicalgia  Central stenosis of spinal canal  - Noni August MD, Neurosurgery, [facility]    Patient requests referral due to worsening neck pain, worsening radicular symptoms, new numbness and dropping objects. Order placed for NexWave 3 in 1 device. Patient suffers from chronic neck spasms and lumbar spasms, arthritic knee pain. Patient given product information at appointment. The NexWave is interferential, TENS and NMES. These 3 clinically proven modalities are used to help reduce pain symptoms, to help retrain and strengthen muscles and to help reduce the need for pain (opioid) medications. [x] Follow up    [] 4 weeks   [x] 6-8 weeks   [] 10-12 weeks   [] 3 months  [x] Post procedure to evaluate effectiveness of treatment  [] To evaluate medications changes made at office visit. [] To review diagnostics ordered at last visit. [] To evaluate response to therapy    [x] For management of controlled substance  [x] Random UDS as indicated by ORT score or if indicated by abberent behaviors      Discussion: Discussed exam findings and plan of care with patient. Patient agreed with POC and questions were asked and answered. Activity: Discussed exercise as beneficial to pain reduction, encouraged daily stretching with a focus on torso strengthening.     Goal:  Pain Management Goals of Therapy:   [] Resolution in pain  [x] Decrease in pain level  [x] Improvement in ADL's  [x] Increase in activities with less pain  [] Decrease in medication      Controlled substance monitoring:    [x] Discussed medication side effects, risk of tolerance and/or dependence, and/or alternative treatment  [] Discussed the detrimental effects of long term narcotic use in younger patients especially women of childbearing years. [x] No signs and symptoms of potential drug abuse or diversion were identified  [] Signs of potential drug abuse or diversion were identified   [] ORT Score   [] UDS non-compliant   [] See Note  [x] Random urine drug screen sent today  [] Random urine drug screen not completed today   [] Deferred New Patient  [x] Compliant 6/1/2020  [] Not Compliant see note  [] Medication agreement with provider signed today  [x] Medication agreement with provider on file under media   [] Medication regimen effective and continued   [] New patient continuing current medication while developing POC   [x] On going assessment and evaluation of medication regimen  [] Medication regimen not effective see plan for changes  [x] Ana M Vallecillo reviewed & on file under media      EMR dragon/transcription disclaimer: Much of this encounter note is electronic transcription/translation of spoken language to printed tach. Electronic translation of spoken language may be erroneous, or at times, nonsensical words or phrases may be inadvertently transcribed.  Although, I have reviewed the note for such errors, some may still exist.

## 2021-06-10 ENCOUNTER — TELEPHONE (OUTPATIENT)
Dept: NEUROSURGERY | Age: 61
End: 2021-06-10

## 2021-06-10 NOTE — TELEPHONE ENCOUNTER
2nd attempt to call patient to schedule appointment left voicemail with call back number [phone]

## 2021-06-10 NOTE — TELEPHONE ENCOUNTER
[facility] Neurosurgery New Patient Questionnaire    1. Diagnosis/Reason for Referral?    Central stenosis of spinal canal  M54.2 (ICD-10-CM) - Cervicalgia  M48.02 (ICD-10-CM) - Foraminal stenosis of cervical region  2. Who is completing questionnaire? Patient x Caregiver Family      3. Has the patient had any previous spinal/brain surgeries? yes        A. If yes, what is the name of the facility in which the surgery was performed? [facility]       B. Procedure/Surgery performed? Cervical fusion  C5, 6, 7   C. Who was the surgeon? Kevin TADEO When was the surgery? 2001       E. Did the patient improve after the surgery? yes        4. Is this a second opinion? If yes, Dr. Demian Mahoney would like to review patient first before making the appointment. 5. Have MRI Images been obtain within the last year? Yes  No      XR x  CT     If yes, where was the imaging performed?[facility]      If yes, what part of the body? Lumbar  Cervical x Thoracic  Brain     If yes, when was it obtained? 6 months ago  Note: if the scan was performed at a facility other than [facility], the disc will need to be brought to the appointment or we need to reach out to obtain the disc. A. Was the patient instructed to provide the disc? Yes  x No      8. Has the patient had a NCV/EMG within the last year? Yes  [facility]     If yes, where was it performed and date? MM/YY  Location:      9. Has the patient been to Physical Therapy? Yes x No     If yes, what location, how long attended, and last visit? Location: [facility]       Therapy Lasted:doesn't remember    Date of Last Visit: does not know      10. Has the patient been to Pain Management? Yes x No     If yes, what location and last visit     Location:[facility]   Last Visit:06-07-21   Is it helping? yes

## 2021-06-16 ENCOUNTER — OFFICE VISIT (OUTPATIENT)
Dept: NEUROSURGERY | Age: 61
End: 2021-06-16
Payer: COMMERCIAL

## 2021-06-16 VITALS
HEIGHT: 67 IN | HEART RATE: 84 BPM | BODY MASS INDEX: 40.34 KG/M2 | SYSTOLIC BLOOD PRESSURE: 127 MMHG | DIASTOLIC BLOOD PRESSURE: 68 MMHG | WEIGHT: 257 LBS

## 2021-06-16 DIAGNOSIS — M54.12 CERVICAL RADICULOPATHY: Primary | ICD-10-CM

## 2021-06-16 DIAGNOSIS — Z98.1 S/P CERVICAL SPINAL FUSION: ICD-10-CM

## 2021-06-16 PROCEDURE — 99204 OFFICE O/P NEW MOD 45 MIN: CPT | Performed by: NEUROLOGICAL SURGERY

## 2021-06-16 RX ORDER — DIAZEPAM 5 MG/1
5 TABLET ORAL
Qty: 2 TABLET | Refills: 0 | Status: SHIPPED | OUTPATIENT
Start: 2021-06-16 | End: 2021-07-16

## 2021-06-16 RX ORDER — HYDROCHLOROTHIAZIDE 12.5 MG/1
1 CAPSULE, GELATIN COATED ORAL DAILY
COMMUNITY
Start: 2021-06-09

## 2021-06-16 RX ORDER — DULAGLUTIDE 3 MG/.5ML
INJECTION, SOLUTION SUBCUTANEOUS
COMMUNITY
Start: 2021-06-09 | End: 2021-12-23

## 2021-06-23 ENCOUNTER — HOSPITAL ENCOUNTER (OUTPATIENT)
Dept: PAIN MANAGEMENT | Age: 61
Discharge: HOME | End: 2021-06-23
Payer: COMMERCIAL

## 2021-06-23 VITALS
SYSTOLIC BLOOD PRESSURE: 145 MMHG | RESPIRATION RATE: 18 BRPM | HEART RATE: 74 BPM | TEMPERATURE: 96.9 F | OXYGEN SATURATION: 97 % | DIASTOLIC BLOOD PRESSURE: 61 MMHG

## 2021-06-23 PROCEDURE — 20553 NJX 1/MLT TRIGGER POINTS 3/>: CPT | Performed by: NURSE PRACTITIONER

## 2021-06-23 PROCEDURE — 2500000003 HC RX 250 WO HCPCS

## 2021-06-23 PROCEDURE — 20553 NJX 1/MLT TRIGGER POINTS 3/>: CPT

## 2021-06-23 RX ORDER — LIDOCAINE HYDROCHLORIDE 10 MG/ML
10 INJECTION, SOLUTION EPIDURAL; INFILTRATION; INTRACAUDAL; PERINEURAL ONCE
Status: DISCONTINUED | OUTPATIENT
Start: 2021-06-23 | End: 2021-06-25 | Stop reason: HOSPADM

## 2021-06-23 RX ORDER — BUPIVACAINE HYDROCHLORIDE 5 MG/ML
10 INJECTION, SOLUTION EPIDURAL; INTRACAUDAL ONCE
Status: DISCONTINUED | OUTPATIENT
Start: 2021-06-23 | End: 2021-06-25 | Stop reason: HOSPADM

## 2021-06-23 NOTE — PROCEDURES
[facility] Physical & Pain Medicine    Patient Name: Mckenzie Norman    : 1960                    Age: 63 y.o. Sex: female    Date: 2021    Pre-op Diagnosis: Myofascial Pain/ Muscle Spasms    Post-op Diagnosis: Myofascial Pain/ Muscle Spasms    Procedure: Lumbar Trigger Point Injections    Performing Procedure: TIANA SofiaP - BC; VA-BC    Patient Vitals for the past 24 hrs:   BP Temp Temp src Pulse Resp SpO2   21 1432 (!) 145/61 96.9 °F (36.1 °C) Temporal 74 18 97 %       Description of Procedure:    After a brief physical assessment and failure to improve with conservative measures the patient presented for Lumbar Trigger Point Injections The indications, limitations and possible complications were discussed with the patient and the patient elected to proceed with the procedure. After voluntary, informed and signed consent obtained the patient was placed in a seated position. Appropriate time out was obtained per policy. The area of maximal tenderness was palpated over the bilaterally Lumbar Muscles - Lower Latissimus,  Erector Spinae, Lumbar Paraspinous. The skin overlying these areas was marked with a skin marker. The areas were prepped using aseptic technique with CHG prep. The skin overlying the proposed injection sites were then sprayed with Gebauer's Solution while protecting patient eyes. Each trigger point of the Lumbar Muscles - Lower Latissimus,  Erector Spinae, Lumbar Paraspinous was injected after negative aspiration was injected with approximately 1-2 ml of a solution of 5 ml of 1% Lidocaine Plain and 5 ml of 0.5% Marcaine Plain after negative aspiration    Prior to trigger points, patient was able to use demo NexWave Patient felt she would benefit from unit. Order placed for NexWave 3 in 1 device. Patient suffers from chronic low back pain, chronic neck pain and myofacial pain. Patient given product information at appointment.  The NexWave is interferential, TENS and NMES. These 3 clinically proven modalities are used to help reduce pain symptoms, to help retrain and strengthen muscles and to help reduce the need for pain (opioid) medications. Discharge: The patient tolerated the procedure well. There were no complications during the procedure and the patient was discharged home with discharge instructions. The patient has been instructed to contact the office should there be any complications or questions to arise between today and their next appointment. Plan:     Will return to the office in 6 weeks for follow up    KEIKO Joshua CNP, 6/23/2021 at 4:09 PM

## 2021-07-01 ENCOUNTER — TELEPHONE (OUTPATIENT)
Dept: PAIN MANAGEMENT | Age: 61
End: 2021-07-01

## 2021-07-01 DIAGNOSIS — M51.36 BULGING LUMBAR DISC: ICD-10-CM

## 2021-07-01 DIAGNOSIS — G89.29 CHRONIC PAIN OF BOTH KNEES: ICD-10-CM

## 2021-07-01 DIAGNOSIS — M25.561 CHRONIC PAIN OF BOTH KNEES: ICD-10-CM

## 2021-07-01 DIAGNOSIS — M25.562 CHRONIC PAIN OF BOTH KNEES: ICD-10-CM

## 2021-07-01 DIAGNOSIS — B02.21 NEURALGIA, GENICULATE: ICD-10-CM

## 2021-07-01 DIAGNOSIS — M51.16 DISPLACEMENT OF LUMBAR DISC WITH RADICULOPATHY: ICD-10-CM

## 2021-07-01 DIAGNOSIS — M51.9 LUMBAR DISC DISEASE: ICD-10-CM

## 2021-07-01 NOTE — TELEPHONE ENCOUNTER
Attempted to call pt concerning her injections she had on 06/23. No answer. Left voicemail. Spontaneous

## 2021-07-27 ENCOUNTER — APPOINTMENT (OUTPATIENT)
Dept: GENERAL RADIOLOGY | Facility: HOSPITAL | Age: 61
End: 2021-07-27

## 2021-07-27 ENCOUNTER — APPOINTMENT (OUTPATIENT)
Dept: CT IMAGING | Facility: HOSPITAL | Age: 61
End: 2021-07-27

## 2021-07-27 ENCOUNTER — HOSPITAL ENCOUNTER (INPATIENT)
Facility: HOSPITAL | Age: 61
LOS: 9 days | Discharge: HOME | End: 2021-08-05
Attending: STUDENT IN AN ORGANIZED HEALTH CARE EDUCATION/TRAINING PROGRAM | Admitting: HOSPITALIST

## 2021-07-27 DIAGNOSIS — Z74.09 IMPAIRED MOBILITY AND ACTIVITIES OF DAILY LIVING: ICD-10-CM

## 2021-07-27 DIAGNOSIS — Z78.9 IMPAIRED MOBILITY AND ACTIVITIES OF DAILY LIVING: ICD-10-CM

## 2021-07-27 DIAGNOSIS — J18.9 PNEUMONIA OF BOTH LUNGS DUE TO INFECTIOUS ORGANISM, UNSPECIFIED PART OF LUNG: ICD-10-CM

## 2021-07-27 DIAGNOSIS — J96.01 ACUTE RESPIRATORY FAILURE WITH HYPOXIA (HCC): Primary | ICD-10-CM

## 2021-07-27 DIAGNOSIS — N17.9 ACUTE RENAL FAILURE, UNSPECIFIED ACUTE RENAL FAILURE TYPE (HCC): ICD-10-CM

## 2021-07-27 DIAGNOSIS — U07.1 COVID-19: ICD-10-CM

## 2021-07-27 DIAGNOSIS — Z74.09 IMPAIRED FUNCTIONAL MOBILITY, BALANCE, GAIT, AND ENDURANCE: ICD-10-CM

## 2021-07-27 PROBLEM — J12.82 PNEUMONIA DUE TO COVID-19 VIRUS: Status: ACTIVE | Noted: 2021-07-27

## 2021-07-27 LAB
ALBUMIN SERPL-MCNC: 2.9 G/DL (ref 3.5–5.2)
ALBUMIN/GLOB SERPL: 0.7 G/DL
ALP SERPL-CCNC: 72 U/L (ref 39–117)
ALT SERPL W P-5'-P-CCNC: 19 U/L (ref 1–33)
AMORPH URATE CRY URNS QL MICRO: ABNORMAL /HPF
ANION GAP SERPL CALCULATED.3IONS-SCNC: 13 MMOL/L (ref 5–15)
ANISOCYTOSIS BLD QL: NORMAL
AST SERPL-CCNC: 25 U/L (ref 1–32)
BACTERIA UR QL AUTO: ABNORMAL /HPF
BASOPHILS # BLD AUTO: 0.01 10*3/MM3 (ref 0–0.2)
BASOPHILS NFR BLD AUTO: 0.1 % (ref 0–1.5)
BILIRUB SERPL-MCNC: 0.3 MG/DL (ref 0–1.2)
BILIRUB UR QL STRIP: ABNORMAL
BUN SERPL-MCNC: 50 MG/DL (ref 8–23)
BUN/CREAT SERPL: 39.4 (ref 7–25)
CALCIUM SPEC-SCNC: 8 MG/DL (ref 8.6–10.5)
CHLORIDE SERPL-SCNC: 99 MMOL/L (ref 98–107)
CK SERPL-CCNC: 219 U/L (ref 20–180)
CLARITY UR: ABNORMAL
CO2 SERPL-SCNC: 20 MMOL/L (ref 22–29)
COLOR UR: YELLOW
CREAT SERPL-MCNC: 1.27 MG/DL (ref 0.57–1)
D-LACTATE SERPL-SCNC: 1.2 MMOL/L (ref 0.5–2)
DEPRECATED RDW RBC AUTO: 36.4 FL (ref 37–54)
EOSINOPHIL # BLD AUTO: 0.04 10*3/MM3 (ref 0–0.4)
EOSINOPHIL NFR BLD AUTO: 0.5 % (ref 0.3–6.2)
ERYTHROCYTE [DISTWIDTH] IN BLOOD BY AUTOMATED COUNT: 16.3 % (ref 12.3–15.4)
FLUAV RNA RESP QL NAA+PROBE: NOT DETECTED
FLUBV RNA RESP QL NAA+PROBE: NOT DETECTED
GFR SERPL CREATININE-BSD FRML MDRD: 43 ML/MIN/1.73
GLOBULIN UR ELPH-MCNC: 4 GM/DL
GLUCOSE BLDC GLUCOMTR-MCNC: 203 MG/DL (ref 70–130)
GLUCOSE SERPL-MCNC: 250 MG/DL (ref 65–99)
GLUCOSE UR STRIP-MCNC: NEGATIVE MG/DL
HCT VFR BLD AUTO: 29.6 % (ref 34–46.6)
HGB BLD-MCNC: 9.7 G/DL (ref 12–15.9)
HGB UR QL STRIP.AUTO: NEGATIVE
HOLD SPECIMEN: NORMAL
HYALINE CASTS UR QL AUTO: ABNORMAL /LPF
HYPOCHROMIA BLD QL: NORMAL
IMM GRANULOCYTES # BLD AUTO: 0.04 10*3/MM3 (ref 0–0.05)
IMM GRANULOCYTES NFR BLD AUTO: 0.5 % (ref 0–0.5)
KETONES UR QL STRIP: ABNORMAL
LEUKOCYTE ESTERASE UR QL STRIP.AUTO: NEGATIVE
LYMPHOCYTES # BLD AUTO: 0.88 10*3/MM3 (ref 0.7–3.1)
LYMPHOCYTES NFR BLD AUTO: 10.7 % (ref 19.6–45.3)
MAGNESIUM SERPL-MCNC: 1.7 MG/DL (ref 1.6–2.4)
MCH RBC QN AUTO: 21 PG (ref 26.6–33)
MCHC RBC AUTO-ENTMCNC: 32.8 G/DL (ref 31.5–35.7)
MCV RBC AUTO: 63.9 FL (ref 79–97)
MONOCYTES # BLD AUTO: 0.71 10*3/MM3 (ref 0.1–0.9)
MONOCYTES NFR BLD AUTO: 8.6 % (ref 5–12)
NEUTROPHILS NFR BLD AUTO: 6.53 10*3/MM3 (ref 1.7–7)
NEUTROPHILS NFR BLD AUTO: 79.6 % (ref 42.7–76)
NITRITE UR QL STRIP: NEGATIVE
NRBC BLD AUTO-RTO: 0 /100 WBC (ref 0–0.2)
NT-PROBNP SERPL-MCNC: 147.6 PG/ML (ref 0–900)
PH UR STRIP.AUTO: 5.5 [PH] (ref 5–9)
PLAT MORPH BLD: NORMAL
PLATELET # BLD AUTO: 350 10*3/MM3 (ref 140–450)
PMV BLD AUTO: 10.7 FL (ref 6–12)
POTASSIUM SERPL-SCNC: 4.9 MMOL/L (ref 3.5–5.2)
PROCALCITONIN SERPL-MCNC: 0.2 NG/ML (ref 0–0.25)
PROT SERPL-MCNC: 6.9 G/DL (ref 6–8.5)
PROT UR QL STRIP: ABNORMAL
RBC # BLD AUTO: 4.63 10*6/MM3 (ref 3.77–5.28)
RBC # UR: ABNORMAL /HPF
REF LAB TEST METHOD: ABNORMAL
SARS-COV-2 RNA RESP QL NAA+PROBE: DETECTED
SODIUM SERPL-SCNC: 132 MMOL/L (ref 136–145)
SP GR UR STRIP: 1.01 (ref 1–1.03)
SQUAMOUS #/AREA URNS HPF: ABNORMAL /HPF
TROPONIN T SERPL-MCNC: <0.01 NG/ML (ref 0–0.03)
UROBILINOGEN UR QL STRIP: ABNORMAL
WBC # BLD AUTO: 8.21 10*3/MM3 (ref 3.4–10.8)
WBC MORPH BLD: NORMAL
WBC UR QL AUTO: ABNORMAL /HPF
WHOLE BLOOD HOLD SPECIMEN: NORMAL

## 2021-07-27 PROCEDURE — 83605 ASSAY OF LACTIC ACID: CPT | Performed by: STUDENT IN AN ORGANIZED HEALTH CARE EDUCATION/TRAINING PROGRAM

## 2021-07-27 PROCEDURE — 93005 ELECTROCARDIOGRAM TRACING: CPT | Performed by: STUDENT IN AN ORGANIZED HEALTH CARE EDUCATION/TRAINING PROGRAM

## 2021-07-27 PROCEDURE — 84145 PROCALCITONIN (PCT): CPT | Performed by: HOSPITALIST

## 2021-07-27 PROCEDURE — 83880 ASSAY OF NATRIURETIC PEPTIDE: CPT | Performed by: STUDENT IN AN ORGANIZED HEALTH CARE EDUCATION/TRAINING PROGRAM

## 2021-07-27 PROCEDURE — 99285 EMERGENCY DEPT VISIT HI MDM: CPT

## 2021-07-27 PROCEDURE — 80053 COMPREHEN METABOLIC PANEL: CPT | Performed by: STUDENT IN AN ORGANIZED HEALTH CARE EDUCATION/TRAINING PROGRAM

## 2021-07-27 PROCEDURE — 71045 X-RAY EXAM CHEST 1 VIEW: CPT

## 2021-07-27 PROCEDURE — 87636 SARSCOV2 & INF A&B AMP PRB: CPT | Performed by: STUDENT IN AN ORGANIZED HEALTH CARE EDUCATION/TRAINING PROGRAM

## 2021-07-27 PROCEDURE — 87040 BLOOD CULTURE FOR BACTERIA: CPT | Performed by: STUDENT IN AN ORGANIZED HEALTH CARE EDUCATION/TRAINING PROGRAM

## 2021-07-27 PROCEDURE — 83735 ASSAY OF MAGNESIUM: CPT | Performed by: STUDENT IN AN ORGANIZED HEALTH CARE EDUCATION/TRAINING PROGRAM

## 2021-07-27 PROCEDURE — 85007 BL SMEAR W/DIFF WBC COUNT: CPT | Performed by: STUDENT IN AN ORGANIZED HEALTH CARE EDUCATION/TRAINING PROGRAM

## 2021-07-27 PROCEDURE — 81001 URINALYSIS AUTO W/SCOPE: CPT | Performed by: STUDENT IN AN ORGANIZED HEALTH CARE EDUCATION/TRAINING PROGRAM

## 2021-07-27 PROCEDURE — 70450 CT HEAD/BRAIN W/O DYE: CPT

## 2021-07-27 PROCEDURE — 84484 ASSAY OF TROPONIN QUANT: CPT | Performed by: STUDENT IN AN ORGANIZED HEALTH CARE EDUCATION/TRAINING PROGRAM

## 2021-07-27 PROCEDURE — 87086 URINE CULTURE/COLONY COUNT: CPT | Performed by: STUDENT IN AN ORGANIZED HEALTH CARE EDUCATION/TRAINING PROGRAM

## 2021-07-27 PROCEDURE — 25010000002 CEFTRIAXONE PER 250 MG: Performed by: EMERGENCY MEDICINE

## 2021-07-27 PROCEDURE — 82962 GLUCOSE BLOOD TEST: CPT

## 2021-07-27 PROCEDURE — 85025 COMPLETE CBC W/AUTO DIFF WBC: CPT | Performed by: STUDENT IN AN ORGANIZED HEALTH CARE EDUCATION/TRAINING PROGRAM

## 2021-07-27 PROCEDURE — 93010 ELECTROCARDIOGRAM REPORT: CPT | Performed by: INTERNAL MEDICINE

## 2021-07-27 PROCEDURE — 82550 ASSAY OF CK (CPK): CPT | Performed by: EMERGENCY MEDICINE

## 2021-07-27 RX ORDER — DEXAMETHASONE SODIUM PHOSPHATE 4 MG/ML
6 INJECTION, SOLUTION INTRA-ARTICULAR; INTRALESIONAL; INTRAMUSCULAR; INTRAVENOUS; SOFT TISSUE DAILY
Status: DISCONTINUED | OUTPATIENT
Start: 2021-07-28 | End: 2021-07-28

## 2021-07-27 RX ORDER — ZOLPIDEM TARTRATE 12.5 MG/1
12.5 TABLET, FILM COATED, EXTENDED RELEASE ORAL NIGHTLY PRN
COMMUNITY

## 2021-07-27 RX ORDER — NICOTINE POLACRILEX 4 MG
15 LOZENGE BUCCAL
Status: DISCONTINUED | OUTPATIENT
Start: 2021-07-27 | End: 2021-08-05 | Stop reason: HOSPADM

## 2021-07-27 RX ORDER — AMLODIPINE BESYLATE 5 MG/1
5 TABLET ORAL
Status: DISCONTINUED | OUTPATIENT
Start: 2021-07-28 | End: 2021-08-05 | Stop reason: HOSPADM

## 2021-07-27 RX ORDER — PREGABALIN 150 MG/1
150 CAPSULE ORAL 3 TIMES DAILY PRN
COMMUNITY

## 2021-07-27 RX ORDER — PREGABALIN 75 MG/1
150 CAPSULE ORAL EVERY 12 HOURS SCHEDULED
Status: DISCONTINUED | OUTPATIENT
Start: 2021-07-28 | End: 2021-08-05 | Stop reason: HOSPADM

## 2021-07-27 RX ORDER — ATORVASTATIN CALCIUM 40 MG/1
40 TABLET, FILM COATED ORAL NIGHTLY
Status: DISCONTINUED | OUTPATIENT
Start: 2021-07-28 | End: 2021-08-05 | Stop reason: HOSPADM

## 2021-07-27 RX ORDER — DEXAMETHASONE SODIUM PHOSPHATE 4 MG/ML
6 INJECTION, SOLUTION INTRA-ARTICULAR; INTRALESIONAL; INTRAMUSCULAR; INTRAVENOUS; SOFT TISSUE EVERY 6 HOURS
Status: DISCONTINUED | OUTPATIENT
Start: 2021-07-27 | End: 2021-07-27

## 2021-07-27 RX ORDER — DEXTROSE MONOHYDRATE 25 G/50ML
25 INJECTION, SOLUTION INTRAVENOUS
Status: DISCONTINUED | OUTPATIENT
Start: 2021-07-27 | End: 2021-08-05 | Stop reason: HOSPADM

## 2021-07-27 RX ORDER — LISINOPRIL 20 MG/1
20 TABLET ORAL DAILY
Status: DISCONTINUED | OUTPATIENT
Start: 2021-07-28 | End: 2021-08-05 | Stop reason: HOSPADM

## 2021-07-27 RX ADMIN — CEFTRIAXONE SODIUM 2 G: 2 INJECTION, POWDER, FOR SOLUTION INTRAMUSCULAR; INTRAVENOUS at 21:23

## 2021-07-27 RX ADMIN — SODIUM CHLORIDE, POTASSIUM CHLORIDE, SODIUM LACTATE AND CALCIUM CHLORIDE 1000 ML: 600; 310; 30; 20 INJECTION, SOLUTION INTRAVENOUS at 18:22

## 2021-07-28 PROBLEM — E66.9 OBESITY (BMI 30-39.9): Status: ACTIVE | Noted: 2021-07-28

## 2021-07-28 PROBLEM — G93.41 METABOLIC ENCEPHALOPATHY: Status: ACTIVE | Noted: 2021-07-28

## 2021-07-28 LAB
ALBUMIN SERPL-MCNC: 2.6 G/DL (ref 3.5–5.2)
ALBUMIN/GLOB SERPL: 0.7 G/DL
ALP SERPL-CCNC: 74 U/L (ref 39–117)
ALT SERPL W P-5'-P-CCNC: 17 U/L (ref 1–33)
ANION GAP SERPL CALCULATED.3IONS-SCNC: 11 MMOL/L (ref 5–15)
AST SERPL-CCNC: 23 U/L (ref 1–32)
BACTERIA SPEC AEROBE CULT: NO GROWTH
BASOPHILS # BLD AUTO: 0.02 10*3/MM3 (ref 0–0.2)
BASOPHILS NFR BLD AUTO: 0.3 % (ref 0–1.5)
BILIRUB SERPL-MCNC: 0.2 MG/DL (ref 0–1.2)
BUN SERPL-MCNC: 34 MG/DL (ref 8–23)
BUN/CREAT SERPL: 36.6 (ref 7–25)
CALCIUM SPEC-SCNC: 8.4 MG/DL (ref 8.6–10.5)
CHLORIDE SERPL-SCNC: 102 MMOL/L (ref 98–107)
CO2 SERPL-SCNC: 21 MMOL/L (ref 22–29)
CREAT SERPL-MCNC: 0.93 MG/DL (ref 0.57–1)
CRP SERPL-MCNC: 22.28 MG/DL (ref 0–0.5)
CRP SERPL-MCNC: 24.17 MG/DL (ref 0–0.5)
D-DIMER, QUANTITATIVE (MAD,POW, STR): 2269 NG/ML (FEU) (ref 0–470)
DEPRECATED RDW RBC AUTO: 36.7 FL (ref 37–54)
EOSINOPHIL # BLD AUTO: 0.11 10*3/MM3 (ref 0–0.4)
EOSINOPHIL NFR BLD AUTO: 1.7 % (ref 0.3–6.2)
ERYTHROCYTE [DISTWIDTH] IN BLOOD BY AUTOMATED COUNT: 16.6 % (ref 12.3–15.4)
FERRITIN SERPL-MCNC: 542 NG/ML (ref 13–150)
FERRITIN SERPL-MCNC: 558.8 NG/ML (ref 13–150)
FIBRINOGEN PPP-MCNC: 859 MG/DL (ref 228–514)
GFR SERPL CREATININE-BSD FRML MDRD: 61 ML/MIN/1.73
GLOBULIN UR ELPH-MCNC: 4 GM/DL
GLUCOSE BLDC GLUCOMTR-MCNC: 246 MG/DL (ref 70–130)
GLUCOSE BLDC GLUCOMTR-MCNC: 319 MG/DL (ref 70–130)
GLUCOSE BLDC GLUCOMTR-MCNC: 337 MG/DL (ref 70–130)
GLUCOSE BLDC GLUCOMTR-MCNC: 369 MG/DL (ref 70–130)
GLUCOSE SERPL-MCNC: 274 MG/DL (ref 65–99)
HCT VFR BLD AUTO: 28.4 % (ref 34–46.6)
HGB BLD-MCNC: 9.4 G/DL (ref 12–15.9)
IMM GRANULOCYTES # BLD AUTO: 0.05 10*3/MM3 (ref 0–0.05)
IMM GRANULOCYTES NFR BLD AUTO: 0.8 % (ref 0–0.5)
LYMPHOCYTES # BLD AUTO: 0.92 10*3/MM3 (ref 0.7–3.1)
LYMPHOCYTES NFR BLD AUTO: 13.8 % (ref 19.6–45.3)
MCH RBC QN AUTO: 21.4 PG (ref 26.6–33)
MCHC RBC AUTO-ENTMCNC: 33.1 G/DL (ref 31.5–35.7)
MCV RBC AUTO: 64.7 FL (ref 79–97)
MONOCYTES # BLD AUTO: 0.52 10*3/MM3 (ref 0.1–0.9)
MONOCYTES NFR BLD AUTO: 7.8 % (ref 5–12)
NEUTROPHILS NFR BLD AUTO: 5.03 10*3/MM3 (ref 1.7–7)
NEUTROPHILS NFR BLD AUTO: 75.6 % (ref 42.7–76)
NRBC BLD AUTO-RTO: 0 /100 WBC (ref 0–0.2)
PLATELET # BLD AUTO: 324 10*3/MM3 (ref 140–450)
PMV BLD AUTO: 10.7 FL (ref 6–12)
POTASSIUM SERPL-SCNC: 4.3 MMOL/L (ref 3.5–5.2)
PROCALCITONIN SERPL-MCNC: 0.19 NG/ML (ref 0–0.25)
PROT SERPL-MCNC: 6.6 G/DL (ref 6–8.5)
RBC # BLD AUTO: 4.39 10*6/MM3 (ref 3.77–5.28)
SODIUM SERPL-SCNC: 134 MMOL/L (ref 136–145)
WBC # BLD AUTO: 6.65 10*3/MM3 (ref 3.4–10.8)

## 2021-07-28 PROCEDURE — 94799 UNLISTED PULMONARY SVC/PX: CPT

## 2021-07-28 PROCEDURE — 82962 GLUCOSE BLOOD TEST: CPT

## 2021-07-28 PROCEDURE — 85025 COMPLETE CBC W/AUTO DIFF WBC: CPT | Performed by: HOSPITALIST

## 2021-07-28 PROCEDURE — 25010000002 DEXAMETHASONE SODIUM PHOSPHATE 10 MG/ML SOLUTION: Performed by: HOSPITALIST

## 2021-07-28 PROCEDURE — 86140 C-REACTIVE PROTEIN: CPT | Performed by: HOSPITALIST

## 2021-07-28 PROCEDURE — 25010000002 ENOXAPARIN PER 10 MG: Performed by: HOSPITALIST

## 2021-07-28 PROCEDURE — 36415 COLL VENOUS BLD VENIPUNCTURE: CPT | Performed by: HOSPITALIST

## 2021-07-28 PROCEDURE — 97162 PT EVAL MOD COMPLEX 30 MIN: CPT

## 2021-07-28 PROCEDURE — 94760 N-INVAS EAR/PLS OXIMETRY 1: CPT

## 2021-07-28 PROCEDURE — 82728 ASSAY OF FERRITIN: CPT | Performed by: HOSPITALIST

## 2021-07-28 PROCEDURE — 80053 COMPREHEN METABOLIC PANEL: CPT | Performed by: HOSPITALIST

## 2021-07-28 PROCEDURE — 87040 BLOOD CULTURE FOR BACTERIA: CPT | Performed by: HOSPITALIST

## 2021-07-28 PROCEDURE — 85379 FIBRIN DEGRADATION QUANT: CPT | Performed by: HOSPITALIST

## 2021-07-28 PROCEDURE — 94640 AIRWAY INHALATION TREATMENT: CPT

## 2021-07-28 PROCEDURE — 84145 PROCALCITONIN (PCT): CPT | Performed by: HOSPITALIST

## 2021-07-28 PROCEDURE — 63710000001 INSULIN DETEMIR PER 5 UNITS: Performed by: INTERNAL MEDICINE

## 2021-07-28 PROCEDURE — 63710000001 INSULIN ASPART PER 5 UNITS: Performed by: NURSE PRACTITIONER

## 2021-07-28 PROCEDURE — 85384 FIBRINOGEN ACTIVITY: CPT | Performed by: HOSPITALIST

## 2021-07-28 PROCEDURE — 97166 OT EVAL MOD COMPLEX 45 MIN: CPT

## 2021-07-28 PROCEDURE — 63710000001 INSULIN ASPART PER 5 UNITS: Performed by: HOSPITALIST

## 2021-07-28 RX ORDER — BUDESONIDE AND FORMOTEROL FUMARATE DIHYDRATE 160; 4.5 UG/1; UG/1
2 AEROSOL RESPIRATORY (INHALATION)
Status: DISCONTINUED | OUTPATIENT
Start: 2021-07-28 | End: 2021-08-05 | Stop reason: HOSPADM

## 2021-07-28 RX ORDER — DEXAMETHASONE SODIUM PHOSPHATE 10 MG/ML
6 INJECTION, SOLUTION INTRAMUSCULAR; INTRAVENOUS DAILY
Status: DISCONTINUED | OUTPATIENT
Start: 2021-07-28 | End: 2021-07-29

## 2021-07-28 RX ORDER — NYSTATIN 100000 [USP'U]/G
POWDER TOPICAL EVERY 12 HOURS SCHEDULED
Status: DISCONTINUED | OUTPATIENT
Start: 2021-07-28 | End: 2021-08-05 | Stop reason: HOSPADM

## 2021-07-28 RX ORDER — BENZONATATE 100 MG/1
200 CAPSULE ORAL 3 TIMES DAILY PRN
Status: DISCONTINUED | OUTPATIENT
Start: 2021-07-28 | End: 2021-08-05 | Stop reason: HOSPADM

## 2021-07-28 RX ORDER — ACETAMINOPHEN 325 MG/1
650 TABLET ORAL EVERY 6 HOURS PRN
Status: DISCONTINUED | OUTPATIENT
Start: 2021-07-28 | End: 2021-08-05 | Stop reason: HOSPADM

## 2021-07-28 RX ORDER — ALBUTEROL SULFATE 90 UG/1
2 AEROSOL, METERED RESPIRATORY (INHALATION)
Status: DISCONTINUED | OUTPATIENT
Start: 2021-07-28 | End: 2021-08-05 | Stop reason: HOSPADM

## 2021-07-28 RX ADMIN — BUDESONIDE AND FORMOTEROL FUMARATE DIHYDRATE 2 PUFF: 160; 4.5 AEROSOL RESPIRATORY (INHALATION) at 15:36

## 2021-07-28 RX ADMIN — ALBUTEROL SULFATE 2 PUFF: 90 AEROSOL, METERED RESPIRATORY (INHALATION) at 15:36

## 2021-07-28 RX ADMIN — INSULIN ASPART 8 UNITS: 100 INJECTION, SOLUTION INTRAVENOUS; SUBCUTANEOUS at 17:40

## 2021-07-28 RX ADMIN — INSULIN ASPART 5 UNITS: 100 INJECTION, SOLUTION INTRAVENOUS; SUBCUTANEOUS at 08:33

## 2021-07-28 RX ADMIN — ATORVASTATIN CALCIUM 40 MG: 40 TABLET, FILM COATED ORAL at 20:54

## 2021-07-28 RX ADMIN — INSULIN ASPART 10 UNITS: 100 INJECTION, SOLUTION INTRAVENOUS; SUBCUTANEOUS at 17:40

## 2021-07-28 RX ADMIN — DEXAMETHASONE SODIUM PHOSPHATE 6 MG: 10 INJECTION, SOLUTION INTRAMUSCULAR; INTRAVENOUS at 08:32

## 2021-07-28 RX ADMIN — NYSTATIN: 100000 POWDER TOPICAL at 11:26

## 2021-07-28 RX ADMIN — TIZANIDINE 5 MG: 4 TABLET ORAL at 08:32

## 2021-07-28 RX ADMIN — LISINOPRIL 20 MG: 20 TABLET ORAL at 08:32

## 2021-07-28 RX ADMIN — PREGABALIN 150 MG: 75 CAPSULE ORAL at 00:55

## 2021-07-28 RX ADMIN — BUDESONIDE AND FORMOTEROL FUMARATE DIHYDRATE 2 PUFF: 160; 4.5 AEROSOL RESPIRATORY (INHALATION) at 19:39

## 2021-07-28 RX ADMIN — ACETAMINOPHEN 650 MG: 325 TABLET, FILM COATED ORAL at 11:26

## 2021-07-28 RX ADMIN — INSULIN DETEMIR 50 UNITS: 100 INJECTION, SOLUTION SUBCUTANEOUS at 20:58

## 2021-07-28 RX ADMIN — ALBUTEROL SULFATE 2 PUFF: 90 AEROSOL, METERED RESPIRATORY (INHALATION) at 19:37

## 2021-07-28 RX ADMIN — NYSTATIN 1 APPLICATION: 100000 POWDER TOPICAL at 20:58

## 2021-07-28 RX ADMIN — ENOXAPARIN SODIUM 40 MG: 40 INJECTION SUBCUTANEOUS at 00:55

## 2021-07-28 RX ADMIN — IPRATROPIUM BROMIDE 2 PUFF: 17 AEROSOL, METERED RESPIRATORY (INHALATION) at 19:38

## 2021-07-28 RX ADMIN — SERTRALINE 50 MG: 50 TABLET, FILM COATED ORAL at 08:32

## 2021-07-28 RX ADMIN — INSULIN ASPART 10 UNITS: 100 INJECTION, SOLUTION INTRAVENOUS; SUBCUTANEOUS at 11:26

## 2021-07-28 RX ADMIN — PREGABALIN 150 MG: 75 CAPSULE ORAL at 20:54

## 2021-07-28 RX ADMIN — ATORVASTATIN CALCIUM 40 MG: 40 TABLET, FILM COATED ORAL at 00:55

## 2021-07-28 RX ADMIN — IPRATROPIUM BROMIDE 2 PUFF: 17 AEROSOL, METERED RESPIRATORY (INHALATION) at 15:36

## 2021-07-28 RX ADMIN — AMLODIPINE BESYLATE 5 MG: 5 TABLET ORAL at 08:32

## 2021-07-29 LAB
ALBUMIN SERPL-MCNC: 2.6 G/DL (ref 3.5–5.2)
ALBUMIN/GLOB SERPL: 0.6 G/DL
ALP SERPL-CCNC: 74 U/L (ref 39–117)
ALT SERPL W P-5'-P-CCNC: 20 U/L (ref 1–33)
ANION GAP SERPL CALCULATED.3IONS-SCNC: 10 MMOL/L (ref 5–15)
AST SERPL-CCNC: 26 U/L (ref 1–32)
BASOPHILS # BLD AUTO: 0.01 10*3/MM3 (ref 0–0.2)
BASOPHILS NFR BLD AUTO: 0.1 % (ref 0–1.5)
BILIRUB SERPL-MCNC: 0.2 MG/DL (ref 0–1.2)
BUN SERPL-MCNC: 27 MG/DL (ref 8–23)
BUN/CREAT SERPL: 29.7 (ref 7–25)
CALCIUM SPEC-SCNC: 9 MG/DL (ref 8.6–10.5)
CHLORIDE SERPL-SCNC: 105 MMOL/L (ref 98–107)
CO2 SERPL-SCNC: 22 MMOL/L (ref 22–29)
CREAT SERPL-MCNC: 0.91 MG/DL (ref 0.57–1)
CRP SERPL-MCNC: 19.87 MG/DL (ref 0–0.5)
DEPRECATED RDW RBC AUTO: 36.1 FL (ref 37–54)
EOSINOPHIL # BLD AUTO: 0 10*3/MM3 (ref 0–0.4)
EOSINOPHIL NFR BLD AUTO: 0 % (ref 0.3–6.2)
ERYTHROCYTE [DISTWIDTH] IN BLOOD BY AUTOMATED COUNT: 16.4 % (ref 12.3–15.4)
FERRITIN SERPL-MCNC: 653.2 NG/ML (ref 13–150)
GFR SERPL CREATININE-BSD FRML MDRD: 63 ML/MIN/1.73
GLOBULIN UR ELPH-MCNC: 4.5 GM/DL
GLUCOSE BLDC GLUCOMTR-MCNC: 248 MG/DL (ref 70–130)
GLUCOSE BLDC GLUCOMTR-MCNC: 329 MG/DL (ref 70–130)
GLUCOSE BLDC GLUCOMTR-MCNC: 349 MG/DL (ref 70–130)
GLUCOSE BLDC GLUCOMTR-MCNC: 376 MG/DL (ref 70–130)
GLUCOSE BLDC GLUCOMTR-MCNC: 431 MG/DL (ref 70–130)
GLUCOSE BLDC GLUCOMTR-MCNC: 454 MG/DL (ref 70–130)
GLUCOSE SERPL-MCNC: 236 MG/DL (ref 65–99)
HCT VFR BLD AUTO: 29.6 % (ref 34–46.6)
HGB BLD-MCNC: 9.5 G/DL (ref 12–15.9)
IMM GRANULOCYTES # BLD AUTO: 0.04 10*3/MM3 (ref 0–0.05)
IMM GRANULOCYTES NFR BLD AUTO: 0.5 % (ref 0–0.5)
LYMPHOCYTES # BLD AUTO: 0.9 10*3/MM3 (ref 0.7–3.1)
LYMPHOCYTES NFR BLD AUTO: 11.9 % (ref 19.6–45.3)
MCH RBC QN AUTO: 20.6 PG (ref 26.6–33)
MCHC RBC AUTO-ENTMCNC: 32.1 G/DL (ref 31.5–35.7)
MCV RBC AUTO: 64.2 FL (ref 79–97)
MONOCYTES # BLD AUTO: 0.71 10*3/MM3 (ref 0.1–0.9)
MONOCYTES NFR BLD AUTO: 9.4 % (ref 5–12)
NEUTROPHILS NFR BLD AUTO: 5.88 10*3/MM3 (ref 1.7–7)
NEUTROPHILS NFR BLD AUTO: 78.1 % (ref 42.7–76)
NRBC BLD AUTO-RTO: 0 /100 WBC (ref 0–0.2)
PLATELET # BLD AUTO: 342 10*3/MM3 (ref 140–450)
PMV BLD AUTO: 11.6 FL (ref 6–12)
POTASSIUM SERPL-SCNC: 4.7 MMOL/L (ref 3.5–5.2)
POTASSIUM SERPL-SCNC: 5.3 MMOL/L (ref 3.5–5.2)
PROT SERPL-MCNC: 7.1 G/DL (ref 6–8.5)
RBC # BLD AUTO: 4.61 10*6/MM3 (ref 3.77–5.28)
SODIUM SERPL-SCNC: 137 MMOL/L (ref 136–145)
WBC # BLD AUTO: 7.54 10*3/MM3 (ref 3.4–10.8)

## 2021-07-29 PROCEDURE — 63710000001 INSULIN ASPART PER 5 UNITS: Performed by: NURSE PRACTITIONER

## 2021-07-29 PROCEDURE — 25010000002 ENOXAPARIN PER 10 MG: Performed by: HOSPITALIST

## 2021-07-29 PROCEDURE — 80053 COMPREHEN METABOLIC PANEL: CPT | Performed by: HOSPITALIST

## 2021-07-29 PROCEDURE — 97116 GAIT TRAINING THERAPY: CPT

## 2021-07-29 PROCEDURE — 82962 GLUCOSE BLOOD TEST: CPT

## 2021-07-29 PROCEDURE — 97535 SELF CARE MNGMENT TRAINING: CPT

## 2021-07-29 PROCEDURE — 85025 COMPLETE CBC W/AUTO DIFF WBC: CPT | Performed by: HOSPITALIST

## 2021-07-29 PROCEDURE — 63710000001 INSULIN DETEMIR PER 5 UNITS: Performed by: INTERNAL MEDICINE

## 2021-07-29 PROCEDURE — 63710000001 INSULIN DETEMIR PER 5 UNITS: Performed by: HOSPITALIST

## 2021-07-29 PROCEDURE — 94799 UNLISTED PULMONARY SVC/PX: CPT

## 2021-07-29 PROCEDURE — 97110 THERAPEUTIC EXERCISES: CPT

## 2021-07-29 PROCEDURE — 25010000002 DEXAMETHASONE SODIUM PHOSPHATE 10 MG/ML SOLUTION: Performed by: HOSPITALIST

## 2021-07-29 PROCEDURE — 97530 THERAPEUTIC ACTIVITIES: CPT

## 2021-07-29 PROCEDURE — 86140 C-REACTIVE PROTEIN: CPT | Performed by: HOSPITALIST

## 2021-07-29 PROCEDURE — 84132 ASSAY OF SERUM POTASSIUM: CPT | Performed by: NURSE PRACTITIONER

## 2021-07-29 PROCEDURE — 63710000001 INSULIN ASPART PER 5 UNITS: Performed by: INTERNAL MEDICINE

## 2021-07-29 PROCEDURE — 94760 N-INVAS EAR/PLS OXIMETRY 1: CPT

## 2021-07-29 PROCEDURE — 63710000001 INSULIN ASPART PER 5 UNITS: Performed by: HOSPITALIST

## 2021-07-29 PROCEDURE — 82728 ASSAY OF FERRITIN: CPT | Performed by: HOSPITALIST

## 2021-07-29 PROCEDURE — 63710000001 DEXAMETHASONE PER 0.25 MG: Performed by: HOSPITALIST

## 2021-07-29 RX ORDER — POTASSIUM CHLORIDE 7.45 MG/ML
10 INJECTION INTRAVENOUS
Status: DISCONTINUED | OUTPATIENT
Start: 2021-07-29 | End: 2021-08-05 | Stop reason: HOSPADM

## 2021-07-29 RX ORDER — POTASSIUM CHLORIDE 1.5 G/1.77G
40 POWDER, FOR SOLUTION ORAL AS NEEDED
Status: DISCONTINUED | OUTPATIENT
Start: 2021-07-29 | End: 2021-08-05 | Stop reason: HOSPADM

## 2021-07-29 RX ORDER — DEXAMETHASONE SODIUM PHOSPHATE 4 MG/ML
6 INJECTION, SOLUTION INTRA-ARTICULAR; INTRALESIONAL; INTRAMUSCULAR; INTRAVENOUS; SOFT TISSUE DAILY
Status: DISCONTINUED | OUTPATIENT
Start: 2021-07-30 | End: 2021-08-01

## 2021-07-29 RX ORDER — POTASSIUM CHLORIDE 750 MG/1
40 CAPSULE, EXTENDED RELEASE ORAL AS NEEDED
Status: DISCONTINUED | OUTPATIENT
Start: 2021-07-29 | End: 2021-08-05 | Stop reason: HOSPADM

## 2021-07-29 RX ADMIN — INSULIN ASPART 5 UNITS: 100 INJECTION, SOLUTION INTRAVENOUS; SUBCUTANEOUS at 11:30

## 2021-07-29 RX ADMIN — DEXAMETHASONE 6 MG: 2 TABLET ORAL at 08:52

## 2021-07-29 RX ADMIN — LISINOPRIL 20 MG: 20 TABLET ORAL at 08:52

## 2021-07-29 RX ADMIN — AMLODIPINE BESYLATE 5 MG: 5 TABLET ORAL at 08:52

## 2021-07-29 RX ADMIN — INSULIN ASPART 8 UNITS: 100 INJECTION, SOLUTION INTRAVENOUS; SUBCUTANEOUS at 11:30

## 2021-07-29 RX ADMIN — INSULIN ASPART 5 UNITS: 100 INJECTION, SOLUTION INTRAVENOUS; SUBCUTANEOUS at 22:08

## 2021-07-29 RX ADMIN — ALBUTEROL SULFATE 2 PUFF: 90 AEROSOL, METERED RESPIRATORY (INHALATION) at 20:03

## 2021-07-29 RX ADMIN — SERTRALINE 50 MG: 50 TABLET, FILM COATED ORAL at 08:52

## 2021-07-29 RX ADMIN — NYSTATIN: 100000 POWDER TOPICAL at 20:21

## 2021-07-29 RX ADMIN — IPRATROPIUM BROMIDE 2 PUFF: 17 AEROSOL, METERED RESPIRATORY (INHALATION) at 15:44

## 2021-07-29 RX ADMIN — BENZONATATE 200 MG: 100 CAPSULE ORAL at 01:00

## 2021-07-29 RX ADMIN — ATORVASTATIN CALCIUM 40 MG: 40 TABLET, FILM COATED ORAL at 20:20

## 2021-07-29 RX ADMIN — PREGABALIN 150 MG: 75 CAPSULE ORAL at 20:20

## 2021-07-29 RX ADMIN — IPRATROPIUM BROMIDE 2 PUFF: 17 AEROSOL, METERED RESPIRATORY (INHALATION) at 07:17

## 2021-07-29 RX ADMIN — NYSTATIN: 100000 POWDER TOPICAL at 08:53

## 2021-07-29 RX ADMIN — ENOXAPARIN SODIUM 40 MG: 40 INJECTION SUBCUTANEOUS at 00:11

## 2021-07-29 RX ADMIN — INSULIN ASPART 12 UNITS: 100 INJECTION, SOLUTION INTRAVENOUS; SUBCUTANEOUS at 17:35

## 2021-07-29 RX ADMIN — INSULIN ASPART 8 UNITS: 100 INJECTION, SOLUTION INTRAVENOUS; SUBCUTANEOUS at 08:53

## 2021-07-29 RX ADMIN — BENZONATATE 200 MG: 100 CAPSULE ORAL at 16:25

## 2021-07-29 RX ADMIN — IPRATROPIUM BROMIDE 2 PUFF: 17 AEROSOL, METERED RESPIRATORY (INHALATION) at 11:06

## 2021-07-29 RX ADMIN — PREGABALIN 150 MG: 75 CAPSULE ORAL at 08:52

## 2021-07-29 RX ADMIN — INSULIN DETEMIR 10 UNITS: 100 INJECTION, SOLUTION SUBCUTANEOUS at 22:07

## 2021-07-29 RX ADMIN — BENZONATATE 200 MG: 100 CAPSULE ORAL at 22:32

## 2021-07-29 RX ADMIN — ALBUTEROL SULFATE 2 PUFF: 90 AEROSOL, METERED RESPIRATORY (INHALATION) at 15:44

## 2021-07-29 RX ADMIN — TIZANIDINE 5 MG: 4 TABLET ORAL at 08:52

## 2021-07-29 RX ADMIN — DEXAMETHASONE SODIUM PHOSPHATE 6 MG: 10 INJECTION, SOLUTION INTRAMUSCULAR; INTRAVENOUS at 08:53

## 2021-07-29 RX ADMIN — BUDESONIDE AND FORMOTEROL FUMARATE DIHYDRATE 2 PUFF: 160; 4.5 AEROSOL RESPIRATORY (INHALATION) at 07:16

## 2021-07-29 RX ADMIN — ALBUTEROL SULFATE 2 PUFF: 90 AEROSOL, METERED RESPIRATORY (INHALATION) at 11:06

## 2021-07-29 RX ADMIN — INSULIN DETEMIR 50 UNITS: 100 INJECTION, SOLUTION SUBCUTANEOUS at 09:51

## 2021-07-29 RX ADMIN — INSULIN ASPART 5 UNITS: 100 INJECTION, SOLUTION INTRAVENOUS; SUBCUTANEOUS at 08:51

## 2021-07-29 RX ADMIN — INSULIN ASPART 8 UNITS: 100 INJECTION, SOLUTION INTRAVENOUS; SUBCUTANEOUS at 17:36

## 2021-07-29 RX ADMIN — IPRATROPIUM BROMIDE 2 PUFF: 17 AEROSOL, METERED RESPIRATORY (INHALATION) at 20:03

## 2021-07-29 RX ADMIN — BUDESONIDE AND FORMOTEROL FUMARATE DIHYDRATE 2 PUFF: 160; 4.5 AEROSOL RESPIRATORY (INHALATION) at 20:03

## 2021-07-29 RX ADMIN — ALBUTEROL SULFATE 2 PUFF: 90 AEROSOL, METERED RESPIRATORY (INHALATION) at 07:16

## 2021-07-30 PROBLEM — E43 SEVERE MALNUTRITION (HCC): Status: ACTIVE | Noted: 2021-07-30

## 2021-07-30 LAB
ALBUMIN SERPL-MCNC: 3 G/DL (ref 3.5–5.2)
ALBUMIN/GLOB SERPL: 0.8 G/DL
ALP SERPL-CCNC: 72 U/L (ref 39–117)
ALT SERPL W P-5'-P-CCNC: 23 U/L (ref 1–33)
ANION GAP SERPL CALCULATED.3IONS-SCNC: 8 MMOL/L (ref 5–15)
AST SERPL-CCNC: 20 U/L (ref 1–32)
BASOPHILS # BLD AUTO: 0.01 10*3/MM3 (ref 0–0.2)
BASOPHILS NFR BLD AUTO: 0.1 % (ref 0–1.5)
BILIRUB SERPL-MCNC: 0.2 MG/DL (ref 0–1.2)
BUN SERPL-MCNC: 28 MG/DL (ref 8–23)
BUN/CREAT SERPL: 32.6 (ref 7–25)
CALCIUM SPEC-SCNC: 9.5 MG/DL (ref 8.6–10.5)
CHLORIDE SERPL-SCNC: 103 MMOL/L (ref 98–107)
CO2 SERPL-SCNC: 24 MMOL/L (ref 22–29)
CREAT SERPL-MCNC: 0.86 MG/DL (ref 0.57–1)
CRP SERPL-MCNC: 9.22 MG/DL (ref 0–0.5)
D-DIMER, QUANTITATIVE (MAD,POW, STR): 1786 NG/ML (FEU) (ref 0–470)
DEPRECATED RDW RBC AUTO: 36.6 FL (ref 37–54)
EOSINOPHIL # BLD AUTO: 0 10*3/MM3 (ref 0–0.4)
EOSINOPHIL NFR BLD AUTO: 0 % (ref 0.3–6.2)
ERYTHROCYTE [DISTWIDTH] IN BLOOD BY AUTOMATED COUNT: 16.1 % (ref 12.3–15.4)
FERRITIN SERPL-MCNC: 752.1 NG/ML (ref 13–150)
FIBRINOGEN PPP-MCNC: 689 MG/DL (ref 228–514)
GFR SERPL CREATININE-BSD FRML MDRD: 67 ML/MIN/1.73
GLOBULIN UR ELPH-MCNC: 3.8 GM/DL
GLUCOSE SERPL-MCNC: 250 MG/DL (ref 65–99)
HCT VFR BLD AUTO: 29.7 % (ref 34–46.6)
HGB BLD-MCNC: 9.4 G/DL (ref 12–15.9)
IMM GRANULOCYTES # BLD AUTO: 0.1 10*3/MM3 (ref 0–0.05)
IMM GRANULOCYTES NFR BLD AUTO: 1 % (ref 0–0.5)
LYMPHOCYTES # BLD AUTO: 1.08 10*3/MM3 (ref 0.7–3.1)
LYMPHOCYTES NFR BLD AUTO: 11.2 % (ref 19.6–45.3)
MCH RBC QN AUTO: 20.7 PG (ref 26.6–33)
MCHC RBC AUTO-ENTMCNC: 31.6 G/DL (ref 31.5–35.7)
MCV RBC AUTO: 65.3 FL (ref 79–97)
MONOCYTES # BLD AUTO: 0.85 10*3/MM3 (ref 0.1–0.9)
MONOCYTES NFR BLD AUTO: 8.8 % (ref 5–12)
NEUTROPHILS NFR BLD AUTO: 7.58 10*3/MM3 (ref 1.7–7)
NEUTROPHILS NFR BLD AUTO: 78.9 % (ref 42.7–76)
NRBC BLD AUTO-RTO: 0 /100 WBC (ref 0–0.2)
PLATELET # BLD AUTO: 403 10*3/MM3 (ref 140–450)
PMV BLD AUTO: 11.9 FL (ref 6–12)
POTASSIUM SERPL-SCNC: 4.8 MMOL/L (ref 3.5–5.2)
PROT SERPL-MCNC: 6.8 G/DL (ref 6–8.5)
RBC # BLD AUTO: 4.55 10*6/MM3 (ref 3.77–5.28)
SODIUM SERPL-SCNC: 135 MMOL/L (ref 136–145)
WBC # BLD AUTO: 9.62 10*3/MM3 (ref 3.4–10.8)

## 2021-07-30 PROCEDURE — 94799 UNLISTED PULMONARY SVC/PX: CPT

## 2021-07-30 PROCEDURE — 82962 GLUCOSE BLOOD TEST: CPT

## 2021-07-30 PROCEDURE — 80053 COMPREHEN METABOLIC PANEL: CPT | Performed by: HOSPITALIST

## 2021-07-30 PROCEDURE — 63710000001 INSULIN DETEMIR PER 5 UNITS: Performed by: HOSPITALIST

## 2021-07-30 PROCEDURE — 97530 THERAPEUTIC ACTIVITIES: CPT

## 2021-07-30 PROCEDURE — 82728 ASSAY OF FERRITIN: CPT | Performed by: HOSPITALIST

## 2021-07-30 PROCEDURE — 97535 SELF CARE MNGMENT TRAINING: CPT

## 2021-07-30 PROCEDURE — 85025 COMPLETE CBC W/AUTO DIFF WBC: CPT | Performed by: HOSPITALIST

## 2021-07-30 PROCEDURE — 63710000001 INSULIN ASPART PER 5 UNITS: Performed by: HOSPITALIST

## 2021-07-30 PROCEDURE — 94760 N-INVAS EAR/PLS OXIMETRY 1: CPT

## 2021-07-30 PROCEDURE — 85384 FIBRINOGEN ACTIVITY: CPT | Performed by: HOSPITALIST

## 2021-07-30 PROCEDURE — 86140 C-REACTIVE PROTEIN: CPT | Performed by: HOSPITALIST

## 2021-07-30 PROCEDURE — 63710000001 INSULIN ASPART PER 5 UNITS: Performed by: NURSE PRACTITIONER

## 2021-07-30 PROCEDURE — 85379 FIBRIN DEGRADATION QUANT: CPT | Performed by: HOSPITALIST

## 2021-07-30 PROCEDURE — 25010000002 ENOXAPARIN PER 10 MG: Performed by: HOSPITALIST

## 2021-07-30 RX ORDER — PANTOPRAZOLE SODIUM 40 MG/1
40 TABLET, DELAYED RELEASE ORAL
Status: DISCONTINUED | OUTPATIENT
Start: 2021-07-31 | End: 2021-08-05 | Stop reason: HOSPADM

## 2021-07-30 RX ADMIN — ATORVASTATIN CALCIUM 40 MG: 40 TABLET, FILM COATED ORAL at 20:13

## 2021-07-30 RX ADMIN — NYSTATIN: 100000 POWDER TOPICAL at 08:59

## 2021-07-30 RX ADMIN — BUDESONIDE AND FORMOTEROL FUMARATE DIHYDRATE 2 PUFF: 160; 4.5 AEROSOL RESPIRATORY (INHALATION) at 20:09

## 2021-07-30 RX ADMIN — SERTRALINE 50 MG: 50 TABLET, FILM COATED ORAL at 08:56

## 2021-07-30 RX ADMIN — INSULIN ASPART 8 UNITS: 100 INJECTION, SOLUTION INTRAVENOUS; SUBCUTANEOUS at 17:25

## 2021-07-30 RX ADMIN — IPRATROPIUM BROMIDE 2 PUFF: 17 AEROSOL, METERED RESPIRATORY (INHALATION) at 20:09

## 2021-07-30 RX ADMIN — PREGABALIN 150 MG: 75 CAPSULE ORAL at 20:13

## 2021-07-30 RX ADMIN — PREGABALIN 150 MG: 75 CAPSULE ORAL at 08:56

## 2021-07-30 RX ADMIN — IPRATROPIUM BROMIDE 2 PUFF: 17 AEROSOL, METERED RESPIRATORY (INHALATION) at 15:53

## 2021-07-30 RX ADMIN — IPRATROPIUM BROMIDE 2 PUFF: 17 AEROSOL, METERED RESPIRATORY (INHALATION) at 07:36

## 2021-07-30 RX ADMIN — ALBUTEROL SULFATE 2 PUFF: 90 AEROSOL, METERED RESPIRATORY (INHALATION) at 07:36

## 2021-07-30 RX ADMIN — INSULIN ASPART 8 UNITS: 100 INJECTION, SOLUTION INTRAVENOUS; SUBCUTANEOUS at 17:24

## 2021-07-30 RX ADMIN — INSULIN DETEMIR 50 UNITS: 100 INJECTION, SOLUTION SUBCUTANEOUS at 08:59

## 2021-07-30 RX ADMIN — INSULIN ASPART 10 UNITS: 100 INJECTION, SOLUTION INTRAVENOUS; SUBCUTANEOUS at 11:33

## 2021-07-30 RX ADMIN — BENZONATATE 200 MG: 100 CAPSULE ORAL at 15:59

## 2021-07-30 RX ADMIN — IPRATROPIUM BROMIDE 2 PUFF: 17 AEROSOL, METERED RESPIRATORY (INHALATION) at 11:44

## 2021-07-30 RX ADMIN — AMLODIPINE BESYLATE 5 MG: 5 TABLET ORAL at 08:56

## 2021-07-30 RX ADMIN — ALBUTEROL SULFATE 2 PUFF: 90 AEROSOL, METERED RESPIRATORY (INHALATION) at 20:09

## 2021-07-30 RX ADMIN — LISINOPRIL 20 MG: 20 TABLET ORAL at 08:56

## 2021-07-30 RX ADMIN — ALBUTEROL SULFATE 2 PUFF: 90 AEROSOL, METERED RESPIRATORY (INHALATION) at 11:44

## 2021-07-30 RX ADMIN — DEXAMETHASONE 6 MG: 2 TABLET ORAL at 08:56

## 2021-07-30 RX ADMIN — ALBUTEROL SULFATE 2 PUFF: 90 AEROSOL, METERED RESPIRATORY (INHALATION) at 15:53

## 2021-07-30 RX ADMIN — INSULIN ASPART 8 UNITS: 100 INJECTION, SOLUTION INTRAVENOUS; SUBCUTANEOUS at 11:33

## 2021-07-30 RX ADMIN — ENOXAPARIN SODIUM 40 MG: 40 INJECTION SUBCUTANEOUS at 02:50

## 2021-07-30 RX ADMIN — INSULIN ASPART 8 UNITS: 100 INJECTION, SOLUTION INTRAVENOUS; SUBCUTANEOUS at 08:59

## 2021-07-30 RX ADMIN — INSULIN ASPART 8 UNITS: 100 INJECTION, SOLUTION INTRAVENOUS; SUBCUTANEOUS at 08:58

## 2021-07-30 RX ADMIN — BUDESONIDE AND FORMOTEROL FUMARATE DIHYDRATE 2 PUFF: 160; 4.5 AEROSOL RESPIRATORY (INHALATION) at 07:36

## 2021-07-30 RX ADMIN — TIZANIDINE 5 MG: 4 TABLET ORAL at 08:56

## 2021-07-31 LAB
ALBUMIN SERPL-MCNC: 3.1 G/DL (ref 3.5–5.2)
ALBUMIN/GLOB SERPL: 0.8 G/DL
ALP SERPL-CCNC: 75 U/L (ref 39–117)
ALT SERPL W P-5'-P-CCNC: 26 U/L (ref 1–33)
ANION GAP SERPL CALCULATED.3IONS-SCNC: 10 MMOL/L (ref 5–15)
AST SERPL-CCNC: 19 U/L (ref 1–32)
BASOPHILS # BLD AUTO: 0.02 10*3/MM3 (ref 0–0.2)
BASOPHILS NFR BLD AUTO: 0.1 % (ref 0–1.5)
BILIRUB SERPL-MCNC: 0.3 MG/DL (ref 0–1.2)
BUN SERPL-MCNC: 24 MG/DL (ref 8–23)
BUN/CREAT SERPL: 29.6 (ref 7–25)
CALCIUM SPEC-SCNC: 9.6 MG/DL (ref 8.6–10.5)
CHLORIDE SERPL-SCNC: 101 MMOL/L (ref 98–107)
CO2 SERPL-SCNC: 27 MMOL/L (ref 22–29)
CREAT SERPL-MCNC: 0.81 MG/DL (ref 0.57–1)
CRP SERPL-MCNC: 4.36 MG/DL (ref 0–0.5)
DEPRECATED RDW RBC AUTO: 37.2 FL (ref 37–54)
EOSINOPHIL # BLD AUTO: 0.03 10*3/MM3 (ref 0–0.4)
EOSINOPHIL NFR BLD AUTO: 0.2 % (ref 0.3–6.2)
ERYTHROCYTE [DISTWIDTH] IN BLOOD BY AUTOMATED COUNT: 17 % (ref 12.3–15.4)
FERRITIN SERPL-MCNC: 587 NG/ML (ref 13–150)
GFR SERPL CREATININE-BSD FRML MDRD: 72 ML/MIN/1.73
GLOBULIN UR ELPH-MCNC: 4.1 GM/DL
GLUCOSE SERPL-MCNC: 144 MG/DL (ref 65–99)
GLUCOSE SERPL-MCNC: 354 MG/DL (ref 65–99)
HCT VFR BLD AUTO: 32.8 % (ref 34–46.6)
HGB BLD-MCNC: 10.4 G/DL (ref 12–15.9)
IMM GRANULOCYTES # BLD AUTO: 0.23 10*3/MM3 (ref 0–0.05)
IMM GRANULOCYTES NFR BLD AUTO: 1.7 % (ref 0–0.5)
LYMPHOCYTES # BLD AUTO: 1.84 10*3/MM3 (ref 0.7–3.1)
LYMPHOCYTES NFR BLD AUTO: 13.3 % (ref 19.6–45.3)
MCH RBC QN AUTO: 20.9 PG (ref 26.6–33)
MCHC RBC AUTO-ENTMCNC: 31.7 G/DL (ref 31.5–35.7)
MCV RBC AUTO: 66 FL (ref 79–97)
MONOCYTES # BLD AUTO: 1.09 10*3/MM3 (ref 0.1–0.9)
MONOCYTES NFR BLD AUTO: 7.9 % (ref 5–12)
NEUTROPHILS NFR BLD AUTO: 10.67 10*3/MM3 (ref 1.7–7)
NEUTROPHILS NFR BLD AUTO: 76.8 % (ref 42.7–76)
NRBC BLD AUTO-RTO: 0.1 /100 WBC (ref 0–0.2)
PLATELET # BLD AUTO: 483 10*3/MM3 (ref 140–450)
PMV BLD AUTO: 11.1 FL (ref 6–12)
POTASSIUM SERPL-SCNC: 4.4 MMOL/L (ref 3.5–5.2)
PROT SERPL-MCNC: 7.2 G/DL (ref 6–8.5)
RBC # BLD AUTO: 4.97 10*6/MM3 (ref 3.77–5.28)
SODIUM SERPL-SCNC: 138 MMOL/L (ref 136–145)
WBC # BLD AUTO: 13.88 10*3/MM3 (ref 3.4–10.8)

## 2021-07-31 PROCEDURE — 94799 UNLISTED PULMONARY SVC/PX: CPT

## 2021-07-31 PROCEDURE — 82962 GLUCOSE BLOOD TEST: CPT

## 2021-07-31 PROCEDURE — 94760 N-INVAS EAR/PLS OXIMETRY 1: CPT

## 2021-07-31 PROCEDURE — 63710000001 INSULIN ASPART PER 5 UNITS: Performed by: INTERNAL MEDICINE

## 2021-07-31 PROCEDURE — 63710000001 INSULIN ASPART PER 5 UNITS: Performed by: HOSPITALIST

## 2021-07-31 PROCEDURE — 85025 COMPLETE CBC W/AUTO DIFF WBC: CPT | Performed by: HOSPITALIST

## 2021-07-31 PROCEDURE — 86140 C-REACTIVE PROTEIN: CPT | Performed by: HOSPITALIST

## 2021-07-31 PROCEDURE — 80053 COMPREHEN METABOLIC PANEL: CPT | Performed by: HOSPITALIST

## 2021-07-31 PROCEDURE — 82728 ASSAY OF FERRITIN: CPT | Performed by: HOSPITALIST

## 2021-07-31 PROCEDURE — 82947 ASSAY GLUCOSE BLOOD QUANT: CPT | Performed by: HOSPITALIST

## 2021-07-31 PROCEDURE — 63710000001 INSULIN ASPART PER 5 UNITS: Performed by: NURSE PRACTITIONER

## 2021-07-31 PROCEDURE — 25010000002 ENOXAPARIN PER 10 MG: Performed by: HOSPITALIST

## 2021-07-31 PROCEDURE — 63710000001 DEXAMETHASONE PER 0.25 MG: Performed by: NURSE PRACTITIONER

## 2021-07-31 PROCEDURE — 63710000001 INSULIN DETEMIR PER 5 UNITS: Performed by: HOSPITALIST

## 2021-07-31 RX ADMIN — IPRATROPIUM BROMIDE 2 PUFF: 17 AEROSOL, METERED RESPIRATORY (INHALATION) at 20:37

## 2021-07-31 RX ADMIN — ENOXAPARIN SODIUM 40 MG: 40 INJECTION SUBCUTANEOUS at 00:08

## 2021-07-31 RX ADMIN — AMLODIPINE BESYLATE 5 MG: 5 TABLET ORAL at 09:27

## 2021-07-31 RX ADMIN — BENZONATATE 200 MG: 100 CAPSULE ORAL at 11:20

## 2021-07-31 RX ADMIN — ALBUTEROL SULFATE 2 PUFF: 90 AEROSOL, METERED RESPIRATORY (INHALATION) at 09:48

## 2021-07-31 RX ADMIN — TIZANIDINE 5 MG: 4 TABLET ORAL at 09:26

## 2021-07-31 RX ADMIN — ENOXAPARIN SODIUM 40 MG: 40 INJECTION SUBCUTANEOUS at 23:58

## 2021-07-31 RX ADMIN — IPRATROPIUM BROMIDE 2 PUFF: 17 AEROSOL, METERED RESPIRATORY (INHALATION) at 09:48

## 2021-07-31 RX ADMIN — IPRATROPIUM BROMIDE 2 PUFF: 17 AEROSOL, METERED RESPIRATORY (INHALATION) at 16:13

## 2021-07-31 RX ADMIN — ALBUTEROL SULFATE 2 PUFF: 90 AEROSOL, METERED RESPIRATORY (INHALATION) at 20:38

## 2021-07-31 RX ADMIN — ATORVASTATIN CALCIUM 40 MG: 40 TABLET, FILM COATED ORAL at 20:37

## 2021-07-31 RX ADMIN — INSULIN ASPART 8 UNITS: 100 INJECTION, SOLUTION INTRAVENOUS; SUBCUTANEOUS at 09:29

## 2021-07-31 RX ADMIN — DEXAMETHASONE 6 MG: 2 TABLET ORAL at 09:27

## 2021-07-31 RX ADMIN — IPRATROPIUM BROMIDE 2 PUFF: 17 AEROSOL, METERED RESPIRATORY (INHALATION) at 12:55

## 2021-07-31 RX ADMIN — INSULIN ASPART 12 UNITS: 100 INJECTION, SOLUTION INTRAVENOUS; SUBCUTANEOUS at 23:03

## 2021-07-31 RX ADMIN — PANTOPRAZOLE SODIUM 40 MG: 40 TABLET, DELAYED RELEASE ORAL at 06:04

## 2021-07-31 RX ADMIN — INSULIN ASPART 12 UNITS: 100 INJECTION, SOLUTION INTRAVENOUS; SUBCUTANEOUS at 17:21

## 2021-07-31 RX ADMIN — INSULIN ASPART 8 UNITS: 100 INJECTION, SOLUTION INTRAVENOUS; SUBCUTANEOUS at 17:21

## 2021-07-31 RX ADMIN — INSULIN DETEMIR 50 UNITS: 100 INJECTION, SOLUTION SUBCUTANEOUS at 09:30

## 2021-07-31 RX ADMIN — LISINOPRIL 20 MG: 20 TABLET ORAL at 09:27

## 2021-07-31 RX ADMIN — BUDESONIDE AND FORMOTEROL FUMARATE DIHYDRATE 2 PUFF: 160; 4.5 AEROSOL RESPIRATORY (INHALATION) at 09:48

## 2021-07-31 RX ADMIN — SERTRALINE 50 MG: 50 TABLET, FILM COATED ORAL at 09:27

## 2021-07-31 RX ADMIN — INSULIN ASPART 5 UNITS: 100 INJECTION, SOLUTION INTRAVENOUS; SUBCUTANEOUS at 11:20

## 2021-07-31 RX ADMIN — BUDESONIDE AND FORMOTEROL FUMARATE DIHYDRATE 2 PUFF: 160; 4.5 AEROSOL RESPIRATORY (INHALATION) at 20:38

## 2021-07-31 RX ADMIN — ALBUTEROL SULFATE 2 PUFF: 90 AEROSOL, METERED RESPIRATORY (INHALATION) at 12:55

## 2021-07-31 RX ADMIN — INSULIN ASPART 8 UNITS: 100 INJECTION, SOLUTION INTRAVENOUS; SUBCUTANEOUS at 11:20

## 2021-07-31 RX ADMIN — NYSTATIN: 100000 POWDER TOPICAL at 09:28

## 2021-07-31 RX ADMIN — PREGABALIN 150 MG: 75 CAPSULE ORAL at 09:27

## 2021-07-31 RX ADMIN — ALBUTEROL SULFATE 2 PUFF: 90 AEROSOL, METERED RESPIRATORY (INHALATION) at 16:13

## 2021-07-31 RX ADMIN — PREGABALIN 150 MG: 75 CAPSULE ORAL at 20:37

## 2021-07-31 RX ADMIN — NYSTATIN: 100000 POWDER TOPICAL at 20:38

## 2021-08-01 LAB
ALBUMIN SERPL-MCNC: 2.6 G/DL (ref 3.5–5.2)
ALBUMIN/GLOB SERPL: 0.7 G/DL
ALP SERPL-CCNC: 72 U/L (ref 39–117)
ALT SERPL W P-5'-P-CCNC: 23 U/L (ref 1–33)
ANION GAP SERPL CALCULATED.3IONS-SCNC: 11 MMOL/L (ref 5–15)
AST SERPL-CCNC: 16 U/L (ref 1–32)
BACTERIA SPEC AEROBE CULT: NORMAL
BASOPHILS # BLD AUTO: 0.02 10*3/MM3 (ref 0–0.2)
BASOPHILS NFR BLD AUTO: 0.2 % (ref 0–1.5)
BILIRUB SERPL-MCNC: 0.3 MG/DL (ref 0–1.2)
BUN SERPL-MCNC: 19 MG/DL (ref 8–23)
BUN/CREAT SERPL: 28.4 (ref 7–25)
CALCIUM SPEC-SCNC: 9 MG/DL (ref 8.6–10.5)
CHLORIDE SERPL-SCNC: 104 MMOL/L (ref 98–107)
CO2 SERPL-SCNC: 21 MMOL/L (ref 22–29)
CREAT SERPL-MCNC: 0.67 MG/DL (ref 0.57–1)
CRP SERPL-MCNC: 4.16 MG/DL (ref 0–0.5)
D-DIMER, QUANTITATIVE (MAD,POW, STR): 2341 NG/ML (FEU) (ref 0–470)
DEPRECATED RDW RBC AUTO: 35.9 FL (ref 37–54)
EOSINOPHIL # BLD AUTO: 0.04 10*3/MM3 (ref 0–0.4)
EOSINOPHIL NFR BLD AUTO: 0.3 % (ref 0.3–6.2)
ERYTHROCYTE [DISTWIDTH] IN BLOOD BY AUTOMATED COUNT: 16.1 % (ref 12.3–15.4)
FERRITIN SERPL-MCNC: 450.5 NG/ML (ref 13–150)
FIBRINOGEN PPP-MCNC: 619 MG/DL (ref 228–514)
GFR SERPL CREATININE-BSD FRML MDRD: 89 ML/MIN/1.73
GLOBULIN UR ELPH-MCNC: 4 GM/DL
GLUCOSE BLDC GLUCOMTR-MCNC: 138 MG/DL (ref 70–130)
GLUCOSE BLDC GLUCOMTR-MCNC: 254 MG/DL (ref 70–130)
GLUCOSE BLDC GLUCOMTR-MCNC: 398 MG/DL (ref 70–130)
GLUCOSE BLDC GLUCOMTR-MCNC: 409 MG/DL (ref 70–130)
GLUCOSE BLDC GLUCOMTR-MCNC: 422 MG/DL (ref 70–130)
GLUCOSE SERPL-MCNC: 151 MG/DL (ref 65–99)
HCT VFR BLD AUTO: 30.7 % (ref 34–46.6)
HGB BLD-MCNC: 9.7 G/DL (ref 12–15.9)
IMM GRANULOCYTES # BLD AUTO: 0.29 10*3/MM3 (ref 0–0.05)
IMM GRANULOCYTES NFR BLD AUTO: 2.3 % (ref 0–0.5)
LYMPHOCYTES # BLD AUTO: 2.04 10*3/MM3 (ref 0.7–3.1)
LYMPHOCYTES NFR BLD AUTO: 16.2 % (ref 19.6–45.3)
MCH RBC QN AUTO: 20.6 PG (ref 26.6–33)
MCHC RBC AUTO-ENTMCNC: 31.6 G/DL (ref 31.5–35.7)
MCV RBC AUTO: 65.2 FL (ref 79–97)
MONOCYTES # BLD AUTO: 1.09 10*3/MM3 (ref 0.1–0.9)
MONOCYTES NFR BLD AUTO: 8.7 % (ref 5–12)
NEUTROPHILS NFR BLD AUTO: 72.3 % (ref 42.7–76)
NEUTROPHILS NFR BLD AUTO: 9.09 10*3/MM3 (ref 1.7–7)
NRBC BLD AUTO-RTO: 0.4 /100 WBC (ref 0–0.2)
PLATELET # BLD AUTO: 442 10*3/MM3 (ref 140–450)
PMV BLD AUTO: 11 FL (ref 6–12)
POTASSIUM SERPL-SCNC: 4.2 MMOL/L (ref 3.5–5.2)
PROT SERPL-MCNC: 6.6 G/DL (ref 6–8.5)
RBC # BLD AUTO: 4.71 10*6/MM3 (ref 3.77–5.28)
SODIUM SERPL-SCNC: 136 MMOL/L (ref 136–145)
WBC # BLD AUTO: 12.57 10*3/MM3 (ref 3.4–10.8)

## 2021-08-01 PROCEDURE — 85379 FIBRIN DEGRADATION QUANT: CPT | Performed by: HOSPITALIST

## 2021-08-01 PROCEDURE — 94799 UNLISTED PULMONARY SVC/PX: CPT

## 2021-08-01 PROCEDURE — 82962 GLUCOSE BLOOD TEST: CPT

## 2021-08-01 PROCEDURE — 85025 COMPLETE CBC W/AUTO DIFF WBC: CPT | Performed by: HOSPITALIST

## 2021-08-01 PROCEDURE — 63710000001 INSULIN DETEMIR PER 5 UNITS: Performed by: HOSPITALIST

## 2021-08-01 PROCEDURE — 86140 C-REACTIVE PROTEIN: CPT | Performed by: HOSPITALIST

## 2021-08-01 PROCEDURE — 97110 THERAPEUTIC EXERCISES: CPT

## 2021-08-01 PROCEDURE — 80053 COMPREHEN METABOLIC PANEL: CPT | Performed by: HOSPITALIST

## 2021-08-01 PROCEDURE — 82728 ASSAY OF FERRITIN: CPT | Performed by: HOSPITALIST

## 2021-08-01 PROCEDURE — 63710000001 DEXAMETHASONE PER 0.25 MG: Performed by: NURSE PRACTITIONER

## 2021-08-01 PROCEDURE — 63710000001 INSULIN ASPART PER 5 UNITS: Performed by: NURSE PRACTITIONER

## 2021-08-01 PROCEDURE — 94760 N-INVAS EAR/PLS OXIMETRY 1: CPT

## 2021-08-01 PROCEDURE — 85384 FIBRINOGEN ACTIVITY: CPT | Performed by: HOSPITALIST

## 2021-08-01 PROCEDURE — 97535 SELF CARE MNGMENT TRAINING: CPT

## 2021-08-01 PROCEDURE — 97116 GAIT TRAINING THERAPY: CPT

## 2021-08-01 PROCEDURE — 63710000001 INSULIN ASPART PER 5 UNITS: Performed by: HOSPITALIST

## 2021-08-01 RX ORDER — DEXAMETHASONE SODIUM PHOSPHATE 4 MG/ML
6 INJECTION, SOLUTION INTRA-ARTICULAR; INTRALESIONAL; INTRAMUSCULAR; INTRAVENOUS; SOFT TISSUE 2 TIMES DAILY
Status: DISCONTINUED | OUTPATIENT
Start: 2021-08-01 | End: 2021-08-05 | Stop reason: HOSPADM

## 2021-08-01 RX ADMIN — PANTOPRAZOLE SODIUM 40 MG: 40 TABLET, DELAYED RELEASE ORAL at 05:18

## 2021-08-01 RX ADMIN — PREGABALIN 150 MG: 75 CAPSULE ORAL at 08:53

## 2021-08-01 RX ADMIN — AMLODIPINE BESYLATE 5 MG: 5 TABLET ORAL at 08:53

## 2021-08-01 RX ADMIN — ALBUTEROL SULFATE 2 PUFF: 90 AEROSOL, METERED RESPIRATORY (INHALATION) at 20:51

## 2021-08-01 RX ADMIN — ALBUTEROL SULFATE 2 PUFF: 90 AEROSOL, METERED RESPIRATORY (INHALATION) at 15:40

## 2021-08-01 RX ADMIN — TIZANIDINE 5 MG: 4 TABLET ORAL at 08:53

## 2021-08-01 RX ADMIN — DEXAMETHASONE 6 MG: 2 TABLET ORAL at 20:50

## 2021-08-01 RX ADMIN — BENZONATATE 200 MG: 100 CAPSULE ORAL at 20:55

## 2021-08-01 RX ADMIN — ALBUTEROL SULFATE 2 PUFF: 90 AEROSOL, METERED RESPIRATORY (INHALATION) at 08:43

## 2021-08-01 RX ADMIN — SERTRALINE 50 MG: 50 TABLET, FILM COATED ORAL at 08:53

## 2021-08-01 RX ADMIN — IPRATROPIUM BROMIDE 2 PUFF: 17 AEROSOL, METERED RESPIRATORY (INHALATION) at 20:51

## 2021-08-01 RX ADMIN — INSULIN ASPART 8 UNITS: 100 INJECTION, SOLUTION INTRAVENOUS; SUBCUTANEOUS at 11:42

## 2021-08-01 RX ADMIN — IPRATROPIUM BROMIDE 2 PUFF: 17 AEROSOL, METERED RESPIRATORY (INHALATION) at 08:43

## 2021-08-01 RX ADMIN — INSULIN ASPART 8 UNITS: 100 INJECTION, SOLUTION INTRAVENOUS; SUBCUTANEOUS at 08:54

## 2021-08-01 RX ADMIN — DEXAMETHASONE 6 MG: 2 TABLET ORAL at 08:53

## 2021-08-01 RX ADMIN — NYSTATIN: 100000 POWDER TOPICAL at 20:51

## 2021-08-01 RX ADMIN — BUDESONIDE AND FORMOTEROL FUMARATE DIHYDRATE 2 PUFF: 160; 4.5 AEROSOL RESPIRATORY (INHALATION) at 20:51

## 2021-08-01 RX ADMIN — NYSTATIN: 100000 POWDER TOPICAL at 08:54

## 2021-08-01 RX ADMIN — IPRATROPIUM BROMIDE 2 PUFF: 17 AEROSOL, METERED RESPIRATORY (INHALATION) at 12:00

## 2021-08-01 RX ADMIN — BUDESONIDE AND FORMOTEROL FUMARATE DIHYDRATE 2 PUFF: 160; 4.5 AEROSOL RESPIRATORY (INHALATION) at 08:43

## 2021-08-01 RX ADMIN — ALBUTEROL SULFATE 2 PUFF: 90 AEROSOL, METERED RESPIRATORY (INHALATION) at 11:59

## 2021-08-01 RX ADMIN — PREGABALIN 150 MG: 75 CAPSULE ORAL at 20:51

## 2021-08-01 RX ADMIN — ATORVASTATIN CALCIUM 40 MG: 40 TABLET, FILM COATED ORAL at 20:51

## 2021-08-01 RX ADMIN — INSULIN DETEMIR 50 UNITS: 100 INJECTION, SOLUTION SUBCUTANEOUS at 08:55

## 2021-08-01 RX ADMIN — INSULIN ASPART 8 UNITS: 100 INJECTION, SOLUTION INTRAVENOUS; SUBCUTANEOUS at 17:44

## 2021-08-01 RX ADMIN — LISINOPRIL 20 MG: 20 TABLET ORAL at 08:53

## 2021-08-01 RX ADMIN — IPRATROPIUM BROMIDE 2 PUFF: 17 AEROSOL, METERED RESPIRATORY (INHALATION) at 15:40

## 2021-08-01 RX ADMIN — INSULIN ASPART 14 UNITS: 100 INJECTION, SOLUTION INTRAVENOUS; SUBCUTANEOUS at 17:44

## 2021-08-01 NOTE — PLAN OF CARE
Goal Outcome Evaluation:  Plan of Care Reviewed With: patient        Progress: improving  Outcome Summary: Pt progressing well. Pt sweating upon my arrival. Air conditioner set on 65*. Pt c/o being hot but temp taken per mouth and temp was 98.0*. Pt agreeable to therapy. Pt t/f sup to sit ind and stood with spv. Pt amb in room for 22ft x 2 with RW and SBA. Pt performed B LE ther ex in sitting with no c/o voiced. Pt requested to be left up in recliner and nsg made aware. Pt would cont to benefit from therapy upon DC.

## 2021-08-01 NOTE — PROGRESS NOTES
[facility] Medicine Services  INPATIENT PROGRESS NOTE    Length of Stay: 5  Date of Admission: 7/27/2021  Primary Care Physician: Provider, No Known    Subjective   Chief Complaint: No complaints    HPI:      8/1/2021: Patient has had to have an increase in oxygen today.  She is currently up to 6 L of oxygen.  We will increase her steroids to twice daily.    7/31/2021:  Patient continues to have episodes of desaturations with ambulation.    7/30/2021:  Patient currently on 2 liters. Patient dropped to 87% on room air.      7/29/2021: Patient is currently on 2 L of oxygen.      H&P by Dr. Perez: Patient is a 60-year-old female past medical history of hypertension diabetes who presented to the emergency department after being found confused at her home on a welfare check. Patient states that she knows that she has been very confused for quite a few days, but was unable to figure out how to do anything about it. Patient states that she remembers going to her primary care provider on a Wednesday, which based on available information was 7/14, with cough for 2 days. She states at that point she was checked for Covid and influenza and both tests were negative. She states that a couple of days later she went to the emergency room feeling more poorly and the ER doctor told her she had a viral syndrome and there wasn't anything that she could do about it. She then went home and became significantly confused. After she missed 12 days of work and no one had heard from, her place of employment sent the police for a welfare check. She states that during those 12 days she couldn't figure out how to turn on her television, turn on her shower, or use her telephone. She states that she was drinking some but not eating much. She states she had a significant cough throughout the majority of this time with increasing shortness of breath. She does state that she feels clearer mentally after  arrival at the hospital. She has not been vaccinated against COVID-19.    Review of Systems   Constitutional: Positive for fatigue. Negative for activity change.   HENT: Negative for ear pain and sore throat.    Eyes: Negative for pain and discharge.   Respiratory: Negative for cough and shortness of breath.    Cardiovascular: Negative for chest pain and palpitations.   Gastrointestinal: Negative for abdominal pain and nausea.   Endocrine: Negative for cold intolerance and heat intolerance.   Genitourinary: Negative for difficulty urinating and dysuria.   Musculoskeletal: Negative for arthralgias and gait problem.   Skin: Negative for color change and rash.   Neurological: Negative for dizziness and weakness.   Psychiatric/Behavioral: Negative for agitation and confusion.        Objective    Temp:  [98 °F (36.7 °C)-98.7 °F (37.1 °C)] 98.7 °F (37.1 °C)  Heart Rate:  [55-71] 69  Resp:  [16-20] 18  BP: (115-151)/(56-76) 115/56    Physical Exam  Constitutional:       Appearance: She is well-developed.   HENT:      Head: Normocephalic and atraumatic.   Eyes:      Pupils: Pupils are equal, round, and reactive to light.   Cardiovascular:      Rate and Rhythm: Normal rate and regular rhythm.   Pulmonary:      Effort: Pulmonary effort is normal.      Breath sounds: Normal breath sounds.   Abdominal:      General: Bowel sounds are normal.      Palpations: Abdomen is soft.   Musculoskeletal:         General: Normal range of motion.      Cervical back: Normal range of motion and neck supple.   Skin:     General: Skin is warm and dry.   Neurological:      Mental Status: She is alert and oriented to person, place, and time.   Psychiatric:         Behavior: Behavior normal.       Results Review:  I have reviewed the labs, radiology results, and diagnostic studies.    Laboratory Data:   Results from last 7 days   Lab Units 08/01/21  0627 07/31/21  2109 07/31/21  0647 07/30/21  0532 07/30/21  0532   SODIUM mmol/L 136  --  138  --   135*   POTASSIUM mmol/L 4.2  --  4.4  --  4.8   CHLORIDE mmol/L 104  --  101  --  103   CO2 mmol/L 21.0*  --  27.0  --  24.0   BUN mg/dL 19  --  24*  --  28*   CREATININE mg/dL 0.67  --  0.81  --  0.86   GLUCOSE mg/dL 151* 354* 144*   < > 250*   CALCIUM mg/dL 9.0  --  9.6  --  9.5   BILIRUBIN mg/dL 0.3  --  0.3  --  0.2   ALK PHOS U/L 72  --  75  --  72   ALT (SGPT) U/L 23  --  26  --  23   AST (SGOT) U/L 16  --  19  --  20   ANION GAP mmol/L 11.0  --  10.0  --  8.0    < > = values in this interval not displayed.     Estimated Creatinine Clearance: 111.6 mL/min (by C-G formula based on SCr of 0.67 mg/dL).  Results from last 7 days   Lab Units 07/27/21  1819   MAGNESIUM mg/dL 1.7         Results from last 7 days   Lab Units 08/01/21  0627 07/31/21  0647 07/30/21  0532 07/29/21  0626 07/28/21  0550   WBC 10*3/mm3 12.57* 13.88* 9.62 7.54 6.65   HEMOGLOBIN g/dL 9.7* 10.4* 9.4* 9.5* 9.4*   HEMATOCRIT % 30.7* 32.8* 29.7* 29.6* 28.4*   PLATELETS 10*3/mm3 442 483* 403 342 324           Culture Data:   No results found for: BLOODCX  No results found for: URINECX  No results found for: RESPCX  No results found for: WOUNDCX  No results found for: STOOLCX  No components found for: BODYFLD    Radiology Data:   Imaging Results (Last 24 Hours)     ** No results found for the last 24 hours. **          I have reviewed the patient's current medications.     Assessment/Plan     Active Hospital Problems    Diagnosis  POA   • **Acute respiratory failure with hypoxia (CMS/HCC) [J96.01]  Yes   • Severe malnutrition (CMS/HCC) [E43]  Yes   • Obesity (BMI 30-39.9) [E66.9]  Unknown   • Metabolic encephalopathy [G93.41]  Unknown   • Acute hypoxemic respiratory failure due to COVID-19 (CMS/[facility]) [U07.1, J96.01]  Yes   • COVID-19 virus detected [U07.1]  Yes   • Pneumonia due to COVID-19 virus [U07.1, J12.82]  Yes   • Type 2 diabetes mellitus with hyperglycemia, with long-term current use of insulin (CMS/[facility]) [E11.65, Z79.4]  Not Applicable   •  Benign essential HTN [I10]  Yes       Plan:    1.  Acute hypoxic respiratory failure: Secondary to Covid pneumonia.  Continue supplemental oxygen as needed.  Bronchodilators.  Patient currently on 6 L of oxygen.    2.  Covid pneumonia: Decadron 5/10. Remdesivir contraindicated due to duration of symptoms.  Continue to trend inflammatory markers.  Decadron will be increased to twice daily secondary to worsening hypoxia.  3.  Diabetes mellitus: Continue long-acting insulin. Continue sliding scale.  4.  Hypertension: Continue home medication.  5.  DVT prophylaxis: Lovenox.      Discharge Planning: I expect patient to be discharged to home in 2-3 days.    I confirmed that the patient's Advance Care Plan is present, code status is documented, or surrogate decision maker is listed in the patient's medical record.      I have utilized all available immediate resources to obtain, update, or review the patient's current medications.         This document has been electronically signed by JULIANA Cameron on August 1, 2021 15:06 CDT

## 2021-08-01 NOTE — THERAPY TREATMENT NOTE
Patient Name: Ade Wilson  : 1960    MRN: [id number]                              Today's Date: 2021       Admit Date: 2021    Visit Dx:     ICD-10-CM ICD-9-CM   1. Acute respiratory failure with hypoxia (CMS/HCC)  J96.01 518.81   2. Pneumonia of both lungs due to infectious organism, unspecified part of lung  J18.9 483.8   3. COVID-19  U07.1 079.89   4. Acute renal failure, unspecified acute renal failure type (CMS/HCC)  N17.9 584.9   5. Impaired mobility and activities of daily living  Z74.09 V49.89    Z78.9    6. Impaired functional mobility, balance, gait, and endurance  Z74.09 V49.89     Patient Active Problem List   Diagnosis   • Chest pain   • Stable angina pectoris (CMS/[facility])   • Type 2 diabetes mellitus with hyperglycemia, with long-term current use of insulin (CMS/[facility])   • Benign essential HTN   • Hypertriglyceridemia   • Morbid obesity with BMI of 40.0-44.9, adult (CMS/[facility])   • Chronic stable angina (CMS/[facility])   • Acute respiratory failure with hypoxia (CMS/[facility])   • Acute hypoxemic respiratory failure due to COVID-19 (CMS/[facility])   • COVID-19 virus detected   • Pneumonia due to COVID-19 virus   • Obesity (BMI 30-39.9)   • Metabolic encephalopathy   • Severe malnutrition (CMS/[facility])     Past Medical History:   Diagnosis Date   • Diabetes (CMS/[facility])    • Hypertension      Past Surgical History:   Procedure Laterality Date   • CARDIAC CATHETERIZATION N/A 2020    Procedure: Left Heart Cath;  Surgeon: Brayan Santoro MD;  Location: [facility] INVASIVE LOCATION;  Service: Cardiology;  Laterality: N/A;   • CERVICAL FUSION     • HYSTERECTOMY      partial   • JOINT REPLACEMENT     • REPLACEMENT TOTAL KNEE Bilateral      General Information     Row Name 21 1054          OT Time and Intention    Document Type  therapy note (daily note)  -BB     Mode of Treatment  individual therapy;occupational therapy  -BB     Row Name 21 9417          General Information    Patient Profile Reviewed   yes  -BB     Existing Precautions/Restrictions  fall;oxygen therapy device and L/min  -BB     [facility] Name 08/01/21 1050          Cognition    Orientation Status (Cognition)  oriented x 4  -BB     Row Name 08/01/21 1050          Safety Issues, Functional Mobility    Impairments Affecting Function (Mobility)  balance;endurance/activity tolerance;strength;coordination  -BB       User Key  (r) = Recorded By, (t) = Taken By, (c) = Cosigned By    Initials Name Provider Type    BB Nelsy Coles COTA/L Occupational Therapy Assistant          Mobility/ADL's     Row Name 08/01/21 1050          Bed Mobility    Bed Mobility  supine-sit;sit-supine  -BB     Supine-Sit Door (Bed Mobility)  independent  -BB     Sit-Supine Door (Bed Mobility)  independent  -BB     Assistive Device (Bed Mobility)  bed rails;head of bed elevated  -[facility] Name 08/01/21 1050          Transfers    Sit-Stand Door (Transfers)  independent  -[facility] Name 08/01/21 1050          Sit-Stand Transfer    Assistive Device (Sit-Stand Transfers)  -- no AD  -BB     [facility] Name 08/01/21 1050          Functional Mobility    Functional Mobility- Ind. Level  independent  -BB     Functional Mobility- Device  other (see comments) no AD  -BB     [facility] Name 08/01/21 1050          Lower Body Dressing Assessment/Training    Door Level (Lower Body Dressing)  doff;don;socks;lower body dressing skills;independent  -BB     Position (Lower Body Dressing)  edge of bed sitting  -     Row Name 08/01/21 1050          Grooming Assessment/Training    Door Level (Grooming)  grooming skills;hair care, combing/brushing;wash face, hands;independent  -BB     Position (Grooming)  sink side  -     Row Name 08/01/21 1050          Bathing Assessment/Intervention    Position (Bathing)  -- shower  -[facility] Name 08/01/21 1050          Toileting Assessment/Training    Door Level (Toileting)  adjust/manage clothing;perform perineal  hygiene;independent  -BB     Assistive Devices (Toileting)  commode  -BB     Position (Toileting)  unsupported sitting;unsupported standing  -BB       User Key  (r) = Recorded By, (t) = Taken By, (c) = Cosigned By    Initials Name Provider Type    Nelsy Love COTA/L Occupational Therapy Assistant        Obj/Interventions     Row Name 08/01/21 1050          Vision Assessment/Intervention    Visual Impairment/Limitations  corrective lenses for reading  -BB     Row Name 08/01/21 1050          Range of Motion Comprehensive    General Range of Motion  no range of motion deficits identified  -[facility] Name 08/01/21 1050          Shoulder (Therapeutic Exercise)    Shoulder (Therapeutic Exercise)  AROM (active range of motion)  -BB     Shoulder AROM (Therapeutic Exercise)  bilateral;flexion;extension;sitting 1x15  -BB     [facility] Name 08/01/21 1050          Elbow/Forearm (Therapeutic Exercise)    Elbow/Forearm (Therapeutic Exercise)  AROM (active range of motion)  -BB     Elbow/Forearm AROM (Therapeutic Exercise)  bilateral;flexion;extension;sitting 1x15  -BB     [facility] Name 08/01/21 1050          Wrist (Therapeutic Exercise)    Wrist (Therapeutic Exercise)  AROM (active range of motion)  -BB     Wrist AROM (Therapeutic Exercise)  bilateral;flexion;extension 1x15  -BB     [facility] Name 08/01/21 1050          Therapeutic Exercise    Therapeutic Exercise  shoulder;elbow/forearm;wrist  -BB       User Key  (r) = Recorded By, (t) = Taken By, (c) = Cosigned By    Initials Name Provider Type    Nelsy Love COTA/L Occupational Therapy Assistant        Goals/Plan     Row Name 08/01/21 1050          Bed Mobility Goal 1 (OT)    Activity/Assistive Device (Bed Mobility Goal 1, OT)  sit to supine/supine to sit  -BB     [place] Level/Cues Needed (Bed Mobility Goal 1, OT)  independent  -BB     Time Frame (Bed Mobility Goal 1, OT)  long term goal (LTG);by discharge  -BB     Progress/Outcomes (Bed Mobility Goal 1, OT)   goal met  -BB     Row Name 08/01/21 1050          Transfer Goal 1 (OT)    Activity/Assistive Device (Transfer Goal 1, OT)  sit-to-stand/stand-to-sit;toilet  -BB     [place] Level/Cues Needed (Transfer Goal 1, OT)  standby assist;tactile cues required;verbal cues required  -BB     Time Frame (Transfer Goal 1, OT)  long term goal (LTG);by discharge  -BB     Progress/Outcome (Transfer Goal 1, OT)  goal met  -BB     Row Name 08/01/21 1050          Bathing Goal 1 (OT)    Activity/Device (Bathing Goal 1, OT)  lower body bathing  -BB     [place] Level/Cues Needed (Bathing Goal 1, OT)  standby assist;tactile cues required;verbal cues required  -BB     Time Frame (Bathing Goal 1, OT)  long term goal (LTG);by discharge  -BB     Progress/Outcomes (Bathing Goal 1, OT)  goal met  -BB     Row Name 08/01/21 1050          Dressing Goal 1 (OT)    Activity/Device (Dressing Goal 1, OT)  lower body dressing  -BB     [place]/Cues Needed (Dressing Goal 1, OT)  standby assist;tactile cues required;verbal cues required  -BB     Time Frame (Dressing Goal 1, OT)  long term goal (LTG);by discharge  -BB     Progress/Outcome (Dressing Goal 1, OT)  goal not met  -BB     Row Name 08/01/21 1050          Toileting Goal 1 (OT)    Activity/Device (Toileting Goal 1, OT)  toileting skills, all  -BB     [place] Level/Cues Needed (Toileting Goal 1, OT)  standby assist;tactile cues required;verbal cues required  -BB     Time Frame (Toileting Goal 1, OT)  long term goal (LTG);by discharge  -BB     Progress/Outcome (Toileting Goal 1, OT)  goal met  -BB       User Key  (r) = Recorded By, (t) = Taken By, (c) = Cosigned By    Initials Name Provider Type    Nelsy Love COTA/L Occupational Therapy Assistant        Clinical Impression     Row Name 08/01/21 1050          Pain Scale: Numbers Pre/Post-Treatment    Pretreatment Pain Rating  0/10 - no pain  -BB     Posttreatment Pain Rating  0/10 - no pain  -BB     Row Name 08/01/21  1050          Plan of Care Review    Plan of Care Reviewed With  patient  -BB     Progress  improving  -BB     Outcome Summary  Pt is Independent for bed<>toilet t/f with no AD. Pt performed B UE exercises 1 setx15 reps sitting EOB with O2sats  92%-90% with activity. ContinueOTPOC  -BB     Row Name 08/01/21 1050          Therapy Assessment/Plan (OT)    Rehab Potential (OT)  good, to achieve stated therapy goals  -BB     Criteria for Skilled Therapeutic Interventions Met (OT)  yes;meets criteria;skilled treatment is necessary  -BB     Therapy Frequency (OT)  other (see comments) 5-7 days a week  -BB     Row Name 08/01/21 1050          Therapy Plan Review/Discharge Plan (OT)    Anticipated Discharge Disposition (OT)  home with home health  -BB     Row Name 08/01/21 1050          Vital Signs    Pretreatment Heart Rate (beats/min)  66  -BB     Posttreatment Heart Rate (beats/min)  68  -BB     Pre SpO2 (%)  93  -BB     O2 Delivery Pre Treatment  supplemental O2  -BB     Intra SpO2 (%)  91  -BB     O2 Delivery Intra Treatment  supplemental O2  -BB     Post SpO2 (%)  92  -BB     O2 Delivery Post Treatment  supplemental O2  -BB     Pre Patient Position  Sitting  -BB     Intra Patient Position  Sitting  -BB     Post Patient Position  Sitting  -BB     Row Name 08/01/21 1050          Positioning and Restraints    Pre-Treatment Position  in bed  -BB     Post Treatment Position  bed  -BB     In Bed  sitting EOB;call light within reach;encouraged to call for assist call light is not on per nsg  -BB       User Key  (r) = Recorded By, (t) = Taken By, (c) = Cosigned By    Initials Name Provider Type    BB Nelsy Coles COTA/L Occupational Therapy Assistant        Outcome Measures     Row Name 08/01/21 1050          How much help from another is currently needed...    Putting on and taking off regular lower body clothing?  4  -BB     Bathing (including washing, rinsing, and drying)  4  -BB     Toileting (which includes using  toilet bed pan or urinal)  4  -BB     Putting on and taking off regular upper body clothing  4  -BB     Taking care of personal grooming (such as brushing teeth)  4  -BB     Eating meals  4  -BB     AM-PAC 6 Clicks Score (OT)  24  -BB       User Key  (r) = Recorded By, (t) = Taken By, (c) = Cosigned By    Initials Name Provider Type     Nelsy Coles COTA/L Occupational Therapy Assistant          Occupational Therapy Education                 Title: PT OT SLP Therapies (In Progress)     Topic: Occupational Therapy (In Progress)     Point: ADL training (Done)     Description:   Instruct learner(s) on proper safety adaptation and remediation techniques during self care or transfers.   Instruct in proper use of assistive devices.              Learning Progress Summary           Patient Acceptance, E, VU by WALDO at 8/1/2021 1409                   Point: Home exercise program (Not Started)     Description:   Instruct learner(s) on appropriate technique for monitoring, assisting and/or progressing therapeutic exercises/activities.              Learner Progress:  Not documented in this visit.          Point: Precautions (Done)     Description:   Instruct learner(s) on prescribed precautions during self-care and functional transfers.              Learning Progress Summary           Patient Acceptance, E, VU by  at 7/28/2021 1337    Comment: Pt educated on role of OT, d/c recs, and POC                   Point: Body mechanics (Not Started)     Description:   Instruct learner(s) on proper positioning and spine alignment during self-care, functional mobility activities and/or exercises.              Learner Progress:  Not documented in this visit.                      User Key     Initials Effective Dates Name Provider Type Discipline     06/16/21 -  Nelsy Coles COTA/L Occupational Therapy Assistant OT    RASHEED 06/16/21 -  Francisco Man OT Occupational Therapist OT              OT Recommendation and  Plan  Therapy Frequency (OT): other (see comments) (5-7 days a week)  Plan of Care Review  Plan of Care Reviewed With: patient  Progress: improving  Outcome Summary: Pt is Independent for bed<>toilet t/f with no AD. Pt performed B UE exercises 1 setx15 reps sitting EOB with O2sats  92%-90% with activity. ContinueOTPOC     Time Calculation:   Time Calculation- OT     Row Name 08/01/21 1410             Time Calculation- OT    OT Start Time  1050  -BB      OT Stop Time  1129  -BB      OT Time Calculation (min)  39 min  -BB      Total Timed Code Minutes- OT  39 minute(s)  -BB      OT Received On  08/01/21  -BB         Timed Charges    90937 - OT Therapeutic Exercise Minutes  24  -BB      61420 - OT Self Care/Mgmt Minutes  15  -BB         Total Minutes    Timed Charges Total Minutes  39  -BB       Total Minutes  39  -BB        User Key  (r) = Recorded By, (t) = Taken By, (c) = Cosigned By    Initials Name Provider Type    BB Nelsy Coles COTA/L Occupational Therapy Assistant        Therapy Charges for Today     Code Description Service Date Service Provider Modifiers Qty    83799929572 HC OT THER PROC EA 15 MIN 8/1/2021 Nelsy Coles COTA/L GO 2    59220877509 HC OT SELF CARE/MGMT/TRAIN EA 15 MIN 8/1/2021 Nelsy Coles COTA/L GO 1               JANET Pelaez  8/1/2021

## 2021-08-01 NOTE — PLAN OF CARE
Problem: Adult Inpatient Plan of Care  Goal: Plan of Care Review  Flowsheets  Taken 8/1/2021 1409  Progress: improving  Plan of Care Reviewed With: patient  Taken 8/1/2021 1050  Progress: improving  Plan of Care Reviewed With: patient  Outcome Summary: Pt is Independent for bed<>toilet t/f with no AD. Pt performed B UE exercises 1 setx15 reps sitting EOB with O2sats  92%-90% with activity. ContinueOTPOC   Goal Outcome Evaluation:  Plan of Care Reviewed With: patient        Progress: improving  Outcome Summary: Pt is Independent for bed<>toilet t/f with no AD. Pt performed B UE exercises 1 setx15 reps sitting EOB with O2sats  92%-90% with activity. ContinueOTPOC

## 2021-08-01 NOTE — PLAN OF CARE
Goal Outcome Evaluation:  Plan of Care Reviewed With: patient        Progress: no change  Outcome Summary: VSS; still on 6L O2; no new complaints; will continue to monitor

## 2021-08-01 NOTE — THERAPY TREATMENT NOTE
Acute Care - Physical Therapy Treatment Note  [facility]     Patient Name: Ade Wilson  : 1960  MRN: [id number]  Today's Date: 2021      Visit Dx:     ICD-10-CM ICD-9-CM   1. Acute respiratory failure with hypoxia (CMS/HCC)  J96.01 518.81   2. Pneumonia of both lungs due to infectious organism, unspecified part of lung  J18.9 483.8   3. COVID-19  U07.1 079.89   4. Acute renal failure, unspecified acute renal failure type (CMS/HCC)  N17.9 584.9   5. Impaired mobility and activities of daily living  Z74.09 V49.89    Z78.9    6. Impaired functional mobility, balance, gait, and endurance  Z74.09 V49.89     Patient Active Problem List   Diagnosis   • Chest pain   • Stable angina pectoris (CMS/HCC)   • Type 2 diabetes mellitus with hyperglycemia, with long-term current use of insulin (CMS/[facility])   • Benign essential HTN   • Hypertriglyceridemia   • Morbid obesity with BMI of 40.0-44.9, adult (CMS/HCC)   • Chronic stable angina (CMS/HCC)   • Acute respiratory failure with hypoxia (CMS/HCC)   • Acute hypoxemic respiratory failure due to COVID-19 (CMS/HCC)   • COVID-19 virus detected   • Pneumonia due to COVID-19 virus   • Obesity (BMI 30-39.9)   • Metabolic encephalopathy   • Severe malnutrition (CMS/HCC)     Past Medical History:   Diagnosis Date   • Diabetes (CMS/HCC)    • Hypertension      Past Surgical History:   Procedure Laterality Date   • CARDIAC CATHETERIZATION N/A 2020    Procedure: Left Heart Cath;  Surgeon: Brayan Santoro MD;  Location: [facility] CATH INVASIVE LOCATION;  Service: Cardiology;  Laterality: N/A;   • CERVICAL FUSION     • HYSTERECTOMY      partial   • JOINT REPLACEMENT     • REPLACEMENT TOTAL KNEE Bilateral         PT Assessment (last 12 hours)      PT Evaluation and Treatment     Row Name 21 1422          Physical Therapy Time and Intention    Subjective Information  complains of;fatigue  -TW     Document Type  therapy note (daily note)  -TW     Mode of Treatment  physical  therapy;individual therapy  -TW     Patient Effort  good  -TW     Row Name 08/01/21 1422          General Information    Patient Profile Reviewed  yes  -TW     Patient Observations  alert;cooperative;agree to therapy  -TW     Patient/Family/Caregiver Comments/Observations  No family present.  -TW     General Observations of Patient  Pt supine in bed with O2 applied per NC.  -TW     Existing Precautions/Restrictions  fall;oxygen therapy device and L/min  -TW     Row Name 08/01/21 1422          Cognition    Affect/Mental Status (Cognitive)  WFL  -TW     Orientation Status (Cognition)  oriented x 4  -TW     Follows Commands (Cognition)  WFL  -TW     Cognitive Function (Cognitive)  WFL  -TW     Personal Safety Interventions  fall prevention program maintained;gait belt;nonskid shoes/slippers when out of bed  -TW     Row Name 08/01/21 1422          Pain Scale: Numbers Pre/Post-Treatment    Pretreatment Pain Rating  0/10 - no pain  -TW     Posttreatment Pain Rating  0/10 - no pain  -TW     Row Name 08/01/21 1422          Bed Mobility    Supine-Sit [place] (Bed Mobility)  modified independence  -TW     Sit-Supine [place] (Bed Mobility)  modified independence  -TW     Assistive Device (Bed Mobility)  bed rails;head of bed elevated  -TW     Row Name 08/01/21 1422          Transfers    Transfers  sit-stand transfer;stand-sit transfer  -TW     Sit-Stand [place] (Transfers)  standby assist  -TW     Stand-Sit [place] (Transfers)  standby assist  -     Row Name 08/01/21 1422          Sit-Stand Transfer    Assistive Device (Sit-Stand Transfers)  walker, front-wheeled  -     Row Name 08/01/21 1422          Stand-Sit Transfer    Assistive Device (Stand-Sit Transfers)  walker, front-wheeled  -     Row Name 08/01/21 1422          Gait/Stairs (Locomotion)    [place] Level (Gait)  contact guard  -TW     Assistive Device (Gait)  walker, front-wheeled  -     Distance in Feet (Gait)  22ft x2  -TW      Deviations/Abnormal Patterns (Gait)  long decreased;stride length decreased  -TW     Row Name 08/01/21 1422          Hip (Therapeutic Exercise)    Hip AROM (Therapeutic Exercise)  bilateral;sitting  -TW     Row Name 08/01/21 1422          Knee (Therapeutic Exercise)    Knee AROM (Therapeutic Exercise)  bilateral;sitting  -TW     Row Name 08/01/21 1422          Ankle (Therapeutic Exercise)    Ankle AROM (Therapeutic Exercise)  bilateral;sitting  -TW     Row Name 08/01/21 1422          Plan of Care Review    Progress  improving  -TW     Outcome Summary  Pt progressing well. Pt sweating upon my arrival. Air conditioner set on 65*. Pt c/o being hot but temp taken per mouth and temp was 98.0*. Pt agreeable to therapy. Pt t/f sup to sit ind and stood with spv. Pt amb in room for 22ft x 2 with RW and SBA. Pt performed B LE ther ex in sitting with no c/o voiced. Pt requested to be left up in recliner and nsg made aware. Pt would cont to benefit from therapy upon DC.  -TW     Row Name 08/01/21 1422          Vital Signs    Pre SpO2 (%)  91  -TW     O2 Delivery Pre Treatment  supplemental O2  -TW     Intra SpO2 (%)  94  -TW     O2 Delivery Intra Treatment  supplemental O2  -TW     Post SpO2 (%)  94  -TW     O2 Delivery Post Treatment  supplemental O2  -TW     Pre Patient Position  Supine  -TW     Intra Patient Position  Standing  -TW     Post Patient Position  Sitting  -TW     Row Name 08/01/21 1422          Bed Mobility Goal 1 (PT)    Activity/Assistive Device (Bed Mobility Goal 1, PT)  sit to supine;supine to sit  -TW     [place] Level/Cues Needed (Bed Mobility Goal 1, PT)  independent  -TW     Time Frame (Bed Mobility Goal 1, PT)  by discharge  -TW     Progress/Outcomes (Bed Mobility Goal 1, PT)  (S) goal met  -TW     Row Name 08/01/21 1422          Transfer Goal 1 (PT)    Activity/Assistive Device (Transfer Goal 1, PT)  sit-to-stand/stand-to-sit;bed-to-chair/chair-to-bed  -TW     [place] Level/Cues Needed  (Transfer Goal 1, PT)  1 person assist;modified independence  -TW     Time Frame (Transfer Goal 1, PT)  by discharge  -TW     Progress/Outcome (Transfer Goal 1, PT)  (S) goal met  -TW     Row Name 08/01/21 1422          Gait Training Goal 1 (PT)    Activity/Assistive Device (Gait Training Goal 1, PT)  gait (walking locomotion);assistive device use;backward stepping  -TW     [place] Level (Gait Training Goal 1, PT)  modified independence  -TW     Distance (Gait Training Goal 1, PT)  50'x3  -TW     Time Frame (Gait Training Goal 1, PT)  by discharge  -TW     Progress/Outcome (Gait Training Goal 1, PT)  goal not met  -TW     Row Name 08/01/21 1422          Positioning and Restraints    Pre-Treatment Position  in bed  -TW     Post Treatment Position  bed  -TW     In Bed  supine;call light within reach;encouraged to call for assist;exit alarm on  -TW     Row Name 08/01/21 1422          Therapy Assessment/Plan (PT)    Rehab Potential (PT)  good, to achieve stated therapy goals  -     Row Name 08/01/21 1422          Progress Summary (PT)    Progress Toward Functional Goals (PT)  progress toward functional goals is good  -       User Key  (r) = Recorded By, (t) = Taken By, (c) = Cosigned By    Initials Name Provider Type    TW Ezequiel Perez, PTA Physical Therapy Assistant        Physical Therapy Education                 Title: PT OT SLP Therapies (In Progress)     Topic: Physical Therapy (Done)     Point: Mobility training (Done)     Learning Progress Summary           Patient Acceptance, E,TB, VU by LR at 7/28/2021 1551    Comment: Educated on PT POC and goals.                   Point: Home exercise program (Done)     Learning Progress Summary           Patient Acceptance, E,TB, VU by LR at 7/28/2021 1551    Comment: Educated on PT POC and goals.                   Point: Body mechanics (Done)     Learning Progress Summary           Patient Acceptance, E,TB, VU by LR at 7/28/2021 1551    Comment: Educated on  PT POC and goals.                   Point: Precautions (Done)     Learning Progress Summary           Patient Acceptance, E,TB, VU by LR at 7/28/2021 1551    Comment: Educated on PT POC and goals.                               User Key     Initials Effective Dates Name Provider Type Discipline    LR 06/16/21 -  Jake Anne Physical Therapist PT              PT Recommendation and Plan  Anticipated Discharge Disposition (PT): home with assist, home with home health, home with outpatient therapy services  Progress Summary (PT)  Progress Toward Functional Goals (PT): progress toward functional goals is good  Plan of Care Reviewed With: patient  Progress: improving  Outcome Summary: Pt progressing well. Pt sweating upon my arrival. Air conditioner set on 65*. Pt c/o being hot but temp taken per mouth and temp was 98.0*. Pt agreeable to therapy. Pt t/f sup to sit ind and stood with spv. Pt amb in room for 22ft x 2 with RW and SBA. Pt performed B LE ther ex in sitting with no c/o voiced. Pt requested to be left up in recliner and nsg made aware. Pt would cont to benefit from therapy upon DC.       Time Calculation:   PT Charges     Row Name 08/01/21 1637             Time Calculation    Start Time  1422  -TW      Stop Time  1446  -TW      Time Calculation (min)  24 min  -TW         Time Calculation- PT    Total Timed Code Minutes- PT  24 minute(s)  -TW        User Key  (r) = Recorded By, (t) = Taken By, (c) = Cosigned By    Initials Name Provider Type    TW Ezequiel Perez PTA Physical Therapy Assistant        Therapy Charges for Today     Code Description Service Date Service Provider Modifiers Qty    28493256423 HC GAIT TRAINING EA 15 MIN 8/1/2021 Ezequiel Perez PTA GP 1    21068585387 HC PT THER PROC EA 15 MIN 8/1/2021 Ezequiel Perez, GREG GP 1          PT G-Codes  Outcome Measure Options: AM-PAC 6 Clicks Basic Mobility (PT)  AM-PAC 6 Clicks Score (PT): 23  AM-PAC 6 Clicks Score (OT): 24    Ezequiel L  Chris, PTA  8/1/2021

## 2021-08-02 LAB
BACTERIA SPEC AEROBE CULT: NORMAL
GLUCOSE BLDC GLUCOMTR-MCNC: 133 MG/DL (ref 70–130)
GLUCOSE BLDC GLUCOMTR-MCNC: 244 MG/DL (ref 70–130)
GLUCOSE BLDC GLUCOMTR-MCNC: 267 MG/DL (ref 70–130)
GLUCOSE BLDC GLUCOMTR-MCNC: 269 MG/DL (ref 70–130)
GLUCOSE BLDC GLUCOMTR-MCNC: 283 MG/DL (ref 70–130)
GLUCOSE BLDC GLUCOMTR-MCNC: 338 MG/DL (ref 70–130)
GLUCOSE BLDC GLUCOMTR-MCNC: 343 MG/DL (ref 70–130)
GLUCOSE BLDC GLUCOMTR-MCNC: 369 MG/DL (ref 70–130)

## 2021-08-02 PROCEDURE — 94799 UNLISTED PULMONARY SVC/PX: CPT

## 2021-08-02 PROCEDURE — 25010000002 DEXAMETHASONE PER 1 MG: Performed by: NURSE PRACTITIONER

## 2021-08-02 PROCEDURE — 82962 GLUCOSE BLOOD TEST: CPT

## 2021-08-02 PROCEDURE — 63710000001 INSULIN DETEMIR PER 5 UNITS: Performed by: HOSPITALIST

## 2021-08-02 PROCEDURE — 97110 THERAPEUTIC EXERCISES: CPT

## 2021-08-02 PROCEDURE — 97530 THERAPEUTIC ACTIVITIES: CPT

## 2021-08-02 PROCEDURE — 63710000001 INSULIN ASPART PER 5 UNITS: Performed by: HOSPITALIST

## 2021-08-02 PROCEDURE — 94760 N-INVAS EAR/PLS OXIMETRY 1: CPT

## 2021-08-02 PROCEDURE — 25010000002 ENOXAPARIN PER 10 MG: Performed by: HOSPITALIST

## 2021-08-02 PROCEDURE — 63710000001 DEXAMETHASONE PER 0.25 MG: Performed by: NURSE PRACTITIONER

## 2021-08-02 PROCEDURE — 63710000001 INSULIN ASPART PER 5 UNITS: Performed by: NURSE PRACTITIONER

## 2021-08-02 RX ADMIN — ALBUTEROL SULFATE 2 PUFF: 90 AEROSOL, METERED RESPIRATORY (INHALATION) at 19:39

## 2021-08-02 RX ADMIN — INSULIN ASPART 8 UNITS: 100 INJECTION, SOLUTION INTRAVENOUS; SUBCUTANEOUS at 17:12

## 2021-08-02 RX ADMIN — ATORVASTATIN CALCIUM 40 MG: 40 TABLET, FILM COATED ORAL at 21:17

## 2021-08-02 RX ADMIN — TIZANIDINE 5 MG: 4 TABLET ORAL at 08:52

## 2021-08-02 RX ADMIN — IPRATROPIUM BROMIDE 2 PUFF: 17 AEROSOL, METERED RESPIRATORY (INHALATION) at 19:39

## 2021-08-02 RX ADMIN — BUDESONIDE AND FORMOTEROL FUMARATE DIHYDRATE 2 PUFF: 160; 4.5 AEROSOL RESPIRATORY (INHALATION) at 19:39

## 2021-08-02 RX ADMIN — BUDESONIDE AND FORMOTEROL FUMARATE DIHYDRATE 2 PUFF: 160; 4.5 AEROSOL RESPIRATORY (INHALATION) at 08:38

## 2021-08-02 RX ADMIN — ENOXAPARIN SODIUM 40 MG: 40 INJECTION SUBCUTANEOUS at 00:15

## 2021-08-02 RX ADMIN — INSULIN ASPART 12 UNITS: 100 INJECTION, SOLUTION INTRAVENOUS; SUBCUTANEOUS at 11:12

## 2021-08-02 RX ADMIN — AMLODIPINE BESYLATE 5 MG: 5 TABLET ORAL at 08:52

## 2021-08-02 RX ADMIN — PREGABALIN 150 MG: 75 CAPSULE ORAL at 21:17

## 2021-08-02 RX ADMIN — SERTRALINE 50 MG: 50 TABLET, FILM COATED ORAL at 08:52

## 2021-08-02 RX ADMIN — ALBUTEROL SULFATE 2 PUFF: 90 AEROSOL, METERED RESPIRATORY (INHALATION) at 16:41

## 2021-08-02 RX ADMIN — INSULIN ASPART 8 UNITS: 100 INJECTION, SOLUTION INTRAVENOUS; SUBCUTANEOUS at 11:13

## 2021-08-02 RX ADMIN — IPRATROPIUM BROMIDE 2 PUFF: 17 AEROSOL, METERED RESPIRATORY (INHALATION) at 12:23

## 2021-08-02 RX ADMIN — PANTOPRAZOLE SODIUM 40 MG: 40 TABLET, DELAYED RELEASE ORAL at 05:25

## 2021-08-02 RX ADMIN — PREGABALIN 150 MG: 75 CAPSULE ORAL at 08:52

## 2021-08-02 RX ADMIN — IPRATROPIUM BROMIDE 2 PUFF: 17 AEROSOL, METERED RESPIRATORY (INHALATION) at 16:41

## 2021-08-02 RX ADMIN — IPRATROPIUM BROMIDE 2 PUFF: 17 AEROSOL, METERED RESPIRATORY (INHALATION) at 08:39

## 2021-08-02 RX ADMIN — ENOXAPARIN SODIUM 40 MG: 40 INJECTION SUBCUTANEOUS at 23:47

## 2021-08-02 RX ADMIN — ALBUTEROL SULFATE 2 PUFF: 90 AEROSOL, METERED RESPIRATORY (INHALATION) at 12:23

## 2021-08-02 RX ADMIN — INSULIN ASPART 12 UNITS: 100 INJECTION, SOLUTION INTRAVENOUS; SUBCUTANEOUS at 17:12

## 2021-08-02 RX ADMIN — DEXAMETHASONE 6 MG: 2 TABLET ORAL at 08:52

## 2021-08-02 RX ADMIN — INSULIN DETEMIR 50 UNITS: 100 INJECTION, SOLUTION SUBCUTANEOUS at 08:52

## 2021-08-02 RX ADMIN — DEXAMETHASONE SODIUM PHOSPHATE 6 MG: 4 INJECTION, SOLUTION INTRAMUSCULAR; INTRAVENOUS at 21:17

## 2021-08-02 RX ADMIN — LISINOPRIL 20 MG: 20 TABLET ORAL at 08:52

## 2021-08-02 RX ADMIN — NYSTATIN: 100000 POWDER TOPICAL at 08:53

## 2021-08-02 RX ADMIN — INSULIN ASPART 8 UNITS: 100 INJECTION, SOLUTION INTRAVENOUS; SUBCUTANEOUS at 08:51

## 2021-08-02 RX ADMIN — ALBUTEROL SULFATE 2 PUFF: 90 AEROSOL, METERED RESPIRATORY (INHALATION) at 08:39

## 2021-08-02 RX ADMIN — NYSTATIN: 100000 POWDER TOPICAL at 21:17

## 2021-08-02 RX ADMIN — INSULIN ASPART 10 UNITS: 100 INJECTION, SOLUTION INTRAVENOUS; SUBCUTANEOUS at 08:50

## 2021-08-02 NOTE — THERAPY TREATMENT NOTE
Patient Name: Ade Wilson  : 1960    MRN: [id number]                              Today's Date: 2021       Admit Date: 2021    Visit Dx:     ICD-10-CM ICD-9-CM   1. Acute respiratory failure with hypoxia (CMS/HCC)  J96.01 518.81   2. Pneumonia of both lungs due to infectious organism, unspecified part of lung  J18.9 483.8   3. COVID-19  U07.1 079.89   4. Acute renal failure, unspecified acute renal failure type (CMS/HCC)  N17.9 584.9   5. Impaired mobility and activities of daily living  Z74.09 V49.89    Z78.9    6. Impaired functional mobility, balance, gait, and endurance  Z74.09 V49.89     Patient Active Problem List   Diagnosis   • Chest pain   • Stable angina pectoris (CMS/[facility])   • Type 2 diabetes mellitus with hyperglycemia, with long-term current use of insulin (CMS/[facility])   • Benign essential HTN   • Hypertriglyceridemia   • Morbid obesity with BMI of 40.0-44.9, adult (CMS/[facility])   • Chronic stable angina (CMS/[facility])   • Acute respiratory failure with hypoxia (CMS/[facility])   • Acute hypoxemic respiratory failure due to COVID-19 (CMS/[facility])   • COVID-19 virus detected   • Pneumonia due to COVID-19 virus   • Obesity (BMI 30-39.9)   • Metabolic encephalopathy   • Severe malnutrition (CMS/[facility])     Past Medical History:   Diagnosis Date   • Diabetes (CMS/[facility])    • Hypertension      Past Surgical History:   Procedure Laterality Date   • CARDIAC CATHETERIZATION N/A 2020    Procedure: Left Heart Cath;  Surgeon: Brayan Santoro MD;  Location: [facility] INVASIVE LOCATION;  Service: Cardiology;  Laterality: N/A;   • CERVICAL FUSION     • HYSTERECTOMY      partial   • JOINT REPLACEMENT     • REPLACEMENT TOTAL KNEE Bilateral      General Information     Row Name 21 1125          OT Time and Intention    Document Type  therapy note (daily note)  -     Mode of Treatment  individual therapy;occupational therapy  -     Row Name 21 1125          General Information    Patient Profile Reviewed   yes  -RC     Existing Precautions/Restrictions  fall;oxygen therapy device and L/min  -RC     Row Name 08/02/21 1125          Cognition    Orientation Status (Cognition)  oriented x 4  -RC     Row Name 08/02/21 1125          Safety Issues, Functional Mobility    Impairments Affecting Function (Mobility)  balance;endurance/activity tolerance;strength;coordination  -       User Key  (r) = Recorded By, (t) = Taken By, (c) = Cosigned By    Initials Name Provider Type    RC Pinky Omer COTA/L Occupational Therapy Assistant          Mobility/ADL's    No documentation.       Obj/Interventions    No documentation.       Goals/Plan     Row Name 08/02/21 1125 08/02/21 1025       Bed Mobility Goal 1 (OT)    Activity/Assistive Device (Bed Mobility Goal 1, OT)  sit to supine/supine to sit  -RC  sit to supine/supine to sit  -RC    [place] Level/Cues Needed (Bed Mobility Goal 1, OT)  independent  -RC  independent  -RC    Time Frame (Bed Mobility Goal 1, OT)  long term goal (LTG);by discharge  -RC  long term goal (LTG);by discharge  -RC    Progress/Outcomes (Bed Mobility Goal 1, OT)  goal met  -RC  goal met  -    Row Name 08/02/21 1125 08/02/21 1025       Transfer Goal 1 (OT)    Activity/Assistive Device (Transfer Goal 1, OT)  sit-to-stand/stand-to-sit;toilet  -RC  sit-to-stand/stand-to-sit;toilet  -RC    [place] Level/Cues Needed (Transfer Goal 1, OT)  standby assist;tactile cues required;verbal cues required  -RC  standby assist;tactile cues required;verbal cues required  -RC    Time Frame (Transfer Goal 1, OT)  long term goal (LTG);by discharge  -RC  long term goal (LTG);by discharge  -RC    Progress/Outcome (Transfer Goal 1, OT)  goal met  -RC  goal met  -    Row Name 08/02/21 1125 08/02/21 1025       Bathing Goal 1 (OT)    Activity/Device (Bathing Goal 1, OT)  lower body bathing  -RC  lower body bathing  -RC    [place] Level/Cues Needed (Bathing Goal 1, OT)  standby assist;tactile cues  required;verbal cues required  -RC  standby assist;tactile cues required;verbal cues required  -RC    Time Frame (Bathing Goal 1, OT)  long term goal (LTG);by discharge  -RC  long term goal (LTG);by discharge  -RC    Progress/Outcomes (Bathing Goal 1, OT)  goal met  -RC  goal met  -RC    Row Name 08/02/21 1125 08/02/21 1025       Dressing Goal 1 (OT)    Activity/Device (Dressing Goal 1, OT)  lower body dressing  -RC  lower body dressing  -RC    [place]/Cues Needed (Dressing Goal 1, OT)  standby assist;tactile cues required;verbal cues required  -RC  standby assist;tactile cues required;verbal cues required  -RC    Time Frame (Dressing Goal 1, OT)  long term goal (LTG);by discharge  -RC  long term goal (LTG);by discharge  -RC    Progress/Outcome (Dressing Goal 1, OT)  goal not met  -RC  goal not met  -    Row Name 08/02/21 1125 08/02/21 1025       Toileting Goal 1 (OT)    Activity/Device (Toileting Goal 1, OT)  toileting skills, all  -RC  toileting skills, all  -RC    [place] Level/Cues Needed (Toileting Goal 1, OT)  standby assist;tactile cues required;verbal cues required  -RC  standby assist;tactile cues required;verbal cues required  -RC    Time Frame (Toileting Goal 1, OT)  long term goal (LTG);by discharge  -RC  long term goal (LTG);by discharge  -RC    Progress/Outcome (Toileting Goal 1, OT)  goal met  -  goal met  -      User Key  (r) = Recorded By, (t) = Taken By, (c) = Cosigned By    Initials Name Provider Type    Pinky Mccarthy COTA/L Occupational Therapy Assistant        Clinical Impression    No documentation.       Outcome Measures    No documentation.         Occupational Therapy Education                 Title: PT OT SLP Therapies (In Progress)     Topic: Occupational Therapy (In Progress)     Point: ADL training (Done)     Description:   Instruct learner(s) on proper safety adaptation and remediation techniques during self care or transfers.   Instruct in proper use of assistive  devices.              Learning Progress Summary           Patient Acceptance, E, VU by WALDO at 8/1/2021 1409                   Point: Home exercise program (Not Started)     Description:   Instruct learner(s) on appropriate technique for monitoring, assisting and/or progressing therapeutic exercises/activities.              Learner Progress:  Not documented in this visit.          Point: Precautions (Done)     Description:   Instruct learner(s) on prescribed precautions during self-care and functional transfers.              Learning Progress Summary           Patient Acceptance, E, VU by  at 7/28/2021 1337    Comment: Pt educated on role of OT, d/c recs, and POC                   Point: Body mechanics (Not Started)     Description:   Instruct learner(s) on proper positioning and spine alignment during self-care, functional mobility activities and/or exercises.              Learner Progress:  Not documented in this visit.                      User Key     Initials Effective Dates Name Provider Type Discipline    WALDO 06/16/21 -  Nelsy Coles COTA/L Occupational Therapy Assistant OT     06/16/21 -  Francisco Man OT Occupational Therapist OT              OT Recommendation and Plan     Plan of Care Review  Plan of Care Reviewed With: patient  Progress: improving  Outcome Summary: pt is up in chair upon entry nursing ok'd tx,  pt has had shower this morning and participated in PT. agreeable to ue ex B ue's 15 x2.  cont poc     Time Calculation:   Time Calculation- OT     Row Name 08/02/21 1208 08/02/21 1125          Time Calculation- OT    OT Start Time  1125  -RC  1025  -RC     OT Stop Time  1148  -RC  --     OT Time Calculation (min)  23 min  -RC  --     Total Timed Code Minutes- OT  23 minute(s)  -RC  --     OT Received On  08/02/21  -RC  --        Timed Charges    67329 - OT Therapeutic Exercise Minutes  23  -RC  --        Total Minutes    Timed Charges Total Minutes  23  -RC  --      Total Minutes  23  -RC   --       User Key  (r) = Recorded By, (t) = Taken By, (c) = Cosigned By    Initials Name Provider Type     Pinky Omer COTA/L Occupational Therapy Assistant        Therapy Charges for Today     Code Description Service Date Service Provider Modifiers Qty    31744888768  OT THER PROC EA 15 MIN 8/2/2021 Pinky Omre COTA/MAGDI GO 2               JANET Monteiro  8/2/2021

## 2021-08-02 NOTE — PROGRESS NOTES
Adult Nutrition  Assessment    Patient Name:  Ade Wilson  YOB: 1960  MRN: [id number]  Admit Date:  7/27/2021    Assessment Date:  8/2/2021    Comments:  Pt continues treatment for pneumonia r/t COVID-19. Pt continues to not answer phone when RD calls. Reg diet w/ 2% milk all meals. Intakes are >75% meals. Nurse  Notes pt on 6L 02, 7/30 was on 2L. Rd notes acute malnutrition in note 7/30. IV decadron BID continues /w DM meds. # w/ BMI 38.4. RD to follow hospital course.    Electronically signed by:  Arpita Grey RD  08/02/21 14:29 CDT

## 2021-08-02 NOTE — PROGRESS NOTES
[facility] Medicine Services  INPATIENT PROGRESS NOTE    Length of Stay: 6  Date of Admission: 7/27/2021  Primary Care Physician: Provider, No Known    Subjective   Chief Complaint: No complaints    HPI:      8/2/2021: Patient has been able to wean down to 3 L today.  She states she is still extremely weak.    8/1/2021: Patient has had to have an increase in oxygen today.  She is currently up to 6 L of oxygen.  We will increase her steroids to twice daily.    7/31/2021:  Patient continues to have episodes of desaturations with ambulation.    7/30/2021:  Patient currently on 2 liters. Patient dropped to 87% on room air.      7/29/2021: Patient is currently on 2 L of oxygen.      H&P by Dr. Perez: Patient is a 60-year-old female past medical history of hypertension diabetes who presented to the emergency department after being found confused at her home on a welfare check. Patient states that she knows that she has been very confused for quite a few days, but was unable to figure out how to do anything about it. Patient states that she remembers going to her primary care provider on a Wednesday, which based on available information was 7/14, with cough for 2 days. She states at that point she was checked for Covid and influenza and both tests were negative. She states that a couple of days later she went to the emergency room feeling more poorly and the ER doctor told her she had a viral syndrome and there wasn't anything that she could do about it. She then went home and became significantly confused. After she missed 12 days of work and no one had heard from, her place of employment sent the police for a welfare check. She states that during those 12 days she couldn't figure out how to turn on her television, turn on her shower, or use her telephone. She states that she was drinking some but not eating much. She states she had a significant cough throughout the majority  of this time with increasing shortness of breath. She does state that she feels clearer mentally after arrival at the hospital. She has not been vaccinated against COVID-19.    Review of Systems   Constitutional: Positive for fatigue. Negative for activity change.   HENT: Negative for ear pain and sore throat.    Eyes: Negative for pain and discharge.   Respiratory: Negative for cough and shortness of breath.    Cardiovascular: Negative for chest pain and palpitations.   Gastrointestinal: Negative for abdominal pain and nausea.   Endocrine: Negative for cold intolerance and heat intolerance.   Genitourinary: Negative for difficulty urinating and dysuria.   Musculoskeletal: Negative for arthralgias and gait problem.   Skin: Negative for color change and rash.   Neurological: Negative for dizziness and weakness.   Psychiatric/Behavioral: Negative for agitation and confusion.        Objective    Temp:  [96.2 °F (35.7 °C)-98.8 °F (37.1 °C)] 96.2 °F (35.7 °C)  Heart Rate:  [60-78] 77  Resp:  [16-20] 16  BP: (103-144)/(62-68) 103/62    Physical Exam  Constitutional:       Appearance: She is well-developed.   HENT:      Head: Normocephalic and atraumatic.   Eyes:      Pupils: Pupils are equal, round, and reactive to light.   Cardiovascular:      Rate and Rhythm: Normal rate and regular rhythm.   Pulmonary:      Effort: Pulmonary effort is normal.      Breath sounds: Normal breath sounds.   Abdominal:      General: Bowel sounds are normal.      Palpations: Abdomen is soft.   Musculoskeletal:         General: Normal range of motion.      Cervical back: Normal range of motion and neck supple.   Skin:     General: Skin is warm and dry.   Neurological:      Mental Status: She is alert and oriented to person, place, and time.   Psychiatric:         Behavior: Behavior normal.       Results Review:  I have reviewed the labs, radiology results, and diagnostic studies.    Laboratory Data:   Results from last 7 days   Lab Units  08/01/21  0627 07/31/21  2109 07/31/21  0647 07/30/21  0532 07/30/21  0532   SODIUM mmol/L 136  --  138  --  135*   POTASSIUM mmol/L 4.2  --  4.4  --  4.8   CHLORIDE mmol/L 104  --  101  --  103   CO2 mmol/L 21.0*  --  27.0  --  24.0   BUN mg/dL 19  --  24*  --  28*   CREATININE mg/dL 0.67  --  0.81  --  0.86   GLUCOSE mg/dL 151* 354* 144*   < > 250*   CALCIUM mg/dL 9.0  --  9.6  --  9.5   BILIRUBIN mg/dL 0.3  --  0.3  --  0.2   ALK PHOS U/L 72  --  75  --  72   ALT (SGPT) U/L 23  --  26  --  23   AST (SGOT) U/L 16  --  19  --  20   ANION GAP mmol/L 11.0  --  10.0  --  8.0    < > = values in this interval not displayed.     Estimated Creatinine Clearance: 113.3 mL/min (by C-G formula based on SCr of 0.67 mg/dL).  Results from last 7 days   Lab Units 07/27/21  1819   MAGNESIUM mg/dL 1.7         Results from last 7 days   Lab Units 08/01/21  0627 07/31/21  0647 07/30/21  0532 07/29/21  0626 07/28/21  0550   WBC 10*3/mm3 12.57* 13.88* 9.62 7.54 6.65   HEMOGLOBIN g/dL 9.7* 10.4* 9.4* 9.5* 9.4*   HEMATOCRIT % 30.7* 32.8* 29.7* 29.6* 28.4*   PLATELETS 10*3/mm3 442 483* 403 342 324           Culture Data:   No results found for: BLOODCX  No results found for: URINECX  No results found for: RESPCX  No results found for: WOUNDCX  No results found for: STOOLCX  No components found for: BODYFLD    Radiology Data:   Imaging Results (Last 24 Hours)     ** No results found for the last 24 hours. **          I have reviewed the patient's current medications.     Assessment/Plan     Active Hospital Problems    Diagnosis  POA   • **Acute respiratory failure with hypoxia (CMS/HCC) [J96.01]  Yes   • Severe malnutrition (CMS/HCC) [E43]  Yes   • Obesity (BMI 30-39.9) [E66.9]  Unknown   • Metabolic encephalopathy [G93.41]  Unknown   • Acute hypoxemic respiratory failure due to COVID-19 (CMS/HCC) [U07.1, J96.01]  Yes   • COVID-19 virus detected [U07.1]  Yes   • Pneumonia due to COVID-19 virus [U07.1, J12.82]  Yes   • Type 2 diabetes  mellitus with hyperglycemia, with long-term current use of insulin (CMS/[facility]) [E11.65, Z79.4]  Not Applicable   • Benign essential HTN [I10]  Yes       Plan:    1.  Acute hypoxic respiratory failure: Secondary to Covid pneumonia.  Continue supplemental oxygen as needed.  Bronchodilators.  Patient currently on 3 L of oxygen.    2.  Covid pneumonia: Decadron 6/10. Remdesivir contraindicated due to duration of symptoms.  Continue to trend inflammatory markers.  Decadron will be increased to twice daily secondary to worsening hypoxia.  3.  Diabetes mellitus: Continue long-acting insulin. Continue sliding scale.  4.  Hypertension: Continue home medication.  5.  DVT prophylaxis: Lovenox.      Discharge Planning: I expect patient to be discharged to home in 2-3 days.    I confirmed that the patient's Advance Care Plan is present, code status is documented, or surrogate decision maker is listed in the patient's medical record.      I have utilized all available immediate resources to obtain, update, or review the patient's current medications.         This document has been electronically signed by JULIANA Cameron on August 2, 2021 15:57 CDT

## 2021-08-02 NOTE — PLAN OF CARE
Goal Outcome Evaluation:  Plan of Care Reviewed With: patient        Progress: improving  Outcome Summary: pt making good progress and is ind with bed mob and transfers in room. stit to atand  x 5 , seated eob therex and then gt x 50' in room w/o ad. vss. nurses came in for vitals and reconnect monitor.

## 2021-08-02 NOTE — PLAN OF CARE
Goal Outcome Evaluation:  Plan of Care Reviewed With: patient        Progress: improving  Outcome Summary: pt is up in chair upon entry nursing ok'd tx,  pt has had shower this morning and participated in PT. agreeable to ue ex B ue's 15 x2.  cont poc

## 2021-08-02 NOTE — THERAPY TREATMENT NOTE
Acute Care - Physical Therapy Treatment Note  [facility]     Patient Name: Ade Wilson  : 1960  MRN: [id number]  Today's Date: 2021      Visit Dx:     ICD-10-CM ICD-9-CM   1. Acute respiratory failure with hypoxia (CMS/HCC)  J96.01 518.81   2. Pneumonia of both lungs due to infectious organism, unspecified part of lung  J18.9 483.8   3. COVID-19  U07.1 079.89   4. Acute renal failure, unspecified acute renal failure type (CMS/HCC)  N17.9 584.9   5. Impaired mobility and activities of daily living  Z74.09 V49.89    Z78.9    6. Impaired functional mobility, balance, gait, and endurance  Z74.09 V49.89     Patient Active Problem List   Diagnosis   • Chest pain   • Stable angina pectoris (CMS/HCC)   • Type 2 diabetes mellitus with hyperglycemia, with long-term current use of insulin (CMS/[facility])   • Benign essential HTN   • Hypertriglyceridemia   • Morbid obesity with BMI of 40.0-44.9, adult (CMS/HCC)   • Chronic stable angina (CMS/HCC)   • Acute respiratory failure with hypoxia (CMS/[facility])   • Acute hypoxemic respiratory failure due to COVID-19 (CMS/HCC)   • COVID-19 virus detected   • Pneumonia due to COVID-19 virus   • Obesity (BMI 30-39.9)   • Metabolic encephalopathy   • Severe malnutrition (CMS/HCC)     Past Medical History:   Diagnosis Date   • Diabetes (CMS/[facility])    • Hypertension      Past Surgical History:   Procedure Laterality Date   • CARDIAC CATHETERIZATION N/A 2020    Procedure: Left Heart Cath;  Surgeon: Brayan Santoro MD;  Location: [facility] CATH INVASIVE LOCATION;  Service: Cardiology;  Laterality: N/A;   • CERVICAL FUSION     • HYSTERECTOMY      partial   • JOINT REPLACEMENT     • REPLACEMENT TOTAL KNEE Bilateral         PT Assessment (last 12 hours)      PT Evaluation and Treatment     Row Name 21 1050          Physical Therapy Time and Intention    Subjective Information  no complaints  -RW     Document Type  therapy note (daily note)  -RW     Mode of Treatment  physical  therapy;individual therapy  -RW     Patient Effort  good  -RW     Row Name 08/02/21 1050          General Information    Patient Profile Reviewed  yes  -RW     Existing Precautions/Restrictions  fall;oxygen therapy device and L/min  -     Row Name 08/02/21 1050          Cognition    Affect/Mental Status (Cognitive)  WFL  -RW     Orientation Status (Cognition)  oriented x 4  -RW     Follows Commands (Cognition)  WFL  -RW     Cognitive Function (Cognitive)  WFL  -RW     Row Name 08/02/21 1050          Pain Scale: Numbers Pre/Post-Treatment    Pretreatment Pain Rating  0/10 - no pain  -RW     Posttreatment Pain Rating  0/10 - no pain  -RW     Row Name 08/02/21 1050          Bed Mobility    Supine-Sit [place] (Bed Mobility)  modified independence  -RW     Sit-Supine [place] (Bed Mobility)  modified independence  -RW     Assistive Device (Bed Mobility)  bed rails;head of bed elevated  -     Row Name 08/02/21 1050          Transfers    Transfers  sit-stand transfer;stand-sit transfer  -RW     Comment (Transfers)  sit to stand x 5  -RW     Sit-Stand [place] (Transfers)  independent  -RW     Stand-Sit [place] (Transfers)  independent  -     Row Name 08/02/21 1050          Sit-Stand Transfer    Assistive Device (Sit-Stand Transfers)  --  -     Row Name 08/02/21 1050          Stand-Sit Transfer    Assistive Device (Stand-Sit Transfers)  --  -[facility] Name 08/02/21 1050          Gait/Stairs (Locomotion)    [place] Level (Gait)  independent  -RW     Assistive Device (Gait)  --  -RW     Distance in Feet (Gait)  50  -RW     Pattern (Gait)  step-through  -RW     Deviations/Abnormal Patterns (Gait)  --  -     Row Name 08/02/21 1050          Hip (Therapeutic Exercise)    Hip (Therapeutic Exercise)  strengthening exercise  -RW     Hip Strengthening (Therapeutic Exercise)  marching while seated;10 repetitions;5 repetitions  -     Row Name 08/02/21 1050          Knee (Therapeutic Exercise)    Knee  (Therapeutic Exercise)  -- sit to stand x 5  -RW     Knee AROM (Therapeutic Exercise)  LAQ (long arc quad);10 repetitions;2 sets  -RW     Row Name 08/02/21 1050          Vital Signs    Pretreatment Heart Rate (beats/min)  66  -RW     Pre SpO2 (%)  92  -RW     Row Name 08/02/21 1050          Bed Mobility Goal 1 (PT)    Activity/Assistive Device (Bed Mobility Goal 1, PT)  sit to supine;supine to sit  -RW     [place] Level/Cues Needed (Bed Mobility Goal 1, PT)  independent  -RW     Time Frame (Bed Mobility Goal 1, PT)  by discharge  -RW     Progress/Outcomes (Bed Mobility Goal 1, PT)  (S) goal met  -RW     Row Name 08/02/21 1050          Transfer Goal 1 (PT)    Activity/Assistive Device (Transfer Goal 1, PT)  sit-to-stand/stand-to-sit;bed-to-chair/chair-to-bed  -RW     [place] Level/Cues Needed (Transfer Goal 1, PT)  1 person assist;modified independence  -RW     Time Frame (Transfer Goal 1, PT)  by discharge  -RW     Progress/Outcome (Transfer Goal 1, PT)  (S) goal met  -RW     Row Name 08/02/21 1050          Gait Training Goal 1 (PT)    Activity/Assistive Device (Gait Training Goal 1, PT)  gait (walking locomotion);assistive device use;backward stepping  -RW     [place] Level (Gait Training Goal 1, PT)  modified independence  -RW     Distance (Gait Training Goal 1, PT)  50'x3  -RW     Time Frame (Gait Training Goal 1, PT)  by discharge  -RW     Progress/Outcome (Gait Training Goal 1, PT)  goal partially met  -RW     Row Name 08/02/21 1050          Positioning and Restraints    Pre-Treatment Position  in bed  -RW     Post Treatment Position  bed  -RW     In Bed  sitting EOB;with nsg  -RW     Row Name 08/02/21 1050          Therapy Assessment/Plan (PT)    Rehab Potential (PT)  good, to achieve stated therapy goals  -RW       User Key  (r) = Recorded By, (t) = Taken By, (c) = Cosigned By    Initials Name Provider Type    Aldo Sandoval, PTA Physical Therapy Assistant        Physical Therapy Education                  Title: PT OT SLP Therapies (In Progress)     Topic: Physical Therapy (Done)     Point: Mobility training (Done)     Learning Progress Summary           Patient Acceptance, E,TB, VU by LR at 7/28/2021 1551    Comment: Educated on PT POC and goals.                   Point: Home exercise program (Done)     Learning Progress Summary           Patient Acceptance, E,TB, VU by LR at 7/28/2021 1551    Comment: Educated on PT POC and goals.                   Point: Body mechanics (Done)     Learning Progress Summary           Patient Acceptance, E,TB, VU by LR at 7/28/2021 1551    Comment: Educated on PT POC and goals.                   Point: Precautions (Done)     Learning Progress Summary           Patient Acceptance, E,TB, VU by LR at 7/28/2021 1551    Comment: Educated on PT POC and goals.                               User Key     Initials Effective Dates Name Provider Type Discipline    LR 06/16/21 -  Jake Anne Physical Therapist PT              PT Recommendation and Plan  Anticipated Discharge Disposition (PT): home with assist, home with home health, home with outpatient therapy services  Plan of Care Reviewed With: patient  Progress: improving  Outcome Summary: pt making good progress and is ind with bed mob and transfers in room. stit to atand  x 5 , seated eob therex and then gt x 50' in room w/o ad. vss. nurses came in for vitals and reconnect monitor.       Time Calculation:   PT Charges     Row Name 08/02/21 1239             Time Calculation    Start Time  1050  -RW      Stop Time  1115  -RW      Time Calculation (min)  25 min  -RW         Time Calculation- PT    Total Timed Code Minutes- PT  25 minute(s)  -RW         Timed Charges    37946 - PT Therapeutic Exercise Minutes  15  -RW      86891 - PT Therapeutic Activity Minutes  10  -RW         Total Minutes    Timed Charges Total Minutes  25  -RW       Total Minutes  25  -RW        User Key  (r) = Recorded By, (t) = Taken By, (c) =  Cosigned By    Initials Name Provider Type    RW Aldo Castillo PTA Physical Therapy Assistant        Therapy Charges for Today     Code Description Service Date Service Provider Modifiers Qty    94171832233 HC PT THER PROC EA 15 MIN 8/2/2021 Aldo Castillo PTA GP 1    75683715539 HC PT THERAPEUTIC ACT EA 15 MIN 8/2/2021 Aldo Castillo PTA GP 1          PT G-Codes  Outcome Measure Options: AM-PAC 6 Clicks Basic Mobility (PT)  AM-PAC 6 Clicks Score (PT): 23  AM-PAC 6 Clicks Score (OT): 24    Aldo Castillo PTA  8/2/2021

## 2021-08-03 LAB
ALBUMIN SERPL-MCNC: 3.1 G/DL (ref 3.5–5.2)
ALBUMIN/GLOB SERPL: 0.9 G/DL
ALP SERPL-CCNC: 77 U/L (ref 39–117)
ALT SERPL W P-5'-P-CCNC: 27 U/L (ref 1–33)
ANION GAP SERPL CALCULATED.3IONS-SCNC: 12 MMOL/L (ref 5–15)
AST SERPL-CCNC: 14 U/L (ref 1–32)
BASOPHILS # BLD AUTO: 0.03 10*3/MM3 (ref 0–0.2)
BASOPHILS NFR BLD AUTO: 0.2 % (ref 0–1.5)
BILIRUB SERPL-MCNC: 0.2 MG/DL (ref 0–1.2)
BUN SERPL-MCNC: 26 MG/DL (ref 8–23)
BUN/CREAT SERPL: 34.2 (ref 7–25)
CALCIUM SPEC-SCNC: 8.6 MG/DL (ref 8.6–10.5)
CHLORIDE SERPL-SCNC: 101 MMOL/L (ref 98–107)
CO2 SERPL-SCNC: 22 MMOL/L (ref 22–29)
CREAT SERPL-MCNC: 0.76 MG/DL (ref 0.57–1)
D-DIMER, QUANTITATIVE (MAD,POW, STR): 1606 NG/ML (FEU) (ref 0–470)
DEPRECATED RDW RBC AUTO: 37.1 FL (ref 37–54)
EOSINOPHIL # BLD AUTO: 0 10*3/MM3 (ref 0–0.4)
EOSINOPHIL NFR BLD AUTO: 0 % (ref 0.3–6.2)
ERYTHROCYTE [DISTWIDTH] IN BLOOD BY AUTOMATED COUNT: 17.8 % (ref 12.3–15.4)
FIBRINOGEN PPP-MCNC: 441 MG/DL (ref 228–514)
GFR SERPL CREATININE-BSD FRML MDRD: 77 ML/MIN/1.73
GLOBULIN UR ELPH-MCNC: 3.5 GM/DL
GLUCOSE BLDC GLUCOMTR-MCNC: 308 MG/DL (ref 70–130)
GLUCOSE BLDC GLUCOMTR-MCNC: 341 MG/DL (ref 70–130)
GLUCOSE BLDC GLUCOMTR-MCNC: 359 MG/DL (ref 70–130)
GLUCOSE BLDC GLUCOMTR-MCNC: 407 MG/DL (ref 70–130)
GLUCOSE BLDC GLUCOMTR-MCNC: 415 MG/DL (ref 70–130)
GLUCOSE SERPL-MCNC: 320 MG/DL (ref 65–99)
HCT VFR BLD AUTO: 31.1 % (ref 34–46.6)
HGB BLD-MCNC: 10.1 G/DL (ref 12–15.9)
IMM GRANULOCYTES # BLD AUTO: 0.36 10*3/MM3 (ref 0–0.05)
IMM GRANULOCYTES NFR BLD AUTO: 2.3 % (ref 0–0.5)
LYMPHOCYTES # BLD AUTO: 1.53 10*3/MM3 (ref 0.7–3.1)
LYMPHOCYTES NFR BLD AUTO: 9.7 % (ref 19.6–45.3)
MCH RBC QN AUTO: 21.5 PG (ref 26.6–33)
MCHC RBC AUTO-ENTMCNC: 32.5 G/DL (ref 31.5–35.7)
MCV RBC AUTO: 66.3 FL (ref 79–97)
MONOCYTES # BLD AUTO: 1.06 10*3/MM3 (ref 0.1–0.9)
MONOCYTES NFR BLD AUTO: 6.7 % (ref 5–12)
NEUTROPHILS NFR BLD AUTO: 12.82 10*3/MM3 (ref 1.7–7)
NEUTROPHILS NFR BLD AUTO: 81.1 % (ref 42.7–76)
NRBC BLD AUTO-RTO: 0.3 /100 WBC (ref 0–0.2)
PLATELET # BLD AUTO: 449 10*3/MM3 (ref 140–450)
PMV BLD AUTO: 11.3 FL (ref 6–12)
POTASSIUM SERPL-SCNC: 4.4 MMOL/L (ref 3.5–5.2)
PROT SERPL-MCNC: 6.6 G/DL (ref 6–8.5)
RBC # BLD AUTO: 4.69 10*6/MM3 (ref 3.77–5.28)
SODIUM SERPL-SCNC: 135 MMOL/L (ref 136–145)
WBC # BLD AUTO: 15.8 10*3/MM3 (ref 3.4–10.8)

## 2021-08-03 PROCEDURE — 85384 FIBRINOGEN ACTIVITY: CPT | Performed by: HOSPITALIST

## 2021-08-03 PROCEDURE — 63710000001 DEXAMETHASONE PER 0.25 MG: Performed by: NURSE PRACTITIONER

## 2021-08-03 PROCEDURE — 97530 THERAPEUTIC ACTIVITIES: CPT

## 2021-08-03 PROCEDURE — 63710000001 INSULIN ASPART PER 5 UNITS: Performed by: NURSE PRACTITIONER

## 2021-08-03 PROCEDURE — 94799 UNLISTED PULMONARY SVC/PX: CPT

## 2021-08-03 PROCEDURE — 63710000001 INSULIN DETEMIR PER 5 UNITS: Performed by: HOSPITALIST

## 2021-08-03 PROCEDURE — 82962 GLUCOSE BLOOD TEST: CPT

## 2021-08-03 PROCEDURE — 25010000002 DEXAMETHASONE PER 1 MG: Performed by: NURSE PRACTITIONER

## 2021-08-03 PROCEDURE — 85379 FIBRIN DEGRADATION QUANT: CPT | Performed by: HOSPITALIST

## 2021-08-03 PROCEDURE — 80053 COMPREHEN METABOLIC PANEL: CPT | Performed by: NURSE PRACTITIONER

## 2021-08-03 PROCEDURE — 94760 N-INVAS EAR/PLS OXIMETRY 1: CPT

## 2021-08-03 PROCEDURE — 97116 GAIT TRAINING THERAPY: CPT

## 2021-08-03 PROCEDURE — 85025 COMPLETE CBC W/AUTO DIFF WBC: CPT | Performed by: NURSE PRACTITIONER

## 2021-08-03 PROCEDURE — 63710000001 INSULIN ASPART PER 5 UNITS: Performed by: HOSPITALIST

## 2021-08-03 RX ADMIN — DEXAMETHASONE 6 MG: 2 TABLET ORAL at 08:15

## 2021-08-03 RX ADMIN — INSULIN ASPART 12 UNITS: 100 INJECTION, SOLUTION INTRAVENOUS; SUBCUTANEOUS at 11:12

## 2021-08-03 RX ADMIN — NYSTATIN: 100000 POWDER TOPICAL at 20:08

## 2021-08-03 RX ADMIN — INSULIN ASPART 10 UNITS: 100 INJECTION, SOLUTION INTRAVENOUS; SUBCUTANEOUS at 08:16

## 2021-08-03 RX ADMIN — AMLODIPINE BESYLATE 5 MG: 5 TABLET ORAL at 08:15

## 2021-08-03 RX ADMIN — ALBUTEROL SULFATE 2 PUFF: 90 AEROSOL, METERED RESPIRATORY (INHALATION) at 10:41

## 2021-08-03 RX ADMIN — ALBUTEROL SULFATE 2 PUFF: 90 AEROSOL, METERED RESPIRATORY (INHALATION) at 07:27

## 2021-08-03 RX ADMIN — PREGABALIN 150 MG: 75 CAPSULE ORAL at 20:06

## 2021-08-03 RX ADMIN — ATORVASTATIN CALCIUM 40 MG: 40 TABLET, FILM COATED ORAL at 20:07

## 2021-08-03 RX ADMIN — PANTOPRAZOLE SODIUM 40 MG: 40 TABLET, DELAYED RELEASE ORAL at 06:18

## 2021-08-03 RX ADMIN — BUDESONIDE AND FORMOTEROL FUMARATE DIHYDRATE 2 PUFF: 160; 4.5 AEROSOL RESPIRATORY (INHALATION) at 07:27

## 2021-08-03 RX ADMIN — SERTRALINE 50 MG: 50 TABLET, FILM COATED ORAL at 08:15

## 2021-08-03 RX ADMIN — TIZANIDINE 5 MG: 4 TABLET ORAL at 08:15

## 2021-08-03 RX ADMIN — INSULIN ASPART 8 UNITS: 100 INJECTION, SOLUTION INTRAVENOUS; SUBCUTANEOUS at 11:12

## 2021-08-03 RX ADMIN — IPRATROPIUM BROMIDE 2 PUFF: 17 AEROSOL, METERED RESPIRATORY (INHALATION) at 07:27

## 2021-08-03 RX ADMIN — INSULIN ASPART 8 UNITS: 100 INJECTION, SOLUTION INTRAVENOUS; SUBCUTANEOUS at 16:33

## 2021-08-03 RX ADMIN — INSULIN ASPART 8 UNITS: 100 INJECTION, SOLUTION INTRAVENOUS; SUBCUTANEOUS at 08:16

## 2021-08-03 RX ADMIN — IPRATROPIUM BROMIDE 2 PUFF: 17 AEROSOL, METERED RESPIRATORY (INHALATION) at 20:30

## 2021-08-03 RX ADMIN — NYSTATIN: 100000 POWDER TOPICAL at 08:20

## 2021-08-03 RX ADMIN — INSULIN DETEMIR 50 UNITS: 100 INJECTION, SOLUTION SUBCUTANEOUS at 08:17

## 2021-08-03 RX ADMIN — DEXAMETHASONE SODIUM PHOSPHATE 6 MG: 4 INJECTION, SOLUTION INTRAMUSCULAR; INTRAVENOUS at 20:05

## 2021-08-03 RX ADMIN — IPRATROPIUM BROMIDE 2 PUFF: 17 AEROSOL, METERED RESPIRATORY (INHALATION) at 15:34

## 2021-08-03 RX ADMIN — ALBUTEROL SULFATE 2 PUFF: 90 AEROSOL, METERED RESPIRATORY (INHALATION) at 15:34

## 2021-08-03 RX ADMIN — LISINOPRIL 20 MG: 20 TABLET ORAL at 08:15

## 2021-08-03 RX ADMIN — INSULIN ASPART 10 UNITS: 100 INJECTION, SOLUTION INTRAVENOUS; SUBCUTANEOUS at 16:32

## 2021-08-03 RX ADMIN — PREGABALIN 150 MG: 75 CAPSULE ORAL at 08:15

## 2021-08-03 RX ADMIN — BUDESONIDE AND FORMOTEROL FUMARATE DIHYDRATE 2 PUFF: 160; 4.5 AEROSOL RESPIRATORY (INHALATION) at 20:30

## 2021-08-03 RX ADMIN — IPRATROPIUM BROMIDE 2 PUFF: 17 AEROSOL, METERED RESPIRATORY (INHALATION) at 10:41

## 2021-08-03 RX ADMIN — ALBUTEROL SULFATE 2 PUFF: 90 AEROSOL, METERED RESPIRATORY (INHALATION) at 20:30

## 2021-08-03 NOTE — PROGRESS NOTES
[facility] Medicine Services  INPATIENT PROGRESS NOTE    Length of Stay: 7  Date of Admission: 7/27/2021  Primary Care Physician: Provider, No Known    Subjective   Chief Complaint: No complaints    HPI:      8/3/2021: The patient had another back set and again is on 5 L of oxygen.  She continues to feel extremely tired but is working with therapy.    8/2/2021: Patient has been able to wean down to 3 L today.  She states she is still extremely weak.    8/1/2021: Patient has had to have an increase in oxygen today.  She is currently up to 6 L of oxygen.  We will increase her steroids to twice daily.    7/31/2021:  Patient continues to have episodes of desaturations with ambulation.    7/30/2021:  Patient currently on 2 liters. Patient dropped to 87% on room air.      7/29/2021: Patient is currently on 2 L of oxygen.      H&P by Dr. Perez: Patient is a 60-year-old female past medical history of hypertension diabetes who presented to the emergency department after being found confused at her home on a welfare check. Patient states that she knows that she has been very confused for quite a few days, but was unable to figure out how to do anything about it. Patient states that she remembers going to her primary care provider on a Wednesday, which based on available information was 7/14, with cough for 2 days. She states at that point she was checked for Covid and influenza and both tests were negative. She states that a couple of days later she went to the emergency room feeling more poorly and the ER doctor told her she had a viral syndrome and there wasn't anything that she could do about it. She then went home and became significantly confused. After she missed 12 days of work and no one had heard from, her place of employment sent the police for a welfare check. She states that during those 12 days she couldn't figure out how to turn on her television, turn on her shower,  or use her telephone. She states that she was drinking some but not eating much. She states she had a significant cough throughout the majority of this time with increasing shortness of breath. She does state that she feels clearer mentally after arrival at the hospital. She has not been vaccinated against COVID-19.    Review of Systems   Constitutional: Positive for fatigue. Negative for activity change.   HENT: Negative for ear pain and sore throat.    Eyes: Negative for pain and discharge.   Respiratory: Negative for cough and shortness of breath.    Cardiovascular: Negative for chest pain and palpitations.   Gastrointestinal: Negative for abdominal pain and nausea.   Endocrine: Negative for cold intolerance and heat intolerance.   Genitourinary: Negative for difficulty urinating and dysuria.   Musculoskeletal: Negative for arthralgias and gait problem.   Skin: Negative for color change and rash.   Neurological: Negative for dizziness and weakness.   Psychiatric/Behavioral: Negative for agitation and confusion.        Objective    Temp:  [97.3 °F (36.3 °C)-98.4 °F (36.9 °C)] 98.4 °F (36.9 °C)  Heart Rate:  [53-88] 77  Resp:  [16-20] 18  BP: (103-160)/(58-65) 133/63    Physical Exam  Constitutional:       Appearance: She is well-developed.   HENT:      Head: Normocephalic and atraumatic.   Eyes:      Pupils: Pupils are equal, round, and reactive to light.   Cardiovascular:      Rate and Rhythm: Normal rate and regular rhythm.   Pulmonary:      Effort: Pulmonary effort is normal.      Breath sounds: Normal breath sounds.   Abdominal:      General: Bowel sounds are normal.      Palpations: Abdomen is soft.   Musculoskeletal:         General: Normal range of motion.      Cervical back: Normal range of motion and neck supple.   Skin:     General: Skin is warm and dry.   Neurological:      Mental Status: She is alert and oriented to person, place, and time.   Psychiatric:         Behavior: Behavior normal.        Results Review:  I have reviewed the labs, radiology results, and diagnostic studies.    Laboratory Data:   Results from last 7 days   Lab Units 08/03/21  0600 08/01/21  0627 07/31/21 2109 07/31/21  0647 07/31/21  0647   SODIUM mmol/L 135* 136  --   --  138   POTASSIUM mmol/L 4.4 4.2  --   --  4.4   CHLORIDE mmol/L 101 104  --   --  101   CO2 mmol/L 22.0 21.0*  --   --  27.0   BUN mg/dL 26* 19  --   --  24*   CREATININE mg/dL 0.76 0.67  --   --  0.81   GLUCOSE mg/dL 320* 151* 354*   < > 144*   CALCIUM mg/dL 8.6 9.0  --   --  9.6   BILIRUBIN mg/dL 0.2 0.3  --   --  0.3   ALK PHOS U/L 77 72  --   --  75   ALT (SGPT) U/L 27 23  --   --  26   AST (SGOT) U/L 14 16  --   --  19   ANION GAP mmol/L 12.0 11.0  --   --  10.0    < > = values in this interval not displayed.     Estimated Creatinine Clearance: 99.9 mL/min (by C-G formula based on SCr of 0.76 mg/dL).  Results from last 7 days   Lab Units 07/27/21  1819   MAGNESIUM mg/dL 1.7         Results from last 7 days   Lab Units 08/03/21  0600 08/01/21 0627 07/31/21  0647 07/30/21  0532 07/29/21  0626   WBC 10*3/mm3 15.80* 12.57* 13.88* 9.62 7.54   HEMOGLOBIN g/dL 10.1* 9.7* 10.4* 9.4* 9.5*   HEMATOCRIT % 31.1* 30.7* 32.8* 29.7* 29.6*   PLATELETS 10*3/mm3 449 442 483* 403 342           Culture Data:   No results found for: BLOODCX  No results found for: URINECX  No results found for: RESPCX  No results found for: WOUNDCX  No results found for: STOOLCX  No components found for: BODYFLD    Radiology Data:   Imaging Results (Last 24 Hours)     ** No results found for the last 24 hours. **          I have reviewed the patient's current medications.     Assessment/Plan     Active Hospital Problems    Diagnosis  POA   • **Acute respiratory failure with hypoxia (CMS/HCC) [J96.01]  Yes   • Severe malnutrition (CMS/HCC) [E43]  Yes   • Obesity (BMI 30-39.9) [E66.9]  Unknown   • Metabolic encephalopathy [G93.41]  Unknown   • Acute hypoxemic respiratory failure due to COVID-19  (CMS/[facility]) [U07.1, J96.01]  Yes   • COVID-19 virus detected [U07.1]  Yes   • Pneumonia due to COVID-19 virus [U07.1, J12.82]  Yes   • Type 2 diabetes mellitus with hyperglycemia, with long-term current use of insulin (CMS/[facility]) [E11.65, Z79.4]  Not Applicable   • Benign essential HTN [I10]  Yes       Plan:    1.  Acute hypoxic respiratory failure: Secondary to Covid pneumonia.  Continue supplemental oxygen as needed.  Bronchodilators.  Patient currently on 5 L of oxygen.    2.  Covid pneumonia: Decadron 7/10. Remdesivir contraindicated due to duration of symptoms.  Continue to trend inflammatory markers.  Decadron was increased to twice daily secondary to worsening hypoxia.  3.  Diabetes mellitus: Continue long-acting insulin. Continue sliding scale.  4.  Hypertension: Continue home medication.  5.  DVT prophylaxis: Lovenox.      Discharge Planning: I expect patient to be discharged to home in 2-3 days.    I confirmed that the patient's Advance Care Plan is present, code status is documented, or surrogate decision maker is listed in the patient's medical record.      I have utilized all available immediate resources to obtain, update, or review the patient's current medications.         This document has been electronically signed by JULIANA Cameron on August 3, 2021 10:53 CDT

## 2021-08-03 NOTE — PLAN OF CARE
Goal Outcome Evaluation:           Progress: improving  Outcome Summary: vss, denies pain, O2 @ 3l/min, cont to monitor

## 2021-08-03 NOTE — THERAPY TREATMENT NOTE
Acute Care - Physical Therapy Treatment Note  [facility]     Patient Name: Ade Wilson  : 1960  MRN: [id number]  Today's Date: 8/3/2021      Visit Dx:     ICD-10-CM ICD-9-CM   1. Acute respiratory failure with hypoxia (CMS/HCC)  J96.01 518.81   2. Pneumonia of both lungs due to infectious organism, unspecified part of lung  J18.9 483.8   3. COVID-19  U07.1 079.89   4. Acute renal failure, unspecified acute renal failure type (CMS/HCC)  N17.9 584.9   5. Impaired mobility and activities of daily living  Z74.09 V49.89    Z78.9    6. Impaired functional mobility, balance, gait, and endurance  Z74.09 V49.89     Patient Active Problem List   Diagnosis   • Chest pain   • Stable angina pectoris (CMS/HCC)   • Type 2 diabetes mellitus with hyperglycemia, with long-term current use of insulin (CMS/[facility])   • Benign essential HTN   • Hypertriglyceridemia   • Morbid obesity with BMI of 40.0-44.9, adult (CMS/HCC)   • Chronic stable angina (CMS/HCC)   • Acute respiratory failure with hypoxia (CMS/[facility])   • Acute hypoxemic respiratory failure due to COVID-19 (CMS/HCC)   • COVID-19 virus detected   • Pneumonia due to COVID-19 virus   • Obesity (BMI 30-39.9)   • Metabolic encephalopathy   • Severe malnutrition (CMS/HCC)     Past Medical History:   Diagnosis Date   • Diabetes (CMS/[facility])    • Hypertension      Past Surgical History:   Procedure Laterality Date   • CARDIAC CATHETERIZATION N/A 2020    Procedure: Left Heart Cath;  Surgeon: Brayan Santoro MD;  Location: [facility] CATH INVASIVE LOCATION;  Service: Cardiology;  Laterality: N/A;   • CERVICAL FUSION     • HYSTERECTOMY      partial   • JOINT REPLACEMENT     • REPLACEMENT TOTAL KNEE Bilateral         PT Assessment (last 12 hours)      PT Evaluation and Treatment     Row Name 21 1400          Physical Therapy Time and Intention    Subjective Information  no complaints  -RW     Document Type  therapy note (daily note)  -RW     Mode of Treatment  physical  therapy;individual therapy  -RW     Patient Effort  good  -RW     Row Name 08/03/21 1400          General Information    Patient Profile Reviewed  yes  -RW     Existing Precautions/Restrictions  fall;oxygen therapy device and L/min  -RW     Row Name 08/03/21 1400          Cognition    Affect/Mental Status (Cognitive)  WFL  -RW     Orientation Status (Cognition)  oriented x 4  -RW     Follows Commands (Cognition)  WFL  -RW     Cognitive Function (Cognitive)  WFL  -RW     Row Name 08/03/21 1400          Pain Scale: Numbers Pre/Post-Treatment    Pretreatment Pain Rating  0/10 - no pain  -RW     Posttreatment Pain Rating  0/10 - no pain  -RW     Row Name 08/03/21 1400          Bed Mobility    Supine-Sit [place] (Bed Mobility)  modified independence  -RW     Sit-Supine [place] (Bed Mobility)  modified independence  -RW     Assistive Device (Bed Mobility)  bed rails;head of bed elevated  -RW     Row Name 08/03/21 1400          Transfers    Transfers  sit-stand transfer;stand-sit transfer  -RW     Sit-Stand [place] (Transfers)  independent  -RW     Stand-Sit [place] (Transfers)  independent  -RW     Row Name 08/03/21 1400          Gait/Stairs (Locomotion)    [place] Level (Gait)  independent  -RW     Distance in Feet (Gait)  50 x 2, 75, 125  -RW     Pattern (Gait)  step-through  -RW     Row Name 08/03/21 1400          Knee (Therapeutic Exercise)    Knee (Therapeutic Exercise)  strengthening exercise  -RW     Knee AROM (Therapeutic Exercise)  LAQ (long arc quad);10 repetitions;5 repetitions;2 sets  -RW     Row Name 08/03/21 1400          Vital Signs    Pretreatment Heart Rate (beats/min)  88  -RW     Pre SpO2 (%)  91  -RW     O2 Delivery Pre Treatment  supplemental O2  -RW     Intra SpO2 (%)  92  -RW     O2 Delivery Intra Treatment  supplemental O2  -RW     Post SpO2 (%)  95  -RW     O2 Delivery Post Treatment  supplemental O2  -RW     Row Name 08/03/21 1400          Bed Mobility Goal 1 (PT)     Activity/Assistive Device (Bed Mobility Goal 1, PT)  sit to supine;supine to sit  -RW     [place] Level/Cues Needed (Bed Mobility Goal 1, PT)  independent  -RW     Time Frame (Bed Mobility Goal 1, PT)  by discharge  -RW     Progress/Outcomes (Bed Mobility Goal 1, PT)  (S) goal met  -RW     Row Name 08/03/21 1400          Transfer Goal 1 (PT)    Activity/Assistive Device (Transfer Goal 1, PT)  sit-to-stand/stand-to-sit;bed-to-chair/chair-to-bed  -RW     [place] Level/Cues Needed (Transfer Goal 1, PT)  1 person assist;modified independence  -RW     Time Frame (Transfer Goal 1, PT)  by discharge  -RW     Progress/Outcome (Transfer Goal 1, PT)  (S) goal met  -RW     Row Name 08/03/21 1400          Gait Training Goal 1 (PT)    Activity/Assistive Device (Gait Training Goal 1, PT)  gait (walking locomotion);assistive device use;backward stepping  -RW     [place] Level (Gait Training Goal 1, PT)  modified independence  -RW     Distance (Gait Training Goal 1, PT)  50'x3  -RW     Time Frame (Gait Training Goal 1, PT)  by discharge  -RW     Progress/Outcome (Gait Training Goal 1, PT)  (S) goal met  -RW     Row Name 08/03/21 1400          Positioning and Restraints    Pre-Treatment Position  in bed  -RW     Post Treatment Position  chair  -RW     In Chair  call light within reach;encouraged to call for assist;notified nsg;reclined  -RW     Row Name 08/03/21 1400          Therapy Assessment/Plan (PT)    Rehab Potential (PT)  good, to achieve stated therapy goals  -RW       User Key  (r) = Recorded By, (t) = Taken By, (c) = Cosigned By    Initials Name Provider Type    RW Aldo Castillo, PTA Physical Therapy Assistant        Physical Therapy Education                 Title: PT OT SLP Therapies (In Progress)     Topic: Physical Therapy (Done)     Point: Mobility training (Done)     Learning Progress Summary           Patient Acceptance, E,TB, VU by LR at 7/28/2021 7940    Comment: Educated on PT POC and goals.                    Point: Home exercise program (Done)     Learning Progress Summary           Patient Acceptance, E,TB, VU by LR at 7/28/2021 1551    Comment: Educated on PT POC and goals.                   Point: Body mechanics (Done)     Learning Progress Summary           Patient Acceptance, E,TB, VU by LR at 7/28/2021 1551    Comment: Educated on PT POC and goals.                   Point: Precautions (Done)     Learning Progress Summary           Patient Acceptance, E,TB, VU by LR at 7/28/2021 1551    Comment: Educated on PT POC and goals.                               User Key     Initials Effective Dates Name Provider Type Discipline    LR 06/16/21 -  Jake Anne Physical Therapist PT              PT Recommendation and Plan  Anticipated Discharge Disposition (PT): home with assist, home with home health, home with outpatient therapy services  Plan of Care Reviewed With: patient  Progress: improving  Outcome Summary: pt mod ind with bed mob and ind with gt. gt x 4 bouts of 50, 50, 75. and 125' . o2 sats initally at 91% sup and drops to 89 with first gt. as gt continues pt sats stay in 90' s and elevate to 92-95 %. stil on 4 liters of o2 performed some eoh therex and then in chair. vss.       Time Calculation:   PT Charges     Row Name 08/03/21 1536             Time Calculation    Start Time  1400  -RW      Stop Time  1438  -RW      Time Calculation (min)  38 min  -RW         Time Calculation- PT    Total Timed Code Minutes- PT  38 minute(s)  -RW         Timed Charges    25553 - Gait Training Minutes   23  -RW      92078 - PT Therapeutic Activity Minutes  15  -RW         Total Minutes    Timed Charges Total Minutes  38  -RW       Total Minutes  38  -RW        User Key  (r) = Recorded By, (t) = Taken By, (c) = Cosigned By    Initials Name Provider Type    RW Aldo Castillo PTA Physical Therapy Assistant        Therapy Charges for Today     Code Description Service Date Service Provider Modifiers Qty     77983633189 HC PT THER PROC EA 15 MIN 8/2/2021 Aldo Castillo, PTA GP 1    94909871203 HC PT THERAPEUTIC ACT EA 15 MIN 8/2/2021 Aldo Castillo, PTA GP 1    34929699617 HC GAIT TRAINING EA 15 MIN 8/3/2021 Aldo Castillo, PTA GP 2    76341176106 HC PT THERAPEUTIC ACT EA 15 MIN 8/3/2021 Aldo Castillo, PTA GP 1          PT G-Codes  Outcome Measure Options: AM-PAC 6 Clicks Basic Mobility (PT)  AM-PAC 6 Clicks Score (PT): 23  AM-PAC 6 Clicks Score (OT): 24    Aldo Castillo PTA  8/3/2021

## 2021-08-03 NOTE — PLAN OF CARE
Goal Outcome Evaluation:  Plan of Care Reviewed With: patient        Progress: improving  Outcome Summary: Patient receive sitting up in bed. VS stable. Slept well throughout the night offered no complaints. Condition remains stable. will continue to monitor.

## 2021-08-03 NOTE — THERAPY TREATMENT NOTE
Patient Name: Ade Wilson  : 1960    MRN: [id number]                              Today's Date: 8/3/2021       Admit Date: 2021    Visit Dx:     ICD-10-CM ICD-9-CM   1. Acute respiratory failure with hypoxia (CMS/HCC)  J96.01 518.81   2. Pneumonia of both lungs due to infectious organism, unspecified part of lung  J18.9 483.8   3. COVID-19  U07.1 079.89   4. Acute renal failure, unspecified acute renal failure type (CMS/HCC)  N17.9 584.9   5. Impaired mobility and activities of daily living  Z74.09 V49.89    Z78.9    6. Impaired functional mobility, balance, gait, and endurance  Z74.09 V49.89     Patient Active Problem List   Diagnosis   • Chest pain   • Stable angina pectoris (CMS/[facility])   • Type 2 diabetes mellitus with hyperglycemia, with long-term current use of insulin (CMS/[facility])   • Benign essential HTN   • Hypertriglyceridemia   • Morbid obesity with BMI of 40.0-44.9, adult (CMS/[facility])   • Chronic stable angina (CMS/[facility])   • Acute respiratory failure with hypoxia (CMS/[facility])   • Acute hypoxemic respiratory failure due to COVID-19 (CMS/[facility])   • COVID-19 virus detected   • Pneumonia due to COVID-19 virus   • Obesity (BMI 30-39.9)   • Metabolic encephalopathy   • Severe malnutrition (CMS/[facility])     Past Medical History:   Diagnosis Date   • Diabetes (CMS/[facility])    • Hypertension      Past Surgical History:   Procedure Laterality Date   • CARDIAC CATHETERIZATION N/A 2020    Procedure: Left Heart Cath;  Surgeon: Brayan Santoro MD;  Location: [facility] INVASIVE LOCATION;  Service: Cardiology;  Laterality: N/A;   • CERVICAL FUSION     • HYSTERECTOMY      partial   • JOINT REPLACEMENT     • REPLACEMENT TOTAL KNEE Bilateral      General Information     Row Name 21 0850          OT Time and Intention    Document Type  therapy note (daily note)  -     Mode of Treatment  individual therapy;occupational therapy  -     Row Name 21 0850          General Information    Patient Profile Reviewed   yes  -     Existing Precautions/Restrictions  fall;oxygen therapy device and L/min  -     Row Name 08/03/21 0850          Cognition    Orientation Status (Cognition)  oriented x 4  -     Row Name 08/03/21 0850          Safety Issues, Functional Mobility    Impairments Affecting Function (Mobility)  balance;endurance/activity tolerance;strength;coordination  -       User Key  (r) = Recorded By, (t) = Taken By, (c) = Cosigned By    Initials Name Provider Type     Pinky Omer COTA/L Occupational Therapy Assistant          Mobility/ADL's    No documentation.       Obj/Interventions    No documentation.       Goals/Plan     Row Name 08/03/21 0850          Bed Mobility Goal 1 (OT)    Activity/Assistive Device (Bed Mobility Goal 1, OT)  sit to supine/supine to sit  -RC     [place] Level/Cues Needed (Bed Mobility Goal 1, OT)  independent  -RC     Time Frame (Bed Mobility Goal 1, OT)  long term goal (LTG);by discharge  -RC     Progress/Outcomes (Bed Mobility Goal 1, OT)  goal met  -[facility] Name 08/03/21 0850          Transfer Goal 1 (OT)    Activity/Assistive Device (Transfer Goal 1, OT)  sit-to-stand/stand-to-sit;toilet  -RC     [place] Level/Cues Needed (Transfer Goal 1, OT)  standby assist;tactile cues required;verbal cues required  -RC     Time Frame (Transfer Goal 1, OT)  long term goal (LTG);by discharge  -RC     Progress/Outcome (Transfer Goal 1, OT)  goal met  -     Row Name 08/03/21 0850          Bathing Goal 1 (OT)    Activity/Device (Bathing Goal 1, OT)  lower body bathing  -RC     [place] Level/Cues Needed (Bathing Goal 1, OT)  standby assist;tactile cues required;verbal cues required  -RC     Time Frame (Bathing Goal 1, OT)  long term goal (LTG);by discharge  -RC     Progress/Outcomes (Bathing Goal 1, OT)  goal met  -     Row Name 08/03/21 0850          Dressing Goal 1 (OT)    Activity/Device (Dressing Goal 1, OT)  lower body dressing  -RC     [place]/Cues Needed  (Dressing Goal 1, OT)  standby assist;tactile cues required;verbal cues required  -RC     Time Frame (Dressing Goal 1, OT)  long term goal (LTG);by discharge  -RC     Progress/Outcome (Dressing Goal 1, OT)  goal not met  -RC     Row Name 08/03/21 0850          Toileting Goal 1 (OT)    Activity/Device (Toileting Goal 1, OT)  toileting skills, all  -RC     [place] Level/Cues Needed (Toileting Goal 1, OT)  standby assist;tactile cues required;verbal cues required  -RC     Time Frame (Toileting Goal 1, OT)  long term goal (LTG);by discharge  -RC     Progress/Outcome (Toileting Goal 1, OT)  goal met  -RC       User Key  (r) = Recorded By, (t) = Taken By, (c) = Cosigned By    Initials Name Provider Type    Pinky Mccarthy COTA/L Occupational Therapy Assistant        Clinical Impression    No documentation.       Outcome Measures    No documentation.         Occupational Therapy Education                 Title: PT OT SLP Therapies (In Progress)     Topic: Occupational Therapy (In Progress)     Point: ADL training (Done)     Description:   Instruct learner(s) on proper safety adaptation and remediation techniques during self care or transfers.   Instruct in proper use of assistive devices.              Learning Progress Summary           Patient Acceptance, E, VU by BB at 8/1/2021 1409                   Point: Home exercise program (Not Started)     Description:   Instruct learner(s) on appropriate technique for monitoring, assisting and/or progressing therapeutic exercises/activities.              Learner Progress:  Not documented in this visit.          Point: Precautions (Done)     Description:   Instruct learner(s) on prescribed precautions during self-care and functional transfers.              Learning Progress Summary           Patient Acceptance, E, VU by RASHEED at 7/28/2021 [id number]    Comment: Pt educated on role of OT, d/c recs, and POC                   Point: Body mechanics (Not Started)     Description:    Instruct learner(s) on proper positioning and spine alignment during self-care, functional mobility activities and/or exercises.              Learner Progress:  Not documented in this visit.                      User Key     Initials Effective Dates Name Provider Type Discipline    BB 06/16/21 -  Nelsy Coles KENDALL/L Occupational Therapy Assistant OT     06/16/21 -  Francisco Man OT Occupational Therapist OT              OT Recommendation and Plan     Plan of Care Review  Plan of Care Reviewed With: patient  Progress: improving  Outcome Summary: nursing OK'd OT, pt in bed and agreeable to OT, modified I w/ sup<>sit, (sit<>stand,x6) amb in room w/ sba ~ 30 ft benigno tx well cont poc     Time Calculation:   Time Calculation- OT     Row Name 08/03/21 1141             Time Calculation- OT    OT Start Time  0850  -RC      OT Stop Time  0913  -RC      OT Time Calculation (min)  23 min  -RC      Total Timed Code Minutes- OT  23 minute(s)  -RC      OT Received On  08/03/21  -RC         Timed Charges    43238 - OT Therapeutic Activity Minutes  23  -RC         Total Minutes    Timed Charges Total Minutes  23  -RC       Total Minutes  23  -RC        User Key  (r) = Recorded By, (t) = Taken By, (c) = Cosigned By    Initials Name Provider Type    RC Pinky Omer, KENDALL/L Occupational Therapy Assistant        Therapy Charges for Today     Code Description Service Date Service Provider Modifiers Qty    62281656148 HC OT THER PROC EA 15 MIN 8/2/2021 Pinky Omer, KENDALL/L GO 2    17969496351 HC OT THERAPEUTIC ACT EA 15 MIN 8/3/2021 Vale Omera JEIMY, KENDALL/L GO 2               Pinky JEIMY Omer, KENDALL/L  8/3/2021

## 2021-08-03 NOTE — PAYOR COMM NOTE
"    Michelle Alejo RN [facility]  [phone]       Phone  [phone]        Fax  Cont. Stay review      Ade Wilson (60 y.o. Female)     Date of Birth Social Security Number Address Home Phone MRN    1960  [address] [place] [address] [phone] [id number]    [employer] Marital Status          None        Admission Date Admission Type Admitting Provider Attending Provider Department, Room/Bed    7/27/21 Emergency Sunil Perez MD Williams, Kevin L, MD [address], 421/1    Discharge Date Discharge Disposition Discharge Destination                       Attending Provider: Sunil Perez MD    Allergies: Tramadol, Tape    Isolation: Enh Drop/Con   Infection: COVID (confirmed) (07/27/21)   Code Status: CPR    Ht: 170.2 cm (67\")   Wt: 111 kg (244 lb 4.3 oz)    Admission Cmt: None   Principal Problem: Acute respiratory failure with hypoxia (CMS/[facility]) [J96.01]                 Active Insurance as of 7/27/2021     Primary Coverage     Payor Plan Insurance Group Employer/Plan Group    [facility]     Payor Plan Address Payor Plan Phone Number Payor Plan Fax Number Effective Dates    [address] [address]   2/28/2019 - None Entered    [facility] [id number]       Subscriber Name Subscriber Birth Date Member ID       ADE WILSON 1960 [id number]                 Emergency Contacts      (Rel.) Home Phone Work Phone Mobile Phone    SARAH MARTINO (Sister) [phone] -- [phone]            Vital Signs (last day)     Date/Time   Temp   Temp src   Pulse   Resp   BP   Patient Position   SpO2    08/03/21 0814   --   --   --   --   --   --   92    08/03/21 0813   98.4 (36.9)   Oral   71   18   133/63   Lying   (!) 88    08/03/21 0727   --   --   68   18   --   --   93    08/03/21 0600   --   --   --   --   --   --   92    08/03/21 0407   98 (36.7)   Oral   55   18   121/63   Lying   95    08/03/21 0308   --   --   53 "   --   --   --   --    08/02/21 2350   98.3 (36.8)   Oral   58   18   160/65   Lying   93    08/02/21 2100   97.3 (36.3)   Oral   72   18   124/58   Lying   94    08/02/21 1939   --   --   79   20   --   --   94    08/02/21 1917   --   --   88   --   --   --   --    08/02/21 1641   --   --   68   16   --   --   98    08/02/21 1557   97.5 (36.4)   Axillary   66   18   132/62   Lying   92    08/02/21 1223   --   --   77   16   --   --   96    08/02/21 1116   --   --   72   18   103/62   Sitting   93    08/02/21 0846   96.2 (35.7)   Oral   67   18   130/63   Sitting   92    08/02/21 0839   --   --   73   16   --   --   96    08/02/21 0824   --   --   65   --   --   --   --    08/02/21 0350   98.1 (36.7)   Oral   60   18   144/62   Lying   94    08/02/21 0153   --   --   78   --   --   --   --              Oxygen Therapy (last day)     Date/Time   SpO2   Device (Oxygen Therapy)   Flow (L/min)   Oxygen Concentration (%)   ETCO2 (mmHg)    08/03/21 0814   92   nasal cannula   5   --   --    08/03/21 0813   (!) 88   nasal cannula   (S) 4.5   --   --    08/03/21 0727   93   nasal cannula   4.5   --   --    08/03/21 0600   92   --   --   --   --    08/03/21 0407   95   nasal cannula   4.5   --   --    08/02/21 2350   93   nasal cannula   5   --   --    08/02/21 2100   94   nasal cannula   3   --   --    08/02/21 2000   --   nasal cannula   3   --   --    08/02/21 1939   94   nasal cannula   3   --   --    08/02/21 1641   98   nasal cannula   3   --   --    08/02/21 1557   92   nasal cannula   3   --   --    08/02/21 1223   96   nasal cannula   3   --   --    08/02/21 1116   93   nasal cannula   3   --   --    08/02/21 0846   92   nasal cannula   3   --   --    08/02/21 0839   96   nasal cannula   (S) 4   --   --    08/02/21 0350   94   nasal cannula   4   --   --              Current Facility-Administered Medications   Medication Dose Route Frequency Provider Last Rate Last Admin   • acetaminophen (TYLENOL) tablet 650 mg  650  mg Oral Q6H PRN Figueroa Mejía APRN   650 mg at 07/28/21 1126   • albuterol sulfate HFA (PROVENTIL HFA;VENTOLIN HFA;PROAIR HFA) inhaler 2 puff  2 puff Inhalation 4x Daily - RT Figueroa Mejía APRN   2 puff at 08/03/21 0727   • amLODIPine (NORVASC) tablet 5 mg  5 mg Oral Q24H Sunil Perez MD   5 mg at 08/03/21 0815   • atorvastatin (LIPITOR) tablet 40 mg  40 mg Oral Nightly Sunil Perez MD   40 mg at 08/02/21 2117   • benzonatate (TESSALON) capsule 200 mg  200 mg Oral TID PRN Figueroa Mejía APRN   200 mg at 08/01/21 2055   • budesonide-formoterol (SYMBICORT) 160-4.5 MCG/ACT inhaler 2 puff  2 puff Inhalation BID - RT Figueroa Mejía APRN   2 puff at 08/03/21 0727   • dexamethasone (DECADRON) injection 6 mg  6 mg Intravenous BID Levill, Annmarie G, APRN   6 mg at 08/02/21 2117    Or   • dexamethasone (DECADRON) tablet 6 mg  6 mg Oral BID Levill, Annmarie G, APRN   6 mg at 08/03/21 0815   • dextrose (D50W) 25 g/ 50mL Intravenous Solution 25 g  25 g Intravenous Q15 Min PRN Sunil Perez MD       • dextrose (GLUTOSE) oral gel 15 g  15 g Oral Q15 Min PRN Sunil Perez MD       • enoxaparin (LOVENOX) syringe 40 mg  40 mg Subcutaneous Q24H Sunil Perez MD   40 mg at 08/02/21 2347   • glucagon (human recombinant) (GLUCAGEN DIAGNOSTIC) injection 1 mg  1 mg Subcutaneous Q15 Min PRN Sunil Perez MD       • insulin aspart (novoLOG) injection 0-14 Units  0-14 Units Subcutaneous TID AC Sunil Perez MD   10 Units at 08/03/21 0816   • insulin aspart (novoLOG) injection 8 Units  8 Units Subcutaneous TID With Meals Figueroa Mejía APRN   8 Units at 08/03/21 0816   • insulin detemir (LEVEMIR) injection 50 Units  50 Units Subcutaneous Daily Sunil Perez MD   50 Units at 08/03/21 0817   • ipratropium (ATROVENT HFA) inhaler 2 puff  2 puff Inhalation 4x Daily - RT Figueroa Mejía APRN   2 puff at 08/03/21 0727   • lisinopril  (PRINIVIL,ZESTRIL) tablet 20 mg  20 mg Oral Daily Sunil Perez MD   20 mg at 08/03/21 0815   • magnesium hydroxide (MILK OF MAGNESIA) suspension 2400 mg/10mL 5 mL  5 mL Oral Daily PRN Sunil Perez MD       • nystatin (MYCOSTATIN) powder   Topical Q12H Figueroa Mejía APRN   Given at 08/03/21 0820   • pantoprazole (PROTONIX) EC tablet 40 mg  40 mg Oral Q AM Annmarie Kelley APRN   40 mg at 08/03/21 0618   • potassium chloride (MICRO-K) CR capsule 40 mEq  40 mEq Oral PRN Annmarie Kelley APRN        Or   • potassium chloride (KLOR-CON) packet 40 mEq  40 mEq Oral PRN Annmarie Kelley APRN        Or   • potassium chloride 10 mEq in 100 mL IVPB  10 mEq Intravenous Q1H PRN Annmarie Kelley APRN       • pregabalin (LYRICA) capsule 150 mg  150 mg Oral Q12H Sunil Perez MD   150 mg at 08/03/21 0815   • sertraline (ZOLOFT) tablet 50 mg  50 mg Oral Daily Sunil Perez MD   50 mg at 08/03/21 0815   • tiZANidine (ZANAFLEX) half tablet 5 mg  5 mg Oral Daily Sunil Perez MD   5 mg at 08/03/21 0815        Physician Progress Notes (last 24 hours) (Notes from 08/02/21 0849 through 08/03/21 0849)      Annmarie Kelley APRN at 08/02/21 1552     Attestation signed by Augustin Michel MD at 08/02/21 3402    I personally evaluated and examined the patient in conjunction with JULIANA Phan and agree with the assessment, treatment plan, and disposition of the patient as recorded.                          [facility] Medicine Services  INPATIENT PROGRESS NOTE    Length of Stay: 6  Date of Admission: 7/27/2021  Primary Care Physician: Provider, No Known    Subjective   Chief Complaint: No complaints    HPI:      8/2/2021: Patient has been able to wean down to 3 L today.  She states she is still extremely weak.    8/1/2021: Patient has had to have an increase in oxygen today.  She is currently up to 6 L of oxygen.  We will increase her steroids to twice  daily.    7/31/2021:  Patient continues to have episodes of desaturations with ambulation.    7/30/2021:  Patient currently on 2 liters. Patient dropped to 87% on room air.      7/29/2021: Patient is currently on 2 L of oxygen.      H&P by Dr. Perez: Patient is a 60-year-old female past medical history of hypertension diabetes who presented to the emergency department after being found confused at her home on a welfare check. Patient states that she knows that she has been very confused for quite a few days, but was unable to figure out how to do anything about it. Patient states that she remembers going to her primary care provider on a Wednesday, which based on available information was 7/14, with cough for 2 days. She states at that point she was checked for Covid and influenza and both tests were negative. She states that a couple of days later she went to the emergency room feeling more poorly and the ER doctor told her she had a viral syndrome and there wasn't anything that she could do about it. She then went home and became significantly confused. After she missed 12 days of work and no one had heard from, her place of employment sent the police for a welfare check. She states that during those 12 days she couldn't figure out how to turn on her television, turn on her shower, or use her telephone. She states that she was drinking some but not eating much. She states she had a significant cough throughout the majority of this time with increasing shortness of breath. She does state that she feels clearer mentally after arrival at the hospital. She has not been vaccinated against COVID-19.    Review of Systems   Constitutional: Positive for fatigue. Negative for activity change.   HENT: Negative for ear pain and sore throat.    Eyes: Negative for pain and discharge.   Respiratory: Negative for cough and shortness of breath.    Cardiovascular: Negative for chest pain and palpitations.   Gastrointestinal:  Negative for abdominal pain and nausea.   Endocrine: Negative for cold intolerance and heat intolerance.   Genitourinary: Negative for difficulty urinating and dysuria.   Musculoskeletal: Negative for arthralgias and gait problem.   Skin: Negative for color change and rash.   Neurological: Negative for dizziness and weakness.   Psychiatric/Behavioral: Negative for agitation and confusion.        Objective    Temp:  [96.2 °F (35.7 °C)-98.8 °F (37.1 °C)] 96.2 °F (35.7 °C)  Heart Rate:  [60-78] 77  Resp:  [16-20] 16  BP: (103-144)/(62-68) 103/62    Physical Exam  Constitutional:       Appearance: She is well-developed.   HENT:      Head: Normocephalic and atraumatic.   Eyes:      Pupils: Pupils are equal, round, and reactive to light.   Cardiovascular:      Rate and Rhythm: Normal rate and regular rhythm.   Pulmonary:      Effort: Pulmonary effort is normal.      Breath sounds: Normal breath sounds.   Abdominal:      General: Bowel sounds are normal.      Palpations: Abdomen is soft.   Musculoskeletal:         General: Normal range of motion.      Cervical back: Normal range of motion and neck supple.   Skin:     General: Skin is warm and dry.   Neurological:      Mental Status: She is alert and oriented to person, place, and time.   Psychiatric:         Behavior: Behavior normal.       Results Review:  I have reviewed the labs, radiology results, and diagnostic studies.    Laboratory Data:   Results from last 7 days   Lab Units 08/01/21  0627 07/31/21  2109 07/31/21  0647 07/30/21  0532 07/30/21  0532   SODIUM mmol/L 136  --  138  --  135*   POTASSIUM mmol/L 4.2  --  4.4  --  4.8   CHLORIDE mmol/L 104  --  101  --  103   CO2 mmol/L 21.0*  --  27.0  --  24.0   BUN mg/dL 19  --  24*  --  28*   CREATININE mg/dL 0.67  --  0.81  --  0.86   GLUCOSE mg/dL 151* 354* 144*   < > 250*   CALCIUM mg/dL 9.0  --  9.6  --  9.5   BILIRUBIN mg/dL 0.3  --  0.3  --  0.2   ALK PHOS U/L 72  --  75  --  72   ALT (SGPT) U/L 23  --  26  --   23   AST (SGOT) U/L 16  --  19  --  20   ANION GAP mmol/L 11.0  --  10.0  --  8.0    < > = values in this interval not displayed.     Estimated Creatinine Clearance: 113.3 mL/min (by C-G formula based on SCr of 0.67 mg/dL).  Results from last 7 days   Lab Units 07/27/21  1819   MAGNESIUM mg/dL 1.7         Results from last 7 days   Lab Units 08/01/21  0627 07/31/21  0647 07/30/21  0532 07/29/21  0626 07/28/21  0550   WBC 10*3/mm3 12.57* 13.88* 9.62 7.54 6.65   HEMOGLOBIN g/dL 9.7* 10.4* 9.4* 9.5* 9.4*   HEMATOCRIT % 30.7* 32.8* 29.7* 29.6* 28.4*   PLATELETS 10*3/mm3 442 483* 403 342 324           Culture Data:   No results found for: BLOODCX  No results found for: URINECX  No results found for: RESPCX  No results found for: WOUNDCX  No results found for: STOOLCX  No components found for: BODYFLD    Radiology Data:   Imaging Results (Last 24 Hours)     ** No results found for the last 24 hours. **          I have reviewed the patient's current medications.     Assessment/Plan     Active Hospital Problems    Diagnosis  POA   • **Acute respiratory failure with hypoxia (CMS/HCC) [J96.01]  Yes   • Severe malnutrition (CMS/HCC) [E43]  Yes   • Obesity (BMI 30-39.9) [E66.9]  Unknown   • Metabolic encephalopathy [G93.41]  Unknown   • Acute hypoxemic respiratory failure due to COVID-19 (CMS/HCC) [U07.1, J96.01]  Yes   • COVID-19 virus detected [U07.1]  Yes   • Pneumonia due to COVID-19 virus [U07.1, J12.82]  Yes   • Type 2 diabetes mellitus with hyperglycemia, with long-term current use of insulin (CMS/HCC) [E11.65, Z79.4]  Not Applicable   • Benign essential HTN [I10]  Yes       Plan:    1.  Acute hypoxic respiratory failure: Secondary to Covid pneumonia.  Continue supplemental oxygen as needed.  Bronchodilators.  Patient currently on 3 L of oxygen.    2.  Covid pneumonia: Decadron 6/10. Remdesivir contraindicated due to duration of symptoms.  Continue to trend inflammatory markers.  Decadron will be increased to twice daily  secondary to worsening hypoxia.  3.  Diabetes mellitus: Continue long-acting insulin. Continue sliding scale.  4.  Hypertension: Continue home medication.  5.  DVT prophylaxis: Lovenox.      Discharge Planning: I expect patient to be discharged to home in 2-3 days.    I confirmed that the patient's Advance Care Plan is present, code status is documented, or surrogate decision maker is listed in the patient's medical record.      I have utilized all available immediate resources to obtain, update, or review the patient's current medications.         This document has been electronically signed by JULIANA Cameron on August 2, 2021 15:57 CDT          Electronically signed by Augustin Michel MD at 08/02/21 1604       Medical Student Notes (last 24 hours) (Notes from 08/02/21 0849 through 08/03/21 0849)    No notes of this type exist for this encounter.         Consult Notes (last 24 hours) (Notes from 08/02/21 0849 through 08/03/21 0849)    No notes of this type exist for this encounter.

## 2021-08-03 NOTE — PLAN OF CARE
Goal Outcome Evaluation:  Plan of Care Reviewed With: patient        Progress: improving  Outcome Summary: pt mod ind with bed mob and ind with gt. gt x 4 bouts of 50, 50, 75. and 125' . o2 sats initally at 91% sup and drops to 89 with first gt. as gt continues pt sats stay in 90' s and elevate to 92-95 %. stil on 4 liters of o2 performed some eoh therex and then in chair. vss.

## 2021-08-04 ENCOUNTER — APPOINTMENT (OUTPATIENT)
Dept: ULTRASOUND IMAGING | Facility: HOSPITAL | Age: 61
End: 2021-08-04

## 2021-08-04 LAB
ALBUMIN SERPL-MCNC: 3.1 G/DL (ref 3.5–5.2)
ALBUMIN/GLOB SERPL: 1 G/DL
ALP SERPL-CCNC: 88 U/L (ref 39–117)
ALT SERPL W P-5'-P-CCNC: 28 U/L (ref 1–33)
ANION GAP SERPL CALCULATED.3IONS-SCNC: 10 MMOL/L (ref 5–15)
AST SERPL-CCNC: 33 U/L (ref 1–32)
BASOPHILS # BLD AUTO: 0.03 10*3/MM3 (ref 0–0.2)
BASOPHILS NFR BLD AUTO: 0.2 % (ref 0–1.5)
BILIRUB SERPL-MCNC: 0.2 MG/DL (ref 0–1.2)
BUN SERPL-MCNC: 27 MG/DL (ref 8–23)
BUN/CREAT SERPL: 34.2 (ref 7–25)
CALCIUM SPEC-SCNC: 8.8 MG/DL (ref 8.6–10.5)
CHLORIDE SERPL-SCNC: 103 MMOL/L (ref 98–107)
CO2 SERPL-SCNC: 22 MMOL/L (ref 22–29)
CREAT SERPL-MCNC: 0.79 MG/DL (ref 0.57–1)
DEPRECATED RDW RBC AUTO: 37.4 FL (ref 37–54)
EOSINOPHIL # BLD AUTO: 0 10*3/MM3 (ref 0–0.4)
EOSINOPHIL NFR BLD AUTO: 0 % (ref 0.3–6.2)
ERYTHROCYTE [DISTWIDTH] IN BLOOD BY AUTOMATED COUNT: 18 % (ref 12.3–15.4)
GFR SERPL CREATININE-BSD FRML MDRD: 74 ML/MIN/1.73
GLOBULIN UR ELPH-MCNC: 3.2 GM/DL
GLUCOSE BLDC GLUCOMTR-MCNC: 303 MG/DL (ref 70–130)
GLUCOSE BLDC GLUCOMTR-MCNC: 341 MG/DL (ref 70–130)
GLUCOSE BLDC GLUCOMTR-MCNC: 349 MG/DL (ref 70–130)
GLUCOSE BLDC GLUCOMTR-MCNC: 350 MG/DL (ref 70–130)
GLUCOSE BLDC GLUCOMTR-MCNC: 350 MG/DL (ref 70–130)
GLUCOSE BLDC GLUCOMTR-MCNC: 387 MG/DL (ref 70–130)
GLUCOSE BLDC GLUCOMTR-MCNC: 396 MG/DL (ref 70–130)
GLUCOSE BLDC GLUCOMTR-MCNC: 402 MG/DL (ref 70–130)
GLUCOSE SERPL-MCNC: 366 MG/DL (ref 65–99)
HCT VFR BLD AUTO: 31.4 % (ref 34–46.6)
HGB BLD-MCNC: 10.3 G/DL (ref 12–15.9)
IMM GRANULOCYTES # BLD AUTO: 0.45 10*3/MM3 (ref 0–0.05)
IMM GRANULOCYTES NFR BLD AUTO: 2.8 % (ref 0–0.5)
LYMPHOCYTES # BLD AUTO: 1.33 10*3/MM3 (ref 0.7–3.1)
LYMPHOCYTES NFR BLD AUTO: 8.3 % (ref 19.6–45.3)
MCH RBC QN AUTO: 21.5 PG (ref 26.6–33)
MCHC RBC AUTO-ENTMCNC: 32.8 G/DL (ref 31.5–35.7)
MCV RBC AUTO: 65.4 FL (ref 79–97)
MONOCYTES # BLD AUTO: 1 10*3/MM3 (ref 0.1–0.9)
MONOCYTES NFR BLD AUTO: 6.2 % (ref 5–12)
NEUTROPHILS NFR BLD AUTO: 13.29 10*3/MM3 (ref 1.7–7)
NEUTROPHILS NFR BLD AUTO: 82.5 % (ref 42.7–76)
NRBC BLD AUTO-RTO: 0.1 /100 WBC (ref 0–0.2)
PLATELET # BLD AUTO: 414 10*3/MM3 (ref 140–450)
PMV BLD AUTO: 10.7 FL (ref 6–12)
POTASSIUM SERPL-SCNC: 4.8 MMOL/L (ref 3.5–5.2)
PROT SERPL-MCNC: 6.3 G/DL (ref 6–8.5)
RBC # BLD AUTO: 4.8 10*6/MM3 (ref 3.77–5.28)
SODIUM SERPL-SCNC: 135 MMOL/L (ref 136–145)
WBC # BLD AUTO: 16.1 10*3/MM3 (ref 3.4–10.8)

## 2021-08-04 PROCEDURE — 97110 THERAPEUTIC EXERCISES: CPT

## 2021-08-04 PROCEDURE — 94760 N-INVAS EAR/PLS OXIMETRY 1: CPT

## 2021-08-04 PROCEDURE — 63710000001 INSULIN ASPART PER 5 UNITS: Performed by: NURSE PRACTITIONER

## 2021-08-04 PROCEDURE — 25010000002 ENOXAPARIN PER 10 MG: Performed by: HOSPITALIST

## 2021-08-04 PROCEDURE — 93970 EXTREMITY STUDY: CPT

## 2021-08-04 PROCEDURE — 94799 UNLISTED PULMONARY SVC/PX: CPT

## 2021-08-04 PROCEDURE — 63710000001 INSULIN DETEMIR PER 5 UNITS: Performed by: HOSPITALIST

## 2021-08-04 PROCEDURE — 25010000002 DEXAMETHASONE PER 1 MG: Performed by: NURSE PRACTITIONER

## 2021-08-04 PROCEDURE — 97530 THERAPEUTIC ACTIVITIES: CPT

## 2021-08-04 PROCEDURE — 85025 COMPLETE CBC W/AUTO DIFF WBC: CPT | Performed by: NURSE PRACTITIONER

## 2021-08-04 PROCEDURE — 82962 GLUCOSE BLOOD TEST: CPT

## 2021-08-04 PROCEDURE — 97116 GAIT TRAINING THERAPY: CPT

## 2021-08-04 PROCEDURE — 63710000001 DEXAMETHASONE PER 0.25 MG: Performed by: NURSE PRACTITIONER

## 2021-08-04 PROCEDURE — 63710000001 INSULIN ASPART PER 5 UNITS: Performed by: HOSPITALIST

## 2021-08-04 PROCEDURE — 80053 COMPREHEN METABOLIC PANEL: CPT | Performed by: NURSE PRACTITIONER

## 2021-08-04 RX ADMIN — AMLODIPINE BESYLATE 5 MG: 5 TABLET ORAL at 08:25

## 2021-08-04 RX ADMIN — SERTRALINE 50 MG: 50 TABLET, FILM COATED ORAL at 08:25

## 2021-08-04 RX ADMIN — ALBUTEROL SULFATE 2 PUFF: 90 AEROSOL, METERED RESPIRATORY (INHALATION) at 21:33

## 2021-08-04 RX ADMIN — TIZANIDINE 5 MG: 4 TABLET ORAL at 08:25

## 2021-08-04 RX ADMIN — INSULIN ASPART 8 UNITS: 100 INJECTION, SOLUTION INTRAVENOUS; SUBCUTANEOUS at 17:28

## 2021-08-04 RX ADMIN — BUDESONIDE AND FORMOTEROL FUMARATE DIHYDRATE 2 PUFF: 160; 4.5 AEROSOL RESPIRATORY (INHALATION) at 08:59

## 2021-08-04 RX ADMIN — ALBUTEROL SULFATE 2 PUFF: 90 AEROSOL, METERED RESPIRATORY (INHALATION) at 15:43

## 2021-08-04 RX ADMIN — PREGABALIN 150 MG: 75 CAPSULE ORAL at 20:35

## 2021-08-04 RX ADMIN — IPRATROPIUM BROMIDE 2 PUFF: 17 AEROSOL, METERED RESPIRATORY (INHALATION) at 11:31

## 2021-08-04 RX ADMIN — BUDESONIDE AND FORMOTEROL FUMARATE DIHYDRATE 2 PUFF: 160; 4.5 AEROSOL RESPIRATORY (INHALATION) at 21:33

## 2021-08-04 RX ADMIN — INSULIN ASPART 12 UNITS: 100 INJECTION, SOLUTION INTRAVENOUS; SUBCUTANEOUS at 12:29

## 2021-08-04 RX ADMIN — IPRATROPIUM BROMIDE 2 PUFF: 17 AEROSOL, METERED RESPIRATORY (INHALATION) at 08:59

## 2021-08-04 RX ADMIN — PANTOPRAZOLE SODIUM 40 MG: 40 TABLET, DELAYED RELEASE ORAL at 05:09

## 2021-08-04 RX ADMIN — DEXAMETHASONE SODIUM PHOSPHATE 6 MG: 4 INJECTION, SOLUTION INTRAMUSCULAR; INTRAVENOUS at 20:34

## 2021-08-04 RX ADMIN — IPRATROPIUM BROMIDE 2 PUFF: 17 AEROSOL, METERED RESPIRATORY (INHALATION) at 21:33

## 2021-08-04 RX ADMIN — INSULIN ASPART 12 UNITS: 100 INJECTION, SOLUTION INTRAVENOUS; SUBCUTANEOUS at 17:29

## 2021-08-04 RX ADMIN — PREGABALIN 150 MG: 75 CAPSULE ORAL at 08:25

## 2021-08-04 RX ADMIN — IPRATROPIUM BROMIDE 2 PUFF: 17 AEROSOL, METERED RESPIRATORY (INHALATION) at 15:43

## 2021-08-04 RX ADMIN — INSULIN ASPART 12 UNITS: 100 INJECTION, SOLUTION INTRAVENOUS; SUBCUTANEOUS at 08:26

## 2021-08-04 RX ADMIN — ENOXAPARIN SODIUM 40 MG: 40 INJECTION SUBCUTANEOUS at 00:19

## 2021-08-04 RX ADMIN — DEXAMETHASONE 6 MG: 2 TABLET ORAL at 08:25

## 2021-08-04 RX ADMIN — INSULIN ASPART 8 UNITS: 100 INJECTION, SOLUTION INTRAVENOUS; SUBCUTANEOUS at 08:26

## 2021-08-04 RX ADMIN — ALBUTEROL SULFATE 2 PUFF: 90 AEROSOL, METERED RESPIRATORY (INHALATION) at 08:59

## 2021-08-04 RX ADMIN — INSULIN ASPART 8 UNITS: 100 INJECTION, SOLUTION INTRAVENOUS; SUBCUTANEOUS at 12:29

## 2021-08-04 RX ADMIN — INSULIN DETEMIR 50 UNITS: 100 INJECTION, SOLUTION SUBCUTANEOUS at 08:22

## 2021-08-04 RX ADMIN — ALBUTEROL SULFATE 2 PUFF: 90 AEROSOL, METERED RESPIRATORY (INHALATION) at 11:31

## 2021-08-04 RX ADMIN — ATORVASTATIN CALCIUM 40 MG: 40 TABLET, FILM COATED ORAL at 20:35

## 2021-08-04 RX ADMIN — LISINOPRIL 20 MG: 20 TABLET ORAL at 08:25

## 2021-08-04 RX ADMIN — NYSTATIN 1 APPLICATION: 100000 POWDER TOPICAL at 08:26

## 2021-08-04 NOTE — THERAPY TREATMENT NOTE
Patient Name: Ade Wilson  : 1960    MRN: [id number]                              Today's Date: 2021       Admit Date: 2021    Visit Dx:     ICD-10-CM ICD-9-CM   1. Acute respiratory failure with hypoxia (CMS/HCC)  J96.01 518.81   2. Pneumonia of both lungs due to infectious organism, unspecified part of lung  J18.9 483.8   3. COVID-19  U07.1 079.89   4. Acute renal failure, unspecified acute renal failure type (CMS/HCC)  N17.9 584.9   5. Impaired mobility and activities of daily living  Z74.09 V49.89    Z78.9    6. Impaired functional mobility, balance, gait, and endurance  Z74.09 V49.89     Patient Active Problem List   Diagnosis   • Chest pain   • Stable angina pectoris (CMS/[facility])   • Type 2 diabetes mellitus with hyperglycemia, with long-term current use of insulin (CMS/[facility])   • Benign essential HTN   • Hypertriglyceridemia   • Morbid obesity with BMI of 40.0-44.9, adult (CMS/[facility])   • Chronic stable angina (CMS/[facility])   • Acute respiratory failure with hypoxia (CMS/[facility])   • Acute hypoxemic respiratory failure due to COVID-19 (CMS/[facility])   • COVID-19 virus detected   • Pneumonia due to COVID-19 virus   • Obesity (BMI 30-39.9)   • Metabolic encephalopathy   • Severe malnutrition (CMS/[facility])     Past Medical History:   Diagnosis Date   • Diabetes (CMS/[facility])    • Hypertension      Past Surgical History:   Procedure Laterality Date   • CARDIAC CATHETERIZATION N/A 2020    Procedure: Left Heart Cath;  Surgeon: Brayan Santoro MD;  Location: [facility] INVASIVE LOC[facility];  Service: Cardiology;  Laterality: N/A;   • CERVICAL FUSION     • HYSTERECTOMY      partial   • JOINT REPLACEMENT     • REPLACEMENT TOTAL KNEE Bilateral      General Information     Row Name 21 0835          OT Time and Intention    Document Type  therapy note (daily note)  -     Mode of Treatment  individual therapy;occupational therapy  -     Row Name 21 0835          General Information    Patient Profile Reviewed   yes  -     Existing Precautions/Restrictions  fall;oxygen therapy device and L/min  -     Row Name 08/04/21 0835          Cognition    Orientation Status (Cognition)  oriented x 4  -     Row Name 08/04/21 0835          Safety Issues, Functional Mobility    Impairments Affecting Function (Mobility)  balance;endurance/activity tolerance;strength;coordination  -       User Key  (r) = Recorded By, (t) = Taken By, (c) = Cosigned By    Initials Name Provider Type     Pinky Omer COTA/L Occupational Therapy Assistant          Mobility/ADL's    No documentation.       Obj/Interventions    No documentation.       Goals/Plan     Row Name 08/04/21 0835          Bed Mobility Goal 1 (OT)    Activity/Assistive Device (Bed Mobility Goal 1, OT)  sit to supine/supine to sit  -RC     [place] Level/Cues Needed (Bed Mobility Goal 1, OT)  independent  -RC     Time Frame (Bed Mobility Goal 1, OT)  long term goal (LTG);by discharge  -RC     Progress/Outcomes (Bed Mobility Goal 1, OT)  goal met  -[facility] Name 08/04/21 0835          Transfer Goal 1 (OT)    Activity/Assistive Device (Transfer Goal 1, OT)  sit-to-stand/stand-to-sit;toilet  -     [place] Level/Cues Needed (Transfer Goal 1, OT)  standby assist;tactile cues required;verbal cues required  -RC     Time Frame (Transfer Goal 1, OT)  long term goal (LTG);by discharge  -RC     Progress/Outcome (Transfer Goal 1, OT)  goal met  -[facility] Name 08/04/21 0835          Bathing Goal 1 (OT)    Activity/Device (Bathing Goal 1, OT)  lower body bathing  -RC     [place] Level/Cues Needed (Bathing Goal 1, OT)  standby assist;tactile cues required;verbal cues required  -RC     Time Frame (Bathing Goal 1, OT)  long term goal (LTG);by discharge  -RC     Progress/Outcomes (Bathing Goal 1, OT)  goal met  -     Row Name 08/04/21 0835          Dressing Goal 1 (OT)    Activity/Device (Dressing Goal 1, OT)  lower body dressing  -RC     [place]/Cues Needed  (Dressing Goal 1, OT)  standby assist;tactile cues required;verbal cues required  -RC     Time Frame (Dressing Goal 1, OT)  long term goal (LTG);by discharge  -RC     Progress/Outcome (Dressing Goal 1, OT)  goal not met  -     Row Name 08/04/21 0835          Toileting Goal 1 (OT)    Activity/Device (Toileting Goal 1, OT)  toileting skills, all  -RC     [place] Level/Cues Needed (Toileting Goal 1, OT)  standby assist;tactile cues required;verbal cues required  -     Time Frame (Toileting Goal 1, OT)  long term goal (LTG);by discharge  -     Progress/Outcome (Toileting Goal 1, OT)  goal met  -       User Key  (r) = Recorded By, (t) = Taken By, (c) = Cosigned By    Initials Name Provider Type    Pinky Mccarthy COTA/L Occupational Therapy Assistant        Clinical Impression    No documentation.       Outcome Measures    No documentation.         Occupational Therapy Education                 Title: PT OT SLP Therapies (In Progress)     Topic: Occupational Therapy (In Progress)     Point: ADL training (Done)     Description:   Instruct learner(s) on proper safety adaptation and remediation techniques during self care or transfers.   Instruct in proper use of assistive devices.              Learning Progress Summary           Patient Acceptance, E, VU by BB at 8/1/2021 1409                   Point: Home exercise program (Not Started)     Description:   Instruct learner(s) on appropriate technique for monitoring, assisting and/or progressing therapeutic exercises/activities.              Learner Progress:  Not documented in this visit.          Point: Precautions (Done)     Description:   Instruct learner(s) on prescribed precautions during self-care and functional transfers.              Learning Progress Summary           Patient Acceptance, E, NR,VU by RC at 8/4/2021 1253    Comment: PLB    Acceptance, E, VU by  at 7/28/2021 1337    Comment: Pt educated on role of OT, d/c recs, and POC                    Point: Body mechanics (Not Started)     Description:   Instruct learner(s) on proper positioning and spine alignment during self-care, functional mobility activities and/or exercises.              Learner Progress:  Not documented in this visit.                      User Key     Initials Effective Dates Name Provider Type Discipline    BB 06/16/21 -  Nelsy Coles KENDALL/L Occupational Therapy Assistant OT    RC 06/16/21 -  Negra Pinky G, KENDALL/L Occupational Therapy Assistant OT     06/16/21 -  Francisco Man OT Occupational Therapist OT              OT Recommendation and Plan     Plan of Care Review  Plan of Care Reviewed With: patient  Progress: improving  Outcome Summary: nursing OK'd tx pt agreeab;e to fx act on room air now per nursing, pt amb x 3 ~30 ft w/ supervision, O2 decreased to 90%w/ act. back up to 92%w/ rest and PLB, cardio punches 2 sets 20x,   cont poc     Time Calculation:   Time Calculation- OT     Row Name 08/04/21 1249             Time Calculation- OT    OT Start Time  0835  -RC      OT Stop Time  0859  -RC      OT Time Calculation (min)  24 min  -RC      Total Timed Code Minutes- OT  24 minute(s)  -RC      OT Received On  08/04/21  -RC         Timed Charges    11435 - OT Therapeutic Activity Minutes  24  -RC         Total Minutes    Timed Charges Total Minutes  24  -RC       Total Minutes  24  -RC        User Key  (r) = Recorded By, (t) = Taken By, (c) = Cosigned By    Initials Name Provider Type    RC Vale Omera G, KENDALL/L Occupational Therapy Assistant        Therapy Charges for Today     Code Description Service Date Service Provider Modifiers Qty    41441264493 HC OT THERAPEUTIC ACT EA 15 MIN 8/3/2021 Negra, Pinky G, KENDALL/L GO 2    38765256133 HC OT THERAPEUTIC ACT EA 15 MIN 8/4/2021 Negra, Pinky G, KENDALL/L GO 2               Pinky G Negra, KENDALL/L  8/4/2021

## 2021-08-04 NOTE — PLAN OF CARE
Goal Outcome Evaluation:           Progress: improving  Outcome Summary: Pt states she is feels better today, pt on room air. Pt lowell pain.

## 2021-08-04 NOTE — PROGRESS NOTES
[facility] Services  INPATIENT PROGRESS NOTE    Length of Stay: 8  Date of Admission: 7/27/2021  Primary Care Physician: Provider, No Known    Subjective   Chief Complaint: No complaints    HPI:        8/4/2021: Patient was weaned to room air today.  Complains of pain in her right thigh.  Will check Doppler ultrasound to rule out DVT.    8/3/2021: The patient had another back set and again is on 5 L of oxygen.  She continues to feel extremely tired but is working with therapy.    8/2/2021: Patient has been able to wean down to 3 L today.  She states she is still extremely weak.    8/1/2021: Patient has had to have an increase in oxygen today.  She is currently up to 6 L of oxygen.  We will increase her steroids to twice daily.    7/31/2021:  Patient continues to have episodes of desaturations with ambulation.    7/30/2021:  Patient currently on 2 liters. Patient dropped to 87% on room air.      7/29/2021: Patient is currently on 2 L of oxygen.      H&P by Dr. Perez: Patient is a 60-year-old female past medical history of hypertension diabetes who presented to the emergency department after being found confused at her home on a welfare check. Patient states that she knows that she has been very confused for quite a few days, but was unable to figure out how to do anything about it. Patient states that she remembers going to her primary care provider on a Wednesday, which based on available information was 7/14, with cough for 2 days. She states at that point she was checked for Covid and influenza and both tests were negative. She states that a couple of days later she went to the emergency room feeling more poorly and the ER doctor told her she had a viral syndrome and there wasn't anything that she could do about it. She then went home and became significantly confused. After she missed 12 days of work and no one had heard from, her place of employment sent  the police for a welfare check. She states that during those 12 days she couldn't figure out how to turn on her television, turn on her shower, or use her telephone. She states that she was drinking some but not eating much. She states she had a significant cough throughout the majority of this time with increasing shortness of breath. She does state that she feels clearer mentally after arrival at the hospital. She has not been vaccinated against COVID-19.    Review of Systems   Constitutional: Positive for fatigue. Negative for activity change.   HENT: Negative for ear pain and sore throat.    Eyes: Negative for pain and discharge.   Respiratory: Negative for cough and shortness of breath.    Cardiovascular: Negative for chest pain and palpitations.   Gastrointestinal: Negative for abdominal pain and nausea.   Endocrine: Negative for cold intolerance and heat intolerance.   Genitourinary: Negative for difficulty urinating and dysuria.   Musculoskeletal: Negative for arthralgias and gait problem.   Skin: Negative for color change and rash.   Neurological: Negative for dizziness and weakness.   Psychiatric/Behavioral: Negative for agitation and confusion.        Objective    Temp:  [96.7 °F (35.9 °C)-97.9 °F (36.6 °C)] 97.2 °F (36.2 °C)  Heart Rate:  [53-80] 68  Resp:  [17-20] 18  BP: (121-146)/(58-65) 121/58    Physical Exam  Constitutional:       Appearance: She is well-developed.   HENT:      Head: Normocephalic and atraumatic.   Eyes:      Pupils: Pupils are equal, round, and reactive to light.   Cardiovascular:      Rate and Rhythm: Normal rate and regular rhythm.   Pulmonary:      Effort: Pulmonary effort is normal.      Breath sounds: Normal breath sounds.   Abdominal:      General: Bowel sounds are normal.      Palpations: Abdomen is soft.   Musculoskeletal:         General: Normal range of motion.      Cervical back: Normal range of motion and neck supple.   Skin:     General: Skin is warm and dry.    Neurological:      Mental Status: She is alert and oriented to person, place, and time.   Psychiatric:         Behavior: Behavior normal.       Results Review:  I have reviewed the labs, radiology results, and diagnostic studies.    Laboratory Data:   Results from last 7 days   Lab Units 08/04/21  0615 08/03/21  0600 08/01/21  0627   SODIUM mmol/L 135* 135* 136   POTASSIUM mmol/L 4.8 4.4 4.2   CHLORIDE mmol/L 103 101 104   CO2 mmol/L 22.0 22.0 21.0*   BUN mg/dL 27* 26* 19   CREATININE mg/dL 0.79 0.76 0.67   GLUCOSE mg/dL 366* 320* 151*   CALCIUM mg/dL 8.8 8.6 9.0   BILIRUBIN mg/dL 0.2 0.2 0.3   ALK PHOS U/L 88 77 72   ALT (SGPT) U/L 28 27 23   AST (SGOT) U/L 33* 14 16   ANION GAP mmol/L 10.0 12.0 11.0     Estimated Creatinine Clearance: 96.6 mL/min (by C-G formula based on SCr of 0.79 mg/dL).          Results from last 7 days   Lab Units 08/04/21  0615 08/03/21  0600 08/01/21  0627 07/31/21  0647 07/30/21  0532   WBC 10*3/mm3 16.10* 15.80* 12.57* 13.88* 9.62   HEMOGLOBIN g/dL 10.3* 10.1* 9.7* 10.4* 9.4*   HEMATOCRIT % 31.4* 31.1* 30.7* 32.8* 29.7*   PLATELETS 10*3/mm3 414 449 442 483* 403           Culture Data:   No results found for: BLOODCX  No results found for: URINECX  No results found for: RESPCX  No results found for: WOUNDCX  No results found for: STOOLCX  No components found for: BODYFLD    Radiology Data:   Imaging Results (Last 24 Hours)     ** No results found for the last 24 hours. **          I have reviewed the patient's current medications.     Assessment/Plan     Active Hospital Problems    Diagnosis  POA   • **Acute respiratory failure with hypoxia (CMS/HCC) [J96.01]  Yes   • Severe malnutrition (CMS/HCC) [E43]  Yes   • Obesity (BMI 30-39.9) [E66.9]  Unknown   • Metabolic encephalopathy [G93.41]  Unknown   • Acute hypoxemic respiratory failure due to COVID-19 (CMS/HCC) [U07.1, J96.01]  Yes   • COVID-19 virus detected [U07.1]  Yes   • Pneumonia due to COVID-19 virus [U07.1, J12.82]  Yes   • Type  2 diabetes mellitus with hyperglycemia, with long-term current use of insulin (CMS/[facility]) [E11.65, Z79.4]  Not Applicable   • Benign essential HTN [I10]  Yes       Plan:    1.  Acute hypoxic respiratory failure: Secondary to Covid pneumonia.  Continue supplemental oxygen as needed.  Bronchodilators.  Patient currently on room air.      2.  Covid pneumonia: Decadron 8/10. Remdesivir contraindicated due to duration of symptoms.  Continue to trend inflammatory markers.  Decadron was increased to twice daily secondary to worsening hypoxia.  3.  Diabetes mellitus: Continue long-acting insulin. Continue sliding scale.  4.  Hypertension: Continue home medication.  5.  DVT prophylaxis: Lovenox.  6.  Right thigh pain: Doppler ultrasound to rule out DVT.      Discharge Planning: I expect patient to be discharged to home in 1-2 days.    I confirmed that the patient's Advance Care Plan is present, code status is documented, or surrogate decision maker is listed in the patient's medical record.      I have utilized all available immediate resources to obtain, update, or review the patient's current medications.         This document has been electronically signed by JULIANA Cameron on August 4, 2021 18:50 CDT

## 2021-08-04 NOTE — THERAPY TREATMENT NOTE
Acute Care - Physical Therapy Treatment Note  [facility]     Patient Name: Ade Wilson  : 1960  MRN: [id number]  Today's Date: 2021      Visit Dx:     ICD-10-CM ICD-9-CM   1. Acute respiratory failure with hypoxia (CMS/HCC)  J96.01 518.81   2. Pneumonia of both lungs due to infectious organism, unspecified part of lung  J18.9 483.8   3. COVID-19  U07.1 079.89   4. Acute renal failure, unspecified acute renal failure type (CMS/HCC)  N17.9 584.9   5. Impaired mobility and activities of daily living  Z74.09 V49.89    Z78.9    6. Impaired functional mobility, balance, gait, and endurance  Z74.09 V49.89     Patient Active Problem List   Diagnosis   • Chest pain   • Stable angina pectoris (CMS/HCC)   • Type 2 diabetes mellitus with hyperglycemia, with long-term current use of insulin (CMS/[facility])   • Benign essential HTN   • Hypertriglyceridemia   • Morbid obesity with BMI of 40.0-44.9, adult (CMS/HCC)   • Chronic stable angina (CMS/HCC)   • Acute respiratory failure with hypoxia (CMS/[facility])   • Acute hypoxemic respiratory failure due to COVID-19 (CMS/HCC)   • COVID-19 virus detected   • Pneumonia due to COVID-19 virus   • Obesity (BMI 30-39.9)   • Metabolic encephalopathy   • Severe malnutrition (CMS/HCC)     Past Medical History:   Diagnosis Date   • Diabetes (CMS/[facility])    • Hypertension      Past Surgical History:   Procedure Laterality Date   • CARDIAC CATHETERIZATION N/A 2020    Procedure: Left Heart Cath;  Surgeon: Brayan Santoro MD;  Location: [facility] CATH INVASIVE LOCATION;  Service: Cardiology;  Laterality: N/A;   • CERVICAL FUSION     • HYSTERECTOMY      partial   • JOINT REPLACEMENT     • REPLACEMENT TOTAL KNEE Bilateral         PT Assessment (last 12 hours)      PT Evaluation and Treatment     Row Name 21 4311          Physical Therapy Time and Intention    Subjective Information  no complaints  -RW     Document Type  therapy note (daily note)  -RW     Mode of Treatment  physical  therapy;individual therapy  -RW     Patient Effort  good  -RW     Comment  pt on room air  -RW     Row Name 08/04/21 1353          General Information    Patient Profile Reviewed  yes  -RW     Existing Precautions/Restrictions  fall;oxygen therapy device and L/min  -RW     Row Name 08/04/21 1353          Cognition    Affect/Mental Status (Cognitive)  WFL  -RW     Orientation Status (Cognition)  oriented x 4  -RW     Follows Commands (Cognition)  WFL  -RW     Cognitive Function (Cognitive)  WFL  -RW     Row Name 08/04/21 1353          Pain Scale: Numbers Pre/Post-Treatment    Pretreatment Pain Rating  0/10 - no pain  -RW     Posttreatment Pain Rating  0/10 - no pain  -RW     Row Name 08/04/21 1353          Bed Mobility    Supine-Sit [place] (Bed Mobility)  modified independence  -RW     Sit-Supine [place] (Bed Mobility)  modified independence  -RW     Assistive Device (Bed Mobility)  bed rails;head of bed elevated  -     Row Name 08/04/21 1353          Transfers    Transfers  sit-stand transfer;stand-sit transfer  -RW     Sit-Stand [place] (Transfers)  independent  -RW     Stand-Sit [place] (Transfers)  independent  -     Row Name 08/04/21 135          Gait/Stairs (Locomotion)    [place] Level (Gait)  independent  -RW     Distance in Feet (Gait)  64, 96  -RW     Pattern (Gait)  step-through  -     Row Name 08/04/21 1353          Hip (Therapeutic Exercise)    Hip Strengthening (Therapeutic Exercise)  marching while standing;10 repetitions;5 repetitions  -     Row Name 08/04/21 1353          Knee (Therapeutic Exercise)    Knee AROM (Therapeutic Exercise)  LAQ (long arc quad);10 repetitions;5 repetitions;3 sets  -     Row Name 08/04/21 [facility]          Ankle (Therapeutic Exercise)    Ankle AROM (Therapeutic Exercise)  dorsiflexion;plantarflexion;10 repetitions;5 repetitions;standing standing hr  -RW     Row Name 08/04/21 1353          Vital Signs    Pretreatment Heart Rate (beats/min)   70  -RW     Posttreatment Heart Rate (beats/min)  72  -RW     Pre SpO2 (%)  93  -RW     O2 Delivery Pre Treatment  room air  -RW     Post SpO2 (%)  95  -RW     O2 Delivery Post Treatment  room air  -RW     Row Name 08/04/21 1356          Bed Mobility Goal 1 (PT)    Activity/Assistive Device (Bed Mobility Goal 1, PT)  sit to supine;supine to sit  -RW     [place] Level/Cues Needed (Bed Mobility Goal 1, PT)  independent  -RW     Time Frame (Bed Mobility Goal 1, PT)  by discharge  -RW     Progress/Outcomes (Bed Mobility Goal 1, PT)  (S) goal met  -RW     Row Name 08/04/21 6434          Transfer Goal 1 (PT)    Activity/Assistive Device (Transfer Goal 1, PT)  sit-to-stand/stand-to-sit;bed-to-chair/chair-to-bed  -RW     [place] Level/Cues Needed (Transfer Goal 1, PT)  1 person assist;modified independence  -RW     Time Frame (Transfer Goal 1, PT)  by discharge  -RW     Progress/Outcome (Transfer Goal 1, PT)  (S) goal met  -RW     Row Name 08/04/21 [facility]          Gait Training Goal 1 (PT)    Activity/Assistive Device (Gait Training Goal 1, PT)  gait (walking locomotion);assistive device use;backward stepping  -RW     [place] Level (Gait Training Goal 1, PT)  modified independence  -RW     Distance (Gait Training Goal 1, PT)  50'x3  -RW     Time Frame (Gait Training Goal 1, PT)  by discharge  -RW     Progress/Outcome (Gait Training Goal 1, PT)  (S) goal met  -RW     Row Name 08/04/21 1732          Therapy Assessment/Plan (PT)    Rehab Potential (PT)  good, to achieve stated therapy goals  -RW       User Key  (r) = Recorded By, (t) = Taken By, (c) = Cosigned By    Initials Name Provider Type    RW Aldo Castillo, PTA Physical Therapy Assistant        Physical Therapy Education                 Title: PT OT SLP Therapies (In Progress)     Topic: Physical Therapy (Done)     Point: Mobility training (Done)     Learning Progress Summary           Patient Acceptance, E,TB, VU by LR at 7/28/2021 4374    Comment:  Educated on PT POC and goals.                   Point: Home exercise program (Done)     Learning Progress Summary           Patient Acceptance, E,TB, VU by LR at 7/28/2021 1551    Comment: Educated on PT POC and goals.                   Point: Body mechanics (Done)     Learning Progress Summary           Patient Acceptance, E,TB, VU by LR at 7/28/2021 1551    Comment: Educated on PT POC and goals.                   Point: Precautions (Done)     Learning Progress Summary           Patient Acceptance, E,TB, VU by LR at 7/28/2021 1551    Comment: Educated on PT POC and goals.                               User Key     Initials Effective Dates Name Provider Type Discipline    LR 06/16/21 -  Jake Anne Physical Therapist PT              PT Recommendation and Plan  Anticipated Discharge Disposition (PT): home with assist, home with home health, home with outpatient therapy services  Plan of Care Reviewed With: patient  Progress: improving  Outcome Summary: pt in recliner and now on room air. ind with transfers and gt x 64 and 96'. standing therex heelraises standing march and seated laq. may dc home tomorrow.       Time Calculation:   PT Charges     Row Name 08/04/21 1627             Time Calculation    Start Time  1353  -RW      Stop Time  1418  -RW      Time Calculation (min)  25 min  -RW         Time Calculation- PT    Total Timed Code Minutes- PT  25 minute(s)  -RW         Timed Charges    65675 - PT Therapeutic Exercise Minutes  10  -RW      66664 - Gait Training Minutes   15  -RW         Total Minutes    Timed Charges Total Minutes  25  -RW       Total Minutes  25  -RW        User Key  (r) = Recorded By, (t) = Taken By, (c) = Cosigned By    Initials Name Provider Type    RW Aldo Castillo PTA Physical Therapy Assistant        Therapy Charges for Today     Code Description Service Date Service Provider Modifiers Qty    50334806329 HC GAIT TRAINING EA 15 MIN 8/3/2021 Aldo Castillo PTA GP 2    43588759885 HC  PT THERAPEUTIC ACT EA 15 MIN 8/3/2021 Aldo Castillo, PTA GP 1    37741513836 HC PT THER PROC EA 15 MIN 8/4/2021 Aldo Castillo, PTA GP 1    96441906256 HC GAIT TRAINING EA 15 MIN 8/4/2021 Aldo Castillo, PTA GP 1          PT G-Codes  Outcome Measure Options: AM-PAC 6 Clicks Basic Mobility (PT)  AM-PAC 6 Clicks Score (PT): 23  AM-PAC 6 Clicks Score (OT): 24    Aldo Castillo PTA  8/4/2021

## 2021-08-04 NOTE — PLAN OF CARE
Goal Outcome Evaluation:  Plan of Care Reviewed With: patient        Progress: improving     Remains on 3L, no complaints of pain, VSS, IV site changed. Will continue to monitor.

## 2021-08-04 NOTE — PLAN OF CARE
Goal Outcome Evaluation:  Plan of Care Reviewed With: patient        Progress: improving  Outcome Summary: nursing OK'd tx pt agreeab;e to fx act on room air now per nursing, pt amb x 3 ~30 ft w/ supervision, O2 decreased to 90%w/ act. back up to 92%w/ rest and PLB, cardio punches 2 sets 20x,   cont poc

## 2021-08-04 NOTE — PLAN OF CARE
Goal Outcome Evaluation:  Plan of Care Reviewed With: patient        Progress: improving  Outcome Summary: pt in recliner and now on room air. ind with transfers and gt x 64 and 96'. standing therex heelraises standing march and seated laq. may dc home tomorrow.

## 2021-08-04 NOTE — NURSING NOTE
Patient reported to nurse that her right upper leg is intermittently numb. It is also mildly painful with touch. Pulses are strong and skin color is good. APRN made aware.

## 2021-08-05 ENCOUNTER — READMISSION MANAGEMENT (OUTPATIENT)
Dept: CALL CENTER | Facility: HOSPITAL | Age: 61
End: 2021-08-05

## 2021-08-05 VITALS
OXYGEN SATURATION: 92 % | SYSTOLIC BLOOD PRESSURE: 127 MMHG | RESPIRATION RATE: 18 BRPM | WEIGHT: 244.71 LBS | TEMPERATURE: 97.3 F | HEART RATE: 60 BPM | HEIGHT: 67 IN | BODY MASS INDEX: 38.41 KG/M2 | DIASTOLIC BLOOD PRESSURE: 64 MMHG

## 2021-08-05 PROBLEM — D89.832 CYTOKINE RELEASE SYNDROME, GRADE 2: Status: ACTIVE | Noted: 2021-08-05

## 2021-08-05 LAB
ALBUMIN SERPL-MCNC: 3.2 G/DL (ref 3.5–5.2)
ALBUMIN/GLOB SERPL: 0.9 G/DL
ALP SERPL-CCNC: 75 U/L (ref 39–117)
ALT SERPL W P-5'-P-CCNC: 31 U/L (ref 1–33)
ANION GAP SERPL CALCULATED.3IONS-SCNC: 16 MMOL/L (ref 5–15)
ANISOCYTOSIS BLD QL: NORMAL
AST SERPL-CCNC: 17 U/L (ref 1–32)
BASOPHILS # BLD AUTO: 0.03 10*3/MM3 (ref 0–0.2)
BASOPHILS NFR BLD AUTO: 0.2 % (ref 0–1.5)
BILIRUB SERPL-MCNC: 0.3 MG/DL (ref 0–1.2)
BUN SERPL-MCNC: 24 MG/DL (ref 8–23)
BUN/CREAT SERPL: 30 (ref 7–25)
CALCIUM SPEC-SCNC: 9.1 MG/DL (ref 8.6–10.5)
CHLORIDE SERPL-SCNC: 102 MMOL/L (ref 98–107)
CO2 SERPL-SCNC: 18 MMOL/L (ref 22–29)
CREAT SERPL-MCNC: 0.8 MG/DL (ref 0.57–1)
D-DIMER, QUANTITATIVE (MAD,POW, STR): 1128 NG/ML (FEU) (ref 0–470)
DEPRECATED RDW RBC AUTO: 37.8 FL (ref 37–54)
EOSINOPHIL # BLD AUTO: 0 10*3/MM3 (ref 0–0.4)
EOSINOPHIL NFR BLD AUTO: 0 % (ref 0.3–6.2)
ERYTHROCYTE [DISTWIDTH] IN BLOOD BY AUTOMATED COUNT: 17.7 % (ref 12.3–15.4)
FIBRINOGEN PPP-MCNC: 376 MG/DL (ref 228–514)
GFR SERPL CREATININE-BSD FRML MDRD: 73 ML/MIN/1.73
GLOBULIN UR ELPH-MCNC: 3.4 GM/DL
GLUCOSE BLDC GLUCOMTR-MCNC: 331 MG/DL (ref 70–130)
GLUCOSE BLDC GLUCOMTR-MCNC: 371 MG/DL (ref 70–130)
GLUCOSE SERPL-MCNC: 333 MG/DL (ref 65–99)
HCT VFR BLD AUTO: 33.2 % (ref 34–46.6)
HGB BLD-MCNC: 10.5 G/DL (ref 12–15.9)
HYPOCHROMIA BLD QL: NORMAL
IMM GRANULOCYTES # BLD AUTO: 0.76 10*3/MM3 (ref 0–0.05)
IMM GRANULOCYTES NFR BLD AUTO: 4.3 % (ref 0–0.5)
LYMPHOCYTES # BLD AUTO: 1.8 10*3/MM3 (ref 0.7–3.1)
LYMPHOCYTES NFR BLD AUTO: 10.2 % (ref 19.6–45.3)
MCH RBC QN AUTO: 21.2 PG (ref 26.6–33)
MCHC RBC AUTO-ENTMCNC: 31.6 G/DL (ref 31.5–35.7)
MCV RBC AUTO: 66.9 FL (ref 79–97)
MONOCYTES # BLD AUTO: 1.25 10*3/MM3 (ref 0.1–0.9)
MONOCYTES NFR BLD AUTO: 7.1 % (ref 5–12)
NEUTROPHILS NFR BLD AUTO: 13.8 10*3/MM3 (ref 1.7–7)
NEUTROPHILS NFR BLD AUTO: 78.2 % (ref 42.7–76)
NEUTS HYPERSEG # BLD: NORMAL 10*3/UL
NRBC BLD AUTO-RTO: 0.1 /100 WBC (ref 0–0.2)
PLATELET # BLD AUTO: 415 10*3/MM3 (ref 140–450)
PMV BLD AUTO: 11.5 FL (ref 6–12)
POLYCHROMASIA BLD QL SMEAR: NORMAL
POTASSIUM SERPL-SCNC: 5 MMOL/L (ref 3.5–5.2)
PROT SERPL-MCNC: 6.6 G/DL (ref 6–8.5)
RBC # BLD AUTO: 4.96 10*6/MM3 (ref 3.77–5.28)
SMALL PLATELETS BLD QL SMEAR: NORMAL
SODIUM SERPL-SCNC: 136 MMOL/L (ref 136–145)
WBC # BLD AUTO: 17.64 10*3/MM3 (ref 3.4–10.8)

## 2021-08-05 PROCEDURE — 94799 UNLISTED PULMONARY SVC/PX: CPT

## 2021-08-05 PROCEDURE — 85007 BL SMEAR W/DIFF WBC COUNT: CPT | Performed by: NURSE PRACTITIONER

## 2021-08-05 PROCEDURE — 80053 COMPREHEN METABOLIC PANEL: CPT | Performed by: NURSE PRACTITIONER

## 2021-08-05 PROCEDURE — 85025 COMPLETE CBC W/AUTO DIFF WBC: CPT | Performed by: NURSE PRACTITIONER

## 2021-08-05 PROCEDURE — 85379 FIBRIN DEGRADATION QUANT: CPT | Performed by: HOSPITALIST

## 2021-08-05 PROCEDURE — 82962 GLUCOSE BLOOD TEST: CPT

## 2021-08-05 PROCEDURE — 25010000002 ENOXAPARIN PER 10 MG: Performed by: HOSPITALIST

## 2021-08-05 PROCEDURE — 63710000001 DEXAMETHASONE PER 0.25 MG: Performed by: NURSE PRACTITIONER

## 2021-08-05 PROCEDURE — 63710000001 INSULIN ASPART PER 5 UNITS: Performed by: NURSE PRACTITIONER

## 2021-08-05 PROCEDURE — 94760 N-INVAS EAR/PLS OXIMETRY 1: CPT

## 2021-08-05 PROCEDURE — 85384 FIBRINOGEN ACTIVITY: CPT | Performed by: HOSPITALIST

## 2021-08-05 PROCEDURE — 63710000001 INSULIN ASPART PER 5 UNITS: Performed by: HOSPITALIST

## 2021-08-05 RX ORDER — ALBUTEROL SULFATE 90 UG/1
2 AEROSOL, METERED RESPIRATORY (INHALATION)
Start: 2021-08-05

## 2021-08-05 RX ORDER — BUDESONIDE AND FORMOTEROL FUMARATE DIHYDRATE 160; 4.5 UG/1; UG/1
2 AEROSOL RESPIRATORY (INHALATION)
Refills: 12
Start: 2021-08-05

## 2021-08-05 RX ADMIN — TIZANIDINE 5 MG: 4 TABLET ORAL at 08:50

## 2021-08-05 RX ADMIN — INSULIN ASPART 12 UNITS: 100 INJECTION, SOLUTION INTRAVENOUS; SUBCUTANEOUS at 11:49

## 2021-08-05 RX ADMIN — INSULIN ASPART 8 UNITS: 100 INJECTION, SOLUTION INTRAVENOUS; SUBCUTANEOUS at 11:49

## 2021-08-05 RX ADMIN — NYSTATIN: 100000 POWDER TOPICAL at 00:00

## 2021-08-05 RX ADMIN — PANTOPRAZOLE SODIUM 40 MG: 40 TABLET, DELAYED RELEASE ORAL at 05:02

## 2021-08-05 RX ADMIN — ENOXAPARIN SODIUM 40 MG: 40 INJECTION SUBCUTANEOUS at 00:08

## 2021-08-05 RX ADMIN — BUDESONIDE AND FORMOTEROL FUMARATE DIHYDRATE 2 PUFF: 160; 4.5 AEROSOL RESPIRATORY (INHALATION) at 08:55

## 2021-08-05 RX ADMIN — INSULIN ASPART 12 UNITS: 100 INJECTION, SOLUTION INTRAVENOUS; SUBCUTANEOUS at 08:51

## 2021-08-05 RX ADMIN — LISINOPRIL 20 MG: 20 TABLET ORAL at 08:51

## 2021-08-05 RX ADMIN — AMLODIPINE BESYLATE 5 MG: 5 TABLET ORAL at 08:50

## 2021-08-05 RX ADMIN — IPRATROPIUM BROMIDE 2 PUFF: 17 AEROSOL, METERED RESPIRATORY (INHALATION) at 08:55

## 2021-08-05 RX ADMIN — INSULIN ASPART 8 UNITS: 100 INJECTION, SOLUTION INTRAVENOUS; SUBCUTANEOUS at 08:51

## 2021-08-05 RX ADMIN — ALBUTEROL SULFATE 2 PUFF: 90 AEROSOL, METERED RESPIRATORY (INHALATION) at 08:55

## 2021-08-05 RX ADMIN — DEXAMETHASONE 6 MG: 2 TABLET ORAL at 08:50

## 2021-08-05 RX ADMIN — PREGABALIN 150 MG: 75 CAPSULE ORAL at 08:50

## 2021-08-05 NOTE — DISCHARGE SUMMARY
[facility] Medicine Services  DISCHARGE SUMMARY       Date of Admission: 7/27/2021  Date of Discharge:  8/5/2021  Primary Care Physician: Provider, No Known    Presenting Problem/History of Present Illness:  Acute respiratory failure with hypoxia (CMS/[facility]) [J96.01]  Acute renal failure, unspecified acute renal failure type (CMS/HCC) [N17.9]  Pneumonia of both lungs due to infectious organism, unspecified part of lung [J18.9]  COVID-19 [U07.1]     Final Discharge Diagnoses:    Acute respiratory failure with hypoxia (CMS/HCC)    Acute hypoxemic respiratory failure due to COVID-19 (CMS/HCC)    Pneumonia due to COVID-19 virus    Metabolic encephalopathy    Type 2 diabetes mellitus with hyperglycemia, with long-term current use of insulin (CMS/[facility])    Benign essential HTN    COVID-19 virus detected    Obesity (BMI 30-39.9)    Severe malnutrition (CMS/[facility])    Cytokine release syndrome, grade 2      Consults:   Consults     No orders found from 6/28/2021 to 7/28/2021.          Pertinent Test Results:   CT Head Without Contrast    Result Date: 7/27/2021  EXAM: CT HEAD WITHOUT IV CONTRAST COMPARISONS: None INDICATION: confusion, gen weakness TECHNIQUE: Noncontrast CT images were obtained of the brain. Reformats were provided. All CT scans at this facility uses dose modulation, iterative reconstruction, and/or weight-based dosing when appropriate to achieve a radiation dose as low as reasonably achievable. FINDINGS:  No intracranial hemorrhage, appreciable mass, mass effect or midline shift. There is preservation of the gray-white matter differentiation. No dense MCA or insular ribbon sign. The ventricles and other CSF containing spaces are within normal limits. Calvarium is intact. Mild left sphenoid sinus disease. Mastoid air cells are clear. Orbits are unremarkable.     No acute intracranial process. Mild left sphenoid sinus disease. Electronically signed by:  Jace Agustin  MD  7/27/2021 8:19 PM CDT Workstation: [id number]    XR Chest 1 View    Result Date: 7/27/2021  EXAM: XR CHEST 1 VIEW COMPARISONS: CT chest angiogram 8/27/2020 INDICATION: ams FINDINGS: Frontal view of the chest. Diffuse airspace opacities are seen bilaterally. Left upper lobe calcified granuloma is unchanged. Heart size is normal. No effusion or pneumothorax. No acute osseous abnormality.     Diffuse bilateral airspace disease/pneumonia. Electronically signed by:  Jace Agustin MD  7/27/2021 7:09 PM CDT Workstation: [id number]    US Venous Doppler Lower Extremity Bilateral (duplex)    Result Date: 8/5/2021  US LOWER EXTREMITY VEINS BILATERAL INDICATION: 60 years Female; Right leg pain/ assess for DVT... Covid positive., J96.01 Acute respiratory failure with hypoxia J18.9 Pneumonia, unspecified organism U07.1 COVID-19 N17.9 Acute kidney failure, unspecified Z74.09 Other reduced mobility Z78.9 Other specified health status Z74.09 Other reduced mobility TECHNIQUE:  Grayscale, color and spectral Doppler ultrasound of the venous system of the bilateral lower extremities. COMPARISON: None. FINDINGS: Right Lower Extremity:   Deep Veins:     Common Femoral: Patent.     Superficial Femoral: Patent.     Deep Femoral: Patent.     Popliteal: Patent.        Calf: Patent.   Superficial Veins: Patent. Left Lower Extremity:   Deep Veins:     Common Femoral: Patent.     Superficial Femoral: Patent.     Deep Femoral: Patent.     Popliteal: Patent.        Calf: Patent.   Superficial Veins: Patent. Incidental findings: None.     NO EVIDENCE OF DVT. Electronically signed by:  William Galindo  8/5/2021 1:29 AM CDT Workstation: [id number]    HPI/Hospital Course:  The patient is a 60 y.o. female with a history of HTN and DMIIwho [facility] with [facility] status. She was found to be COVID-19 positive.  Chest x-ray notes diffuse bilateral airspace disease. She was hypoxic and initiated on supplemental  "oxygen.  Remdesivir was contraindicated due to duration of symptoms.  She was managed with decadron and bronchodilators.  She has weaned to room air.  Altered mental status has resolved.  She is stable and discharged to home.    Condition on Discharge:  Stable    Physical Exam on Discharge:  /64 (BP Location: Right arm, Patient Position: Lying)   Pulse 60   Temp 97.3 °F (36.3 °C) (Oral)   Resp 18   Ht 170.2 cm (67\")   Wt 111 kg (244 lb 11.4 oz)   SpO2 92%   BMI 38.33 kg/m²   Physical Exam  Vitals reviewed.   Constitutional:       Appearance: Normal appearance.   HENT:      Head: Normocephalic and atraumatic.   Cardiovascular:      Rate and Rhythm: Normal rate and regular rhythm.   Pulmonary:      Effort: Pulmonary effort is normal.      Breath sounds: Normal breath sounds.   Abdominal:      General: There is no distension.      Palpations: Abdomen is soft.      Tenderness: There is no abdominal tenderness.   Musculoskeletal:         General: No swelling or deformity.   Skin:     General: Skin is warm and dry.   Neurological:      General: No focal deficit present.      Mental Status: She is alert and oriented to person, place, and time.           Discharge Disposition:  Home or Self Care    Discharge Medications:     Discharge Medications      New Medications      Instructions Start Date   albuterol sulfate  (90 Base) MCG/ACT inhaler  Commonly known as: PROVENTIL HFA;VENTOLIN HFA;PROAIR HFA   2 puffs, Inhalation, 4 Times Daily - RT      budesonide-formoterol 160-4.5 MCG/ACT inhaler  Commonly known as: SYMBICORT   2 puffs, Inhalation, 2 Times Daily - RT         Continue These Medications      Instructions Start Date   Ambien CR 12.5 MG CR tablet  Generic drug: zolpidem CR   12.5 mg, Oral, Nightly PRN      amLODIPine 5 MG tablet  Commonly known as: NORVASC   5 mg, Oral, Every 24 Hours Scheduled      atorvastatin 40 MG tablet  Commonly known as: LIPITOR   40 mg, Oral, Nightly      insulin glargine " 100 UNIT/ML injection  Commonly known as: LANTUS, SEMGLEE   50 Units, Subcutaneous, Daily      insulin lispro 100 UNIT/ML injection  Commonly known as: humaLOG   Subcutaneous, 3 Times Daily Before Meals      lisinopril 20 MG tablet  Commonly known as: PRINIVIL,ZESTRIL   20 mg, Oral, Daily      Lyrica 150 MG capsule  Generic drug: pregabalin   150 mg, Oral, 3 Times Daily PRN      metFORMIN 500 MG tablet  Commonly known as: GLUCOPHAGE   1,000 mg, 2 Times Daily With Meals      sertraline 50 MG tablet  Commonly known as: ZOLOFT   50 mg, Oral, Daily      tiZANidine 4 MG tablet  Commonly known as: ZANAFLEX   5 mg, Oral, Daily             Discharge Diet:   Diet Instructions     Diet: Regular, Consistent Carbohydrate; Thin      Discharge Diet:  Regular  Consistent Carbohydrate       Fluid Consistency: Thin          Activity at Discharge:   Activity Instructions     Activity as Tolerated          Follow-up Appointments:   1. PCP 1 week    Figueroa Mejía, JULIANA  08/05/21  15:43 CDT     Time: Discharge planning and teaching took greater than 30 minutes.

## 2021-08-05 NOTE — CONSULTS
Adult Nutrition  Assessment    Patient Name:  Ade Wilson  YOB: 1960  MRN: [id number]  Admit Date:  7/27/2021    Assessment Date:  8/5/2021    Comments:  Spoke with pt on the phone. She has discharge orders, but she said she is not going home today.  Dislikes the food and RDN asked for her lunch pref. She said she was going home so she chose the main entree.  Intake has improved. Will honor pref.  Pt is drinking her milk well she reports.  Encouraged her to eat well at home and eat three meals per day. Received consult for dm ed....  Said she doesn't eat breakfast since she goes to work so early but won't be at work for a while now so she could eat more often. Drinks diet coke and milk. Will send her home with nutrition materials and she can call for outpatient appt in the future,if desired. RDN staff will continue to monitor.    Reason for Assessment     Row Name 08/05/21 1102          Reason for Assessment    Reason For Assessment  follow-up protocol     Diagnosis  pulmonary disease     Identified At Risk by Screening Criteria  need for education;unintentional loss of 10 lbs or more in the past 2 mos;reduced oral intake over the last month         Nutrition/Diet History     Row Name 08/05/21 1102          Nutrition/Diet History    Typical Food/Fluid Intake  pt said her meals have not been good. thought she was going home and ordered what she didn't like.     Food Preferences  gave food pref for lunch she will eat. intake is better. doesn't always like the food.     Factors Affecting Nutritional Intake  impaired cognitive status/motor control;other (see comments)         Anthropometrics     Row Name 08/05/21 0507          Anthropometrics    Weight  111 kg (244 lb 11.4 oz)           Physical Findings     Row Name 08/05/21 1103          Physical Findings    Overall Physical Appearance  obese           Nutrition Prescription Ordered     Row Name 08/05/21 1104          Nutrition Prescription PO     Current PO Diet  Regular     Supplement  Milk     Supplement Frequency  3 times a day     Common Modifiers  Consistent Carbohydrate         Evaluation of Received Nutrient/Fluid Intake     Row Name 08/05/21 1104          PO Evaluation    Number of Meals  2     % PO Intake  75               Electronically signed by:  Lorena Lund RD  08/05/21 11:06 CDT

## 2021-08-05 NOTE — PLAN OF CARE
Goal Outcome Evaluation:  Plan of Care Reviewed With: patient        Progress: improving     Doppler of bilateral legs was used to rule out DVT, results negative. Was on room air at beginning of shift, pt required 1L of oxygen to sit above 90%. VSS, resting well between care, denies pain. Will continue to monitor.

## 2021-08-06 ENCOUNTER — READMISSION MANAGEMENT (OUTPATIENT)
Dept: CALL CENTER | Facility: HOSPITAL | Age: 61
End: 2021-08-06

## 2021-08-06 NOTE — OUTREACH NOTE
COVID-19 Week 1 Survey      Responses   [facility] patient discharged from?  [place]   Does the patient have one of the following disease processes/diagnoses(primary or secondary)?  COVID-19   COVID-19 underlying condition?  None   Call Number  Call 1   Week 1 Call successful?  No   Discharge diagnosis  Pneumonia due to COVID-19 Acute hypoxemic respiratory failure           Vinita House RN

## 2021-08-06 NOTE — PAYOR COMM NOTE
"Jazz[facility] Fountainian   [facility]  phone [phone]  fax [phone]    Auth# [id number]    Ade Wilson (60 y.o. Female)     Date of Birth Social Security Number Address Home Phone MRN    1960  [address]   [facility] [facility] [phone] [id number]    Worship Marital Status          None        Admission Date Admission Type Admitting Provider Attending Provider Department, Room/Bed    7/27/21 Emergency Sunil Perez MD  [address], 421/1    Discharge Date Discharge Disposition Discharge Destination        8/5/2021 Home or Self Care              Attending Provider: (none)   Allergies: Tramadol, Tape    Isolation: None   Infection: COVID (confirmed) (07/27/21)   Code Status: Prior    Ht: 170.2 cm (67\")   Wt: 111 kg (244 lb 11.4 oz)    Admission Cmt: None   Principal Problem: Acute respiratory failure with hypoxia (CMS/HCC) [J96.01]                 Active Insurance as of 7/27/2021     Primary Coverage     Payor Plan Insurance Group Employer/Plan Group    [facility]     Payor Plan Address Payor Plan Phone Number Payor Plan Fax Number Effective Dates    [address] [address]   2/28/2019 - None Entered    [facility] [phone]       Subscriber Name Subscriber Birth Date Member ID       ADE WILSON 1960 [id number]                 Emergency Contacts      (Rel.) Home Phone Work Phone Mobile Phone    SARAH MARTINO (Sister) [phone] -- [phone]               Discharge Summary      Figueroa Mejía APRN at 08/05/21 1228     Attestation signed by Augustin Michel MD at 08/05/21 4893    I personally evaluated and examined the patient in conjunction with JULIANA Mensah and agree with the assessment, treatment plan, and disposition of the patient as recorded.                          [facility] Medicine Services  DISCHARGE SUMMARY       Date of Admission: 7/27/2021  Date of " Discharge:  8/5/2021  Primary Care Physician: Provider, No Known    Presenting Problem/History of Present Illness:  Acute respiratory failure with hypoxia (CMS/[facility]) [J96.01]  Acute renal failure, unspecified acute renal failure type (CMS/[facility]) [N17.9]  Pneumonia of both lungs due to infectious organism, unspecified part of lung [J18.9]  COVID-19 [U07.1]     Final Discharge Diagnoses:    Acute respiratory failure with hypoxia (CMS/[facility])    Acute hypoxemic respiratory failure due to COVID-19 (CMS/[facility])    Pneumonia due to COVID-19 virus    Metabolic encephalopathy    Type 2 diabetes mellitus with hyperglycemia, with long-term current use of insulin (CMS/[facility])    Benign essential HTN    COVID-19 virus detected    Obesity (BMI 30-39.9)    Severe malnutrition (CMS/[facility])    Cytokine release syndrome, grade 2      Consults:   Consults     No orders found from 6/28/2021 to 7/28/2021.          Pertinent Test Results:   CT Head Without Contrast    Result Date: 7/27/2021  EXAM: CT HEAD WITHOUT IV CONTRAST COMPARISONS: None INDICATION: confusion, gen weakness TECHNIQUE: Noncontrast CT images were obtained of the brain. Reformats were provided. All CT scans at this facility uses dose modulation, iterative reconstruction, and/or weight-based dosing when appropriate to achieve a radiation dose as low as reasonably achievable. FINDINGS:  No intracranial hemorrhage, appreciable mass, mass effect or midline shift. There is preservation of the gray-white matter differentiation. No dense MCA or insular ribbon sign. The ventricles and other CSF containing spaces are within normal limits. Calvarium is intact. Mild left sphenoid sinus disease. Mastoid air cells are clear. Orbits are unremarkable.     No acute intracranial process. Mild left sphenoid sinus disease. Electronically signed by:  Jace Agustin MD  7/27/2021 8:19 PM CDT Workstation: [id number]    XR Chest 1 View    Result Date: 7/27/2021  EXAM: XR CHEST 1 VIEW COMPARISONS:  CT chest angiogram 8/27/2020 INDICATION: ams FINDINGS: Frontal view of the chest. Diffuse airspace opacities are seen bilaterally. Left upper lobe calcified granuloma is unchanged. Heart size is normal. No effusion or pneumothorax. No acute osseous abnormality.     Diffuse bilateral airspace disease/pneumonia. Electronically signed by:  Jace Agustin MD  7/27/2021 7:09 PM CDT Workstation: [id number]    US Venous Doppler Lower Extremity Bilateral (duplex)    Result Date: 8/5/2021  US LOWER EXTREMITY VEINS BILATERAL INDICATION: 60 years Female; Right leg pain/ assess for DVT... Covid positive., J96.01 Acute respiratory failure with hypoxia J18.9 Pneumonia, unspecified organism U07.1 COVID-19 N17.9 Acute kidney failure, unspecified Z74.09 Other reduced mobility Z78.9 Other specified health status Z74.09 Other reduced mobility TECHNIQUE:  Grayscale, color and spectral Doppler ultrasound of the venous system of the bilateral lower extremities. COMPARISON: None. FINDINGS: Right Lower Extremity:   Deep Veins:     Common Femoral: Patent.     Superficial Femoral: Patent.     Deep Femoral: Patent.     Popliteal: Patent.        Calf: Patent.   Superficial Veins: Patent. Left Lower Extremity:   Deep Veins:     Common Femoral: Patent.     Superficial Femoral: Patent.     Deep Femoral: Patent.     Popliteal: Patent.        Calf: Patent.   Superficial Veins: Patent. Incidental findings: None.     NO EVIDENCE OF DVT. Electronically signed by:  William Galindo  8/5/2021 1:29 AM CDT Workstation: [id number]    HPI/Hospital Course:  The patient is a 60 y.o. female with a history of HTN and DMIIwho [facility] status. She was found to be COVID-19 positive.  Chest x-ray notes diffuse bilateral airspace disease. She was hypoxic and initiated on supplemental oxygen.  Remdesivir was contraindicated due to duration of symptoms.  She was managed with decadron and bronchodilators.  She has  "weaned to room air.  Altered mental status has resolved.  She is stable and discharged to home.    Condition on Discharge:  Stable    Physical Exam on Discharge:  /64 (BP Location: Right arm, Patient Position: Lying)   Pulse 60   Temp 97.3 °F (36.3 °C) (Oral)   Resp 18   Ht 170.2 cm (67\")   Wt 111 kg (244 lb 11.4 oz)   SpO2 92%   BMI 38.33 kg/m²   Physical Exam  Vitals reviewed.   Constitutional:       Appearance: Normal appearance.   HENT:      Head: Normocephalic and atraumatic.   Cardiovascular:      Rate and Rhythm: Normal rate and regular rhythm.   Pulmonary:      Effort: Pulmonary effort is normal.      Breath sounds: Normal breath sounds.   Abdominal:      General: There is no distension.      Palpations: Abdomen is soft.      Tenderness: There is no abdominal tenderness.   Musculoskeletal:         General: No swelling or deformity.   Skin:     General: Skin is warm and dry.   Neurological:      General: No focal deficit present.      Mental Status: She is alert and oriented to person, place, and time.           Discharge Disposition:  Home or Self Care    Discharge Medications:     Discharge Medications      New Medications      Instructions Start Date   albuterol sulfate  (90 Base) MCG/ACT inhaler  Commonly known as: PROVENTIL HFA;VENTOLIN HFA;PROAIR HFA   2 puffs, Inhalation, 4 Times Daily - RT      budesonide-formoterol 160-4.5 MCG/ACT inhaler  Commonly known as: SYMBICORT   2 puffs, Inhalation, 2 Times Daily - RT         Continue These Medications      Instructions Start Date   Ambien CR 12.5 MG CR tablet  Generic drug: zolpidem CR   12.5 mg, Oral, Nightly PRN      amLODIPine 5 MG tablet  Commonly known as: NORVASC   5 mg, Oral, Every 24 Hours Scheduled      atorvastatin 40 MG tablet  Commonly known as: LIPITOR   40 mg, Oral, Nightly      insulin glargine 100 UNIT/ML injection  Commonly known as: LANTUS, SEMGLEE   50 Units, Subcutaneous, Daily      insulin lispro 100 UNIT/ML " injection  Commonly known as: humaLOG   Subcutaneous, 3 Times Daily Before Meals      lisinopril 20 MG tablet  Commonly known as: PRINIVIL,ZESTRIL   20 mg, Oral, Daily      Lyrica 150 MG capsule  Generic drug: pregabalin   150 mg, Oral, 3 Times Daily PRN      metFORMIN 500 MG tablet  Commonly known as: GLUCOPHAGE   1,000 mg, 2 Times Daily With Meals      sertraline 50 MG tablet  Commonly known as: ZOLOFT   50 mg, Oral, Daily      tiZANidine 4 MG tablet  Commonly known as: ZANAFLEX   5 mg, Oral, Daily             Discharge Diet:   Diet Instructions     Diet: Regular, Consistent Carbohydrate; Thin      Discharge Diet:  Regular  Consistent Carbohydrate       Fluid Consistency: Thin          Activity at Discharge:   Activity Instructions     Activity as Tolerated          Follow-up Appointments:   1. PCP 1 week    JULIANA Ng  08/05/21  15:43 CDT     Time: Discharge planning and teaching took greater than 30 minutes.                 Electronically signed by Augustin Michel MD at 08/05/21 2827

## 2021-08-06 NOTE — OUTREACH NOTE
Prep Survey      Responses   [facility] facility patient discharged from?  [place]   Is LACE score < 7 ?  No   Emergency Room discharge w/ pulse ox?  No   Eligibility  Readm Mgmt   Discharge diagnosis  Pneumonia due to COVID-19 Acute hypoxemic respiratory failure    Does the patient have one of the following disease processes/diagnoses(primary or secondary)?  COVID-19   Does the patient have Home health ordered?  No   Is there a DME ordered?  No   Prep survey completed?  Yes          Kaylene Dillon RN

## 2021-08-07 ENCOUNTER — READMISSION MANAGEMENT (OUTPATIENT)
Dept: CALL CENTER | Facility: HOSPITAL | Age: 61
End: 2021-08-07

## 2021-08-07 LAB — GLUCOSE BLDC GLUCOMTR-MCNC: 302 MG/DL (ref 70–130)

## 2021-08-07 NOTE — OUTREACH NOTE
COVID-19 Week 1 Survey      Responses   [facility] patient discharged from?  [place]   Does the patient have one of the following disease processes/diagnoses(primary or secondary)?  COVID-19   COVID-19 underlying condition?  None   Call Number  Call 2   Week 1 Call successful?  Yes   Call start time  1025   Call end time  1030   Discharge diagnosis  Pneumonia due to COVID-19 Acute hypoxemic respiratory failure    Meds reviewed with patient/caregiver?  Yes   Is the patient having any side effects they believe may be caused by any medication additions or changes?  No   Does the patient have all medications ordered at discharge?  Yes   Is the patient taking all medications as directed (includes completed medication regime)?  Yes   Does the patient have a primary care provider?   Yes   Comments regarding PCP  PCP's first name is Ramiro. Patient is unsure of her last name. Hospital d/c f/u appt is on 8/9/21.   Has the patient kept scheduled appointments due by today?  N/A   Psychosocial issues?  No   Did the patient receive a copy of their discharge instructions?  Yes   Did the patient receive a copy of COVID-19 specific instructions?  Yes   Nursing interventions  Reviewed instructions with patient   What is the patient's perception of their health status since discharge?  Same   Does the patient have any of the following symptoms?  Shortness of breath, Cough, Loss of taste/smell [Pt is weak and shaky,  SOB with exertion]   Nursing Interventions  Nurse provided patient education   Pulse Ox monitoring  None   Is the patient/caregiver able to teach back steps to recovery at home?  Rest and rebuild strength, gradually increase activity, Set small, achievable goals for return to baseline health, Eat a well-balance diet   If the patient is a current smoker, are they able to teach back resources for cessation?  Not a smoker   Is the patient/caregiver able to teach back the hierarchy of who to call/visit for  symptoms/problems? PCP, Specialist, Home health nurse, Urgent Care, ED, 911  Yes   COVID-19 call completed?  Yes          Maritza Sabillon RN

## 2021-08-08 ENCOUNTER — READMISSION MANAGEMENT (OUTPATIENT)
Dept: CALL CENTER | Facility: HOSPITAL | Age: 61
End: 2021-08-08

## 2021-08-08 NOTE — OUTREACH NOTE
COVID-19 Week 1 Survey      Responses   [facility] patient discharged from?  [place]   Does the patient have one of the following disease processes/diagnoses(primary or secondary)?  COVID-19   COVID-19 underlying condition?  None   Call Number  Call 3   Week 1 Call successful?  Yes   Call start time  1146   Call end time  1149   Discharge diagnosis  Pneumonia due to COVID-19 Acute hypoxemic respiratory failure    Meds reviewed with patient/caregiver?  Yes   Is the patient having any side effects they believe may be caused by any medication additions or changes?  No   Does the patient have all medications ordered at discharge?  Yes   Is the patient taking all medications as directed (includes completed medication regime)?  Yes   Does the patient have a primary care provider?   Yes   Does the patient have an appointment with their PCP or specialist within 7 days of discharge?  Yes   Has the patient kept scheduled appointments due by today?  N/A   Psychosocial issues?  No   Did the patient receive a copy of their discharge instructions?  Yes   Did the patient receive a copy of COVID-19 specific instructions?  Yes   Nursing interventions  Reviewed instructions with patient   What is the patient's perception of their health status since discharge?  Same   Does the patient have any of the following symptoms?  Cough, Shortness of breath, Loss of taste/smell   Nursing Interventions  Nurse provided patient education   Pulse Ox monitoring  None   Is the patient/caregiver able to teach back steps to recovery at home?  Rest and rebuild strength, gradually increase activity, Set small, achievable goals for return to baseline health, Eat a well-balance diet   If the patient is a current smoker, are they able to teach back resources for cessation?  Not a smoker   Is the patient/caregiver able to teach back the hierarchy of who to call/visit for symptoms/problems? PCP, Specialist, Home health nurse, Urgent Care, ED, 911   Yes   COVID-19 call completed?  Yes   Wrap up additional comments  still sob when walking from room to room.          Valorie Williamson RN

## 2021-08-11 ENCOUNTER — READMISSION MANAGEMENT (OUTPATIENT)
Dept: CALL CENTER | Facility: HOSPITAL | Age: 61
End: 2021-08-11

## 2021-08-11 NOTE — OUTREACH NOTE
COVID-19 Week 1 Survey      Responses   [facility] patient discharged from?  [place]   Does the patient have one of the following disease processes/diagnoses(primary or secondary)?  COVID-19   COVID-19 underlying condition?  None   Call Number  Call 4   Week 1 Call successful?  Yes   Call start time  1200   Call end time  1206   Discharge diagnosis  Pneumonia due to COVID-19 Acute hypoxemic respiratory failure    Meds reviewed with patient/caregiver?  Yes   Is the patient taking all medications as directed (includes completed medication regime)?  Yes   Has the patient kept scheduled appointments due by today?  Yes   Psychosocial issues?  No   Comments  Pt is fatigued, still decreased appetite.    What is the patient's perception of their health status since discharge?  Same   Does the patient have any of the following symptoms?  Cough, Shortness of breath, Loss of taste/smell [SOA is worse with exertion. +malaise and fatigue]   Nursing Interventions  Nurse provided patient education   Pulse Ox monitoring  None   Is the patient/caregiver able to teach back steps to recovery at home?  Set small, achievable goals for return to baseline health   Is the patient/caregiver able to teach back the hierarchy of who to call/visit for symptoms/problems? PCP, Specialist, Home health nurse, Urgent Care, ED, 911  Yes   COVID-19 call completed?  Yes          Vinita House RN

## 2021-08-15 ENCOUNTER — READMISSION MANAGEMENT (OUTPATIENT)
Dept: CALL CENTER | Facility: HOSPITAL | Age: 61
End: 2021-08-15

## 2021-08-15 NOTE — OUTREACH NOTE
COVID-19 Week 2 Survey      Responses   [facility] patient discharged from?  [place]   Does the patient have one of the following disease processes/diagnoses(primary or secondary)?  COVID-19   COVID-19 underlying condition?  None   Call Number  Call 1   COVID-19 Week 2: Call 1 attempt successful?  No   Discharge diagnosis  Pneumonia due to COVID-19 Acute hypoxemic respiratory failure           Irina Richard RN

## 2021-08-18 ENCOUNTER — READMISSION MANAGEMENT (OUTPATIENT)
Dept: CALL CENTER | Facility: HOSPITAL | Age: 61
End: 2021-08-18

## 2021-08-18 NOTE — OUTREACH NOTE
COVID-19 Week 2 Survey      Responses   [facility] patient discharged from?  [place]   Does the patient have one of the following disease processes/diagnoses(primary or secondary)?  COVID-19   COVID-19 underlying condition?  None   Call Number  Call 2   COVID-19 Week 2: Call 1 attempt successful?  No   Discharge diagnosis  Pneumonia due to COVID-19 Acute hypoxemic respiratory failure           Vinita House RN

## 2021-08-25 ENCOUNTER — READMISSION MANAGEMENT (OUTPATIENT)
Dept: CALL CENTER | Facility: HOSPITAL | Age: 61
End: 2021-08-25

## 2021-08-25 NOTE — OUTREACH NOTE
COVID-19 Week 3 Survey      Responses   [facility] patient discharged from?  [place]   Does the patient have one of the following disease processes/diagnoses(primary or secondary)?  COVID-19   COVID-19 underlying condition?  None   Call Number  Call 1   COVID-19 Week 3: Call 1 attempt successful?  Yes   Call start time  0931   Call end time  0934   Discharge diagnosis  Pneumonia due to COVID-19 Acute hypoxemic respiratory failure    Meds reviewed with patient/caregiver?  Yes   Is the patient having any side effects they believe may be caused by any medication additions or changes?  No   Does the patient have all medications ordered at discharge?  Yes   Is the patient taking all medications as directed (includes completed medication regime)?  Yes   Does the patient have a primary care provider?   Yes   Does the patient have an appointment with their PCP or specialist within 7 days of discharge?  Yes   Has the patient kept scheduled appointments due by today?  Yes   Has home health visited the patient within 72 hours of discharge?  N/A   Psychosocial issues?  No   Comments  Pt is fatigued, still decreased appetite.    Did the patient receive a copy of their discharge instructions?  Yes   Did the patient receive a copy of COVID-19 specific instructions?  Yes   Nursing interventions  Reviewed instructions with patient   What is the patient's perception of their health status since discharge?  Same   Does the patient have any of the following symptoms?  Cough, Shortness of breath, Loss of taste/smell   Nursing Interventions  Nurse provided patient education   Pulse Ox monitoring  Intermittent   Pulse Ox device source  Patient   O2 Sat comments  Sats 93% on room air   O2 Sat: education provided  Sat levels, Monitoring frequency, When to seek care   Is the patient/caregiver able to teach back steps to recovery at home?  Set small, achievable goals for return to baseline health   If the patient is a current  smoker, are they able to teach back resources for cessation?  Not a smoker   Is the patient/caregiver able to teach back the hierarchy of who to call/visit for symptoms/problems? PCP, Specialist, Home health nurse, Urgent Care, ED, 911  Yes   COVID-19 call completed?  Yes   Wrap up additional comments  Still with weakness and SOB, but improving.           Benson Herron RN

## 2021-08-30 LAB
QT INTERVAL: 358 MS
QTC INTERVAL: 399 MS

## 2021-09-01 ENCOUNTER — READMISSION MANAGEMENT (OUTPATIENT)
Dept: CALL CENTER | Facility: HOSPITAL | Age: 61
End: 2021-09-01

## 2021-09-01 NOTE — OUTREACH NOTE
COVID-19 Week 4 Survey      Responses   [facility] patient discharged from?  [place]   Does the patient have one of the following disease processes/diagnoses(primary or secondary)?  COVID-19   COVID-19 underlying condition?  None   Call Number  Call 1   COVID-19 Week 4: Call 1 attempt successful?  No          Rosa Cruz RN

## 2021-09-09 DIAGNOSIS — G47.09 OTHER INSOMNIA: ICD-10-CM

## 2021-09-09 DIAGNOSIS — G89.29 CHRONIC BILATERAL LOW BACK PAIN WITHOUT SCIATICA: ICD-10-CM

## 2021-09-09 DIAGNOSIS — M54.50 CHRONIC BILATERAL LOW BACK PAIN WITHOUT SCIATICA: ICD-10-CM

## 2021-09-10 RX ORDER — ZOLPIDEM TARTRATE 12.5 MG/1
12.5 TABLET, FILM COATED, EXTENDED RELEASE ORAL NIGHTLY PRN
Qty: 30 TABLET | Refills: 2 | Status: SHIPPED | OUTPATIENT
Start: 2021-09-11 | End: 2021-12-08 | Stop reason: SDUPTHER

## 2021-09-10 RX ORDER — HYDROCODONE BITARTRATE AND ACETAMINOPHEN 10; 325 MG/1; MG/1
1 TABLET ORAL EVERY 6 HOURS PRN
Qty: 120 TABLET | Refills: 0 | Status: SHIPPED | OUTPATIENT
Start: 2021-09-10 | End: 2021-09-23 | Stop reason: SDUPTHER

## 2021-09-23 ENCOUNTER — HOSPITAL ENCOUNTER (OUTPATIENT)
Dept: PAIN MANAGEMENT | Age: 61
Discharge: HOME | End: 2021-09-23
Payer: COMMERCIAL

## 2021-09-23 VITALS
HEIGHT: 67 IN | OXYGEN SATURATION: 97 % | BODY MASS INDEX: 38.92 KG/M2 | WEIGHT: 248 LBS | TEMPERATURE: 97 F | HEART RATE: 78 BPM

## 2021-09-23 DIAGNOSIS — M51.36 BULGING LUMBAR DISC: ICD-10-CM

## 2021-09-23 DIAGNOSIS — M54.12 CERVICAL RADICULOPATHY: Primary | ICD-10-CM

## 2021-09-23 DIAGNOSIS — G89.29 CHRONIC PAIN OF BOTH KNEES: ICD-10-CM

## 2021-09-23 DIAGNOSIS — B02.21 NEURALGIA, GENICULATE: ICD-10-CM

## 2021-09-23 DIAGNOSIS — M51.9 LUMBAR DISC DISEASE: ICD-10-CM

## 2021-09-23 DIAGNOSIS — M47.816 FACET ARTHRITIS OF LUMBAR REGION: ICD-10-CM

## 2021-09-23 DIAGNOSIS — M51.16 DISPLACEMENT OF LUMBAR DISC WITH RADICULOPATHY: ICD-10-CM

## 2021-09-23 DIAGNOSIS — M79.18 BILATERAL MYOFASCIAL PAIN: ICD-10-CM

## 2021-09-23 DIAGNOSIS — M48.02 FORAMINAL STENOSIS OF CERVICAL REGION: ICD-10-CM

## 2021-09-23 DIAGNOSIS — G89.29 CHRONIC BILATERAL LOW BACK PAIN WITHOUT SCIATICA: ICD-10-CM

## 2021-09-23 DIAGNOSIS — M25.561 CHRONIC PAIN OF BOTH KNEES: ICD-10-CM

## 2021-09-23 DIAGNOSIS — Z98.890 HISTORY OF CERVICAL SPINAL SURGERY: ICD-10-CM

## 2021-09-23 DIAGNOSIS — M54.12 CERVICAL RADICULOPATHY: ICD-10-CM

## 2021-09-23 DIAGNOSIS — M48.00 CENTRAL STENOSIS OF SPINAL CANAL: ICD-10-CM

## 2021-09-23 DIAGNOSIS — M54.50 CHRONIC BILATERAL LOW BACK PAIN WITHOUT SCIATICA: ICD-10-CM

## 2021-09-23 DIAGNOSIS — M25.562 CHRONIC PAIN OF BOTH KNEES: ICD-10-CM

## 2021-09-23 PROCEDURE — 99214 OFFICE O/P EST MOD 30 MIN: CPT

## 2021-09-23 PROCEDURE — 99214 OFFICE O/P EST MOD 30 MIN: CPT | Performed by: NURSE PRACTITIONER

## 2021-09-23 RX ORDER — PREGABALIN 150 MG/1
150 CAPSULE ORAL 3 TIMES DAILY
Qty: 90 CAPSULE | Refills: 2 | Status: SHIPPED | OUTPATIENT
Start: 2021-09-23 | End: 2022-02-14 | Stop reason: SDUPTHER

## 2021-09-23 RX ORDER — TIZANIDINE 4 MG/1
4 TABLET ORAL 2 TIMES DAILY
Qty: 60 TABLET | Refills: 5 | Status: SHIPPED | OUTPATIENT
Start: 2021-09-23 | End: 2022-04-21 | Stop reason: SDUPTHER

## 2021-09-23 RX ORDER — HYDROCODONE BITARTRATE AND ACETAMINOPHEN 10; 325 MG/1; MG/1
1 TABLET ORAL EVERY 6 HOURS PRN
Qty: 120 TABLET | Refills: 0 | Status: SHIPPED | OUTPATIENT
Start: 2021-10-10 | End: 2021-11-22 | Stop reason: SDUPTHER

## 2021-09-23 RX ORDER — NABUMETONE 500 MG/1
500 TABLET, FILM COATED ORAL 2 TIMES DAILY
Qty: 60 TABLET | Refills: 5 | Status: SHIPPED | OUTPATIENT
Start: 2021-09-23 | End: 2022-02-14 | Stop reason: SDUPTHER

## 2021-09-23 ASSESSMENT — ENCOUNTER SYMPTOMS
CONSTIPATION: 0
BACK PAIN: 1
BOWEL INCONTINENCE: 0

## 2021-09-23 ASSESSMENT — PAIN DESCRIPTION - PAIN TYPE: TYPE: CHRONIC PAIN

## 2021-09-23 ASSESSMENT — PAIN DESCRIPTION - LOCATION: LOCATION: BACK;NECK;KNEE

## 2021-09-23 ASSESSMENT — PAIN SCALES - GENERAL: PAINLEVEL_OUTOF10: 7

## 2021-09-23 NOTE — PROGRESS NOTES
[facility]  Office Note    Patient Name: Susanne Smith    MR #: [id number]    Account #: [de-identified]    : 1960    Age: 63 y.o. Sex: female    Date: 2021    PCP: KEIKO Alvarado CNP    Chief Complaint:   Chief Complaint   Patient presents with    Lower Back Pain    Neck Pain    Knee Pain       History of Present Illness:     Susanne Smith is a 63 y.o. female who presents for office visit. Patient had bilateral lumbar trigger point injections on 2021. Patient had at least 85% relief of pain from procedure(s) for at least 6 weeks and was able to increase activity after procedure. Patient received enough pain relief from injections that the patient would like to repeat the injection(s). At last appointment, patient had appointment with neurosurgery due to worsening neck pain. Patient was ordered to have MRI of C Spine. Patient was not able to get MRI due to becoming sick. Since last visit, patient developed Covid which led into Covid pneumonia. Patient was at home for approximately 12 days when her employer had police do she well check. Patient does not have very much recollection of this time. Patient was delusional.  Has still not been released to go back to work due to oxygenation. Patient's low back pain is getting worse since she has been lying around due to the illness. Back Pain  This is a chronic problem. The current episode started more than 1 month ago. The problem occurs constantly. The problem has been waxing and waning since onset. The pain is present in the lumbar spine and sacro-iliac. The quality of the pain is described as cramping and aching. The symptoms are aggravated by bending, position, standing and twisting. Associated symptoms include numbness and weakness. Pertinent negatives include no bladder incontinence, bowel incontinence or leg pain. Neck Pain   This is a chronic problem.  The current episode started more than 1 year ago. The problem occurs constantly. The problem has been gradually worsening. The quality of the pain is described as aching and cramping. The symptoms are aggravated by bending and twisting. Associated symptoms include numbness and weakness. Pertinent negatives include no leg pain. Knee Pain  Associated symptoms include arthralgias, myalgias, neck pain, numbness and weakness. Screening Tools:    PEG Score: 8     Last PEG Score: 10     Annual ORT Score: 1     Annual PHQ Score: 13    Current Pain Assessment  Pain Assessment  Pain Assessment: 0-10  Pain Level: 7  Patient's Stated Pain Goal: 4  Pain Type: Chronic pain  Pain Location: Back, Neck, Knee    Past Visit HPI:  7/15/2020  Patient presents today for lumbar trigger point injections along with left SI injection as well as reevaluation to return to work. Patient states that condition was improving she was able to do activities of daily living as well as household chores with less pain. Patient has been undergoing physical therapy for low back and knee strengthening. Patient stated that she was doing exceptionally well until the last 2 treatments. Patient stated that she had had an increase in neck pain from the leg press as she felt it was more to do with the amount of weight. Patient stated that she mentioned this to physical therapist however they put more weight on the next day. Patient stated that she has had an increase in pain the last 2 days however she feels that the injections today are targeting the areas of increased pain. She does feel that she will be able to return to work. 6/29/2020  presents today for evaluation of return to work. Patient's condition has significantly improved as PEG score was a 10 at last appointment and is now 7.3. Physical therapy recommends another 4 weeks of treatment prior to returning back to work.   Patient's pain is more localized to the SI area as she has had significant improvement in knee pain.     Of note: Disability paperwork has not been received by this office. Patient instructed that NP will be going out of town if paperwork is received by tomorrow paperwork will be completed otherwise paperwork will not be completed until NP is back in town mid July. 6/1/2020  presents for follow-up of acute on chronic knee pain and low back pain. Back on April 1, 2020 patient had presented for a left SI injection for low back pain. The evening prior to the injection patient had fallen and hurt her left knee. Patient was in an extreme amount of pain. Patient was sent for imaging and was taken off of work. Patient started doing some therapeutic home exercises and was off work for approximately 3 weeks. Her pain gradually improved. Patient stated being off work for that 3 weeks she felt better than she had felt in a long time as the pain had resolved. Patient was able to activities of daily living and household chores with minimal pain. When patient received the injection for her low back on 4/1/2020 she was unable to determine how effective the injection was as her knee pain was so severe that it overpowered her low back pain. Patient was seen on 4/21/2020 and she was released to go back to work the following week. Patient states that after returning to work, her knee pain has gradually worsened. Her quality of life has deteriorated. Patient states that the weekends she spends recuperating so she can return to work the following week. Patient is stating that her thighs feel weak. Patient has difficulty with ambulation. Patient does walk bent over and cannot fully extend her knees. This is causing her back pain to worsen. Patient feels that the injections did help with the low back but between the fall and the positions in which she stands to work, the injections for the low back did not last very long. Patient is having difficulty sleeping at night.   Patient is worried that she may have to retire as she does not know if her body can continue. Patient states that her housework is behind as once she gets home from work it is difficult for her to do household chores. Knee Pain is a recurrent problem. The current episode started more than 1 month ago. The problem occurs constantly. The problem has been gradually worsening. Associated symptoms include arthralgias, joint swelling and myalgias. Associated symptoms comments: Abrasion to left knee almost healed. The symptoms are aggravated by walking, standing and twisting. Back Pain is a chronic problem. The current episode started more than 1 year ago. The problem occurs constantly. The problem has been gradually worsening since onset. The pain is present in the lumbar spine and sacro-iliac. The quality of the pain is described as aching and cramping. The symptoms are aggravated by twisting, standing, bending and position. 4/21/2020  presents for 3 week follow up post fall. Patient presented for a Left SI injection on 4/1/2020. Patient had fallen the night before. She was having severe knee pain. Patient was assessed and she was given work release to allow time to heal. Patient is presenting today to see if she can return to work. Knee Pain is a recurrent problem. The current episode started 1 to 4 weeks ago. The problem occurs constantly. The problem has been gradually improving. Associated symptoms include arthralgias, joint swelling and myalgias. The symptoms are aggravated by walking, standing and twisting.     3/25/2020  present for sooner appointment related to missed procedure. Patient states that she received a phone call the day before her injection stating the time was 3:30. Patient stated that she was in her managers office when she got the phone call. Patient had that the appointment was at 1100. Patient had taken 1/2 day off but had to take the entire day off work.  Patient came for her appointment on 2/14/2020 but when she registered she was told that everyone was gone for the day. Patient states that low back pain is getting worse. Patient states that pain is affecting her ability to work. She is having difficulty sleeping and she does not know how much longer she can tolerate this pain. Back Pain is a chronic problem. The current episode started more than 1 year ago. The problem occurs constantly. The problem has been rapidly worsening since onset. The pain is present in the lumbar spine and sacro-iliac. The quality of the pain is described as aching, cramping, shooting and stabbing. Radiates to: left groin. The pain is worse during the day (due to activity). The symptoms are aggravated by bending, standing and twisting (lifting). Associated symptoms include headaches. Pertinent negatives include no bladder incontinence or bowel incontinence. 1/30/2020  presents to the office for follow-up of MRI and worsening low back pain. Discussed reviewed with patient that she does have ligamentous hypertrophy with just chronic degenerative changes. Discussed patient's worsening low back pain and symptoms. Patient states that low back does wrap into the groin at times. Patient has pain with flexion and extension. Low back pain cramps and makes it difficulty for her to do anything after she works. Patient states that all she can tolerate is working and going to bed. Her quality of life has diminished and it takes all she can do to work therefore things in her house are not getting done. 12/17/19   presents to the office for annual exam with primary complaints of worsening cervical radiculopathy with pain going down left arm and into last 2 fingers getting numb. Patient is also having worsening low back pain. She states that constant flexion extension as demanding by her job that she will start having pain in her low back and that her legs would become weak.   She has had a couple of occasions where she has had electrical sensations going up her back and making it difficult to breathe. Last office appointment patient was scheduled to have MRI of Lumbar spine and will need MRI of cervical spine in the futue. Patient attempted to have MRI at her local hospital however patient barely fit inside of MRI machine. Patient stated that MRI machine was pulling at her arms and that she could not breathe and started having anxiety attack. MRI had to be stopped. Patient was brought back into the office to schedule MRI with sedation. However patient states she does not need IV sedation if she can get in the open MRI.     Employment: factory    Past Medical Histoy  Past Medical History:   Diagnosis Date    Arthritis     [facility] [address]     Diabetes mellitus [facility] [address]     Hypertension     Thyroid disease        Surgery History  Past Surgical History:   Procedure Laterality Date    CARDIAC SURGERY  1998    heart cath    CERVICAL SPINE SURGERY      HYSTERECTOMY      JOINT REPLACEMENT      bilateral knees        Family History  family history is not on file. Social History    Social History     Tobacco Use    Smoking status: Former Smoker    Smokeless tobacco: Never Used   Substance Use Topics    Alcohol use: No       Allergies  Tape [adhesive tape] and Tramadol     Current Medications  Current Outpatient Medications   Medication Sig Dispense Refill    HYDROcodone-acetaminophen (NORCO)  MG per tablet Take 1 tablet by mouth every 6 hours as needed for Pain for up to 30 days. Intended supply: 30 days 120 tablet 0    zolpidem (AMBIEN CR) 12.5 MG extended release tablet Take 1 tablet by mouth nightly as needed for Sleep for up to 90 days. 30 tablet 2    TRULICITY 3 BE/9.7BI SOPN INJECT 1 2 (ONE HALF) ML SUBCUTANEOUSLY ONCE A WEEK      hydroCHLOROthiazide (MICROZIDE) 12.5 MG capsule Take 1 capsule by mouth daily      pregabalin (LYRICA) 150 MG capsule Take 1 capsule by mouth 3 times daily for 90 days.  90 capsule 2    nabumetone (RELAFEN) 500 MG tablet Take 1 tablet by mouth 2 times daily 60 tablet 2    tiZANidine (ZANAFLEX) 4 MG tablet Take 1 tablet by mouth 2 times daily 60 tablet 2    aspirin EC 81 MG EC tablet Take 1 tablet by mouth daily (Patient not taking: Reported on 6/16/2021)      amLODIPine (NORVASC) 5 MG tablet Take 5 mg by mouth daily (Patient not taking: Reported on 6/16/2021)      atorvastatin (LIPITOR) 40 MG tablet Take 40 mg by mouth nightly (Patient not taking: Reported on 6/16/2021)      insulin glargine (LANTUS) 100 UNIT/ML injection vial Inject 50 Units into the skin daily      sertraline (ZOLOFT) 50 MG tablet Take 50 mg by mouth daily      metFORMIN (GLUCOPHAGE) 1000 MG tablet Take 1,000 mg by mouth 2 times daily      diclofenac (FLECTOR) 1.3 % patch Place 1 patch onto the skin 2 times daily (Patient not taking: Reported on 6/16/2021) 60 patch 2    lisinopril (PRINIVIL;ZESTRIL) 20 MG tablet Take 20 mg by mouth daily      Insulin Lispro (HUMALOG KWIKPEN SC) Inject into the skin      levothyroxine (SYNTHROID) 25 MCG tablet Take 25 mcg by mouth daily        No current facility-administered medications for this encounter. Review of Systems:  Review of Systems   Constitutional: Positive for activity change. Gastrointestinal: Negative for bowel incontinence and constipation. Genitourinary: Negative for bladder incontinence. Musculoskeletal: Positive for arthralgias, back pain, gait problem, myalgias, neck pain and neck stiffness. Neurological: Positive for weakness and numbness. Gait disturbance   Psychiatric/Behavioral: Positive for sleep disturbance. Negative for agitation, self-injury and suicidal ideas. The patient is not nervous/anxious.         14 point ROS negative besides that noted in HPI    Physical Exam:    Vitals:    09/23/21 0909   Pulse: 78   Temp: 97 °F (36.1 °C)   TempSrc: Temporal   SpO2: 97%   Weight: 248 lb (112.5 kg)   Height: 5' 7\" (1.702 m)       Body mass index is 38.84 kg/m². Physical Exam  Vitals and nursing note reviewed. Constitutional:       General: She is not in acute distress. Appearance: She is well-developed. HENT:      Head: Normocephalic. Right Ear: External ear normal.      Left Ear: External ear normal.      Nose: Nose normal.   Eyes:      Conjunctiva/sclera: Conjunctivae normal.      Pupils: Pupils are equal, round, and reactive to light. Neck:      Vascular: No JVD. Trachea: No tracheal deviation. Cardiovascular:      Rate and Rhythm: Normal rate. Pulmonary:      Effort: Pulmonary effort is normal.   Abdominal:      General: There is no distension. Tenderness: There is no abdominal tenderness. Musculoskeletal:      Cervical back: Spasms (tender palpable knots noted) and tenderness present. Pain with movement present. Decreased range of motion. Lumbar back: Spasms (tender palpable knots noted), tenderness and bony tenderness (Bilateral SI joints tender to palpation with 3+ provocative tests) present. Negative right straight leg raise test and negative left straight leg raise test.      Comments: Reese's (-) bilaterally    Patient points to on left SI joint as the source of low back pain  TTP of SI joint on left  positive Rigo's on left, positive Distraction on left, positive Thigh Thrust on left     Skin:     General: Skin is warm and dry. Neurological:      Mental Status: She is alert and oriented to person, place, and time. Cranial Nerves: No cranial nerve deficit. Gait: Gait abnormal.   Psychiatric:         Behavior: Behavior normal.         Thought Content:  Thought content normal.         Judgment: Judgment normal.        Labs:  Lab Results   Component Value Date     11/30/2014    K 3.8 11/30/2014    CL 99 11/30/2014    CO2 24 11/30/2014    BUN 11 11/30/2014    CREATININE 0.9 05/15/2018    CREATININE 0.6 11/30/2014    GLUCOSE 212 11/30/2014    CALCIUM 9.7 11/30/2014        Lab Results Component Value Date    WBC 12.05 (H) 11/30/2014    HGB 13.0 11/30/2014    HCT 40.4 11/30/2014    MCV 67.4 (L) 11/30/2014     11/30/2014     Assessment:  Active Problems:    Bilateral knee pain    Neuralgia, geniculate    Lumbar disc disease    Bulging lumbar disc    Displacement of lumbar disc with radiculopathy    Other insomnia    Cervical radiculopathy    Cervicalgia    Central stenosis of spinal canal    Foraminal stenosis of cervical region    Facet arthritis of lumbar region    SI (sacroiliac) joint dysfunction    Chronic bilateral low back pain without sciatica    Pain management contract agreement    Bilateral myofascial pain  Resolved Problems:    * No resolved hospital problems. *      Plan:  Chronic bilateral low back pain without sciatica  - HYDROcodone-acetaminophen (NORCO)  MG per tablet; Take 1 tablet by mouth every 6 hours as needed for Pain for up to 30 days. Intended supply: 30 days  Dispense: 120 tablet; Refill: 0    Cervical radiculopathy  Refill sent at appointment - pregabalin (LYRICA) 150 MG capsule; Take 1 capsule by mouth 3 times daily for 90 days. Dispense: 90 capsule; Refill: 2    Other insomnia  No refill needed as was refilled on 9/11/2021 - zolpidem (AMBIEN CR) 12.5 MG extended release tablet; Take 1 tablet by mouth nightly as needed for Sleep for up to 90 days. Dispense: 30 tablet; Refill: 2    Facet arthritis of lumbar region  Refill sent at appointment - nabumetone (RELAFEN) 500 MG tablet; Take 1 tablet by mouth 2 times daily  Dispense: 60 tablet; Refill: 5    Bilateral myofascial pain  Refill sent at appointment - tiZANidine (ZANAFLEX) 4 MG tablet; Take 1 tablet by mouth 2 times daily  Dispense: 60 tablet; Refill: 5    Repeat Schedule bilateral lumbar trigger point injections. Foraminal stenosis of cervical region  Cervicalgia  Central stenosis of spinal canal    - MRI CERVICAL SPINE W WO CONTRAST;  Future  MRI was scheduled at neurosurgery consultation however patient did not obtain MRI due to being ill and hospitalized for Covid and Covid pneumonia. Will reorder MRI with and without contrast due to previous cervical surgery. Patient is having worsening neck pain, her radicular symptoms are worsening with a new numbness and difficulty with fine motor tasks and dropping objects. Patient to continue to use [facility] device as this has been helpful. [x] Follow up    [] 4 weeks   [x] 6-8 weeks   [] 10-12 weeks   [] 3 months  [x] Post procedure to evaluate effectiveness of treatment  [] To evaluate medications changes made at office visit. [] To review diagnostics ordered at last visit. [] To evaluate response to therapy    [x] For management of controlled substance  [x] Random UDS as indicated by ORT score or if indicated by abberent behaviors      Discussion: Discussed exam findings and plan of care with patient. Patient agreed with POC and questions were asked and answered. Activity: Discussed exercise as beneficial to pain reduction, encouraged daily stretching with a focus on torso strengthening. Goal:  Pain Management Goals of Therapy:   [] Resolution in pain  [x] Decrease in pain level  [x] Improvement in ADL's  [x] Increase in activities with less pain  [] Decrease in medication      Controlled substance monitoring:    [x] Discussed medication side effects, risk of tolerance and/or dependence, and/or alternative treatment  [] Discussed the detrimental effects of long term narcotic use in younger patients especially women of childbearing years.   [x] No signs and symptoms of potential drug abuse or diversion were identified  [] Signs of potential drug abuse or diversion were identified   [] ORT Score   [] UDS non-compliant   [] See Note  [] Random urine drug screen sent today  [x] Random urine drug screen not completed today   [] Deferred New Patient  [x] Compliant 6/7/2021  [] Not Compliant see note  [] Medication agreement with provider signed today  [x] Medication agreement with provider on file under media   [] Medication regimen effective and continued   [] New patient continuing current medication while developing POC   [x] On going assessment and evaluation of medication regimen  [] Medication regimen not effective see plan for changes  [x] Loni Rear reviewed & on file under media      EMR dragon/transcription disclaimer: Much of this encounter note is electronic transcription/translation of spoken language to printed tach. Electronic translation of spoken language may be erroneous, or at times, nonsensical words or phrases may be inadvertently transcribed.  Although, I have reviewed the note for such errors, some may still exist.

## 2021-10-06 ENCOUNTER — HOSPITAL ENCOUNTER (OUTPATIENT)
Dept: MRI IMAGING | Age: 61
Discharge: HOME | End: 2021-10-06
Payer: COMMERCIAL

## 2021-10-06 DIAGNOSIS — M48.00 CENTRAL STENOSIS OF SPINAL CANAL: ICD-10-CM

## 2021-10-06 DIAGNOSIS — M54.12 CERVICAL RADICULOPATHY: ICD-10-CM

## 2021-10-06 DIAGNOSIS — Z98.890 HISTORY OF CERVICAL SPINAL SURGERY: ICD-10-CM

## 2021-10-06 DIAGNOSIS — M48.02 FORAMINAL STENOSIS OF CERVICAL REGION: ICD-10-CM

## 2021-10-06 LAB
GFR AFRICAN AMERICAN: >60
GFR NON-AFRICAN AMERICAN: 56
PERFORMED ON: ABNORMAL
POC CREATININE: 1 MG/DL (ref 0.3–1.3)
POC SAMPLE TYPE: ABNORMAL

## 2021-10-06 PROCEDURE — 72156 MRI NECK SPINE W/O & W/DYE: CPT

## 2021-10-06 PROCEDURE — 6360000004 HC RX CONTRAST MEDICATION: Performed by: NEUROLOGICAL SURGERY

## 2021-10-06 PROCEDURE — 82565 ASSAY OF CREATININE: CPT

## 2021-10-06 PROCEDURE — A9577 INJ MULTIHANCE: HCPCS | Performed by: NEUROLOGICAL SURGERY

## 2021-10-06 RX ADMIN — GADOBENATE DIMEGLUMINE 20 ML: 529 INJECTION, SOLUTION INTRAVENOUS at 15:05

## 2021-10-11 ENCOUNTER — TELEPHONE (OUTPATIENT)
Dept: PAIN MANAGEMENT | Age: 61
End: 2021-10-11

## 2021-10-11 NOTE — TELEPHONE ENCOUNTER
Notified patient of MRI results per Marguerite Maldonado. Patient would like to hold off on scheduling cervical epidural injections until she discusses in further detail with Marguerite Maldonado at her next office visit.

## 2021-10-11 NOTE — TELEPHONE ENCOUNTER
----- Message from Marylen Reap, APRN - CNP sent at 10/10/2021 11:14 PM CDT -----  Multiple degenerative changes noted. Patient has moderate foraminal stenosis @ C3/C4, C4/C5 and on Right of C7/T1 which would correlate with intermittent cervical radicular pattern. Let patient know we can try a KIM of C6/C7 and that MRI will be sent to Dr. Renato Dey.

## 2021-10-13 ENCOUNTER — HOSPITAL ENCOUNTER (OUTPATIENT)
Dept: PAIN MANAGEMENT | Age: 61
Discharge: HOME | End: 2021-10-13
Payer: COMMERCIAL

## 2021-10-13 VITALS
OXYGEN SATURATION: 94 % | SYSTOLIC BLOOD PRESSURE: 131 MMHG | RESPIRATION RATE: 20 BRPM | HEART RATE: 76 BPM | TEMPERATURE: 97 F | DIASTOLIC BLOOD PRESSURE: 61 MMHG

## 2021-10-13 DIAGNOSIS — M51.16 DISPLACEMENT OF LUMBAR DISC WITH RADICULOPATHY: ICD-10-CM

## 2021-10-13 DIAGNOSIS — M51.9 LUMBAR DISC DISEASE: ICD-10-CM

## 2021-10-13 DIAGNOSIS — B02.21 NEURALGIA, GENICULATE: ICD-10-CM

## 2021-10-13 DIAGNOSIS — M51.36 BULGING LUMBAR DISC: ICD-10-CM

## 2021-10-13 PROCEDURE — 76942 ECHO GUIDE FOR BIOPSY: CPT | Performed by: NURSE PRACTITIONER

## 2021-10-13 PROCEDURE — 20611 DRAIN/INJ JOINT/BURSA W/US: CPT

## 2021-10-13 PROCEDURE — 2500000003 HC RX 250 WO HCPCS

## 2021-10-13 PROCEDURE — 20553 NJX 1/MLT TRIGGER POINTS 3/>: CPT | Performed by: NURSE PRACTITIONER

## 2021-10-13 PROCEDURE — 6360000002 HC RX W HCPCS

## 2021-10-13 PROCEDURE — 20553 NJX 1/MLT TRIGGER POINTS 3/>: CPT

## 2021-10-13 RX ORDER — BUPIVACAINE HYDROCHLORIDE 5 MG/ML
10 INJECTION, SOLUTION EPIDURAL; INTRACAUDAL ONCE
Status: DISCONTINUED | OUTPATIENT
Start: 2021-10-13 | End: 2021-10-14 | Stop reason: HOSPADM

## 2021-10-13 RX ORDER — TRIAMCINOLONE ACETONIDE 40 MG/ML
80 INJECTION, SUSPENSION INTRA-ARTICULAR; INTRAMUSCULAR ONCE
Status: DISCONTINUED | OUTPATIENT
Start: 2021-10-13 | End: 2021-10-14 | Stop reason: HOSPADM

## 2021-10-13 RX ORDER — BUPIVACAINE HYDROCHLORIDE 5 MG/ML
4 INJECTION, SOLUTION EPIDURAL; INTRACAUDAL ONCE
Status: DISCONTINUED | OUTPATIENT
Start: 2021-10-13 | End: 2021-10-14 | Stop reason: HOSPADM

## 2021-10-13 RX ORDER — LIDOCAINE HYDROCHLORIDE 10 MG/ML
10 INJECTION, SOLUTION EPIDURAL; INFILTRATION; INTRACAUDAL; PERINEURAL ONCE
Status: DISCONTINUED | OUTPATIENT
Start: 2021-10-13 | End: 2021-10-14 | Stop reason: HOSPADM

## 2021-10-13 RX ORDER — LIDOCAINE HYDROCHLORIDE 10 MG/ML
4 INJECTION, SOLUTION EPIDURAL; INFILTRATION; INTRACAUDAL; PERINEURAL ONCE
Status: DISCONTINUED | OUTPATIENT
Start: 2021-10-13 | End: 2021-10-14 | Stop reason: HOSPADM

## 2021-10-13 NOTE — PROCEDURES
[facility] Physical & Pain Medicine    Patient Name: Baron Rolle    : 1960    Age: 63 y.o. Sex: female    Date: 2021    Pre-op Diagnosis: bilateral  Sacroiliac Joint(s) Dysfunction/ Sacroiliitis    Post-op Diagnosis: bilateral  Sacroiliac Joint(s) Dysfunction/ Sacroliliits    Procedure: Ultrasound Guided Injection of  bilateral Sacroiliac Joint(s)     Performing Procedure:  ELAINE Bernabe [facility]    Patient Vitals for the past 24 hrs:   BP Temp Temp src Pulse Resp SpO2   10/13/21 1344 131/61 97 °F (36.1 °C) Temporal 76 20 94 %       bilateral  Sacroiliac Joint(s)     Description of Procedure:    After voluntary, informed and signed consent obtained the patient was placed in a prone position. Appropriate time out was obtained per policy. The Sacroiliac Joint(s) was palpated for area of maximal tenderness. The area was prepped in an aseptic fashion with CHG swab. The ultrasound transducer was used to confirm the appropriate location. The skin was sprayed with Gebauer's Solution. Under aseptic technique and direct ultrasound visualization a 22 gauge 3 inch spinal needle was introduced into the Sacroiliac Joint(s). After a negative aspiration, a solution of 2 ml of 0.5% Marcaine Plain and 2 ml of 1% Lidocaine Plain and 1 ml of Kenalog (40 mg/ml) was injected into the Sacroiliac Joint(s). The needle was withdrawn and a sterile dressing applied. If this was a bilateral procedure, the same steps were followed on the opposite side. Note: Patient had red raised rash around the location of the ultrasound wand. It was felt this was a reaction to the Gebauer's Solution. Patient's allergy list updated.      Pre-op Diagnosis: Myofascial Pain/ Muscle Spasms    Post-op Diagnosis: Myofascial Pain/ Muscle Spasms    Procedure: Thoracic Trigger Point Injections    Performing Procedure: ELAINE Bernabe - [facility]    Patient Vitals for the past 24 hrs:   BP Temp Temp src Pulse Resp SpO2 10/13/21 1344 131/61 97 °F (36.1 °C) Temporal 76 20 94 %       Description of Procedure:  After a brief physical assessment and failure to improve with conservative measures the patient presented for Thoracic Trigger Point Injections The indications, limitations and possible complications were discussed with the patient and the patient elected to proceed with the procedure. After voluntary, informed and signed consent obtained the patient was placed in a seated position. Appropriate time out was obtained per policy. The areas were then prepped in a sterile fashion with Chloro-Prep. The area of maximal tenderness was palpated over the bilaterally Thoracic Muscles - Erector Spinae, Upper/Mid Latissimus, Rhomboid Minor, Rhomboid Major. The skin overlying these areas was marked with a skin marker. The areas were prepped using aseptic technique with CHG prep. The skin overlying the proposed injection sites were then sprayed with Gebauer's Solution while protecting patient eyes. Each trigger point of the Thoracic Muscles - Erector Spinae, Upper/Mid Latissimus, Rhomboid Minor, Rhomboid Major was injected after negative aspiration was injected with approximately 1-2 ml of a solution of 5 ml of 1% Lidocaine Plain and 5 ml of 0.5% Marcaine Plain after negative aspiration    Discharge: The patient tolerated the procedure well. There were no complications during the procedure and the patient was discharged home with discharge instructions. The patient has been instructed to contact the office should there be any complications or questions to arise between today and their next appointment. Plan:     Will return to the office in 6 weeks for follow up    KEIKO Das CNP, 10/13/2021 at 2:16 PM

## 2021-10-26 ENCOUNTER — TELEPHONE (OUTPATIENT)
Dept: PAIN MANAGEMENT | Age: 61
End: 2021-10-26

## 2021-10-26 DIAGNOSIS — M54.50 CHRONIC BILATERAL LOW BACK PAIN WITHOUT SCIATICA: ICD-10-CM

## 2021-10-26 DIAGNOSIS — G89.29 CHRONIC BILATERAL LOW BACK PAIN WITHOUT SCIATICA: ICD-10-CM

## 2021-11-22 DIAGNOSIS — M54.50 CHRONIC BILATERAL LOW BACK PAIN WITHOUT SCIATICA: ICD-10-CM

## 2021-11-22 DIAGNOSIS — G89.29 CHRONIC BILATERAL LOW BACK PAIN WITHOUT SCIATICA: ICD-10-CM

## 2021-11-24 RX ORDER — HYDROCODONE BITARTRATE AND ACETAMINOPHEN 10; 325 MG/1; MG/1
1 TABLET ORAL EVERY 6 HOURS PRN
Qty: 120 TABLET | Refills: 0 | Status: SHIPPED | OUTPATIENT
Start: 2021-11-25 | End: 2021-12-23 | Stop reason: SDUPTHER

## 2021-12-08 DIAGNOSIS — G47.09 OTHER INSOMNIA: ICD-10-CM

## 2021-12-08 RX ORDER — ZOLPIDEM TARTRATE 12.5 MG/1
12.5 TABLET, FILM COATED, EXTENDED RELEASE ORAL NIGHTLY PRN
Qty: 30 TABLET | Refills: 2 | Status: SHIPPED | OUTPATIENT
Start: 2021-12-10 | End: 2022-03-10

## 2021-12-23 ENCOUNTER — HOSPITAL ENCOUNTER (OUTPATIENT)
Dept: PAIN MANAGEMENT | Age: 61
Discharge: HOME | End: 2021-12-23
Payer: COMMERCIAL

## 2021-12-23 VITALS
HEART RATE: 81 BPM | TEMPERATURE: 97.1 F | SYSTOLIC BLOOD PRESSURE: 111 MMHG | OXYGEN SATURATION: 95 % | DIASTOLIC BLOOD PRESSURE: 69 MMHG | HEIGHT: 67 IN | BODY MASS INDEX: 37.39 KG/M2 | WEIGHT: 238.2 LBS

## 2021-12-23 DIAGNOSIS — M54.50 CHRONIC BILATERAL LOW BACK PAIN WITHOUT SCIATICA: ICD-10-CM

## 2021-12-23 DIAGNOSIS — G89.29 CHRONIC BILATERAL LOW BACK PAIN WITHOUT SCIATICA: ICD-10-CM

## 2021-12-23 PROCEDURE — 99213 OFFICE O/P EST LOW 20 MIN: CPT

## 2021-12-23 PROCEDURE — 99214 OFFICE O/P EST MOD 30 MIN: CPT | Performed by: NURSE PRACTITIONER

## 2021-12-23 RX ORDER — OMEPRAZOLE 20 MG/1
20 CAPSULE, DELAYED RELEASE ORAL DAILY
COMMUNITY
Start: 2021-10-11

## 2021-12-23 RX ORDER — SEMAGLUTIDE 1.34 MG/ML
1 INJECTION, SOLUTION SUBCUTANEOUS WEEKLY
COMMUNITY
Start: 2021-10-11

## 2021-12-23 RX ORDER — CETIRIZINE HYDROCHLORIDE 10 MG/1
10 TABLET ORAL DAILY
COMMUNITY
Start: 2021-12-01

## 2021-12-23 RX ORDER — FLUTICASONE PROPIONATE 50 MCG
2 SPRAY, SUSPENSION (ML) NASAL DAILY PRN
COMMUNITY
Start: 2021-11-22

## 2021-12-23 RX ORDER — HYDROCODONE BITARTRATE AND ACETAMINOPHEN 10; 325 MG/1; MG/1
1 TABLET ORAL EVERY 6 HOURS PRN
Qty: 120 TABLET | Refills: 0 | Status: SHIPPED | OUTPATIENT
Start: 2021-12-23 | End: 2022-02-14 | Stop reason: SDUPTHER

## 2021-12-23 RX ORDER — INSULIN GLARGINE 100 [IU]/ML
50 INJECTION, SOLUTION SUBCUTANEOUS DAILY
COMMUNITY
Start: 2021-11-22

## 2021-12-23 ASSESSMENT — PAIN DESCRIPTION - INTENSITY
RATING_2: 7
RATING_3: 5

## 2021-12-23 ASSESSMENT — PAIN DESCRIPTION - PAIN TYPE
TYPE_2: CHRONIC PAIN
TYPE_3: CHRONIC PAIN
TYPE: CHRONIC PAIN

## 2021-12-23 ASSESSMENT — PAIN DESCRIPTION - ORIENTATION
ORIENTATION_2: LOWER
ORIENTATION_3: RIGHT;LEFT
ORIENTATION: LOWER

## 2021-12-23 ASSESSMENT — PAIN DESCRIPTION - LOCATION
LOCATION_3: KNEE
LOCATION_2: NECK
LOCATION: BACK

## 2021-12-23 ASSESSMENT — ENCOUNTER SYMPTOMS
CONSTIPATION: 0
BACK PAIN: 1
BOWEL INCONTINENCE: 0

## 2021-12-23 NOTE — PROGRESS NOTES
Clinic Documentation      Education Provided:  [x] Review of Ammy Balbinary  [] Agreement Review  [x] PEG Score Calculated [] PHQ Score Calculated [] ORT Score Calculated    [] Compliance Issues Discussed [] Cognitive Behavior Needs [x] Exercise [] Review of Test [] Financial Issues  [x] Tobacco/Alcohol 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 Test Ordered [x] Prescription Ordered/Changed   [] Obtained Test Results / Consult Notes        Complete if patient is withholding blood thinner for procedure     Blood Thinner Patient is currently taking:      [] Plavix (Hold for 7 days)  [] Aspirin (Hold for 5 days)     [] Pletal (Hold for 2 days)  [] Pradaxa (Hold for 3 days)    [] Effient (Hold for 7 days)  [] Xarelto (Hold for 2 days)    [] Eliquis (Hold for 2 days)  [] Brilinta (Hold for 7 days)    [] Coumadin (Hold for 5 days) - (INR needs to be drawn the day prior to procedure- INR < 2.0)    [] Aggrenox (Hold for 7 days)        [] Patient will stop medication on their own.    [] Blood Thinner Form Faxed for approval to hold.    Provider form faxed to:     Assessment Completed by:  Electronically signed by Jennifer Ann RN on 12/23/2021 at 2:55 PM

## 2021-12-23 NOTE — TELEPHONE ENCOUNTER
Requested Prescriptions     Signed Prescriptions Disp Refills    HYDROcodone-acetaminophen (NORCO)  MG per tablet 120 tablet 0     Sig: Take 1 tablet by mouth every 6 hours as needed for Pain for up to 30 days.  Intended supply: 30 days     Authorizing Provider: Vilam Whitten   refill at office appointment

## 2021-12-23 NOTE — PROGRESS NOTES
[facility] Physical & Pain Medicine  Office Note    Patient Name: Pau Hyde    MR #: [id number]    Account #: [de-identified]    : 1960    Age: 57 y.o. Sex: female    Date: 2021    PCP: KEIKO Licea CNP    Chief Complaint:   Chief Complaint   Patient presents with    Lower Back Pain    Neck Pain    Knee Pain     bilateral       History of Present Illness:     Pau Hyde is a 57 y.o. female who presents for office visit. Patient had bilateral lumbar trigger point injections on 10/13/2021. Patient had at least 85% relief of pain from procedure(s) for at least 6 weeks and was able to increase activity after procedure. Patient received enough pain relief from injections that the patient would like to repeat the injection(s). Patient has recently retired. She states that she just could not tolerate the pain from working any longer. Since last appointment, patient did have cervical MRI on 10/6/2021. Multiple degenerative changes noted. Patient has moderate foraminal stenosis @ C3/C4, C4/C5 and on Right of C7/T1 which would correlate with intermittent cervical radicular pattern.        Neck Pain   This is a chronic problem. The current episode started more than 1 year ago. The problem occurs constantly. The problem has been waxing and waning. The quality of the pain is described as aching and cramping. The symptoms are aggravated by bending and twisting. Associated symptoms include numbness and weakness. Pertinent negatives include no leg pain. Knee Pain  Associated symptoms include arthralgias, myalgias, neck pain, numbness and weakness. Back Pain  This is a chronic problem. The current episode started more than 1 month ago. The problem occurs constantly. The problem has been waxing and waning since onset. The pain is present in the lumbar spine and sacro-iliac. The quality of the pain is described as cramping and aching.  The symptoms are aggravated by bending, position, standing and twisting. Associated symptoms include numbness and weakness. Pertinent negatives include no bladder incontinence, bowel incontinence or leg pain. Screening Tools:    PEG Score: 7.3     Last PEG Score: 8     Annual ORT Score: 1     Annual PHQ Score: 13    Current Pain Assessment  Pain Assessment  Pain Assessment: 0-10  Pain Type: Chronic pain  Pain Location: Back  Pain Orientation: Lower  Multiple Pain Sites: Yes    Past Visit HPI:  7/15/2020  Patient presents today for lumbar trigger point injections along with left SI injection as well as reevaluation to return to work. Patient states that condition was improving she was able to do activities of daily living as well as household chores with less pain. Patient has been undergoing physical therapy for low back and knee strengthening. Patient stated that she was doing exceptionally well until the last 2 treatments. Patient stated that she had had an increase in neck pain from the leg press as she felt it was more to do with the amount of weight. Patient stated that she mentioned this to physical therapist however they put more weight on the next day. Patient stated that she has had an increase in pain the last 2 days however she feels that the injections today are targeting the areas of increased pain. She does feel that she will be able to return to work. 6/29/2020  presents today for evaluation of return to work. Patient's condition has significantly improved as PEG score was a 10 at last appointment and is now 7.3. Physical therapy recommends another 4 weeks of treatment prior to returning back to work. Patient's pain is more localized to the SI area as she has had significant improvement in knee pain. Of note: Disability paperwork has not been received by this office.   Patient instructed that NP will be going out of town if paperwork is received by tomorrow paperwork will be completed otherwise paperwork will not be completed until NP is back in [facility] mid July. 6/1/2020  presents for follow-up of acute on chronic knee pain and low back pain. Back on April 1, 2020 patient had presented for a left SI injection for low back pain. The evening prior to the injection patient had fallen and hurt her left knee. Patient was in an extreme amount of pain. Patient was sent for imaging and was taken off of work. Patient started doing some therapeutic home exercises and was off work for approximately 3 weeks. Her pain gradually improved. Patient stated being off work for that 3 weeks she felt better than she had felt in a long time as the pain had resolved. Patient was able to activities of daily living and household chores with minimal pain. When patient received the injection for her low back on 4/1/2020 she was unable to determine how effective the injection was as her knee pain was so severe that it overpowered her low back pain. Patient was seen on 4/21/2020 and she was released to go back to work the following week. Patient states that after returning to work, her knee pain has gradually worsened. Her quality of life has deteriorated. Patient states that the weekends she spends recuperating so she can return to work the following week. Patient is stating that her thighs feel weak. Patient has difficulty with ambulation. Patient does walk bent over and cannot fully extend her knees. This is causing her back pain to worsen. Patient feels that the injections did help with the low back but between the fall and the positions in which she stands to work, the injections for the low back did not last very long. Patient is having difficulty sleeping at night. Patient is worried that she may have to retire as she does not know if her body can continue. Patient states that her housework is behind as once she gets home from work it is difficult for her to do household chores. Knee Pain is a recurrent problem.  The current episode started more than 1 month ago. The problem occurs constantly. The problem has been gradually worsening. Associated symptoms include arthralgias, joint swelling and myalgias. Associated symptoms comments: Abrasion to left knee almost healed. The symptoms are aggravated by walking, standing and twisting. Back Pain is a chronic problem. The current episode started more than 1 year ago. The problem occurs constantly. The problem has been gradually worsening since onset. The pain is present in the lumbar spine and sacro-iliac. The quality of the pain is described as aching and cramping. The symptoms are aggravated by twisting, standing, bending and position. 4/21/2020  presents for 3 week follow up post fall. Patient presented for a Left SI injection on 4/1/2020. Patient had fallen the night before. She was having severe knee pain. Patient was assessed and she was given work release to allow time to heal. Patient is presenting today to see if she can return to work. Knee Pain is a recurrent problem. The current episode started 1 to 4 weeks ago. The problem occurs constantly. The problem has been gradually improving. Associated symptoms include arthralgias, joint swelling and myalgias. The symptoms are aggravated by walking, standing and twisting.     3/25/2020  present for sooner appointment related to missed procedure. Patient states that she received a phone call the day before her injection stating the time was 3:30. Patient stated that she was in her managers office when she got the phone call. Patient had that the appointment was at 1100. Patient had taken 1/2 day off but had to take the entire day off work. Patient came for her appointment on 2/14/2020 but when she registered she was told that everyone was gone for the day. Patient states that low back pain is getting worse. Patient states that pain is affecting her ability to work.  She is having difficulty sleeping and she does not know how much longer she can tolerate this pain. Back Pain is a chronic problem. The current episode started more than 1 year ago. The problem occurs constantly. The problem has been rapidly worsening since onset. The pain is present in the lumbar spine and sacro-iliac. The quality of the pain is described as aching, cramping, shooting and stabbing. Radiates to: left groin. The pain is worse during the day (due to activity). The symptoms are aggravated by bending, standing and twisting (lifting). Associated symptoms include headaches. Pertinent negatives include no bladder incontinence or bowel incontinence. 1/30/2020  presents to the office for follow-up of MRI and worsening low back pain. Discussed reviewed with patient that she does have ligamentous hypertrophy with just chronic degenerative changes. Discussed patient's worsening low back pain and symptoms. Patient states that low back does wrap into the groin at times. Patient has pain with flexion and extension. Low back pain cramps and makes it difficulty for her to do anything after she works. Patient states that all she can tolerate is working and going to bed. Her quality of life has diminished and it takes all she can do to work therefore things in her house are not getting done. 12/17/19   presents to the office for annual exam with primary complaints of worsening cervical radiculopathy with pain going down left arm and into last 2 fingers getting numb. Patient is also having worsening low back pain. She states that constant flexion extension as demanding by her job that she will start having pain in her low back and that her legs would become weak. She has had a couple of occasions where she has had electrical sensations going up her back and making it difficult to breathe. Last office appointment patient was scheduled to have MRI of Lumbar spine and will need MRI of cervical spine in the futue.   Patient attempted to have MRI at her local hospital however patient barely fit inside of MRI machine. Patient stated that MRI machine was pulling at her arms and that she could not breathe and started having anxiety attack. MRI had to be stopped. Patient was brought back into the office to schedule MRI with sedation. However patient states she does not need IV sedation if she can get in the open MRI.     Employment: factory    Past Medical Histoy  Past Medical History:   Diagnosis Date    Arthritis     Bilateral sacroiliitis ([address]     Chronic pain     Diabetes mellitus ([address]     Hypertension     Thyroid disease        Surgery History  Past Surgical History:   Procedure Laterality Date    CARDIAC SURGERY  1998    heart cath    CERVICAL SPINE SURGERY      HYSTERECTOMY      JOINT REPLACEMENT      bilateral knees        Family History  family history is not on file. Social History    Social History     Tobacco Use    Smoking status: Former Smoker    Smokeless tobacco: Never Used   Substance Use Topics    Alcohol use: No       Allergies  Ethyl chloride, Tape [adhesive tape], and Tramadol     Current Medications  Current Outpatient Medications   Medication Sig Dispense Refill    OZEMPIC, 1 MG/DOSE, 4 MG/3ML SOPN Inject 1 mg into the skin once a week      omeprazole (PRILOSEC) 20 MG delayed release capsule Take 20 mg by mouth daily      fluticasone (FLONASE) 50 MCG/ACT nasal spray 2 sprays by Nasal route daily as needed      Cholecalciferol 50 MCG (2000 UT) CAPS Take 50 mcg by mouth daily      cetirizine (ZYRTEC) 10 MG tablet Take 10 mg by mouth daily      LANTUS SOLOSTAR 100 UNIT/ML injection pen Inject 50 Units into the skin daily      zolpidem (AMBIEN CR) 12.5 MG extended release tablet Take 1 tablet by mouth nightly as needed for Sleep for up to 90 days. 30 tablet 2    HYDROcodone-acetaminophen (NORCO)  MG per tablet Take 1 tablet by mouth every 6 hours as needed for Pain for up to 30 days.  Intended supply: 30 days (may fill 11/25/21) 120 tablet 0    nabumetone (RELAFEN) 500 MG tablet Take 1 tablet by mouth 2 times daily 60 tablet 5    pregabalin (LYRICA) 150 MG capsule Take 1 capsule by mouth 3 times daily for 90 days. 90 capsule 2    tiZANidine (ZANAFLEX) 4 MG tablet Take 1 tablet by mouth 2 times daily 60 tablet 5    hydroCHLOROthiazide (MICROZIDE) 12.5 MG capsule Take 1 capsule by mouth daily      amLODIPine (NORVASC) 5 MG tablet Take 5 mg by mouth daily       atorvastatin (LIPITOR) 40 MG tablet Take 40 mg by mouth nightly       sertraline (ZOLOFT) 50 MG tablet Take 50 mg by mouth daily      metFORMIN (GLUCOPHAGE) 1000 MG tablet Take 1,000 mg by mouth 2 times daily      diclofenac (FLECTOR) 1.3 % patch Place 1 patch onto the skin 2 times daily 60 patch 2    lisinopril (PRINIVIL;ZESTRIL) 20 MG tablet Take 20 mg by mouth daily      Insulin Lispro (HUMALOG KWIKPEN SC) Inject into the skin      levothyroxine (SYNTHROID) 25 MCG tablet Take 25 mcg by mouth daily        No current facility-administered medications for this encounter. Review of Systems:  Review of Systems   Constitutional: Positive for activity change. Gastrointestinal: Negative for bowel incontinence and constipation. Genitourinary: Negative for bladder incontinence. Musculoskeletal: Positive for arthralgias, back pain, gait problem, myalgias, neck pain and neck stiffness. Neurological: Positive for weakness and numbness. Gait disturbance   Psychiatric/Behavioral: Positive for sleep disturbance. Negative for agitation, self-injury and suicidal ideas. The patient is not nervous/anxious. 14 point ROS negative besides that noted in HPI    Physical Exam:    Vitals:    12/23/21 1509   BP: 111/69   Pulse: 81   Temp: 97.1 °F (36.2 °C)   TempSrc: Temporal   SpO2: 95%   Weight: 238 lb 3.2 oz (108 kg)   Height: 5' 7\" (1.702 m)       Body mass index is 37.31 kg/m². Physical Exam  Vitals and nursing note reviewed.    Constitutional: General: She is not in acute distress. Appearance: She is well-developed. HENT:      Head: Normocephalic. Right Ear: External ear normal.      Left Ear: External ear normal.      Nose: Nose normal.   Eyes:      Conjunctiva/sclera: Conjunctivae normal.      Pupils: Pupils are equal, round, and reactive to light. Neck:      Vascular: No JVD. Trachea: No tracheal deviation. Cardiovascular:      Rate and Rhythm: Normal rate. Pulmonary:      Effort: Pulmonary effort is normal.   Abdominal:      General: There is no distension. Tenderness: There is no abdominal tenderness. Musculoskeletal:      Cervical back: Spasms (tender palpable knots noted) and tenderness present. Pain with movement present. Decreased range of motion. Lumbar back: Spasms (tender palpable knots noted), tenderness and bony tenderness (Bilateral SI joints tender to palpation with 3+ provocative tests) present. Negative right straight leg raise test and negative left straight leg raise test.      Comments: Reese's (-) bilaterally    Patient points to on left SI joint as the source of low back pain  TTP of SI joint on left  positive Rigo's on left, positive Distraction on left, positive Thigh Thrust on left     Skin:     General: Skin is warm and dry. Neurological:      Mental Status: She is alert and oriented to person, place, and time. Cranial Nerves: No cranial nerve deficit. Gait: Gait abnormal.   Psychiatric:         Behavior: Behavior normal.         Thought Content:  Thought content normal.         Judgment: Judgment normal.        Labs:  Lab Results   Component Value Date     11/30/2014    K 3.8 11/30/2014    CL 99 11/30/2014    CO2 24 11/30/2014    BUN 11 11/30/2014    CREATININE 1.0 10/06/2021    CREATININE 0.6 11/30/2014    GLUCOSE 212 11/30/2014    CALCIUM 9.7 11/30/2014        Lab Results   Component Value Date    WBC 12.05 (H) 11/30/2014    HGB 13.0 11/30/2014    HCT 40.4 11/30/2014 MCV 67.4 (L) 11/30/2014     11/30/2014     Assessment:  Active Problems:    Bilateral knee pain    Neuralgia, geniculate    Lumbar disc disease    Bulging lumbar disc    Displacement of lumbar disc with radiculopathy    Cervical radiculopathy    Cervicalgia    Central stenosis of spinal canal    Foraminal stenosis of cervical region    Facet arthritis of lumbar region    SI (sacroiliac) joint dysfunction    Chronic bilateral low back pain without sciatica    Pain management contract agreement    Bilateral myofascial pain    History of cervical spinal surgery  Resolved Problems:    * No resolved hospital problems. *      Plan:  Chronic bilateral low back pain without sciatica  Continue medication with refill sent at appointment see refill encounter.   - HYDROcodone-acetaminophen (NORCO)  MG per tablet; Take 1 tablet by mouth every 6 hours as needed for Pain for up to 30 days. Intended supply: 30 days  Dispense: 120 tablet; Refill: 0    Cervical radiculopathy  Continue medication with no refill sent at appointment see refill encounter. pregabalin (LYRICA) 150 MG capsule; Take 1 capsule by mouth 3 times daily for 90 days. Dispense: 90 capsule; Refill: 2    Other insomnia  Continue medication with no refill sent at appointment see refill encounter. -   zolpidem (AMBIEN CR) 12.5 MG extended release tablet; Take 1 tablet by mouth nightly as needed for Sleep for up to 90 days. Dispense: 30 tablet; Refill: 2    Facet arthritis of lumbar region  Continue medication with no refill sent at appointment see refill encounter. nabumetone (RELAFEN) 500 MG tablet; Take 1 tablet by mouth 2 times daily  Dispense: 60 tablet; Refill: 5    Bilateral myofascial pain  Continue medication with no refill sent at appointment see refill encounter. tiZANidine (ZANAFLEX) 4 MG tablet; Take 1 tablet by mouth 2 times daily  Dispense: 60 tablet;  Refill: 5    Repeat Schedule bilateral lumbar trigger point injections for myofacial pain    Schedule bilateral SI joint injections with US as patient has had good pain relief from previous injections. Foraminal stenosis of cervical region  Cervicalgia  Central stenosis of spinal canal    Patient does not want KIM as she had a bad experience with a past injections. Patient to continue to use [facility] device as this has been helpful. [x] Follow up    [] 4 weeks   [x] 6-8 weeks   [] 10-12 weeks   [] 3 months  [x] Post procedure to evaluate effectiveness of treatment  [] To evaluate medications changes made at office visit. [] To review diagnostics ordered at last visit. [] To evaluate response to therapy    [x] For management of controlled substance  [x] Random UDS as indicated by ORT score or if indicated by abberent behaviors      Discussion: Discussed exam findings and plan of care with patient. Patient agreed with POC and questions were asked and answered. Activity: Discussed exercise as beneficial to pain reduction, encouraged daily stretching with a focus on torso strengthening. Goal:  Pain Management Goals of Therapy:   [] Resolution in pain  [x] Decrease in pain level  [x] Improvement in ADL's  [x] Increase in activities with less pain  [] Decrease in medication      Controlled substance monitoring:    [x] Discussed medication side effects, risk of tolerance and/or dependence, and/or alternative treatment  [] Discussed the detrimental effects of long term narcotic use in younger patients especially women of childbearing years.   [x] No signs and symptoms of potential drug abuse or diversion were identified  [] Signs of potential drug abuse or diversion were identified   [] ORT Score   [] UDS non-compliant   [] See Note  [x] Random urine drug screen sent today  [] Random urine drug screen not completed today   [] Deferred New Patient  [] Compliant 6/7/2021  [] Not Compliant see note  [] Medication agreement with provider signed today  [x] Medication agreement with provider on file under media 9/22  [] Medication regimen effective and continued   [] New patient continuing current medication while developing POC   [x] On going assessment and evaluation of medication regimen  [] Medication regimen not effective see plan for changes  [x] Xiao Rashid reviewed & on file under media      EMR dragon/transcription disclaimer: Much of this encounter note is electronic transcription/translation of spoken language to printed tach. Electronic translation of spoken language may be erroneous, or at times, nonsensical words or phrases may be inadvertently transcribed.  Although, I have reviewed the note for such errors, some may still exist.

## 2022-01-13 ENCOUNTER — TELEPHONE (OUTPATIENT)
Dept: PAIN MANAGEMENT | Age: 62
End: 2022-01-13

## 2022-01-19 ENCOUNTER — HOSPITAL ENCOUNTER (OUTPATIENT)
Dept: PAIN MANAGEMENT | Age: 62
Discharge: HOME | End: 2022-01-19
Payer: COMMERCIAL

## 2022-01-19 VITALS
SYSTOLIC BLOOD PRESSURE: 122 MMHG | OXYGEN SATURATION: 97 % | HEART RATE: 73 BPM | TEMPERATURE: 96.7 F | DIASTOLIC BLOOD PRESSURE: 51 MMHG | RESPIRATION RATE: 20 BRPM

## 2022-01-19 DIAGNOSIS — M51.9 LUMBAR DISC DISEASE: ICD-10-CM

## 2022-01-19 DIAGNOSIS — M51.16 DISPLACEMENT OF LUMBAR DISC WITH RADICULOPATHY: ICD-10-CM

## 2022-01-19 DIAGNOSIS — M51.36 BULGING LUMBAR DISC: ICD-10-CM

## 2022-01-19 DIAGNOSIS — B02.21 NEURALGIA, GENICULATE: ICD-10-CM

## 2022-01-19 PROCEDURE — 76942 ECHO GUIDE FOR BIOPSY: CPT | Performed by: NURSE PRACTITIONER

## 2022-01-19 PROCEDURE — 20553 NJX 1/MLT TRIGGER POINTS 3/>: CPT | Performed by: NURSE PRACTITIONER

## 2022-01-19 PROCEDURE — 6360000002 HC RX W HCPCS

## 2022-01-19 PROCEDURE — 20611 DRAIN/INJ JOINT/BURSA W/US: CPT

## 2022-01-19 PROCEDURE — 2500000003 HC RX 250 WO HCPCS

## 2022-01-19 PROCEDURE — 20553 NJX 1/MLT TRIGGER POINTS 3/>: CPT

## 2022-01-19 RX ORDER — BUPIVACAINE HYDROCHLORIDE 5 MG/ML
10 INJECTION, SOLUTION EPIDURAL; INTRACAUDAL ONCE
Status: DISCONTINUED | OUTPATIENT
Start: 2022-01-19 | End: 2022-01-21 | Stop reason: HOSPADM

## 2022-01-19 RX ORDER — LIDOCAINE HYDROCHLORIDE 10 MG/ML
10 INJECTION, SOLUTION EPIDURAL; INFILTRATION; INTRACAUDAL; PERINEURAL ONCE
Status: DISCONTINUED | OUTPATIENT
Start: 2022-01-19 | End: 2022-01-21 | Stop reason: HOSPADM

## 2022-01-19 RX ORDER — BUPIVACAINE HYDROCHLORIDE 5 MG/ML
4 INJECTION, SOLUTION EPIDURAL; INTRACAUDAL ONCE
Status: DISCONTINUED | OUTPATIENT
Start: 2022-01-19 | End: 2022-01-21 | Stop reason: HOSPADM

## 2022-01-19 RX ORDER — TRIAMCINOLONE ACETONIDE 40 MG/ML
80 INJECTION, SUSPENSION INTRA-ARTICULAR; INTRAMUSCULAR ONCE
Status: DISCONTINUED | OUTPATIENT
Start: 2022-01-19 | End: 2022-01-21 | Stop reason: HOSPADM

## 2022-01-19 RX ORDER — LIDOCAINE HYDROCHLORIDE 10 MG/ML
4 INJECTION, SOLUTION EPIDURAL; INFILTRATION; INTRACAUDAL; PERINEURAL ONCE
Status: DISCONTINUED | OUTPATIENT
Start: 2022-01-19 | End: 2022-01-21 | Stop reason: HOSPADM

## 2022-01-19 NOTE — PROCEDURES
[facility] Physical & Pain Medicine    Patient Name: Sudhir Fairbanks    : 1960                    Age: 57 y.o. Sex: female    Date: 2022    Pre-op Diagnosis: Myofascial Pain/ Muscle Spasms    Post-op Diagnosis: Myofascial Pain/ Muscle Spasms    Procedure: Lumbar Trigger Point Injections    Performing Procedure: TIANA McdanielP - BC; [facility]-BC    Patient Vitals for the past 24 hrs:   BP Temp Temp src Pulse Resp SpO2   22 1253 (!) 122/51 96.7 °F (35.9 °C) Temporal 73 20 97 %       Description of Procedure:    After a brief physical assessment and failure to improve with conservative measures the patient presented for Lumbar Trigger Point Injections The indications, limitations and possible complications were discussed with the patient and the patient elected to proceed with the procedure. After voluntary, informed and signed consent obtained the patient was placed in a seated position. Appropriate time out was obtained per policy. The area of maximal tenderness was palpated over the bilaterally Lumbar Muscles - Lower Latissimus,  Erector Spinae, Lumbar Paraspinous. The skin overlying these areas was marked with a skin marker. The areas were prepped using aseptic technique with CHG prep. The skin overlying the proposed injection sites were then sprayed with Gebauer's Solution while protecting patient eyes. Each trigger point of the Lumbar Muscles - Lower Latissimus,  Erector Spinae, Lumbar Paraspinous was injected after negative aspiration was injected with approximately 1-2 ml of a solution of 5 ml of 1% Lidocaine Plain and 5 ml of 0.5% Marcaine Plain after negative aspiration    Discharge: The patient tolerated the procedure well. There were no complications during the procedure and the patient was discharged home with discharge instructions.     The patient has been instructed to contact the office should there be any complications or questions to arise between today and their next appointment. Plan: Will return to the office in 6 weeks for follow up    KEIKO Reeder CNP, 2022 at 2:04 PM   [facility] Physical & Pain Medicine    Patient Name: Kymberly Torres    : 1960    Age: 57 y.o. Sex: female    Date: 2022    Pre-op Diagnosis: bilateral  Sacroiliac Joint(s) Dysfunction/ Sacroiliitis    Post-op Diagnosis: bilateral  Sacroiliac Joint(s) Dysfunction/ Sacroliliits    Procedure: Ultrasound Guided Injection of  bilateral Sacroiliac Joint(s)     Performing Procedure:  Han Alexandra, ACAGNP - BC, [facility]-BC    Patient Vitals for the past 24 hrs:   BP Temp Temp src Pulse Resp SpO2   22 1253 (!) 122/51 96.7 °F (35.9 °C) Temporal 73 20 97 %       bilateral  Sacroiliac Joint(s)     Description of Procedure:    After voluntary, informed and signed consent obtained the patient was placed in a prone position. Appropriate time out was obtained per policy. The Sacroiliac Joint(s) was palpated for area of maximal tenderness. The area was prepped in an aseptic fashion with CHG swab. The ultrasound transducer was used to confirm the appropriate location. The skin was sprayed with Gebauer's Solution. Under aseptic technique and direct ultrasound visualization a 22 gauge 3 inch spinal needle was introduced into the Sacroiliac Joint(s). After a negative aspiration, a solution of 2 ml of 0.5% Marcaine Plain and 2 ml of 1% Lidocaine Plain and 1 ml of Kenalog (40 mg/ml) was injected into the Sacroiliac Joint(s). The needle was withdrawn and a sterile dressing applied. If this was a bilateral procedure, the same steps were followed on the opposite side. Discharge: The patient tolerated the procedure well. There were no complications during the procedure and the patient was discharged home with discharge instructions.     The patient has been instructed to contact the office should there be any complications or questions to arise between today and their next appointment. Plan:    The patient will follow up in the office in approximately 6 weeks.      KEIKO Mathews - CNP, 1/19/2022 at 2:04 PM

## 2022-01-26 ENCOUNTER — TELEPHONE (OUTPATIENT)
Dept: PAIN MANAGEMENT | Age: 62
End: 2022-01-26

## 2022-01-26 DIAGNOSIS — M51.16 DISPLACEMENT OF LUMBAR DISC WITH RADICULOPATHY: ICD-10-CM

## 2022-01-26 DIAGNOSIS — M51.36 BULGING LUMBAR DISC: ICD-10-CM

## 2022-01-26 DIAGNOSIS — B02.21 NEURALGIA, GENICULATE: ICD-10-CM

## 2022-01-26 DIAGNOSIS — M51.9 LUMBAR DISC DISEASE: ICD-10-CM

## 2022-01-26 NOTE — TELEPHONE ENCOUNTER
Spoke with pt concerning her injection she had on 01/19. Pt states she is doing just fine. She is a 2/10 today and is at least 75% better. No further questions at this time.

## 2022-02-14 DIAGNOSIS — M54.50 CHRONIC BILATERAL LOW BACK PAIN WITHOUT SCIATICA: ICD-10-CM

## 2022-02-14 DIAGNOSIS — M54.12 CERVICAL RADICULOPATHY: ICD-10-CM

## 2022-02-14 DIAGNOSIS — G89.29 CHRONIC BILATERAL LOW BACK PAIN WITHOUT SCIATICA: ICD-10-CM

## 2022-02-14 DIAGNOSIS — M47.816 FACET ARTHRITIS OF LUMBAR REGION: ICD-10-CM

## 2022-02-14 RX ORDER — PREGABALIN 150 MG/1
150 CAPSULE ORAL 3 TIMES DAILY
Qty: 90 CAPSULE | Refills: 2 | Status: SHIPPED | OUTPATIENT
Start: 2022-02-14 | End: 2022-04-21 | Stop reason: SDUPTHER

## 2022-02-14 RX ORDER — HYDROCODONE BITARTRATE AND ACETAMINOPHEN 10; 325 MG/1; MG/1
1 TABLET ORAL EVERY 6 HOURS PRN
Qty: 120 TABLET | Refills: 0 | Status: SHIPPED | OUTPATIENT
Start: 2022-02-14 | End: 2022-03-16

## 2022-02-14 RX ORDER — NABUMETONE 500 MG/1
500 TABLET, FILM COATED ORAL 2 TIMES DAILY
Qty: 60 TABLET | Refills: 5 | Status: SHIPPED | OUTPATIENT
Start: 2022-02-14 | End: 2022-04-21 | Stop reason: SDUPTHER

## 2022-04-21 ENCOUNTER — HOSPITAL ENCOUNTER (OUTPATIENT)
Dept: PAIN MANAGEMENT | Age: 62
Discharge: HOME | End: 2022-04-21
Payer: COMMERCIAL

## 2022-04-21 VITALS
OXYGEN SATURATION: 97 % | WEIGHT: 237 LBS | SYSTOLIC BLOOD PRESSURE: 132 MMHG | RESPIRATION RATE: 20 BRPM | DIASTOLIC BLOOD PRESSURE: 75 MMHG | BODY MASS INDEX: 37.2 KG/M2 | TEMPERATURE: 97 F | HEIGHT: 67 IN | HEART RATE: 76 BPM

## 2022-04-21 DIAGNOSIS — M79.18 BILATERAL MYOFASCIAL PAIN: ICD-10-CM

## 2022-04-21 DIAGNOSIS — G47.09 OTHER INSOMNIA: Primary | ICD-10-CM

## 2022-04-21 DIAGNOSIS — M47.816 FACET ARTHRITIS OF LUMBAR REGION: ICD-10-CM

## 2022-04-21 DIAGNOSIS — M54.12 CERVICAL RADICULOPATHY: ICD-10-CM

## 2022-04-21 DIAGNOSIS — G89.29 CHRONIC BILATERAL LOW BACK PAIN WITHOUT SCIATICA: ICD-10-CM

## 2022-04-21 DIAGNOSIS — M54.50 CHRONIC BILATERAL LOW BACK PAIN WITHOUT SCIATICA: ICD-10-CM

## 2022-04-21 PROCEDURE — 99213 OFFICE O/P EST LOW 20 MIN: CPT

## 2022-04-21 PROCEDURE — 99213 OFFICE O/P EST LOW 20 MIN: CPT | Performed by: NURSE PRACTITIONER

## 2022-04-21 RX ORDER — SERTRALINE HYDROCHLORIDE 100 MG/1
100 TABLET, FILM COATED ORAL DAILY
COMMUNITY
Start: 2022-03-02

## 2022-04-21 RX ORDER — LANCETS 33 GAUGE
EACH MISCELLANEOUS
COMMUNITY
Start: 2022-02-15

## 2022-04-21 RX ORDER — HYDROCODONE BITARTRATE AND ACETAMINOPHEN 10; 325 MG/1; MG/1
1 TABLET ORAL EVERY 6 HOURS PRN
Qty: 120 TABLET | Refills: 0 | Status: SHIPPED | OUTPATIENT
Start: 2022-04-21 | End: 2022-06-28 | Stop reason: SDUPTHER

## 2022-04-21 RX ORDER — TIZANIDINE 4 MG/1
4 TABLET ORAL 2 TIMES DAILY
Qty: 60 TABLET | Refills: 5 | Status: SHIPPED | OUTPATIENT
Start: 2022-04-21 | End: 2022-08-15 | Stop reason: SDUPTHER

## 2022-04-21 RX ORDER — PREGABALIN 150 MG/1
150 CAPSULE ORAL 3 TIMES DAILY
Qty: 90 CAPSULE | Refills: 2 | Status: SHIPPED | OUTPATIENT
Start: 2022-04-21 | End: 2022-08-15 | Stop reason: SDUPTHER

## 2022-04-21 RX ORDER — BLOOD SUGAR DIAGNOSTIC
STRIP MISCELLANEOUS
COMMUNITY
Start: 2022-03-02

## 2022-04-21 RX ORDER — ZOLPIDEM TARTRATE 12.5 MG/1
12.5 TABLET, FILM COATED, EXTENDED RELEASE ORAL NIGHTLY PRN
Qty: 30 TABLET | Refills: 2 | Status: SHIPPED | OUTPATIENT
Start: 2022-04-21 | End: 2022-08-03 | Stop reason: SDUPTHER

## 2022-04-21 RX ORDER — NABUMETONE 500 MG/1
500 TABLET, FILM COATED ORAL 2 TIMES DAILY
Qty: 60 TABLET | Refills: 5 | Status: SHIPPED | OUTPATIENT
Start: 2022-04-21 | End: 2022-08-15 | Stop reason: SDUPTHER

## 2022-04-21 ASSESSMENT — ENCOUNTER SYMPTOMS
BOWEL INCONTINENCE: 0
BACK PAIN: 1
CONSTIPATION: 0

## 2022-04-21 ASSESSMENT — PAIN DESCRIPTION - ORIENTATION
ORIENTATION_3: RIGHT;LEFT
ORIENTATION_2: LOWER
ORIENTATION: LOWER

## 2022-04-21 ASSESSMENT — PAIN DESCRIPTION - INTENSITY
RATING_3: 6
RATING_2: 8

## 2022-04-21 ASSESSMENT — PAIN DESCRIPTION - LOCATION
LOCATION_3: KNEE
LOCATION: NECK
LOCATION_2: BACK

## 2022-04-21 ASSESSMENT — PAIN SCALES - GENERAL: PAINLEVEL_OUTOF10: 4

## 2022-04-21 NOTE — PROGRESS NOTES
Clinic Documentation      Education Provided:  [x] Review of Jules Reas  [] Agreement Review  [x] PEG Score Calculated [] PHQ Score Calculated [] ORT Score Calculated    [] Compliance Issues Discussed [] Cognitive Behavior Needs [x] Exercise [] Review of Test [] Financial Issues  [x] Tobacco/Alcohol 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 Test Ordered [x] Prescription Ordered/Changed   [] Obtained Test Results / Consult Notes        Complete if patient is withholding blood thinner for procedure     Blood Thinner Patient is currently taking:      [] Plavix (Hold for 7 days)  [] Aspirin (Hold for 5 days)     [] Pletal (Hold for 2 days)  [] Pradaxa (Hold for 3 days)    [] Effient (Hold for 7 days)  [] Xarelto (Hold for 2 days)    [] Eliquis (Hold for 2 days)  [] Brilinta (Hold for 7 days)    [] Coumadin (Hold for 5 days) - (INR needs to be drawn the day prior to procedure- INR < 2.0)    [] Aggrenox (Hold for 7 days)        [] Patient will stop medication on their own.    [] Blood Thinner Form Faxed for approval to hold.    Provider form faxed to:    Assessment Completed by:  Electronically signed by Ivone Leo RN on 4/21/2022 at 3:42 PM

## 2022-04-21 NOTE — PROGRESS NOTES
[facility] Physical & Pain Medicine  Office Note    Patient Name: Nirmal Lopez    MR #: [id number]    Account #: [de-identified]    : 1960    Age: 57 y.o. Sex: female    Date: 2022    PCP: KEIKO Duque CNP    Chief Complaint:   Chief Complaint   Patient presents with    Neck Pain    Back Pain    Knee Pain     bilateral       History of Present Illness:     Nirmal Lopez is a 57 y.o. female who presents for procedure follow up and office visit. Patient has not had pain medication is several months. She states that she called for a refill and she never received the medication. She thought that she was \"cut off\"    Patient's last documented refill request was in February. The medication was refilled at that time. There is no other entry for a refill. Patient had bilateral SI and bilateral lumbar trigger point injections on 2022. Patient had at least 85% relief of pain from procedure(s) for at least 6 weeks and was able to increase activity after procedure. Patient received enough pain relief from injections that the patient would like to repeat the injection(s). Patient's pain was under fair control when she was getting her medication and injections. Patient has not been able to sleep because she has ran out of her Ambien. Again she did not call for refill as she thought she was not getting controled substances.         Neck Pain   This is a chronic problem. The current episode started more than 1 year ago. The problem occurs constantly. The problem has been waxing and waning. The quality of the pain is described as cramping and aching. The symptoms are aggravated by twisting and bending. Associated symptoms include numbness and weakness. Pertinent negatives include no leg pain. Knee Pain  Associated symptoms include arthralgias, myalgias, neck pain, numbness and weakness. Back Pain  This is a chronic problem. The current episode started more than 1 month ago.  The problem occurs constantly. The problem has been waxing and waning since onset. The pain is present in the lumbar spine and sacro-iliac. The quality of the pain is described as cramping and aching. The symptoms are aggravated by bending, position, standing and twisting. Associated symptoms include numbness and weakness. Pertinent negatives include no bladder incontinence, bowel incontinence or leg pain. Screening Tools:    PEG Score: 8.7     Last PEG Score: 7.3     Annual ORT Score: 1     Annual PHQ Score: 13    Current Pain Assessment  Pain Assessment  Pain Assessment: 0-10  Pain Level: 4  Pain Location: Neck  Pain Orientation: Lower    Past Visit HPI:  12/23/2021  presents for office visit. Patient had bilateral lumbar trigger point injections on 10/13/2021. Patient had at least 85% relief of pain from procedure(s) for at least 6 weeks and was able to increase activity after procedure. Patient received enough pain relief from injections that the patient would like to repeat the injection(s). Patient has recently retired. She states that she just could not tolerate the pain from working any longer. Since last appointment, patient did have cervical MRI on 10/6/2021. Multiple degenerative changes noted. Patient has moderate foraminal stenosis @ C3/C4, C4/C5 and on Right of C7/T1 which would correlate with intermittent cervical radicular pattern. 7/15/2020  Patient presents today for lumbar trigger point injections along with left SI injection as well as reevaluation to return to work. Patient states that condition was improving she was able to do activities of daily living as well as household chores with less pain. Patient has been undergoing physical therapy for low back and knee strengthening. Patient stated that she was doing exceptionally well until the last 2 treatments.   Patient stated that she had had an increase in neck pain from the leg press as she felt it was more to do with the amount of weight. Patient stated that she mentioned this to physical therapist however they put more weight on the next day. Patient stated that she has had an increase in pain the last 2 days however she feels that the injections today are targeting the areas of increased pain. She does feel that she will be able to return to work. 6/29/2020  presents today for evaluation of return to work. Patient's condition has significantly improved as PEG score was a 10 at last appointment and is now 7.3. Physical therapy recommends another 4 weeks of treatment prior to returning back to work. Patient's pain is more localized to the SI area as she has had significant improvement in knee pain. Of note: Disability paperwork has not been received by this office. Patient instructed that NP will be going out of town if paperwork is received by tomorrow paperwork will be completed otherwise paperwork will not be completed until NP is back in town mid July. 6/1/2020  presents for follow-up of acute on chronic knee pain and low back pain. Back on April 1, 2020 patient had presented for a left SI injection for low back pain. The evening prior to the injection patient had fallen and hurt her left knee. Patient was in an extreme amount of pain. Patient was sent for imaging and was taken off of work. Patient started doing some therapeutic home exercises and was off work for approximately 3 weeks. Her pain gradually improved. Patient stated being off work for that 3 weeks she felt better than she had felt in a long time as the pain had resolved. Patient was able to activities of daily living and household chores with minimal pain. When patient received the injection for her low back on 4/1/2020 she was unable to determine how effective the injection was as her knee pain was so severe that it overpowered her low back pain. Patient was seen on 4/21/2020 and she was released to go back to work the following week. Patient states that after returning to work, her knee pain has gradually worsened. Her quality of life has deteriorated. Patient states that the weekends she spends recuperating so she can return to work the following week. Patient is stating that her thighs feel weak. Patient has difficulty with ambulation. Patient does walk bent over and cannot fully extend her knees. This is causing her back pain to worsen. Patient feels that the injections did help with the low back but between the fall and the positions in which she stands to work, the injections for the low back did not last very long. Patient is having difficulty sleeping at night. Patient is worried that she may have to retire as she does not know if her body can continue. Patient states that her housework is behind as once she gets home from work it is difficult for her to do household chores. Knee Pain is a recurrent problem. The current episode started more than 1 month ago. The problem occurs constantly. The problem has been gradually worsening. Associated symptoms include arthralgias, joint swelling and myalgias. Associated symptoms comments: Abrasion to left knee almost healed. The symptoms are aggravated by walking, standing and twisting. Back Pain is a chronic problem. The current episode started more than 1 year ago. The problem occurs constantly. The problem has been gradually worsening since onset. The pain is present in the lumbar spine and sacro-iliac. The quality of the pain is described as aching and cramping. The symptoms are aggravated by twisting, standing, bending and position. 4/21/2020  presents for 3 week follow up post fall. Patient presented for a Left SI injection on 4/1/2020. Patient had fallen the night before. She was having severe knee pain. Patient was assessed and she was given work release to allow time to heal. Patient is presenting today to see if she can return to work.     Knee Pain is a recurrent problem. The current episode started 1 to 4 weeks ago. The problem occurs constantly. The problem has been gradually improving. Associated symptoms include arthralgias, joint swelling and myalgias. The symptoms are aggravated by walking, standing and twisting.     3/25/2020  present for sooner appointment related to missed procedure. Patient states that she received a phone call the day before her injection stating the time was 3:30. Patient stated that she was in her managers office when she got the phone call. Patient had that the appointment was at 1100. Patient had taken 1/2 day off but had to take the entire day off work. Patient came for her appointment on 2/14/2020 but when she registered she was told that everyone was gone for the day. Patient states that low back pain is getting worse. Patient states that pain is affecting her ability to work. She is having difficulty sleeping and she does not know how much longer she can tolerate this pain. Back Pain is a chronic problem. The current episode started more than 1 year ago. The problem occurs constantly. The problem has been rapidly worsening since onset. The pain is present in the lumbar spine and sacro-iliac. The quality of the pain is described as aching, cramping, shooting and stabbing. Radiates to: left groin. The pain is worse during the day (due to activity). The symptoms are aggravated by bending, standing and twisting (lifting). Associated symptoms include headaches. Pertinent negatives include no bladder incontinence or bowel incontinence. 1/30/2020  presents to the office for follow-up of MRI and worsening low back pain. Discussed reviewed with patient that she does have ligamentous hypertrophy with just chronic degenerative changes. Discussed patient's worsening low back pain and symptoms. Patient states that low back does wrap into the groin at times. Patient has pain with flexion and extension.   Low back pain cramps and makes it difficulty for her to do anything after she works. Patient states that all she can tolerate is working and going to bed. Her quality of life has diminished and it takes all she can do to work therefore things in her house are not getting done. 12/17/19   presents to the office for annual exam with primary complaints of worsening cervical radiculopathy with pain going down left arm and into last 2 fingers getting numb. Patient is also having worsening low back pain. She states that constant flexion extension as demanding by her job that she will start having pain in her low back and that her legs would become weak. She has had a couple of occasions where she has had electrical sensations going up her back and making it difficult to breathe. Last office appointment patient was scheduled to have MRI of Lumbar spine and will need MRI of cervical spine in the futue. Patient attempted to have MRI at her local hospital however patient barely fit inside of MRI machine. Patient stated that MRI machine was pulling at her arms and that she could not breathe and started having anxiety attack. MRI had to be stopped. Patient was brought back into the office to schedule MRI with sedation. However patient states she does not need IV sedation if she can get in the open MRI.     Employment: factory    Past Medical Histoy  Past Medical History:   Diagnosis Date    Arthritis     Bilateral sacroiliitis ([address]     Chronic pain     Diabetes mellitus ([address]     Hypertension     Thyroid disease        Surgery History  Past Surgical History:   Procedure Laterality Date    CARDIAC SURGERY  1998    heart cath    CERVICAL SPINE SURGERY      HYSTERECTOMY      JOINT REPLACEMENT      bilateral knees        Family History  family history is not on file. Social History    Social History     Tobacco Use    Smoking status: Former Smoker    Smokeless tobacco: Never Used   Substance Use Topics    Alcohol use:  No Allergies  Ethyl chloride, Tape [adhesive tape], and Tramadol     Current Medications  Current Outpatient Medications   Medication Sig Dispense Refill    zolpidem (AMBIEN CR) 12.5 MG extended release tablet Take 1 tablet by mouth nightly as needed for Sleep for up to 90 days. 30 tablet 2    HYDROcodone-acetaminophen (NORCO)  MG per tablet Take 1 tablet by mouth every 6 hours as needed for Pain for up to 30 days. Intended supply: 30 days 120 tablet 0    ONETOUCH ULTRA strip USE 1 STRIP TO CHECK GLUCOSE 4 TIMES DAILY      Lancets (ONETOUCH DELICA PLUS [id number]) MISC USE LANCETS TO CHECK GLUCOSE TWICE DAILY      sertraline (ZOLOFT) 100 MG tablet Take 100 mg by mouth daily      pregabalin (LYRICA) 150 MG capsule Take 1 capsule by mouth 3 times daily for 90 days.  90 capsule 2    nabumetone (RELAFEN) 500 MG tablet Take 1 tablet by mouth 2 times daily 60 tablet 5    tiZANidine (ZANAFLEX) 4 MG tablet Take 1 tablet by mouth 2 times daily 60 tablet 5    OZEMPIC, 1 MG/DOSE, 4 MG/3ML SOPN Inject 1 mg into the skin once a week      omeprazole (PRILOSEC) 20 MG delayed release capsule Take 20 mg by mouth daily      fluticasone (FLONASE) 50 MCG/ACT nasal spray 2 sprays by Nasal route daily as needed      Cholecalciferol 50 MCG (2000 UT) CAPS Take 50 mcg by mouth daily      cetirizine (ZYRTEC) 10 MG tablet Take 10 mg by mouth daily      LANTUS SOLOSTAR 100 UNIT/ML injection pen Inject 50 Units into the skin daily      hydroCHLOROthiazide (MICROZIDE) 12.5 MG capsule Take 1 capsule by mouth daily      amLODIPine (NORVASC) 5 MG tablet Take 5 mg by mouth daily       atorvastatin (LIPITOR) 40 MG tablet Take 40 mg by mouth nightly       metFORMIN (GLUCOPHAGE) 1000 MG tablet Take 1,000 mg by mouth 2 times daily      diclofenac (FLECTOR) 1.3 % patch Place 1 patch onto the skin 2 times daily 60 patch 2    lisinopril (PRINIVIL;ZESTRIL) 20 MG tablet Take 20 mg by mouth daily      Insulin Lispro (HUMALOG KWIKPEN SC) Inject into the skin      levothyroxine (SYNTHROID) 25 MCG tablet Take 25 mcg by mouth daily        No current facility-administered medications for this encounter. Review of Systems:  Review of Systems   Constitutional: Positive for activity change. Gastrointestinal: Negative for bowel incontinence and constipation. Genitourinary: Negative for bladder incontinence. Musculoskeletal: Positive for arthralgias, back pain, gait problem, myalgias, neck pain and neck stiffness. Neurological: Positive for weakness and numbness. Gait disturbance   Psychiatric/Behavioral: Positive for sleep disturbance. Negative for agitation, self-injury and suicidal ideas. The patient is not nervous/anxious. 14 point ROS negative besides that noted in HPI    Physical Exam:    Vitals:    04/21/22 1430   BP: 132/75   Pulse: 76   Resp: 20   Temp: 97 °F (36.1 °C)   TempSrc: Temporal   SpO2: 97%   Weight: 237 lb (107.5 kg)   Height: 5' 7\" (1.702 m)       Body mass index is 37.12 kg/m². Physical Exam  Vitals and nursing note reviewed. Constitutional:       General: She is not in acute distress. Appearance: She is well-developed. HENT:      Head: Normocephalic. Right Ear: External ear normal.      Left Ear: External ear normal.      Nose: Nose normal.   Eyes:      Conjunctiva/sclera: Conjunctivae normal.      Pupils: Pupils are equal, round, and reactive to light. Neck:      Vascular: No JVD. Trachea: No tracheal deviation. Cardiovascular:      Rate and Rhythm: Normal rate. Pulmonary:      Effort: Pulmonary effort is normal.   Abdominal:      General: There is no distension. Tenderness: There is no abdominal tenderness. Musculoskeletal:      Cervical back: Spasms (tender palpable knots noted) and tenderness present. Pain with movement present. Decreased range of motion.       Lumbar back: Spasms (tender palpable knots noted), tenderness and bony tenderness (Bilateral SI joints tender to palpation with 3+ provocative tests) present. Negative right straight leg raise test and negative left straight leg raise test.      Comments: Reese's (-) bilaterally    Patient points to on left SI joint as the source of low back pain  TTP of SI joint on left  positive Rigo's on left, positive Distraction on left, positive Thigh Thrust on left     Skin:     General: Skin is warm and dry. Neurological:      Mental Status: She is alert and oriented to person, place, and time. Cranial Nerves: No cranial nerve deficit. Gait: Gait abnormal.   Psychiatric:         Behavior: Behavior normal.         Thought Content: Thought content normal.         Judgment: Judgment normal.        Labs:  Lab Results   Component Value Date     11/30/2014    K 3.8 11/30/2014    CL 99 11/30/2014    CO2 24 11/30/2014    BUN 11 11/30/2014    CREATININE 1.0 10/06/2021    CREATININE 0.6 11/30/2014    GLUCOSE 212 11/30/2014    CALCIUM 9.7 11/30/2014        Lab Results   Component Value Date    WBC 12.05 (H) 11/30/2014    HGB 13.0 11/30/2014    HCT 40.4 11/30/2014    MCV 67.4 (L) 11/30/2014     11/30/2014     Assessment:  Active Problems:    Bilateral knee pain    Neuralgia, geniculate    Lumbar disc disease    Bulging lumbar disc    Displacement of lumbar disc with radiculopathy    Other insomnia    Cervical radiculopathy    Cervicalgia    Central stenosis of spinal canal    Foraminal stenosis of cervical region    Facet arthritis of lumbar region    SI (sacroiliac) joint dysfunction    Chronic bilateral low back pain without sciatica    Pain management contract agreement    Bilateral myofascial pain    History of cervical spinal surgery  Resolved Problems:    * No resolved hospital problems. *      Plan:  Other insomnia  - zolpidem (AMBIEN CR) 12.5 MG extended release tablet; Take 1 tablet by mouth nightly as needed for Sleep for up to 90 days. Dispense: 30 tablet;  Refill: 2    Chronic bilateral low back pain without sciatica  - HYDROcodone-acetaminophen (NORCO)  MG per tablet; Take 1 tablet by mouth every 6 hours as needed for Pain for up to 30 days. Intended supply: 30 days  Dispense: 120 tablet; Refill: 0    Refills sent in for pain medication and Ambien. Patient has been on Ambien for many years, long before NP started caring for her. Patient instructed that NP would not refill her medication without some form of notification. Patient instructed that if this happens again to call office. Cervical radiculopathy  Continue medication with refill sent at appointment see refill encounter. pregabalin (LYRICA) 150 MG capsule; Take 1 capsule by mouth 3 times daily for 90 days. Dispense: 90 capsule; Refill: 2      Facet arthritis of lumbar region  Continue medication with refill sent at appointment see refill encounter. nabumetone (RELAFEN) 500 MG tablet; Take 1 tablet by mouth 2 times daily  Dispense: 60 tablet; Refill: 5    Bilateral myofascial pain  Continue medication with refill sent at appointment see refill encounter. tiZANidine (ZANAFLEX) 4 MG tablet; Take 1 tablet by mouth 2 times daily  Dispense: 60 tablet; Refill: 5    Schedule bilateral lumbar trigger point injections for myofacial pain    Schedule bilateral SI joint injections with US as patient has had good pain relief from previous injections. Foraminal stenosis of cervical region  Cervicalgia  Central stenosis of spinal canal    Patient does not want KIM as she had a bad experience with a past injections. Patient to continue to use [facility] device as this has been helpful. [x] Follow up    [] 4 weeks   [x] 6-8 weeks   [] 10-12 weeks   [] 3 months  [x] Post procedure to evaluate effectiveness of treatment  [] To evaluate medications changes made at office visit. [] To review diagnostics ordered at last visit.    [] To evaluate response to therapy    [x] For management of controlled substance  [x] Random UDS as indicated by ORT score or if indicated by abberent behaviors      Discussion: Discussed exam findings and plan of care with patient. Patient agreed with POC and questions were asked and answered. Activity: Discussed exercise as beneficial to pain reduction, encouraged daily stretching with a focus on torso strengthening. Goal:  Pain Management Goals of Therapy:   [] Resolution in pain  [x] Decrease in pain level  [x] Improvement in ADL's  [x] Increase in activities with less pain  [] Decrease in medication      Controlled substance monitoring:    [x] Discussed medication side effects, risk of tolerance and/or dependence, and/or alternative treatment  [] Discussed the detrimental effects of long term narcotic use in younger patients especially women of childbearing years. [x] No signs and symptoms of potential drug abuse or diversion were identified  [] Signs of potential drug abuse or diversion were identified   [] ORT Score   [] UDS non-compliant   [] See Note  [x] Random urine drug screen sent today  [] Random urine drug screen not completed today   [] Deferred New Patient  [] Compliant 6/7/2021  [] Not Compliant see note  [] Medication agreement with provider signed today  [x] Medication agreement with provider on file under media 9/22  [] Medication regimen effective and continued   [] New patient continuing current medication while developing POC   [x] On going assessment and evaluation of medication regimen  [] Medication regimen not effective see plan for changes  [x] Mio Foss reviewed & on file under media      EMR dragon/transcription disclaimer: Much of this encounter note is electronic transcription/translation of spoken language to printed tach. Electronic translation of spoken language may be erroneous, or at times, nonsensical words or phrases may be inadvertently transcribed.  Although, I have reviewed the note for such errors, some may still exist.

## 2022-04-21 NOTE — TELEPHONE ENCOUNTER
Requested Prescriptions     Pending Prescriptions Disp Refills    pregabalin (LYRICA) 150 MG capsule 90 capsule 2     Sig: Take 1 capsule by mouth 3 times daily for 90 days.     nabumetone (RELAFEN) 500 MG tablet 60 tablet 5     Sig: Take 1 tablet by mouth 2 times daily    tiZANidine (ZANAFLEX) 4 MG tablet 60 tablet 5     Sig: Take 1 tablet by mouth 2 times daily

## 2022-05-04 ENCOUNTER — HOSPITAL ENCOUNTER (OUTPATIENT)
Dept: PAIN MANAGEMENT | Age: 62
Discharge: HOME | End: 2022-05-04
Payer: COMMERCIAL

## 2022-05-04 VITALS
HEART RATE: 76 BPM | DIASTOLIC BLOOD PRESSURE: 55 MMHG | TEMPERATURE: 97 F | RESPIRATION RATE: 18 BRPM | SYSTOLIC BLOOD PRESSURE: 124 MMHG | OXYGEN SATURATION: 96 %

## 2022-05-04 PROCEDURE — 2500000003 HC RX 250 WO HCPCS

## 2022-05-04 PROCEDURE — 6360000002 HC RX W HCPCS

## 2022-05-04 PROCEDURE — 76942 ECHO GUIDE FOR BIOPSY: CPT | Performed by: NURSE PRACTITIONER

## 2022-05-04 PROCEDURE — 20553 NJX 1/MLT TRIGGER POINTS 3/>: CPT | Performed by: NURSE PRACTITIONER

## 2022-05-04 PROCEDURE — 20553 NJX 1/MLT TRIGGER POINTS 3/>: CPT

## 2022-05-04 PROCEDURE — 20611 DRAIN/INJ JOINT/BURSA W/US: CPT

## 2022-05-04 RX ORDER — BUPIVACAINE HYDROCHLORIDE 5 MG/ML
4 INJECTION, SOLUTION EPIDURAL; INTRACAUDAL ONCE
Status: DISCONTINUED | OUTPATIENT
Start: 2022-05-04 | End: 2022-05-06 | Stop reason: HOSPADM

## 2022-05-04 RX ORDER — LIDOCAINE HYDROCHLORIDE 10 MG/ML
4 INJECTION, SOLUTION EPIDURAL; INFILTRATION; INTRACAUDAL; PERINEURAL ONCE
Status: DISCONTINUED | OUTPATIENT
Start: 2022-05-04 | End: 2022-05-06 | Stop reason: HOSPADM

## 2022-05-04 RX ORDER — LIDOCAINE HYDROCHLORIDE 10 MG/ML
10 INJECTION, SOLUTION EPIDURAL; INFILTRATION; INTRACAUDAL; PERINEURAL ONCE
Status: DISCONTINUED | OUTPATIENT
Start: 2022-05-04 | End: 2022-05-06 | Stop reason: HOSPADM

## 2022-05-04 RX ORDER — BUPIVACAINE HYDROCHLORIDE 5 MG/ML
10 INJECTION, SOLUTION EPIDURAL; INTRACAUDAL ONCE
Status: DISCONTINUED | OUTPATIENT
Start: 2022-05-04 | End: 2022-05-06 | Stop reason: HOSPADM

## 2022-05-04 RX ORDER — TRIAMCINOLONE ACETONIDE 40 MG/ML
80 INJECTION, SUSPENSION INTRA-ARTICULAR; INTRAMUSCULAR ONCE
Status: DISCONTINUED | OUTPATIENT
Start: 2022-05-04 | End: 2022-05-06 | Stop reason: HOSPADM

## 2022-05-04 NOTE — PROCEDURES
[facility]    Patient Name: Chula Hoskins    : 1960    Age: 57 y.o. Sex: female    Date: May 4, 2022    Pre-op Diagnosis: bilateral  Sacroiliac Joint(s) Dysfunction/ Sacroiliitis    Post-op Diagnosis: bilateral  Sacroiliac Joint(s) Dysfunction/ Sacroliliits    Procedure: Ultrasound Guided Injection of  bilateral Sacroiliac Joint(s)     Performing Procedure:  ELAINE Weinstein    Patient Vitals for the past 24 hrs:   BP Temp Temp src Pulse Resp SpO2   22 1301 (!) 124/55 97 °F (36.1 °C) Temporal 76 18 96 %       bilateral  Sacroiliac Joint(s)     Description of Procedure:    After voluntary, informed and signed consent obtained the patient was placed in a prone position. Appropriate time out was obtained per policy. The Sacroiliac Joint(s) was palpated for area of maximal tenderness. The area was prepped in an aseptic fashion with CHG swab. The ultrasound transducer was used to confirm the appropriate location. The skin was sprayed with Gebauer's Solution. Under aseptic technique and direct ultrasound visualization a 22 gauge 3 inch spinal needle was introduced into the Sacroiliac Joint(s). After a negative aspiration, a solution of 2 ml of 0.5% Marcaine Plain and 2 ml of 1% Lidocaine Plain and 1 ml of Kenalog (40 mg/ml) was injected into the Sacroiliac Joint(s). The needle was withdrawn and a sterile dressing applied. If this was a bilateral procedure, the same steps were followed on the opposite side.      Pre-op Diagnosis: Myofascial Pain/ Muscle Spasms    Post-op Diagnosis: Myofascial Pain/ Muscle Spasms    Procedure: Lumbar Trigger Point Injections    Performing Procedure: ELAINE Weinstein - BC; [facility]    Patient Vitals for the past 24 hrs:   BP Temp Temp src Pulse Resp SpO2   22 1301 (!) 124/55 97 °F (36.1 °C) Temporal 76 18 96 %       Description of Procedure:    After a brief physical assessment and failure to improve with conservative measures the patient presented for Lumbar Trigger Point Injections The indications, limitations and possible complications were discussed with the patient and the patient elected to proceed with the procedure. After voluntary, informed and signed consent obtained the patient was placed in a seated position. Appropriate time out was obtained per policy. The area of maximal tenderness was palpated over the bilaterally Lumbar Muscles - Lower Latissimus,  Erector Spinae, Lumbar Paraspinous. The skin overlying these areas was marked with a skin marker. The areas were prepped using aseptic technique with CHG prep. The skin overlying the proposed injection sites were then sprayed with Gebauer's Solution while protecting patient eyes. Each trigger point of the Lumbar Muscles - Lower Latissimus,  Erector Spinae, Lumbar Paraspinous was injected after negative aspiration was injected with approximately 1-2 ml of a solution of 5 ml of 1% Lidocaine Plain and 5 ml of 0.5% Marcaine Plain after negative aspiration    Discharge: The patient tolerated the procedure well. There were no complications during the procedure and the patient was discharged home with discharge instructions. The patient has been instructed to contact the office should there be any complications or questions to arise between today and their next appointment. Plan:     Will return to the office in 6 weeks for follow up    KEIKO Contreras CNP, 5/4/2022 at 3:33 PM

## 2022-05-04 NOTE — TELEPHONE ENCOUNTER
I called Rashard Gr on 11/5/18 @ 11:21 as a follow up call from her injection that she received on 11/2/18. Abundio Colmenares had bilateral  Lumbar facets at the levels of l4-5 and L5-S1 on this date. Abundio Colmenares did not answer her phone and a voice message was left for her to return my call.
Face Mask
Complex Repair Preamble Text (Leave Blank If You Do Not Want): Extensive wide undermining was performed.

## 2022-05-10 ENCOUNTER — TELEPHONE (OUTPATIENT)
Dept: PAIN MANAGEMENT | Age: 62
End: 2022-05-10

## 2022-05-10 DIAGNOSIS — M51.36 BULGING LUMBAR DISC: ICD-10-CM

## 2022-05-10 DIAGNOSIS — M51.16 DISPLACEMENT OF LUMBAR DISC WITH RADICULOPATHY: ICD-10-CM

## 2022-05-10 DIAGNOSIS — M51.9 LUMBAR DISC DISEASE: ICD-10-CM

## 2022-05-10 DIAGNOSIS — B02.21 NEURALGIA, GENICULATE: ICD-10-CM

## 2022-06-28 DIAGNOSIS — G89.29 CHRONIC BILATERAL LOW BACK PAIN WITHOUT SCIATICA: ICD-10-CM

## 2022-06-28 DIAGNOSIS — M54.50 CHRONIC BILATERAL LOW BACK PAIN WITHOUT SCIATICA: ICD-10-CM

## 2022-06-29 RX ORDER — HYDROCODONE BITARTRATE AND ACETAMINOPHEN 10; 325 MG/1; MG/1
1 TABLET ORAL EVERY 6 HOURS PRN
Qty: 120 TABLET | Refills: 0 | Status: SHIPPED | OUTPATIENT
Start: 2022-06-29 | End: 2022-08-03 | Stop reason: SDUPTHER

## 2022-08-01 DIAGNOSIS — G47.09 OTHER INSOMNIA: ICD-10-CM

## 2022-08-01 DIAGNOSIS — G89.29 CHRONIC BILATERAL LOW BACK PAIN WITHOUT SCIATICA: ICD-10-CM

## 2022-08-01 DIAGNOSIS — M54.50 CHRONIC BILATERAL LOW BACK PAIN WITHOUT SCIATICA: ICD-10-CM

## 2022-08-01 NOTE — TELEPHONE ENCOUNTER
Patient called on 7/29/22. The office is closed on Fridays. Patient cancelled appointment on 6/30/22. She had to put her father on hospice. Appointment rescheduled for 8/15/22 with Wing Garcia.

## 2022-08-03 RX ORDER — ZOLPIDEM TARTRATE 12.5 MG/1
12.5 TABLET, FILM COATED, EXTENDED RELEASE ORAL NIGHTLY PRN
Qty: 14 TABLET | Refills: 0 | Status: SHIPPED | OUTPATIENT
Start: 2022-08-03 | End: 2022-08-15 | Stop reason: SDUPTHER

## 2022-08-03 RX ORDER — HYDROCODONE BITARTRATE AND ACETAMINOPHEN 10; 325 MG/1; MG/1
1 TABLET ORAL EVERY 6 HOURS PRN
Qty: 56 TABLET | Refills: 0 | Status: SHIPPED | OUTPATIENT
Start: 2022-08-03 | End: 2022-08-15 | Stop reason: SDUPTHER

## 2022-08-15 ENCOUNTER — HOSPITAL ENCOUNTER (OUTPATIENT)
Dept: PAIN MANAGEMENT | Age: 62
Discharge: HOME | End: 2022-08-15
Payer: COMMERCIAL

## 2022-08-15 VITALS
WEIGHT: 235 LBS | TEMPERATURE: 98 F | HEIGHT: 67 IN | OXYGEN SATURATION: 94 % | HEART RATE: 71 BPM | SYSTOLIC BLOOD PRESSURE: 133 MMHG | RESPIRATION RATE: 16 BRPM | BODY MASS INDEX: 36.88 KG/M2 | DIASTOLIC BLOOD PRESSURE: 81 MMHG

## 2022-08-15 DIAGNOSIS — M54.12 CERVICAL RADICULOPATHY: ICD-10-CM

## 2022-08-15 DIAGNOSIS — Z51.81 ENCOUNTER FOR MONITORING OPIOID MAINTENANCE THERAPY: ICD-10-CM

## 2022-08-15 DIAGNOSIS — M54.50 CHRONIC BILATERAL LOW BACK PAIN WITHOUT SCIATICA: ICD-10-CM

## 2022-08-15 DIAGNOSIS — G47.09 OTHER INSOMNIA: ICD-10-CM

## 2022-08-15 DIAGNOSIS — Z79.891 ENCOUNTER FOR MONITORING OPIOID MAINTENANCE THERAPY: ICD-10-CM

## 2022-08-15 DIAGNOSIS — M47.816 FACET ARTHRITIS OF LUMBAR REGION: ICD-10-CM

## 2022-08-15 DIAGNOSIS — G89.29 CHRONIC BILATERAL LOW BACK PAIN WITHOUT SCIATICA: ICD-10-CM

## 2022-08-15 DIAGNOSIS — M79.18 BILATERAL MYOFASCIAL PAIN: ICD-10-CM

## 2022-08-15 PROCEDURE — 99213 OFFICE O/P EST LOW 20 MIN: CPT

## 2022-08-15 PROCEDURE — 99214 OFFICE O/P EST MOD 30 MIN: CPT | Performed by: NURSE PRACTITIONER

## 2022-08-15 RX ORDER — HYDROCODONE BITARTRATE AND ACETAMINOPHEN 10; 325 MG/1; MG/1
1 TABLET ORAL EVERY 6 HOURS PRN
Qty: 120 TABLET | Refills: 0 | Status: SHIPPED | OUTPATIENT
Start: 2022-08-15 | End: 2022-09-26 | Stop reason: SDUPTHER

## 2022-08-15 RX ORDER — ZOLPIDEM TARTRATE 12.5 MG/1
12.5 TABLET, FILM COATED, EXTENDED RELEASE ORAL NIGHTLY PRN
Qty: 30 TABLET | Refills: 5 | Status: SHIPPED | OUTPATIENT
Start: 2022-08-15 | End: 2022-09-14

## 2022-08-15 RX ORDER — PROPRANOLOL HYDROCHLORIDE 20 MG/1
1 TABLET ORAL 2 TIMES DAILY
COMMUNITY
Start: 2022-06-06

## 2022-08-15 RX ORDER — NABUMETONE 500 MG/1
500 TABLET, FILM COATED ORAL 2 TIMES DAILY
Qty: 60 TABLET | Refills: 5 | Status: SHIPPED | OUTPATIENT
Start: 2022-08-15

## 2022-08-15 RX ORDER — TIZANIDINE 4 MG/1
4 TABLET ORAL 2 TIMES DAILY
Qty: 60 TABLET | Refills: 5 | Status: SHIPPED | OUTPATIENT
Start: 2022-08-15

## 2022-08-15 RX ORDER — PREGABALIN 150 MG/1
150 CAPSULE ORAL 3 TIMES DAILY
Qty: 90 CAPSULE | Refills: 2 | Status: SHIPPED | OUTPATIENT
Start: 2022-08-15 | End: 2022-11-13

## 2022-08-15 ASSESSMENT — PAIN DESCRIPTION - INTENSITY
RATING_3: 3
RATING_2: 5

## 2022-08-15 ASSESSMENT — PAIN DESCRIPTION - ORIENTATION
ORIENTATION_3: RIGHT;LEFT
ORIENTATION: LOWER
ORIENTATION_2: LOWER

## 2022-08-15 ASSESSMENT — PAIN DESCRIPTION - PAIN TYPE
TYPE_3: CHRONIC PAIN
TYPE_2: CHRONIC PAIN
TYPE: CHRONIC PAIN

## 2022-08-15 ASSESSMENT — PAIN SCALES - GENERAL: PAINLEVEL_OUTOF10: 8

## 2022-08-15 ASSESSMENT — PAIN DESCRIPTION - LOCATION
LOCATION_3: KNEE
LOCATION: BACK
LOCATION_2: NECK

## 2022-08-15 ASSESSMENT — ENCOUNTER SYMPTOMS
BACK PAIN: 1
BOWEL INCONTINENCE: 0
CONSTIPATION: 0

## 2022-08-15 NOTE — TELEPHONE ENCOUNTER
Requested Prescriptions     Signed Prescriptions Disp Refills    zolpidem (AMBIEN CR) 12.5 MG extended release tablet 30 tablet 5     Sig: Take 1 tablet by mouth nightly as needed for Sleep for up to 30 days. Authorizing Provider: Christopher Began A    tiZANidine (ZANAFLEX) 4 MG tablet 60 tablet 5     Sig: Take 1 tablet by mouth in the morning and 1 tablet in the evening. Authorizing Provider: Christopher Began A    pregabalin (LYRICA) 150 MG capsule 90 capsule 2     Sig: Take 1 capsule by mouth in the morning and 1 capsule at noon and 1 capsule before bedtime. Do all this for 90 days. Authorizing Provider: Christopher Began A    nabumetone (RELAFEN) 500 MG tablet 60 tablet 5     Sig: Take 1 tablet by mouth in the morning and 1 tablet before bedtime. Authorizing Provider: Christopher Began A    HYDROcodone-acetaminophen (NORCO)  MG per tablet 120 tablet 0     Sig: Take 1 tablet by mouth every 6 hours as needed for Pain for up to 30 days.  Enough until appointment     Authorizing Provider: Marcial Almaraz

## 2022-08-15 NOTE — PROGRESS NOTES
Clinic Documentation      Education Provided:  [x] Review of Badger Zhao  [] Agreement Review  [x] PEG Score Calculated [] PHQ Score Calculated [] ORT Score Calculated    [] Compliance Issues Discussed [] Cognitive Behavior Needs [x] Exercise [] Review of Test [] Financial Issues  [x] Tobacco/Alcohol 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 Test Ordered [x] Prescription Ordered/Changed   [] Obtained Test Results / Consult Notes        Complete if patient is withholding blood thinner for procedure     Blood Thinner Patient is currently taking:      [] Plavix (Hold for 7 days)  [] Aspirin (Hold for 5 days)     [] Pletal (Hold for 2 days)  [] Pradaxa (Hold for 3 days)    [] Effient (Hold for 7 days)  [] Xarelto (Hold for 2 days)    [] Eliquis (Hold for 2 days)  [] Brilinta (Hold for 7 days)    [] Coumadin (Hold for 5 days) - (INR needs to be drawn the day prior to procedure- INR < 2.0)    [] Aggrenox (Hold for 7 days)        [] Patient will stop medication on their own.    [] Blood Thinner Form Faxed for approval to hold.    Provider form faxed to:     Assessment Completed by:  Electronically signed by Naila Peterson RN on 8/15/2022 at 3:15 PM

## 2022-08-15 NOTE — PROGRESS NOTES
[facility] Physical & Pain Medicine  Office Note    Patient Name: Melanie Ospina    MR #: [id number]    Account #: [de-identified]    : 1960    Age: 57 y.o. Sex: female    Date: 8/15/2022    PCP: KEIKO Carter CNP    Chief Complaint:   Chief Complaint   Patient presents with    Back Pain    Neck Pain    Knee Pain     bilateral       History of Present Illness:     Melanie Ospina is a 57 y.o. female who presents for procedure follow up and office visit. Patient had bilateral lumbar trigger point injections on 2022 and Bilateral SI joint injections on 2022. Patient had at least 85% relief of pain from procedure(s) for at least 6 weeks and was able to increase activity after procedure. Patient received enough pain relief from injections that the patient would like to repeat the injection(s). Does pain affect ADLs Yes transportation, shopping, preparing meals, laundry, housework, dressing, and walking  Does pain affect quality of life? Yes  Does pain affect activities of enjoyment? Yes  Have you been on pain medication for your pain? Yes  If so, does the pain medication keep your pain tolerable to perform the tasks that affect your pain? Yes and injections help as well. Neck Pain   This is a chronic problem. The current episode started more than 1 year ago. The problem occurs constantly. The problem has been waxing and waning. The quality of the pain is described as aching and cramping. The symptoms are aggravated by bending and twisting. Associated symptoms include numbness and weakness. Pertinent negatives include no leg pain. Back Pain  This is a chronic problem. The current episode started more than 1 month ago. The problem occurs constantly. The problem has been waxing and waning since onset. The pain is present in the sacro-iliac. The quality of the pain is described as cramping and aching. The symptoms are aggravated by standing, bending, position and twisting. on Right of C7/T1 which would correlate with intermittent cervical radicular pattern. 7/15/2020  Patient presents today for lumbar trigger point injections along with left SI injection as well as reevaluation to return to work. Patient states that condition was improving she was able to do activities of daily living as well as household chores with less pain. Patient has been undergoing physical therapy for low back and knee strengthening. Patient stated that she was doing exceptionally well until the last 2 treatments. Patient stated that she had had an increase in neck pain from the leg press as she felt it was more to do with the amount of weight. Patient stated that she mentioned this to physical therapist however they put more weight on the next day. Patient stated that she has had an increase in pain the last 2 days however she feels that the injections today are targeting the areas of increased pain. She does feel that she will be able to return to work. 6/29/2020  presents today for evaluation of return to work. Patient's condition has significantly improved as PEG score was a 10 at last appointment and is now 7.3. Physical therapy recommends another 4 weeks of treatment prior to returning back to work. Patient's pain is more localized to the SI area as she has had significant improvement in knee pain. Of note: Disability paperwork has not been received by this office. Patient instructed that NP will be going out of town if paperwork is received by tomorrow paperwork will be completed otherwise paperwork will not be completed until NP is back in town mid July. 6/1/2020  presents for follow-up of acute on chronic knee pain and low back pain. Back on April 1, 2020 patient had presented for a left SI injection for low back pain. The evening prior to the injection patient had fallen and hurt her left knee. Patient was in an extreme amount of pain.   Patient was sent for imaging and was taken off of work. Patient started doing some therapeutic home exercises and was off work for approximately 3 weeks. Her pain gradually improved. Patient stated being off work for that 3 weeks she felt better than she had felt in a long time as the pain had resolved. Patient was able to activities of daily living and household chores with minimal pain. When patient received the injection for her low back on 4/1/2020 she was unable to determine how effective the injection was as her knee pain was so severe that it overpowered her low back pain. Patient was seen on 4/21/2020 and she was released to go back to work the following week. Patient states that after returning to work, her knee pain has gradually worsened. Her quality of life has deteriorated. Patient states that the weekends she spends recuperating so she can return to work the following week. Patient is stating that her thighs feel weak. Patient has difficulty with ambulation. Patient does walk bent over and cannot fully extend her knees. This is causing her back pain to worsen. Patient feels that the injections did help with the low back but between the fall and the positions in which she stands to work, the injections for the low back did not last very long. Patient is having difficulty sleeping at night. Patient is worried that she may have to retire as she does not know if her body can continue. Patient states that her housework is behind as once she gets home from work it is difficult for her to do household chores. Knee Pain is a recurrent problem. The current episode started more than 1 month ago. The problem occurs constantly. The problem has been gradually worsening. Associated symptoms include arthralgias, joint swelling and myalgias. Associated symptoms comments: Abrasion to left knee almost healed. The symptoms are aggravated by walking, standing and twisting. Back Pain is a chronic problem.  The current episode started more than 1 year ago. The problem occurs constantly. The problem has been gradually worsening since onset. The pain is present in the lumbar spine and sacro-iliac. The quality of the pain is described as aching and cramping. The symptoms are aggravated by twisting, standing, bending and position. 4/21/2020  presents for 3 week follow up post fall. Patient presented for a Left SI injection on 4/1/2020. Patient had fallen the night before. She was having severe knee pain. Patient was assessed and she was given work release to allow time to heal. Patient is presenting today to see if she can return to work. Knee Pain is a recurrent problem. The current episode started 1 to 4 weeks ago. The problem occurs constantly. The problem has been gradually improving. Associated symptoms include arthralgias, joint swelling and myalgias. The symptoms are aggravated by walking, standing and twisting.     3/25/2020  present for sooner appointment related to missed procedure. Patient states that she received a phone call the day before her injection stating the time was 3:30. Patient stated that she was in her managers office when she got the phone call. Patient had that the appointment was at 1100. Patient had taken 1/2 day off but had to take the entire day off work. Patient came for her appointment on 2/14/2020 but when she registered she was told that everyone was gone for the day. Patient states that low back pain is getting worse. Patient states that pain is affecting her ability to work. She is having difficulty sleeping and she does not know how much longer she can tolerate this pain. Back Pain is a chronic problem. The current episode started more than 1 year ago. The problem occurs constantly. The problem has been rapidly worsening since onset. The pain is present in the lumbar spine and sacro-iliac. The quality of the pain is described as aching, cramping, shooting and stabbing. Radiates to: left groin.  The Employment: factory    Past Medical Histoy  Past Medical History:   Diagnosis Date    Arthritis     Bilateral sacroiliitis ([facility] [address]     Chronic pain     Diabetes mellitus ([facility] [address]     Hypertension     Thyroid disease        Surgery History  Past Surgical History:   Procedure Laterality Date    CARDIAC SURGERY  1998    heart cath    CERVICAL SPINE SURGERY      HYSTERECTOMY (CERVIX STATUS UNKNOWN)      JOINT REPLACEMENT      bilateral knees        Family History  family history is not on file. Social History    Social History     Tobacco Use    Smoking status: Former    Smokeless tobacco: Never   Substance Use Topics    Alcohol use: No       Allergies  Ethyl chloride, Tape [adhesive tape], and Tramadol     Current Medications  Current Outpatient Medications   Medication Sig Dispense Refill    propranolol (INDERAL) 20 MG tablet Take 1 tablet by mouth in the morning and 1 tablet before bedtime. zolpidem (AMBIEN CR) 12.5 MG extended release tablet Take 1 tablet by mouth nightly as needed for Sleep for up to 30 days. 30 tablet 5    tiZANidine (ZANAFLEX) 4 MG tablet Take 1 tablet by mouth in the morning and 1 tablet in the evening. 60 tablet 5    pregabalin (LYRICA) 150 MG capsule Take 1 capsule by mouth in the morning and 1 capsule at noon and 1 capsule before bedtime. Do all this for 90 days. 90 capsule 2    nabumetone (RELAFEN) 500 MG tablet Take 1 tablet by mouth in the morning and 1 tablet before bedtime. 60 tablet 5    HYDROcodone-acetaminophen (NORCO)  MG per tablet Take 1 tablet by mouth every 6 hours as needed for Pain for up to 30 days.  Enough until appointment 120 tablet 0    ONETOUCH ULTRA strip USE 1 STRIP TO CHECK GLUCOSE 4 TIMES DAILY      Lancets (ONETOUCH DELICA PLUS [id number]) MISC USE LANCETS TO CHECK GLUCOSE TWICE DAILY      sertraline (ZOLOFT) 100 MG tablet Take 100 mg by mouth daily      OZEMPIC, 1 MG/DOSE, 4 MG/3ML SOPN Inject 1 mg into the skin once a week      omeprazole (PRILOSEC) 20 MG delayed release capsule Take 20 mg by mouth daily      fluticasone (FLONASE) 50 MCG/ACT nasal spray 2 sprays by Nasal route daily as needed      Cholecalciferol 50 MCG (2000 UT) CAPS Take 50 mcg by mouth daily      cetirizine (ZYRTEC) 10 MG tablet Take 10 mg by mouth daily      LANTUS SOLOSTAR 100 UNIT/ML injection pen Inject 50 Units into the skin daily      hydroCHLOROthiazide (MICROZIDE) 12.5 MG capsule Take 1 capsule by mouth daily      amLODIPine (NORVASC) 5 MG tablet Take 5 mg by mouth daily       atorvastatin (LIPITOR) 40 MG tablet Take 40 mg by mouth nightly       metFORMIN (GLUCOPHAGE) 1000 MG tablet Take 1,000 mg by mouth 2 times daily      diclofenac (FLECTOR) 1.3 % patch Place 1 patch onto the skin 2 times daily 60 patch 2    lisinopril (PRINIVIL;ZESTRIL) 20 MG tablet Take 20 mg by mouth daily      Insulin Lispro (HUMALOG KWIKPEN SC) Inject into the skin      levothyroxine (SYNTHROID) 25 MCG tablet Take 25 mcg by mouth daily        No current facility-administered medications for this encounter. Review of Systems:  Review of Systems   Constitutional:  Positive for activity change. Gastrointestinal:  Negative for bowel incontinence and constipation. Genitourinary:  Negative for bladder incontinence. Musculoskeletal:  Positive for arthralgias, back pain, gait problem, myalgias, neck pain and neck stiffness. Neurological:  Positive for weakness and numbness. Gait disturbance   Psychiatric/Behavioral:  Positive for sleep disturbance. Negative for agitation, self-injury and suicidal ideas. The patient is not nervous/anxious. 14 point ROS negative besides that noted in HPI    Physical Exam:    Vitals:    08/15/22 1420   BP: 133/81   Pulse: 71   Resp: 16   Temp: 98 °F (36.7 °C)   TempSrc: Temporal   SpO2: 94%   Weight: 235 lb (106.6 kg)   Height: 5' 7\" (1.702 m)       Body mass index is 36.81 kg/m². Physical Exam  Vitals and nursing note reviewed.    Constitutional:       General: She is not in acute distress. Appearance: She is well-developed. HENT:      Head: Normocephalic. Right Ear: External ear normal.      Left Ear: External ear normal.      Nose: Nose normal.   Eyes:      Conjunctiva/sclera: Conjunctivae normal.      Pupils: Pupils are equal, round, and reactive to light. Neck:      Vascular: No JVD. Trachea: No tracheal deviation. Cardiovascular:      Rate and Rhythm: Normal rate. Pulmonary:      Effort: Pulmonary effort is normal.   Abdominal:      General: There is no distension. Tenderness: There is no abdominal tenderness. Musculoskeletal:      Cervical back: Spasms (tender palpable knots noted) and tenderness present. Pain with movement present. Decreased range of motion. Lumbar back: Spasms (tender palpable knots noted), tenderness and bony tenderness (Bilateral SI joints tender to palpation with 3+ provocative tests) present. Negative right straight leg raise test and negative left straight leg raise test.      Comments: Reese's (-) bilaterally    Patient points to on left SI joint as the source of low back pain  TTP of SI joint on left  positive Rigo's on left, positive Distraction on left, positive Thigh Thrust on left     Skin:     General: Skin is warm and dry. Neurological:      Mental Status: She is alert and oriented to person, place, and time. Cranial Nerves: No cranial nerve deficit. Gait: Gait abnormal.   Psychiatric:         Behavior: Behavior normal.         Thought Content:  Thought content normal.         Judgment: Judgment normal.      Labs:  Lab Results   Component Value Date/Time     11/30/2014 12:29 PM    K 3.8 11/30/2014 12:29 PM    CL 99 11/30/2014 12:29 PM    CO2 24 11/30/2014 12:29 PM    BUN 11 11/30/2014 12:29 PM    CREATININE 1.0 10/06/2021 02:37 PM    CREATININE 0.6 11/30/2014 12:29 PM    GLUCOSE 212 11/30/2014 12:29 PM    CALCIUM 9.7 11/30/2014 12:29 PM        Lab Results   Component Value Date WBC 12.05 (H) 11/30/2014    HGB 13.0 11/30/2014    HCT 40.4 11/30/2014    MCV 67.4 (L) 11/30/2014     11/30/2014     Assessment:  Active Problems:    Encounter for monitoring opioid maintenance therapy    Bilateral knee pain    Neuralgia, geniculate    Lumbar disc disease    Bulging lumbar disc    Displacement of lumbar disc with radiculopathy    Other insomnia    Cervical radiculopathy    Cervicalgia    Central stenosis of spinal canal    Foraminal stenosis of cervical region    Facet arthritis of lumbar region    SI (sacroiliac) joint dysfunction    Chronic bilateral low back pain without sciatica    Pain management contract agreement    Bilateral myofascial pain    History of cervical spinal surgery  Resolved Problems:    * No resolved hospital problems. *      Plan:  Other insomnia  Continue medication with refill sent at appointment see refill encounter dated 8/15/2022      - zolpidem (AMBIEN CR) 12.5 MG extended release tablet; Take 1 tablet by mouth nightly as needed for Sleep for up to 90 days. Dispense: 30 tablet; Refill: 2    Chronic bilateral low back pain without sciatica  Continue medication with refill sent at appointment see refill encounter dated 8/15/2022    - HYDROcodone-acetaminophen (NORCO)  MG per tablet; Take 1 tablet by mouth every 6 hours as needed for Pain for up to 30 days. Intended supply: 30 days  Dispense: 120 tablet; Refill: 0    Refills sent in for pain medication and Ambien. Patient has been on Ambien for many years, long before NP started caring for her. Patient instructed that NP would not refill her medication without some form of notification. Patient instructed that if this happens again to call office. Cervical radiculopathy  Continue medication with refill sent at appointment see refill encounter dated 8/15/2022    pregabalin (LYRICA) 150 MG capsule; Take 1 capsule by mouth 3 times daily for 90 days. Dispense: 90 capsule;  Refill: 2      Facet arthritis of lumbar region  Continue medication with refill sent at appointment see refill encounter dated 8/15/2022  nabumetone (RELAFEN) 500 MG tablet; Take 1 tablet by mouth 2 times daily  Dispense: 60 tablet; Refill: 5    Bilateral myofascial pain  Continue medication with refill sent at appointment see refill encounter dated 8/15/2022    tiZANidine (ZANAFLEX) 4 MG tablet; Take 1 tablet by mouth 2 times daily  Dispense: 60 tablet; Refill: 5    Re-Schedule bilateral lumbar trigger point injections for myofacial pain    Re-Schedule bilateral SI joint injections with US as patient has had good pain relief from previous injections. Foraminal stenosis of cervical region  Cervicalgia  Central stenosis of spinal canal    Patient does not want KIM as she had a bad experience with a past injections. Patient to continue to use [facility] device as this has been helpful. [x] Follow up    [] 4 weeks   [x] 6-8 weeks   [] 10-12 weeks   [] 3 months  [x] Post procedure to evaluate effectiveness of treatment  [] To evaluate medications changes made at office visit. [] To review diagnostics ordered at last visit. [] To evaluate response to therapy    [x] For management of controlled substance  [x] Random UDS as indicated by ORT score or if indicated by abberent behaviors      Discussion: Discussed exam findings and plan of care with patient. Patient agreed with POC and questions were asked and answered. Activity: Discussed exercise as beneficial to pain reduction, encouraged daily stretching with a focus on torso strengthening.     Goal:  Pain Management Goals of Therapy:   [] Resolution in pain  [x] Decrease in pain level  [x] Improvement in ADL's  [x] Increase in activities with less pain  [] Decrease in medication      Controlled substance monitoring:    [x] Discussed medication side effects, risk of tolerance and/or dependence, and/or alternative treatment  [] Discussed the detrimental effects of long term narcotic use in younger patients especially women of childbearing years. [x] No signs and symptoms of potential drug abuse or diversion were identified  [] Signs of potential drug abuse or diversion were identified   [] ORT Score   [] UDS non-compliant   [] See Note  [x] Random urine drug screen sent today  [] Random urine drug screen not completed today   [] Deferred New Patient  [] Compliant 4/21/2022  [] Not Compliant see note  [] Medication agreement with provider signed today  [x] Medication agreement with provider on file under media 9/21  [] Medication regimen effective and continued   [] New patient continuing current medication while developing POC   [x] On going assessment and evaluation of medication regimen  [] Medication regimen not effective see plan for changes  [x] Izabel Castellon reviewed & on file under media      EMR dragon/transcription disclaimer: Much of this encounter note is electronic transcription/translation of spoken language to printed tach. Electronic translation of spoken language may be erroneous, or at times, nonsensical words or phrases may be inadvertently transcribed.  Although, I have reviewed the note for such errors, some may still exist.

## 2022-09-21 ENCOUNTER — HOSPITAL ENCOUNTER (OUTPATIENT)
Dept: PAIN MANAGEMENT | Age: 62
Discharge: HOME | End: 2022-09-21
Payer: COMMERCIAL

## 2022-09-21 VITALS
TEMPERATURE: 96.2 F | HEART RATE: 71 BPM | DIASTOLIC BLOOD PRESSURE: 81 MMHG | RESPIRATION RATE: 18 BRPM | SYSTOLIC BLOOD PRESSURE: 109 MMHG | OXYGEN SATURATION: 96 %

## 2022-09-21 PROCEDURE — 6360000002 HC RX W HCPCS

## 2022-09-21 PROCEDURE — 20553 NJX 1/MLT TRIGGER POINTS 3/>: CPT

## 2022-09-21 PROCEDURE — 20611 DRAIN/INJ JOINT/BURSA W/US: CPT

## 2022-09-21 PROCEDURE — 76942 ECHO GUIDE FOR BIOPSY: CPT | Performed by: NURSE PRACTITIONER

## 2022-09-21 PROCEDURE — 20553 NJX 1/MLT TRIGGER POINTS 3/>: CPT | Performed by: NURSE PRACTITIONER

## 2022-09-21 PROCEDURE — 2500000003 HC RX 250 WO HCPCS

## 2022-09-21 RX ORDER — BUPIVACAINE HYDROCHLORIDE 5 MG/ML
10 INJECTION, SOLUTION EPIDURAL; INTRACAUDAL ONCE
Status: DISCONTINUED | OUTPATIENT
Start: 2022-09-21 | End: 2022-09-23 | Stop reason: HOSPADM

## 2022-09-21 RX ORDER — LIDOCAINE HYDROCHLORIDE 10 MG/ML
10 INJECTION, SOLUTION EPIDURAL; INFILTRATION; INTRACAUDAL; PERINEURAL ONCE
Status: DISCONTINUED | OUTPATIENT
Start: 2022-09-21 | End: 2022-09-23 | Stop reason: HOSPADM

## 2022-09-21 RX ORDER — LIDOCAINE HYDROCHLORIDE 10 MG/ML
4 INJECTION, SOLUTION EPIDURAL; INFILTRATION; INTRACAUDAL; PERINEURAL ONCE
Status: DISCONTINUED | OUTPATIENT
Start: 2022-09-21 | End: 2022-09-23 | Stop reason: HOSPADM

## 2022-09-21 RX ORDER — BUPIVACAINE HYDROCHLORIDE 5 MG/ML
4 INJECTION, SOLUTION EPIDURAL; INTRACAUDAL ONCE
Status: DISCONTINUED | OUTPATIENT
Start: 2022-09-21 | End: 2022-09-23 | Stop reason: HOSPADM

## 2022-09-21 RX ORDER — TRIAMCINOLONE ACETONIDE 40 MG/ML
80 INJECTION, SUSPENSION INTRA-ARTICULAR; INTRAMUSCULAR ONCE
Status: DISCONTINUED | OUTPATIENT
Start: 2022-09-21 | End: 2022-09-23 | Stop reason: HOSPADM

## 2022-09-21 NOTE — DISCHARGE INSTRUCTIONS
[facility] Physical And Pain Medicine  Post Procedure Discharge Instructions        YOU HAVE HAD THE FOLLOWING PROCEDURE:                                  [] Occipital Nerve Blocks  [] CTS wrist injection(s)  [] Knee Injection(s)         [] Shoulder Injection(s)   [] Elbow Injection(s)     [] Botox Injection  [] Cervical Trigger Point Injections    [] Thoracic Trigger Point Injections    [x] Lumbar Trigger Point Injections  [] Piriformis Trigger Point Injections  [x] SI Joint Injection(s)     [] Trochanteric Bursa Injection(s)       [] Ankle Injection(s)   [] Plantar Fasciitis   []  ______________  Injection(s) [] Botox []  Migraines [] Spasticity    YOU HAVE RECEIVED THE FOLLOWING MEDICATIONS IN YOUR INJECTION(s)  [x] Lidocaine [x] Bupivacaine   [] DepoMedrol (steroid) [] Decadron (steroid)  [x]  Kenalog (steroid)   [] Toradol  [] Supartz [] Daysi Buttery    [] Botox        PATIENT INFORMATION:   You may experience the following symptoms after your procedure. These symptoms are normal and should not cause concern: You may have an increase in your pain. This may last 24 - 48 hours after your procedure. You may have no change in the pain that you had prior to your injection(s). You may have weakness or numbness in your affected extremity. If this occurs, this may last until numbing the medication wears off. REPORT THE FOLLOWING SYMPTOMS TO YOUR DOCTOR:  Redness, swelling or drainage at the injection site(s)  Unusual pain that interferes with your normal activities of daily living. OTHER INSTRUCTIONS:    [x] I will apply ice to the injection site(s) for at least 24 hours after the procedure. I will rotate the ice on for 20 minutes and off for 20 minutes for at least 24 hours. [x] I will not apply heat for at least 48 hours and I will not take a hot bath or shower for at least 24 hours.      [x] I understand that if Lidocaine or Bupivacaine was used in my injection(s) that the injection site(s) will be numb for a few hours after the procedure    [x] I understand if a steroid was used it will take effect in 3 - 7 days. I understand that as the numbing medication wears off, the pain may return until the steroids start working. [x] I understand that today I will rest for the next 24 hours and drink plenty of water. [] For Botox for Migraines please do not wear anything constricting around the forehead for 7-10 days post injection. Examples headband, hats, or bandana    [] For Botox for Spasticity start therapy for the affected limb in two weeks. [] Additional instructions: ______________________________________________ ___________________________________________________________________    Sedation:  Was oral sedation given? [] Yes  [x] No    I understand that if I took an oral nerve calming medication I will not drive for  [] 24 hours after taking the medication.     [x] I have received a copy of my discharge instructions and understand the above instructions to the best of my knowledge    Patient Discharged:  [x] Home  [] Hospital  [] Other  ____________________________________________    Via:  [x] Ambulatory  [] Wheelchair   [] Stretcher [] EMS       Accompanied By:   [] Significant other  [] Family Member  [] Friend   [] Hospital Staff  []  Ambulance Staff  [] Other_______________________________________________    Plan:  [x] Will return to the office in   [] 1 month   [] 3 months for:  [] Procedure Follow-up  [x] Office Visit   [] Planned Procedure      Patient Signature: _____________________________________________________    Staff Signature: _______________________________________________________        If you have questions, problems or concerns you may call [phone] and follow the prompts    ELAINE Flynn, [place]-BC

## 2022-09-22 NOTE — PROCEDURES
[facility]    Patient Name: Zeus Martinez    : 1960    Age: 57 y.o. Sex: female    Date: 2022    Pre-op Diagnosis: bilateral  Sacroiliac Joint(s) Dysfunction/ Sacroiliitis    Post-op Diagnosis: bilateral  Sacroiliac Joint(s) Dysfunction/ Sacroliliits    Procedure: Ultrasound Guided Injection of  bilateral Sacroiliac Joint(s)     Performing Procedure:  Kari Ormond, ACAGNP - [facility]    Patient Vitals for the past 24 hrs:   BP Temp Temp src Pulse Resp SpO2   22 1503 109/81 (!) 96.2 °F (35.7 °C) Temporal 71 18 96 %       bilateral  Sacroiliac Joint(s)     Description of Procedure:    After voluntary, informed and signed consent obtained the patient was placed in a prone position. Appropriate time out was obtained per policy. The Sacroiliac Joint(s) was palpated for area of maximal tenderness. The area was prepped in an aseptic fashion with CHG swab. The ultrasound transducer was used to confirm the appropriate location. The skin was sprayed with Gebauer's Solution. Under aseptic technique and direct ultrasound visualization a 22 gauge 3 inch spinal needle was introduced into the Sacroiliac Joint(s). After a negative aspiration, a solution of 2 ml of 0.5% Marcaine Plain and 2 ml of 1% Lidocaine Plain and 1 ml of Kenalog (40 mg/ml) was injected into the Sacroiliac Joint(s). The needle was withdrawn and a sterile dressing applied. If this was a bilateral procedure, the same steps were followed on the opposite side.      Pre-op Diagnosis: Myofascial Pain/ Muscle Spasms    Post-op Diagnosis: Myofascial Pain/ Muscle Spasms    Procedure: Lumbar Trigger Point Injections    Performing Procedure: Kari Ormond, ACAGNP - [facility]; [facility]    Patient Vitals for the past 24 hrs:   BP Temp Temp src Pulse Resp SpO2   22 1503 109/81 (!) 96.2 °F (35.7 °C) Temporal 71 18 96 %       Description of Procedure:    After a brief physical assessment and failure to improve with conservative measures the patient presented for Lumbar Trigger Point Injections The indications, limitations and possible complications were discussed with the patient and the patient elected to proceed with the procedure. After voluntary, informed and signed consent obtained the patient was placed in a seated position. Appropriate time out was obtained per policy. The area of maximal tenderness was palpated over the bilaterally Lumbar Muscles - Lower Latissimus,  Erector Spinae, Lumbar Paraspinous. The skin overlying these areas was marked with a skin marker. The areas were prepped using aseptic technique with CHG prep. The skin overlying the proposed injection sites were then sprayed with Gebauer's Solution while protecting patient eyes. Each trigger point of the Lumbar Muscles - Lower Latissimus,  Erector Spinae, Lumbar Paraspinous was injected after negative aspiration was injected with approximately 1-2 ml of a solution of 5 ml of 1% Lidocaine Plain and 5 ml of 0.5% Marcaine Plain after negative aspiration    Discharge: The patient tolerated the procedure well. There were no complications during the procedure and the patient was discharged home with discharge instructions. The patient has been instructed to contact the office should there be any complications or questions to arise between today and their next appointment. Plan:     Will return to the office in 6 weeks for follow up    KEIKO Gonzalez CNP, 9/21/2022 at 9:16 PM

## 2022-09-26 DIAGNOSIS — M54.50 CHRONIC BILATERAL LOW BACK PAIN WITHOUT SCIATICA: ICD-10-CM

## 2022-09-26 DIAGNOSIS — G89.29 CHRONIC BILATERAL LOW BACK PAIN WITHOUT SCIATICA: ICD-10-CM

## 2022-09-26 RX ORDER — HYDROCODONE BITARTRATE AND ACETAMINOPHEN 10; 325 MG/1; MG/1
1 TABLET ORAL EVERY 6 HOURS PRN
Qty: 120 TABLET | Refills: 0 | Status: SHIPPED | OUTPATIENT
Start: 2022-09-26 | End: 2022-10-26

## 2022-09-29 ENCOUNTER — TELEPHONE (OUTPATIENT)
Dept: PAIN MANAGEMENT | Age: 62
End: 2022-09-29

## 2022-09-29 DIAGNOSIS — M25.561 CHRONIC PAIN OF BOTH KNEES: ICD-10-CM

## 2022-09-29 DIAGNOSIS — M51.16 DISPLACEMENT OF LUMBAR DISC WITH RADICULOPATHY: Primary | ICD-10-CM

## 2022-09-29 DIAGNOSIS — M51.36 BULGING LUMBAR DISC: ICD-10-CM

## 2022-09-29 DIAGNOSIS — G89.29 CHRONIC PAIN OF BOTH KNEES: ICD-10-CM

## 2022-09-29 DIAGNOSIS — B02.21 NEURALGIA, GENICULATE: ICD-10-CM

## 2022-09-29 DIAGNOSIS — M51.9 LUMBAR DISC DISEASE: ICD-10-CM

## 2022-09-29 DIAGNOSIS — M25.562 CHRONIC PAIN OF BOTH KNEES: ICD-10-CM

## 2022-09-29 NOTE — TELEPHONE ENCOUNTER
Had pain across low back last night hasn't even got out of bed yet. States that my back is just getting worse.

## 2022-11-29 ENCOUNTER — HOSPITAL ENCOUNTER (OUTPATIENT)
Dept: PAIN MANAGEMENT | Age: 62
Discharge: HOME | End: 2022-11-29
Payer: COMMERCIAL

## 2022-11-29 VITALS
SYSTOLIC BLOOD PRESSURE: 123 MMHG | WEIGHT: 236 LBS | DIASTOLIC BLOOD PRESSURE: 73 MMHG | BODY MASS INDEX: 37.04 KG/M2 | RESPIRATION RATE: 18 BRPM | OXYGEN SATURATION: 95 % | TEMPERATURE: 97.9 F | HEART RATE: 67 BPM | HEIGHT: 67 IN

## 2022-11-29 DIAGNOSIS — G89.29 CHRONIC BILATERAL LOW BACK PAIN WITHOUT SCIATICA: ICD-10-CM

## 2022-11-29 DIAGNOSIS — M54.12 CERVICAL RADICULOPATHY: ICD-10-CM

## 2022-11-29 DIAGNOSIS — M54.50 CHRONIC BILATERAL LOW BACK PAIN WITHOUT SCIATICA: ICD-10-CM

## 2022-11-29 PROCEDURE — 99215 OFFICE O/P EST HI 40 MIN: CPT

## 2022-11-29 RX ORDER — PEN NEEDLE, DIABETIC 32GX 5/32"
NEEDLE, DISPOSABLE MISCELLANEOUS
COMMUNITY
Start: 2022-10-28

## 2022-11-29 RX ORDER — HYDROCODONE BITARTRATE AND ACETAMINOPHEN 10; 325 MG/1; MG/1
1 TABLET ORAL EVERY 6 HOURS PRN
Qty: 120 TABLET | Refills: 0 | Status: SHIPPED | OUTPATIENT
Start: 2022-11-29 | End: 2022-11-29 | Stop reason: CLARIF

## 2022-11-29 RX ORDER — PREGABALIN 150 MG/1
150 CAPSULE ORAL 3 TIMES DAILY
Qty: 90 CAPSULE | Refills: 2 | Status: SHIPPED | OUTPATIENT
Start: 2022-11-29 | End: 2023-02-27

## 2022-11-29 RX ORDER — HYDROCODONE BITARTRATE AND ACETAMINOPHEN 10; 325 MG/1; MG/1
1 TABLET ORAL EVERY 6 HOURS PRN
Qty: 120 TABLET | Refills: 0 | Status: SHIPPED | OUTPATIENT
Start: 2022-12-28 | End: 2023-01-27

## 2022-11-29 RX ORDER — ONDANSETRON 4 MG/1
TABLET, ORALLY DISINTEGRATING ORAL
COMMUNITY
Start: 2022-11-09

## 2022-11-29 RX ORDER — HYDROCODONE BITARTRATE AND ACETAMINOPHEN 10; 325 MG/1; MG/1
1 TABLET ORAL EVERY 6 HOURS PRN
Qty: 120 TABLET | Refills: 0 | Status: SHIPPED | OUTPATIENT
Start: 2022-11-29 | End: 2022-12-29

## 2022-11-29 ASSESSMENT — PAIN DESCRIPTION - LOCATION
LOCATION: BACK
LOCATION_2: NECK

## 2022-11-29 ASSESSMENT — ENCOUNTER SYMPTOMS
BOWEL INCONTINENCE: 0
BACK PAIN: 1
CONSTIPATION: 0

## 2022-11-29 ASSESSMENT — PAIN DESCRIPTION - ORIENTATION
ORIENTATION: LOWER
ORIENTATION_2: LOWER

## 2022-11-29 ASSESSMENT — PAIN DESCRIPTION - PAIN TYPE
TYPE_2: CHRONIC PAIN
TYPE: CHRONIC PAIN

## 2022-11-29 ASSESSMENT — PAIN DESCRIPTION - INTENSITY: RATING_2: 7

## 2022-11-29 ASSESSMENT — PAIN SCALES - GENERAL: PAINLEVEL_OUTOF10: 8

## 2022-11-29 NOTE — PROGRESS NOTES
Pain agreement contract reviewed with patient and signed per patient. Questions answered and patient states understanding of contract. Copy given to patient.

## 2022-11-29 NOTE — PROGRESS NOTES
[facility] [place]  Office Note    Patient Name: Jose Alfredo Mariee    MR #: [id number]    Account #: [de-identified]    : 1960    Age: 60 y.o. Sex: female    Date: 2022    PCP: KEIKO Allen CNP    Chief Complaint:   Chief Complaint   Patient presents with    Back Pain     8/10    Neck Pain     /10       History of Present Illness:     Jose Alfredo Mariee is a 60 y.o. female who presents for procedure follow up and office visit. Patient had bilateral lumbar trigger point injections on 2022 and Bilateral SI joint injections on 2022. Patient had at least 85% relief of pain from procedure(s) for at least 6 weeks and was able to increase activity after procedure. Patient received enough pain relief from injections that the patient would like to repeat the injection(s). Patient states that she has had a couple of episodes where she has had crushing low back pain and her legs will get week. She has passed out a couple of times. Does pain affect ADLs Yes transportation, shopping, preparing meals, laundry, housework, dressing, and walking  Does pain affect quality of life? Yes  Does pain affect activities of enjoyment? Yes  Have you been on pain medication for your pain? Yes  If so, does the pain medication keep your pain tolerable to perform the tasks that affect your pain? Yes and injections help as well. Neck Pain   This is a chronic problem. The current episode started more than 1 year ago. The problem occurs constantly. The problem has been waxing and waning. The quality of the pain is described as aching and cramping. The symptoms are aggravated by bending and twisting. Associated symptoms include numbness and weakness. Pertinent negatives include no leg pain. Back Pain  This is a chronic problem. The current episode started more than 1 month ago. The problem occurs constantly. The problem has been waxing and waning since onset.  The pain is present in the sacro-iliac. The quality of the pain is described as cramping and aching. The symptoms are aggravated by standing, bending, position and twisting. Associated symptoms include numbness and weakness. Pertinent negatives include no bladder incontinence, bowel incontinence or leg pain. Screening Tools:    PEG Score: 7     Last PEG Score: 8     Annual ORT Score: 5     Annual PHQ Score: 11    Current Pain Assessment  Pain Assessment  Pain Assessment: 0-10  Pain Level: 8  Pain Location: Back  Pain Orientation: Lower  Pain Type: Chronic pain    Past Visit HPI:  4/2022  Patient has not had pain medication is several months. She states that she called for a refill and she never received the medication. She thought that she was \"cut off\"    Patient's last documented refill request was in February. The medication was refilled at that time. There is no other entry for a refill. Patient had bilateral SI and bilateral lumbar trigger point injections on 1/19/2022. Patient had at least 85% relief of pain from procedure(s) for at least 6 weeks and was able to increase activity after procedure. Patient received enough pain relief from injections that the patient would like to repeat the injection(s). Patient's pain was under fair control when she was getting her medication and injections. Patient has not been able to sleep because she has ran out of her Ambien. Again she did not call for refill as she thought she was not getting controled substances. 12/23/2021  presents for office visit. Patient had bilateral lumbar trigger point injections on 10/13/2021. Patient had at least 85% relief of pain from procedure(s) for at least 6 weeks and was able to increase activity after procedure. Patient received enough pain relief from injections that the patient would like to repeat the injection(s). Patient has recently retired. She states that she just could not tolerate the pain from working any longer.      Since last appointment, patient did have cervical MRI on 10/6/2021. Multiple degenerative changes noted. Patient has moderate foraminal stenosis @ C3/C4, C4/C5 and on Right of C7/T1 which would correlate with intermittent cervical radicular pattern. 7/15/2020  Patient presents today for lumbar trigger point injections along with left SI injection as well as reevaluation to return to work. Patient states that condition was improving she was able to do activities of daily living as well as household chores with less pain. Patient has been undergoing physical therapy for low back and knee strengthening. Patient stated that she was doing exceptionally well until the last 2 treatments. Patient stated that she had had an increase in neck pain from the leg press as she felt it was more to do with the amount of weight. Patient stated that she mentioned this to physical therapist however they put more weight on the next day. Patient stated that she has had an increase in pain the last 2 days however she feels that the injections today are targeting the areas of increased pain. She does feel that she will be able to return to work. 6/29/2020  presents today for evaluation of return to work. Patient's condition has significantly improved as PEG score was a 10 at last appointment and is now 7.3. Physical therapy recommends another 4 weeks of treatment prior to returning back to work. Patient's pain is more localized to the SI area as she has had significant improvement in knee pain. Of note: Disability paperwork has not been received by this office. Patient instructed that NP will be going out of town if paperwork is received by tomorrow paperwork will be completed otherwise paperwork will not be completed until NP is back in town mid July. 6/1/2020  presents for follow-up of acute on chronic knee pain and low back pain. Back on April 1, 2020 patient had presented for a left SI injection for low back pain. The evening prior to the injection patient had fallen and hurt her left knee. Patient was in an extreme amount of pain. Patient was sent for imaging and was taken off of work. Patient started doing some therapeutic home exercises and was off work for approximately 3 weeks. Her pain gradually improved. Patient stated being off work for that 3 weeks she felt better than she had felt in a long time as the pain had resolved. Patient was able to activities of daily living and household chores with minimal pain. When patient received the injection for her low back on 4/1/2020 she was unable to determine how effective the injection was as her knee pain was so severe that it overpowered her low back pain. Patient was seen on 4/21/2020 and she was released to go back to work the following week. Patient states that after returning to work, her knee pain has gradually worsened. Her quality of life has deteriorated. Patient states that the weekends she spends recuperating so she can return to work the following week. Patient is stating that her thighs feel weak. Patient has difficulty with ambulation. Patient does walk bent over and cannot fully extend her knees. This is causing her back pain to worsen. Patient feels that the injections did help with the low back but between the fall and the positions in which she stands to work, the injections for the low back did not last very long. Patient is having difficulty sleeping at night. Patient is worried that she may have to retire as she does not know if her body can continue. Patient states that her housework is behind as once she gets home from work it is difficult for her to do household chores. Knee Pain is a recurrent problem. The current episode started more than 1 month ago. The problem occurs constantly. The problem has been gradually worsening. Associated symptoms include arthralgias, joint swelling and myalgias.  Associated symptoms comments: Abrasion to left knee almost healed. The symptoms are aggravated by walking, standing and twisting. Back Pain is a chronic problem. The current episode started more than 1 year ago. The problem occurs constantly. The problem has been gradually worsening since onset. The pain is present in the lumbar spine and sacro-iliac. The quality of the pain is described as aching and cramping. The symptoms are aggravated by twisting, standing, bending and position. 4/21/2020  presents for 3 week follow up post fall. Patient presented for a Left SI injection on 4/1/2020. Patient had fallen the night before. She was having severe knee pain. Patient was assessed and she was given work release to allow time to heal. Patient is presenting today to see if she can return to work. Knee Pain is a recurrent problem. The current episode started 1 to 4 weeks ago. The problem occurs constantly. The problem has been gradually improving. Associated symptoms include arthralgias, joint swelling and myalgias. The symptoms are aggravated by walking, standing and twisting.     3/25/2020  present for sooner appointment related to missed procedure. Patient states that she received a phone call the day before her injection stating the time was 3:30. Patient stated that she was in her managers office when she got the phone call. Patient had that the appointment was at 1100. Patient had taken 1/2 day off but had to take the entire day off work. Patient came for her appointment on 2/14/2020 but when she registered she was told that everyone was gone for the day. Patient states that low back pain is getting worse. Patient states that pain is affecting her ability to work. She is having difficulty sleeping and she does not know how much longer she can tolerate this pain. Back Pain is a chronic problem. The current episode started more than 1 year ago. The problem occurs constantly. The problem has been rapidly worsening since onset.  The pain is present in the lumbar spine and sacro-iliac. The quality of the pain is described as aching, cramping, shooting and stabbing. Radiates to: left groin. The pain is worse during the day (due to activity). The symptoms are aggravated by bending, standing and twisting (lifting). Associated symptoms include headaches. Pertinent negatives include no bladder incontinence or bowel incontinence. 1/30/2020  presents to the office for follow-up of MRI and worsening low back pain. Discussed reviewed with patient that she does have ligamentous hypertrophy with just chronic degenerative changes. Discussed patient's worsening low back pain and symptoms. Patient states that low back does wrap into the groin at times. Patient has pain with flexion and extension. Low back pain cramps and makes it difficulty for her to do anything after she works. Patient states that all she can tolerate is working and going to bed. Her quality of life has diminished and it takes all she can do to work therefore things in her house are not getting done. 12/17/19   presents to the office for annual exam with primary complaints of worsening cervical radiculopathy with pain going down left arm and into last 2 fingers getting numb. Patient is also having worsening low back pain. She states that constant flexion extension as demanding by her job that she will start having pain in her low back and that her legs would become weak. She has had a couple of occasions where she has had electrical sensations going up her back and making it difficult to breathe. Last office appointment patient was scheduled to have MRI of Lumbar spine and will need MRI of cervical spine in the futue. Patient attempted to have MRI at her local hospital however patient barely fit inside of MRI machine. Patient stated that MRI machine was pulling at her arms and that she could not breathe and started having anxiety attack. MRI had to be stopped.   Patient was brought back into the office to schedule MRI with sedation. However patient states she does not need IV sedation if she can get in the open MRI. Employment: factory    Past Medical Histoy  Past Medical History:   Diagnosis Date    Arthritis     Bilateral sacroiliitis ([address]     Chronic pain     Diabetes mellitus ([address]     Hypertension     Thyroid disease        Surgery History  Past Surgical History:   Procedure Laterality Date    CARDIAC SURGERY  1998    heart cath    CERVICAL SPINE SURGERY      HYSTERECTOMY (CERVIX STATUS UNKNOWN)      JOINT REPLACEMENT      bilateral knees    OTHER SURGICAL HISTORY  11/09/2022    removal of lap band        Family History  family history is not on file. Social History    Social History     Tobacco Use    Smoking status: Former    Smokeless tobacco: Never   Substance Use Topics    Alcohol use: No       Allergies  Ethyl chloride, Tape [adhesive tape], and Tramadol     Current Medications  Current Outpatient Medications   Medication Sig Dispense Refill    HYDROcodone-acetaminophen (NORCO)  MG per tablet Take 1 tablet by mouth every 6 hours as needed for Pain for up to 30 days. 120 tablet 0    BD PEN NEEDLE INDIRA 2ND GEN 32G X 4 MM MISC AS DIRECTED SUBCUTANEOUS THREE TIMES A DAY      ondansetron (ZOFRAN-ODT) 4 MG disintegrating tablet TAKE 1 TABLET BY MOUTH EVERY 6 HOURS AS NEEDED FOR NAUSEA AND VOMITING      propranolol (INDERAL) 20 MG tablet Take 1 tablet by mouth in the morning and 1 tablet before bedtime. zolpidem (AMBIEN CR) 12.5 MG extended release tablet Take 1 tablet by mouth nightly as needed for Sleep for up to 30 days. 30 tablet 5    tiZANidine (ZANAFLEX) 4 MG tablet Take 1 tablet by mouth in the morning and 1 tablet in the evening. 60 tablet 5    pregabalin (LYRICA) 150 MG capsule Take 1 capsule by mouth in the morning and 1 capsule at noon and 1 capsule before bedtime. Do all this for 90 days.  90 capsule 2    nabumetone (RELAFEN) 500 MG tablet Take 1 tablet by mouth in the morning and 1 tablet before bedtime. 60 tablet 5    ONETOUCH ULTRA strip USE 1 STRIP TO CHECK GLUCOSE 4 TIMES DAILY      Lancets (ONETOUCH DELICA PLUS [id number]) MISC USE LANCETS TO CHECK GLUCOSE TWICE DAILY      sertraline (ZOLOFT) 100 MG tablet Take 100 mg by mouth daily      OZEMPIC, 1 MG/DOSE, 4 MG/3ML SOPN Inject 1 mg into the skin once a week      omeprazole (PRILOSEC) 20 MG delayed release capsule Take 20 mg by mouth daily      fluticasone (FLONASE) 50 MCG/ACT nasal spray 2 sprays by Nasal route daily as needed      Cholecalciferol 50 MCG (2000 UT) CAPS Take 50 mcg by mouth daily      cetirizine (ZYRTEC) 10 MG tablet Take 10 mg by mouth daily      LANTUS SOLOSTAR 100 UNIT/ML injection pen Inject 50 Units into the skin daily      hydroCHLOROthiazide (MICROZIDE) 12.5 MG capsule Take 1 capsule by mouth daily      amLODIPine (NORVASC) 5 MG tablet Take 5 mg by mouth daily       atorvastatin (LIPITOR) 40 MG tablet Take 40 mg by mouth nightly       metFORMIN (GLUCOPHAGE) 1000 MG tablet Take 1,000 mg by mouth 2 times daily      diclofenac (FLECTOR) 1.3 % patch Place 1 patch onto the skin 2 times daily 60 patch 2    lisinopril (PRINIVIL;ZESTRIL) 20 MG tablet Take 20 mg by mouth daily      Insulin Lispro (HUMALOG KWIKPEN SC) Inject into the skin      levothyroxine (SYNTHROID) 25 MCG tablet Take 25 mcg by mouth daily        No current facility-administered medications for this encounter. Review of Systems:  Review of Systems   Constitutional:  Positive for activity change. Gastrointestinal:  Negative for bowel incontinence and constipation. Genitourinary:  Negative for bladder incontinence. Musculoskeletal:  Positive for arthralgias, back pain, gait problem, myalgias, neck pain and neck stiffness. Neurological:  Positive for weakness and numbness. Gait disturbance   Psychiatric/Behavioral:  Positive for sleep disturbance. Negative for agitation, self-injury and suicidal ideas. The patient is not nervous/anxious. 14 point ROS negative besides that noted in HPI    Physical Exam:    Vitals:    11/29/22 1607   BP: 123/73   Pulse: 67   Resp: 18   Temp: 97.9 °F (36.6 °C)   TempSrc: Temporal   SpO2: 95%   Weight: 236 lb (107 kg)   Height: 5' 7\" (1.702 m)       Body mass index is 36.96 kg/m². Physical Exam  Vitals and nursing note reviewed. Constitutional:       General: She is not in acute distress. Appearance: She is well-developed. HENT:      Head: Normocephalic. Right Ear: External ear normal.      Left Ear: External ear normal.      Nose: Nose normal.   Eyes:      Conjunctiva/sclera: Conjunctivae normal.      Pupils: Pupils are equal, round, and reactive to light. Neck:      Vascular: No JVD. Trachea: No tracheal deviation. Cardiovascular:      Rate and Rhythm: Normal rate. Pulmonary:      Effort: Pulmonary effort is normal.   Abdominal:      General: There is no distension. Tenderness: There is no abdominal tenderness. Musculoskeletal:      Cervical back: Spasms (tender palpable knots noted) and tenderness present. Pain with movement present. Decreased range of motion. Lumbar back: Spasms (tender palpable knots noted), tenderness and bony tenderness (Bilateral SI joints tender to palpation with 3+ provocative tests) present. Negative right straight leg raise test and negative left straight leg raise test.      Comments: Reese's (-) bilaterally    Patient points to on left SI joint as the source of low back pain  TTP of SI joint on left  positive Rigo's on left, positive Distraction on left, positive Thigh Thrust on left     Skin:     General: Skin is warm and dry. Neurological:      Mental Status: She is alert and oriented to person, place, and time. Cranial Nerves: No cranial nerve deficit. Gait: Gait abnormal.   Psychiatric:         Behavior: Behavior normal.         Thought Content:  Thought content normal.         Judgment: Judgment normal.      Labs:  Lab Results   Component Value Date/Time     11/30/2014 12:29 PM    K 3.8 11/30/2014 12:29 PM    CL 99 11/30/2014 12:29 PM    CO2 24 11/30/2014 12:29 PM    BUN 11 11/30/2014 12:29 PM    CREATININE 1.0 10/06/2021 02:37 PM    CREATININE 0.6 11/30/2014 12:29 PM    GLUCOSE 212 11/30/2014 12:29 PM    CALCIUM 9.7 11/30/2014 12:29 PM        Lab Results   Component Value Date    WBC 12.05 (H) 11/30/2014    HGB 13.0 11/30/2014    HCT 40.4 11/30/2014    MCV 67.4 (L) 11/30/2014     11/30/2014     Assessment:  Active Problems:    Encounter for monitoring opioid maintenance therapy    Chronic pain of both knees    Neuralgia, geniculate    Lumbar disc disease    Bulging lumbar disc    Displacement of lumbar disc with radiculopathy    Other insomnia    Cervical radiculopathy    Cervicalgia    Central stenosis of spinal canal    Foraminal stenosis of cervical region    Facet arthritis of lumbar region    SI (sacroiliac) joint dysfunction    Chronic bilateral low back pain without sciatica    Pain management contract agreement    Bilateral myofascial pain    History of cervical spinal surgery  Resolved Problems:    * No resolved hospital problems. *      Plan:  Cervical radiculopathy  - pregabalin (LYRICA) 150 MG capsule; Take 1 capsule by mouth 3 times daily for 90 days. Dispense: 90 capsule; Refill: 2  - HYDROcodone-acetaminophen (NORCO)  MG per tablet; Take 1 tablet by mouth every 6 hours as needed for Pain for up to 30 days. Intended supply: 30 days  Dispense: 120 tablet; Refill: 0  - HYDROcodone-acetaminophen (NORCO)  MG per tablet; Take 1 tablet by mouth every 6 hours as needed for Pain for up to 30 days. Intended supply: 30 days  Dispense: 120 tablet; Refill: 0     2. Chronic bilateral low back pain without sciatica  - HYDROcodone-acetaminophen (NORCO)  MG per tablet; Take 1 tablet by mouth every 6 hours as needed for Pain for up to 30 days.  Intended supply: 30 days Dispense: 120 tablet; Refill: 0  - HYDROcodone-acetaminophen (NORCO)  MG per tablet; Take 1 tablet by mouth every 6 hours as needed for Pain for up to 30 days. Intended supply: 30 days  Dispense: 120 tablet; Refill: 0      Due to upcoming holiday with office being closed in honor of upcoming holidays, patient's next two month prescriptions will be e-scribed with appropriate fill dates according to PAU report with making adjustments in fill dates due to weekends as appropriate. Patient would like to start PT in [place] after the first of the year. IF patient does not improve PT, then will order appropriate testing to determine cause of symptoms. Post Pone bilateral lumbar trigger point injections for myofacial pain    Post Pone bilateral SI joint injections with US as patient has had good pain relief from previous injections. Foraminal stenosis of cervical region  Cervicalgia  Central stenosis of spinal canal    Patient does not want KIM as she had a bad experience with a past injections. Patient to continue to use [facility] device as this has been helpful. [x] Follow up    [] 4 weeks   [] 6-8 weeks   [] 10-12 weeks   [x] 3 months  [] Post procedure to evaluate effectiveness of treatment  [] To evaluate medications changes made at office visit. [] To review diagnostics ordered at last visit. [x] To evaluate response to therapy    [x] For management of controlled substance  [x] Random UDS as indicated by ORT score or if indicated by abberent behaviors      Discussion: Discussed exam findings and plan of care with patient. Patient agreed with POC and questions were asked and answered. Activity: Discussed exercise as beneficial to pain reduction, encouraged daily stretching with a focus on torso strengthening.     Goal:  Pain Management Goals of Therapy:   [] Resolution in pain  [x] Decrease in pain level  [x] Improvement in ADL's  [x] Increase in activities with less pain  [] Decrease in medication      Controlled substance monitoring:    [x] Discussed medication side effects, risk of tolerance and/or dependence, and/or alternative treatment  [] Discussed the detrimental effects of long term narcotic use in younger patients especially women of childbearing years. [x] No signs and symptoms of potential drug abuse or diversion were identified  [] Signs of potential drug abuse or diversion were identified   [] ORT Score   [] UDS non-compliant   [] See Note  [] Random urine drug screen sent today  [x] Random urine drug screen not completed today   [] Deferred New Patient  [] Compliant 4/21/2022  [] Not Compliant see note  [x] Medication agreement with provider signed today  [] Medication agreement with provider on file under media 9/21  [] Medication regimen effective and continued   [] New patient continuing current medication while developing POC   [x] On going assessment and evaluation of medication regimen  [] Medication regimen not effective see plan for changes  [x] [employer] reviewed & on file under media      EMR dragon/transcription disclaimer: Much of this encounter note is electronic transcription/translation of spoken language to printed tach. Electronic translation of spoken language may be erroneous, or at times, nonsensical words or phrases may be inadvertently transcribed.  Although, I have reviewed the note for such errors, some may still exist.

## 2022-11-29 NOTE — TELEPHONE ENCOUNTER
1. Cervical radiculopathy  - pregabalin (LYRICA) 150 MG capsule; Take 1 capsule by mouth 3 times daily for 90 days. Dispense: 90 capsule; Refill: 2  - HYDROcodone-acetaminophen (NORCO)  MG per tablet; Take 1 tablet by mouth every 6 hours as needed for Pain for up to 30 days. Intended supply: 30 days  Dispense: 120 tablet; Refill: 0  - HYDROcodone-acetaminophen (NORCO)  MG per tablet; Take 1 tablet by mouth every 6 hours as needed for Pain for up to 30 days. Intended supply: 30 days  Dispense: 120 tablet; Refill: 0    2. Chronic bilateral low back pain without sciatica  - HYDROcodone-acetaminophen (NORCO)  MG per tablet; Take 1 tablet by mouth every 6 hours as needed for Pain for up to 30 days. Intended supply: 30 days  Dispense: 120 tablet; Refill: 0  - HYDROcodone-acetaminophen (NORCO)  MG per tablet; Take 1 tablet by mouth every 6 hours as needed for Pain for up to 30 days. Intended supply: 30 days  Dispense: 120 tablet; Refill: 0     Due to upcoming holiday with office being closed in honor of upcoming holidays, patient's next two month prescriptions will be e-scribed with appropriate fill dates according to PAU report with making adjustments in fill dates due to weekends as appropriate.

## 2022-11-29 NOTE — PROGRESS NOTES
Clinic Documentation      Education Provided:  [x] Review of Maylon [place]  [x] Agreement Review  [x] PEG Score Calculated [x] PHQ Score Calculated [x] ORT Score Calculated    [] Compliance Issues Discussed [] Cognitive Behavior Needs [x] Exercise [] Review of Test [] Financial Issues  [x] Tobacco/Alcohol 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 Test Ordered [x] Prescription Ordered/Changed   [] Obtained Test Results / Consult Notes        Complete if patient is withholding blood thinner for procedure     Blood Thinner Patient is currently taking:      [] Plavix (Hold for 7 days)  [] Aspirin (Hold for 5 days)     [] Pletal (Hold for 2 days)  [] Pradaxa (Hold for 3 days)    [] Effient (Hold for 7 days)  [] Xarelto (Hold for 2 days)    [] Eliquis (Hold for 2 days)  [] Brilinta (Hold for 7 days)    [] Coumadin (Hold for 5 days) - (INR needs to be drawn the day prior to procedure- INR < 2.0)    [] Aggrenox (Hold for 7 days)        [] Patient will stop medication on their own.    [] Blood Thinner Form Faxed for approval to hold.    Provider form faxed to:     Assessment Completed by:  Electronically signed by Helen Jones RN on 11/29/2022 at 4:40 PM

## 2023-01-09 DIAGNOSIS — M51.36 BULGING LUMBAR DISC: ICD-10-CM

## 2023-01-09 DIAGNOSIS — M25.561 CHRONIC PAIN OF BOTH KNEES: Primary | ICD-10-CM

## 2023-01-09 DIAGNOSIS — M25.562 CHRONIC PAIN OF BOTH KNEES: Primary | ICD-10-CM

## 2023-01-09 DIAGNOSIS — M51.9 LUMBAR DISC DISEASE: ICD-10-CM

## 2023-01-09 DIAGNOSIS — G89.29 CHRONIC PAIN OF BOTH KNEES: Primary | ICD-10-CM

## 2023-01-24 NOTE — PLAN OF CARE
Goal Outcome Evaluation:  Plan of Care Reviewed With: patient        Progress: improving  Outcome Summary: nursing OK'd OT, pt in bed and agreeable to OT, modified I w/ sup<>sit, (sit<>stand,x6) amb in room w/ sba ~ 30 ft benigno tx well cont poc   

## 2023-01-25 DIAGNOSIS — M54.12 CERVICAL RADICULOPATHY: ICD-10-CM

## 2023-01-25 DIAGNOSIS — G89.29 CHRONIC BILATERAL LOW BACK PAIN WITHOUT SCIATICA: ICD-10-CM

## 2023-01-25 DIAGNOSIS — M54.50 CHRONIC BILATERAL LOW BACK PAIN WITHOUT SCIATICA: ICD-10-CM

## 2023-01-26 RX ORDER — HYDROCODONE BITARTRATE AND ACETAMINOPHEN 10; 325 MG/1; MG/1
1 TABLET ORAL EVERY 6 HOURS PRN
Qty: 120 TABLET | Refills: 0 | Status: SHIPPED | OUTPATIENT
Start: 2023-01-26 | End: 2023-02-25

## 2023-02-20 DIAGNOSIS — G47.09 OTHER INSOMNIA: ICD-10-CM

## 2023-02-20 RX ORDER — ZOLPIDEM TARTRATE 12.5 MG/1
12.5 TABLET, FILM COATED, EXTENDED RELEASE ORAL NIGHTLY PRN
Qty: 30 TABLET | Refills: 2 | Status: SHIPPED | OUTPATIENT
Start: 2023-02-20 | End: 2023-03-22

## 2023-03-02 ENCOUNTER — HOSPITAL ENCOUNTER (OUTPATIENT)
Dept: PAIN MANAGEMENT | Age: 63
Discharge: HOME | End: 2023-03-02
Payer: COMMERCIAL

## 2023-03-02 VITALS
OXYGEN SATURATION: 94 % | DIASTOLIC BLOOD PRESSURE: 79 MMHG | TEMPERATURE: 96.5 F | BODY MASS INDEX: 39.08 KG/M2 | WEIGHT: 249 LBS | SYSTOLIC BLOOD PRESSURE: 150 MMHG | HEART RATE: 57 BPM | RESPIRATION RATE: 18 BRPM | HEIGHT: 67 IN

## 2023-03-02 DIAGNOSIS — G89.29 CHRONIC BILATERAL LOW BACK PAIN WITHOUT SCIATICA: ICD-10-CM

## 2023-03-02 DIAGNOSIS — G47.09 OTHER INSOMNIA: ICD-10-CM

## 2023-03-02 DIAGNOSIS — G56.03 BILATERAL CARPAL TUNNEL SYNDROME: ICD-10-CM

## 2023-03-02 DIAGNOSIS — M54.50 CHRONIC BILATERAL LOW BACK PAIN WITHOUT SCIATICA: ICD-10-CM

## 2023-03-02 DIAGNOSIS — M54.12 CERVICAL RADICULOPATHY: ICD-10-CM

## 2023-03-02 PROCEDURE — 99214 OFFICE O/P EST MOD 30 MIN: CPT

## 2023-03-02 RX ORDER — ZOLPIDEM TARTRATE 12.5 MG/1
12.5 TABLET, FILM COATED, EXTENDED RELEASE ORAL NIGHTLY PRN
Qty: 30 TABLET | Refills: 5 | Status: SHIPPED | OUTPATIENT
Start: 2023-03-22 | End: 2023-09-18

## 2023-03-02 RX ORDER — HYDROCODONE BITARTRATE AND ACETAMINOPHEN 10; 325 MG/1; MG/1
1 TABLET ORAL EVERY 6 HOURS PRN
Qty: 120 TABLET | Refills: 0 | Status: SHIPPED | OUTPATIENT
Start: 2023-03-02 | End: 2023-04-01

## 2023-03-02 RX ORDER — PREGABALIN 150 MG/1
150 CAPSULE ORAL 3 TIMES DAILY
Qty: 90 CAPSULE | Refills: 5 | Status: SHIPPED | OUTPATIENT
Start: 2023-03-06 | End: 2023-09-02

## 2023-03-02 ASSESSMENT — ENCOUNTER SYMPTOMS
CONSTIPATION: 0
BOWEL INCONTINENCE: 0

## 2023-03-02 ASSESSMENT — PAIN DESCRIPTION - LOCATION
LOCATION_2: NECK
LOCATION: BACK

## 2023-03-02 ASSESSMENT — PAIN DESCRIPTION - ORIENTATION
ORIENTATION_2: LOWER
ORIENTATION: LOWER

## 2023-03-02 ASSESSMENT — PAIN DESCRIPTION - PAIN TYPE
TYPE: CHRONIC PAIN
TYPE_2: CHRONIC PAIN

## 2023-03-02 ASSESSMENT — PAIN SCALES - GENERAL: PAINLEVEL_OUTOF10: 7

## 2023-03-02 ASSESSMENT — PAIN DESCRIPTION - INTENSITY: RATING_2: 9

## 2023-03-02 NOTE — PROGRESS NOTES
Clinic Documentation      Education Provided:  [x] Review of Lieutenant Delgado  [] Agreement Review  [x] PEG Score Calculated [] PHQ Score Calculated [] ORT Score Calculated    [] Compliance Issues Discussed [] Cognitive Behavior Needs [x] Exercise [] Review of Test [] Financial Issues  [x] Tobacco/Alcohol Use Reviewed [x] Teaching [] New Patient [] Picture Obtained    Physician Plan:  [] Outgoing Referral  [] Pharmacy Consult  [x] Test Ordered [x] Prescription Ordered/Changed   [] Obtained Test Results / Consult Notes        Complete if patient is withholding blood thinner for procedure     Blood Thinner Patient is currently taking:      [] Plavix (Hold for 7 days)  [] Aspirin (Hold for 5 days)     [] Pletal (Hold for 2 days)  [] Pradaxa (Hold for 3 days)    [] Effient (Hold for 7 days)  [] Xarelto (Hold for 2 days)    [] Eliquis (Hold for 2 days)  [] Brilinta (Hold for 7 days)    [] Coumadin (Hold for 5 days) - (INR needs to be drawn the day prior to procedure- INR < 2.0)    [] Aggrenox (Hold for 7 days)        [] Patient will stop medication on their own.    [] Blood Thinner Form Faxed for approval to hold.    Provider form faxed to:     Assessment Completed by:  Electronically signed by Ivan Pineda RN on 3/2/2023 at 4:30 PM

## 2023-03-02 NOTE — PROGRESS NOTES
[facility] Physical & Pain Medicine  Office Note    Patient Name: Carly Muñiz    MR #: [id number]    Account #: [de-identified]    : 1960    Age: 60 y.o. Sex: female    Date: 3/2/28085    PCP: KEIKO Maldonado CNP    Chief Complaint:   Chief Complaint   Patient presents with    Back Pain     10    Neck Pain     /10       History of Present Illness:     Carly Muñiz is a 60 y.o. female who presents for 3 month follow up. Patient is still participating in PT for low back pain and bilateral knee pain. Patient started PT on 2023. She is scheduled to go twice a week. She has been given HEP (home exercise program) which is continues to do. Overall patient's condition is unchanged. She states that she has more wrist pain. She has old wrist braces for CTS and she is wondering if she can get an order for some new braces. The below assessments is unchanged since last appointment. Does pain affect ADLs Yes transportation, shopping, preparing meals, laundry, housework, dressing, and walking  Does pain affect quality of life? Yes  Does pain affect activities of enjoyment? Yes  Have you been on pain medication for your pain? Yes  If so, does the pain medication keep your pain tolerable to perform the tasks that affect your pain? Yes and injections help as well. Neck Pain   This is a chronic problem. The current episode started more than 1 year ago. The problem occurs constantly. The problem has been waxing and waning. The quality of the pain is described as cramping and aching. The symptoms are aggravated by bending and twisting. Associated symptoms include numbness and weakness. Pertinent negatives include no leg pain. Back Pain  This is a chronic problem. The current episode started more than 1 month ago. The problem occurs constantly. The problem has been waxing and waning since onset. The pain is present in the sacro-iliac.  The quality of the pain is described as cramping and aching. The symptoms are aggravated by twisting, position and bending. Associated symptoms include numbness and weakness. Pertinent negatives include no bladder incontinence, bowel incontinence or leg pain. Screening Tools:    PEG Score: 7     Last PEG Score: 8     Annual ORT Score: 5     Annual PHQ Score: 11    Current Pain Assessment  Pain Assessment  Pain Assessment: 0-10  Pain Level: 7  Pain Location: Back  Pain Orientation: Lower  Pain Type: Chronic pain    Past Effective Procedures:  Procedures in which patient has had at least 80-85% pain relief from procedure(s) for at least 8 weeks and was able to increase activity after procedure. These procedures are not needed routinely but during exacerbations of chronic pain. Bilateral SI joint injections    Bilateral Lumbar trigger point injections    Past Visit HPI:  4/2022  Patient has not had pain medication is several months. She states that she called for a refill and she never received the medication. She thought that she was \"cut off\"    Patient's last documented refill request was in February. The medication was refilled at that time. There is no other entry for a refill. Patient had bilateral SI and bilateral lumbar trigger point injections on 1/19/2022. Patient had at least 85% relief of pain from procedure(s) for at least 6 weeks and was able to increase activity after procedure. Patient received enough pain relief from injections that the patient would like to repeat the injection(s). Patient's pain was under fair control when she was getting her medication and injections. Patient has not been able to sleep because she has ran out of her Ambien. Again she did not call for refill as she thought she was not getting controled substances. 12/23/2021  presents for office visit. Patient had bilateral lumbar trigger point injections on 10/13/2021.  Patient had at least 85% relief of pain from procedure(s) for at least 6 weeks and was able to increase activity after procedure. Patient received enough pain relief from injections that the patient would like to repeat the injection(s). Patient has recently retired. She states that she just could not tolerate the pain from working any longer. Since last appointment, patient did have cervical MRI on 10/6/2021. Multiple degenerative changes noted. Patient has moderate foraminal stenosis @ C3/C4, C4/C5 and on Right of C7/T1 which would correlate with intermittent cervical radicular pattern. 7/15/2020  Patient presents today for lumbar trigger point injections along with left SI injection as well as reevaluation to return to work. Patient states that condition was improving she was able to do activities of daily living as well as household chores with less pain. Patient has been undergoing physical therapy for low back and knee strengthening. Patient stated that she was doing exceptionally well until the last 2 treatments. Patient stated that she had had an increase in neck pain from the leg press as she felt it was more to do with the amount of weight. Patient stated that she mentioned this to physical therapist however they put more weight on the next day. Patient stated that she has had an increase in pain the last 2 days however she feels that the injections today are targeting the areas of increased pain. She does feel that she will be able to return to work. 6/29/2020  presents today for evaluation of return to work. Patient's condition has significantly improved as PEG score was a 10 at last appointment and is now 7.3. Physical therapy recommends another 4 weeks of treatment prior to returning back to work. Patient's pain is more localized to the SI area as she has had significant improvement in knee pain. Of note: Disability paperwork has not been received by this office.   Patient instructed that NP will be going out of town if paperwork is received by tomorrow paperwork will be completed otherwise paperwork will not be completed until NP is back in [facility] mid July. 6/1/2020  presents for follow-up of acute on chronic knee pain and low back pain. Back on April 1, 2020 patient had presented for a left SI injection for low back pain. The evening prior to the injection patient had fallen and hurt her left knee. Patient was in an extreme amount of pain. Patient was sent for imaging and was taken off of work. Patient started doing some therapeutic home exercises and was off work for approximately 3 weeks. Her pain gradually improved. Patient stated being off work for that 3 weeks she felt better than she had felt in a long time as the pain had resolved. Patient was able to activities of daily living and household chores with minimal pain. When patient received the injection for her low back on 4/1/2020 she was unable to determine how effective the injection was as her knee pain was so severe that it overpowered her low back pain. Patient was seen on 4/21/2020 and she was released to go back to work the following week. Patient states that after returning to work, her knee pain has gradually worsened. Her quality of life has deteriorated. Patient states that the weekends she spends recuperating so she can return to work the following week. Patient is stating that her thighs feel weak. Patient has difficulty with ambulation. Patient does walk bent over and cannot fully extend her knees. This is causing her back pain to worsen. Patient feels that the injections did help with the low back but between the fall and the positions in which she stands to work, the injections for the low back did not last very long. Patient is having difficulty sleeping at night. Patient is worried that she may have to retire as she does not know if her body can continue.   Patient states that her housework is behind as once she gets home from work it is difficult for her to do household chores. Knee Pain is a recurrent problem. The current episode started more than 1 month ago. The problem occurs constantly. The problem has been gradually worsening. Associated symptoms include arthralgias, joint swelling and myalgias. Associated symptoms comments: Abrasion to left knee almost healed. The symptoms are aggravated by walking, standing and twisting. Back Pain is a chronic problem. The current episode started more than 1 year ago. The problem occurs constantly. The problem has been gradually worsening since onset. The pain is present in the lumbar spine and sacro-iliac. The quality of the pain is described as aching and cramping. The symptoms are aggravated by twisting, standing, bending and position. 4/21/2020  presents for 3 week follow up post fall. Patient presented for a Left SI injection on 4/1/2020. Patient had fallen the night before. She was having severe knee pain. Patient was assessed and she was given work release to allow time to heal. Patient is presenting today to see if she can return to work. Knee Pain is a recurrent problem. The current episode started 1 to 4 weeks ago. The problem occurs constantly. The problem has been gradually improving. Associated symptoms include arthralgias, joint swelling and myalgias. The symptoms are aggravated by walking, standing and twisting.     3/25/2020  present for sooner appointment related to missed procedure. Patient states that she received a phone call the day before her injection stating the time was 3:30. Patient stated that she was in her managers office when she got the phone call. Patient had that the appointment was at 1100. Patient had taken 1/2 day off but had to take the entire day off work. Patient came for her appointment on 2/14/2020 but when she registered she was told that everyone was gone for the day. Patient states that low back pain is getting worse. Patient states that pain is affecting her ability to work.  She is having difficulty sleeping and she does not know how much longer she can tolerate this pain. Back Pain is a chronic problem. The current episode started more than 1 year ago. The problem occurs constantly. The problem has been rapidly worsening since onset. The pain is present in the lumbar spine and sacro-iliac. The quality of the pain is described as aching, cramping, shooting and stabbing. Radiates to: left groin. The pain is worse during the day (due to activity). The symptoms are aggravated by bending, standing and twisting (lifting). Associated symptoms include headaches. Pertinent negatives include no bladder incontinence or bowel incontinence. 1/30/2020  presents to the office for follow-up of MRI and worsening low back pain. Discussed reviewed with patient that she does have ligamentous hypertrophy with just chronic degenerative changes. Discussed patient's worsening low back pain and symptoms. Patient states that low back does wrap into the groin at times. Patient has pain with flexion and extension. Low back pain cramps and makes it difficulty for her to do anything after she works. Patient states that all she can tolerate is working and going to bed. Her quality of life has diminished and it takes all she can do to work therefore things in her house are not getting done. 12/17/19   presents to the office for annual exam with primary complaints of worsening cervical radiculopathy with pain going down left arm and into last 2 fingers getting numb. Patient is also having worsening low back pain. She states that constant flexion extension as demanding by her job that she will start having pain in her low back and that her legs would become weak. She has had a couple of occasions where she has had electrical sensations going up her back and making it difficult to breathe.   Last office appointment patient was scheduled to have MRI of Lumbar spine and will need MRI of cervical spine in the futue. Patient attempted to have MRI at her local hospital however patient barely fit inside of MRI machine. Patient stated that MRI machine was pulling at her arms and that she could not breathe and started having anxiety attack. MRI had to be stopped. Patient was brought back into the office to schedule MRI with sedation. However patient states she does not need IV sedation if she can get in the open MRI. Employment: factory    Past Medical Histoy  Past Medical History:   Diagnosis Date    Arthritis     Bilateral sacroiliitis ([address]     Chronic pain     Diabetes mellitus ([address]     Hypertension     Thyroid disease        Surgery History  Past Surgical History:   Procedure Laterality Date    CARDIAC SURGERY  1998    heart cath    CERVICAL SPINE SURGERY      HYSTERECTOMY (CERVIX STATUS UNKNOWN)      JOINT REPLACEMENT      bilateral knees    OTHER SURGICAL HISTORY  11/09/2022    removal of lap band        Family History  family history is not on file. Social History    Social History     Tobacco Use    Smoking status: Former    Smokeless tobacco: Never   Substance Use Topics    Alcohol use: No       Allergies  Ethyl chloride, Tape [adhesive tape], and Tramadol     Current Medications  Current Outpatient Medications   Medication Sig Dispense Refill    HYDROcodone-acetaminophen (NORCO)  MG per tablet Take 1 tablet by mouth every 6 hours as needed for Pain for up to 30 days. 120 tablet 0    [START ON 3/22/2023] zolpidem (AMBIEN CR) 12.5 MG extended release tablet Take 1 tablet by mouth nightly as needed for Sleep for up to 180 days. Max Daily Amount: 12.5 mg 30 tablet 5    [START ON 3/6/2023] pregabalin (LYRICA) 150 MG capsule Take 1 capsule by mouth 3 times daily for 180 days.  Max Daily Amount: 450 mg 90 capsule 5    BD PEN NEEDLE INDIRA 2ND GEN 32G X 4 MM MISC AS DIRECTED SUBCUTANEOUS THREE TIMES A DAY      ondansetron (ZOFRAN-ODT) 4 MG disintegrating tablet TAKE 1 TABLET BY MOUTH EVERY 6 HOURS AS NEEDED FOR NAUSEA AND VOMITING      propranolol (INDERAL) 20 MG tablet Take 1 tablet by mouth in the morning and 1 tablet before bedtime. tiZANidine (ZANAFLEX) 4 MG tablet Take 1 tablet by mouth in the morning and 1 tablet in the evening. 60 tablet 5    nabumetone (RELAFEN) 500 MG tablet Take 1 tablet by mouth in the morning and 1 tablet before bedtime. 60 tablet 5    ONETOUCH ULTRA strip USE 1 STRIP TO CHECK GLUCOSE 4 TIMES DAILY      Lancets (ONETOUCH DELICA PLUS [id number]) MISC USE LANCETS TO CHECK GLUCOSE TWICE DAILY      sertraline (ZOLOFT) 100 MG tablet Take 100 mg by mouth daily      OZEMPIC, 1 MG/DOSE, 4 MG/3ML SOPN Inject 1 mg into the skin once a week (Patient not taking: Reported on 3/2/2023)      omeprazole (PRILOSEC) 20 MG delayed release capsule Take 20 mg by mouth daily      fluticasone (FLONASE) 50 MCG/ACT nasal spray 2 sprays by Nasal route daily as needed      cetirizine (ZYRTEC) 10 MG tablet Take 10 mg by mouth daily      LANTUS SOLOSTAR 100 UNIT/ML injection pen Inject 50 Units into the skin daily      hydroCHLOROthiazide (MICROZIDE) 12.5 MG capsule Take 1 capsule by mouth daily      amLODIPine (NORVASC) 5 MG tablet Take 5 mg by mouth daily       atorvastatin (LIPITOR) 40 MG tablet Take 40 mg by mouth nightly       metFORMIN (GLUCOPHAGE) 1000 MG tablet Take 1,000 mg by mouth 2 times daily      diclofenac (FLECTOR) 1.3 % patch Place 1 patch onto the skin 2 times daily 60 patch 2    lisinopril (PRINIVIL;ZESTRIL) 20 MG tablet Take 20 mg by mouth daily      Insulin Lispro (HUMALOG KWIKPEN SC) Inject into the skin      levothyroxine (SYNTHROID) 25 MCG tablet Take 25 mcg by mouth daily        No current facility-administered medications for this encounter. Review of Systems:  Review of Systems   Constitutional:  Positive for activity change. Gastrointestinal:  Negative for bowel incontinence and constipation. Genitourinary:  Negative for bladder incontinence.    Musculoskeletal:  Positive for arthralgias, back pain, gait problem, myalgias, neck pain and neck stiffness. Neurological:  Positive for weakness and numbness. Gait disturbance   Psychiatric/Behavioral:  Positive for sleep disturbance. Negative for agitation, self-injury and suicidal ideas. The patient is not nervous/anxious. 14 point ROS negative besides that noted in HPI    Physical Exam:    Vitals:    03/02/23 1557   BP: (!) 150/79   Pulse: 57   Resp: 18   Temp: (!) 96.5 °F (35.8 °C)   TempSrc: Temporal   SpO2: 94%   Weight: 249 lb (112.9 kg)   Height: 5' 7\" (1.702 m)       Body mass index is 39 kg/m². Physical Exam  Vitals and nursing note reviewed. Constitutional:       General: She is not in acute distress. Appearance: She is well-developed. HENT:      Head: Normocephalic. Right Ear: External ear normal.      Left Ear: External ear normal.      Nose: Nose normal.   Eyes:      Conjunctiva/sclera: Conjunctivae normal.      Pupils: Pupils are equal, round, and reactive to light. Neck:      Vascular: No JVD. Trachea: No tracheal deviation. Cardiovascular:      Rate and Rhythm: Normal rate. Pulmonary:      Effort: Pulmonary effort is normal.   Abdominal:      General: There is no distension. Tenderness: There is no abdominal tenderness. Musculoskeletal:      Cervical back: Spasms (tender palpable knots noted) and tenderness present. Pain with movement present. Decreased range of motion. Lumbar back: Spasms (tender palpable knots noted), tenderness and bony tenderness present. Negative right straight leg raise test and negative left straight leg raise test.      Comments: Reese's (-) bilateral   Skin:     General: Skin is warm and dry. Neurological:      Mental Status: She is alert and oriented to person, place, and time. Cranial Nerves: No cranial nerve deficit. Gait: Gait abnormal.   Psychiatric:         Behavior: Behavior normal.         Thought Content:  Thought content normal.         Judgment: Judgment normal.      Labs:  Lab Results   Component Value Date/Time     11/30/2014 12:29 PM    K 3.8 11/30/2014 12:29 PM    CL 99 11/30/2014 12:29 PM    CO2 24 11/30/2014 12:29 PM    BUN 11 11/30/2014 12:29 PM    CREATININE 1.0 10/06/2021 02:37 PM    CREATININE 0.6 11/30/2014 12:29 PM    GLUCOSE 212 11/30/2014 12:29 PM    CALCIUM 9.7 11/30/2014 12:29 PM        Lab Results   Component Value Date    WBC 12.05 (H) 11/30/2014    HGB 13.0 11/30/2014    HCT 40.4 11/30/2014    MCV 67.4 (L) 11/30/2014     11/30/2014     Assessment:  Active Problems:    Encounter for monitoring opioid maintenance therapy    Bilateral carpal tunnel syndrome    Chronic pain of both knees    Neuralgia, geniculate    Lumbar disc disease    Bulging lumbar disc    Displacement of lumbar disc with radiculopathy    Other insomnia    Cervical radiculopathy    Cervicalgia    Central stenosis of spinal canal    Foraminal stenosis of cervical region    Facet arthritis of lumbar region    SI (sacroiliac) joint dysfunction    Chronic bilateral low back pain without sciatica    Pain management contract agreement    Bilateral myofascial pain    History of cervical spinal surgery  Resolved Problems:    * No resolved hospital problems. *      Plan:  Chronic bilateral low back pain without sciatica  Cervical radiculopathy  - HYDROcodone-acetaminophen (NORCO)  MG per tablet; Take 1 tablet by mouth every 6 hours as needed for Pain for up to 30 days. Dispense: 120 tablet; Refill: 0  - pregabalin (LYRICA) 150 MG capsule; Take 1 capsule by mouth 3 times daily for 180 days. Max Daily Amount: 450 mg  Dispense: 90 capsule; Refill: 5      3. Other insomnia  - zolpidem (AMBIEN CR) 12.5 MG extended release tablet; Take 1 tablet by mouth nightly as needed for Sleep for up to 180 days. Max Daily Amount: 12.5 mg  Dispense: 30 tablet;  Refill: 5      Refill at office appointment with no changes 3/2/2023    Patient is to continue with PT as she is making progress. Continue to hold injections while she is getting pt. Post Pone bilateral lumbar trigger point injections for myofacial pain    Post Pone bilateral SI joint injections with US as patient has had good pain relief from previous injections. Foraminal stenosis of cervical region  Cervicalgia  Central stenosis of spinal canal    Patient does not want KIM as she had a bad experience with a past injections. Patient to continue to use [facility] device as this has been helpful. Encounter for opioid maintenance therapy  UDS today 3/2/2023    Bilateral carpal tunnel syndrome  - DME Order for [facility]) as OP     Patient given DME order for brace, a copy of patient demographic, a medical release form for [employer] to bill insurance and copy of order for [employer]. Patient understands they are responsible to take paperwork to [employer] to be fitted for brace and to obtain brace. In some cases, the patient will not get the brace at [employer], a [employer] representative will contact patient to set up an appointment to fit patient for the brace. Patient understands this office is not involved in the supplying or billing of [employer]'s bracing. [x] Follow up    [] 4 weeks   [] 6 weeks   [] 8 weeks   [] 10 weeks   [x] 3 months  [] Post procedure to evaluate effectiveness of treatment  [] To evaluate medications changes made at office visit. [] To review diagnostics ordered at last visit. [x] To evaluate response to therapy    [x] For management of controlled substance  [x] Random UDS as indicated by ORT score or if indicated by abberent behaviors       Discussion: Discussed exam findings and plan of care with patient. Patient agreed with POC and questions were asked and answered. Activity: Discussed exercise as beneficial to pain reduction, encouraged daily stretching with a focus on torso strengthening.     Goal:  Pain Management Goals of Therapy:   [] Resolution in pain  [x] Decrease in pain level  [x] Improvement in ADL's  [x] Increase in activities with less pain  [] Decrease in medication      Controlled substance monitoring:    [x] Discussed medication side effects, risk of tolerance and/or dependence, and/or alternative treatment  [] Discussed the detrimental effects of long term narcotic use in younger patients especially women of childbearing years. [x] No signs and symptoms of potential drug abuse or diversion were identified  [] Signs of potential drug abuse or diversion were identified   [] ORT Score   [] UDS non-compliant   [] See Note  [x] Random urine drug screen sent today  [] Random urine drug screen not completed today   [] Deferred New Patient  [] Compliant 4/21/2022  [] Not Compliant see note  [] Medication agreement with provider signed today  [x] Medication agreement with provider on file under media 11/29/2022  [] Medication regimen effective and continued   [] New patient continuing current medication while developing POC   [x] On going assessment and evaluation of medication regimen  [] Medication regimen not effective see plan for changes  [x] Rin Rodarte reviewed & on file under media      EMR dragon/transcription disclaimer: Much of this encounter note is electronic transcription/translation of spoken language to printed tach. Electronic translation of spoken language may be erroneous, or at times, nonsensical words or phrases may be inadvertently transcribed.  Although, I have reviewed the note for such errors, some may still exist.

## 2023-03-02 NOTE — TELEPHONE ENCOUNTER
1. Cervical radiculopathy  - HYDROcodone-acetaminophen (NORCO)  MG per tablet; Take 1 tablet by mouth every 6 hours as needed for Pain for up to 30 days. Dispense: 120 tablet; Refill: 0  - pregabalin (LYRICA) 150 MG capsule; Take 1 capsule by mouth 3 times daily for 180 days. Max Daily Amount: 450 mg  Dispense: 90 capsule; Refill: 5    2. Chronic bilateral low back pain without sciatica  - HYDROcodone-acetaminophen (NORCO)  MG per tablet; Take 1 tablet by mouth every 6 hours as needed for Pain for up to 30 days. Dispense: 120 tablet; Refill: 0    3. Other insomnia  - zolpidem (AMBIEN CR) 12.5 MG extended release tablet; Take 1 tablet by mouth nightly as needed for Sleep for up to 180 days. Max Daily Amount: 12.5 mg  Dispense: 30 tablet;  Refill: 5     Refill at office appointment    Electronically signed by KEIKO Urena CNP on 3/2/2023 at 4:37 PM

## 2023-03-04 ASSESSMENT — ENCOUNTER SYMPTOMS: BACK PAIN: 1

## 2023-05-23 DIAGNOSIS — M54.12 CERVICAL RADICULOPATHY: ICD-10-CM

## 2023-05-23 DIAGNOSIS — M54.50 CHRONIC BILATERAL LOW BACK PAIN WITHOUT SCIATICA: ICD-10-CM

## 2023-05-23 DIAGNOSIS — G89.29 CHRONIC BILATERAL LOW BACK PAIN WITHOUT SCIATICA: ICD-10-CM

## 2023-05-23 RX ORDER — HYDROCODONE BITARTRATE AND ACETAMINOPHEN 10; 325 MG/1; MG/1
1 TABLET ORAL EVERY 6 HOURS PRN
Qty: 120 TABLET | Refills: 0 | Status: SHIPPED | OUTPATIENT
Start: 2023-05-23 | End: 2023-06-22

## 2023-06-05 ENCOUNTER — HOSPITAL ENCOUNTER (OUTPATIENT)
Dept: PAIN MANAGEMENT | Age: 63
Discharge: HOME | End: 2023-06-05
Payer: COMMERCIAL

## 2023-06-05 VITALS
HEART RATE: 67 BPM | SYSTOLIC BLOOD PRESSURE: 126 MMHG | BODY MASS INDEX: 38.45 KG/M2 | RESPIRATION RATE: 18 BRPM | OXYGEN SATURATION: 93 % | DIASTOLIC BLOOD PRESSURE: 71 MMHG | WEIGHT: 245 LBS | HEIGHT: 67 IN | TEMPERATURE: 97.3 F

## 2023-06-05 DIAGNOSIS — M54.50 CHRONIC BILATERAL LOW BACK PAIN WITHOUT SCIATICA: ICD-10-CM

## 2023-06-05 DIAGNOSIS — G47.09 OTHER INSOMNIA: ICD-10-CM

## 2023-06-05 DIAGNOSIS — G89.29 CHRONIC BILATERAL LOW BACK PAIN WITHOUT SCIATICA: ICD-10-CM

## 2023-06-05 DIAGNOSIS — M54.12 CERVICAL RADICULOPATHY: ICD-10-CM

## 2023-06-05 PROCEDURE — 99213 OFFICE O/P EST LOW 20 MIN: CPT

## 2023-06-05 RX ORDER — HYDROCODONE BITARTRATE AND ACETAMINOPHEN 10; 325 MG/1; MG/1
1 TABLET ORAL EVERY 6 HOURS PRN
Qty: 120 TABLET | Refills: 0 | Status: SHIPPED | OUTPATIENT
Start: 2023-06-22 | End: 2023-07-22

## 2023-06-05 RX ORDER — ACETAMINOPHEN 160 MG
TABLET,DISINTEGRATING ORAL
COMMUNITY
Start: 2023-05-01

## 2023-06-05 RX ORDER — ZOLPIDEM TARTRATE 12.5 MG/1
12.5 TABLET, FILM COATED, EXTENDED RELEASE ORAL NIGHTLY PRN
Qty: 30 TABLET | Refills: 5 | Status: SHIPPED | OUTPATIENT
Start: 2023-06-30 | End: 2023-12-27

## 2023-06-05 RX ORDER — FERROUS SULFATE 325(65) MG
TABLET ORAL
COMMUNITY
Start: 2023-05-01

## 2023-06-05 ASSESSMENT — PAIN DESCRIPTION - ORIENTATION
ORIENTATION: LOWER
ORIENTATION_2: LOWER

## 2023-06-05 ASSESSMENT — PAIN DESCRIPTION - LOCATION
LOCATION: BACK
LOCATION_2: NECK

## 2023-06-05 ASSESSMENT — PAIN SCALES - GENERAL: PAINLEVEL_OUTOF10: 8

## 2023-06-05 ASSESSMENT — ENCOUNTER SYMPTOMS
BOWEL INCONTINENCE: 0
CONSTIPATION: 0

## 2023-06-05 ASSESSMENT — PAIN DESCRIPTION - PAIN TYPE
TYPE: CHRONIC PAIN
TYPE_2: CHRONIC PAIN

## 2023-06-05 ASSESSMENT — PAIN DESCRIPTION - INTENSITY: RATING_2: 8

## 2023-06-05 NOTE — PROGRESS NOTES
Clinic Documentation      Education Provided:  [x] Review of Marcelino Carlos  [] Agreement Review  [x] PEG Score Calculated [] PHQ Score Calculated [] ORT Score Calculated    [] Compliance Issues Discussed [] Cognitive Behavior Needs [x] Exercise [] Review of Test [] Financial Issues  [x] Tobacco/Alcohol 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 Test Ordered [x] Prescription Ordered/Changed   [] Obtained Test Results / Consult Notes        Complete if patient is withholding blood thinner for procedure     Blood Thinner Patient is currently taking:      [] Plavix (Hold for 7 days)  [] Aspirin (Hold for 5 days)     [] Pletal (Hold for 2 days)  [] Pradaxa (Hold for 3 days)    [] Effient (Hold for 7 days)  [] Xarelto (Hold for 2 days)    [] Eliquis (Hold for 2 days)  [] Brilinta (Hold for 7 days)    [] Coumadin (Hold for 5 days) - (INR needs to be drawn the day prior to procedure- INR < 2.0)    [] Aggrenox (Hold for 7 days)        [] Patient will stop medication on their own.    [] Blood Thinner Form Faxed for approval to hold.    Provider form faxed to:     Assessment Completed by:  Electronically signed by Brad Taylor RN on 6/5/2023 at 4:37 PM
language to printed tach. Electronic translation of spoken language may be erroneous, or at times, nonsensical words or phrases may be inadvertently transcribed.  Although, I have reviewed the note for such errors, some may still exist.

## 2023-06-05 NOTE — TELEPHONE ENCOUNTER
1. Cervical radiculopathy  - HYDROcodone-acetaminophen (NORCO)  MG per tablet; Take 1 tablet by mouth every 6 hours as needed for Pain for up to 30 days. Dispense: 120 tablet; Refill: 0    2. Chronic bilateral low back pain without sciatica  - HYDROcodone-acetaminophen (NORCO)  MG per tablet; Take 1 tablet by mouth every 6 hours as needed for Pain for up to 30 days. Dispense: 120 tablet; Refill: 0    3. Other insomnia  - zolpidem (AMBIEN CR) 12.5 MG extended release tablet; Take 1 tablet by mouth nightly as needed for Sleep for up to 180 days. Max Daily Amount: 12.5 mg  Dispense: 30 tablet;  Refill: 5     Continue medication with refill sent at appointment yes; refill sent to medical director at appointment NA, see refill encounter dated 6/5/2023    Electronically signed by KEIKO Rosales CNP on 6/5/2023 at 4:37 PM

## 2023-06-18 ASSESSMENT — ENCOUNTER SYMPTOMS: BACK PAIN: 1

## 2023-06-28 ENCOUNTER — HOSPITAL ENCOUNTER (OUTPATIENT)
Dept: PAIN MANAGEMENT | Age: 63
Discharge: HOME | End: 2023-06-28
Payer: COMMERCIAL

## 2023-06-28 VITALS
TEMPERATURE: 96.6 F | HEART RATE: 75 BPM | DIASTOLIC BLOOD PRESSURE: 64 MMHG | SYSTOLIC BLOOD PRESSURE: 124 MMHG | RESPIRATION RATE: 18 BRPM | OXYGEN SATURATION: 93 %

## 2023-06-28 DIAGNOSIS — G89.29 CHRONIC PAIN OF BOTH KNEES: ICD-10-CM

## 2023-06-28 DIAGNOSIS — M25.561 CHRONIC PAIN OF BOTH KNEES: ICD-10-CM

## 2023-06-28 DIAGNOSIS — B02.21 NEURALGIA, GENICULATE: Primary | Chronic | ICD-10-CM

## 2023-06-28 DIAGNOSIS — M25.562 CHRONIC PAIN OF BOTH KNEES: ICD-10-CM

## 2023-06-28 DIAGNOSIS — M51.16 DISPLACEMENT OF LUMBAR DISC WITH RADICULOPATHY: Chronic | ICD-10-CM

## 2023-06-28 DIAGNOSIS — M51.9 LUMBAR DISC DISEASE: Chronic | ICD-10-CM

## 2023-06-28 DIAGNOSIS — M51.36 BULGING LUMBAR DISC: Chronic | ICD-10-CM

## 2023-06-28 PROCEDURE — 6360000002 HC RX W HCPCS

## 2023-06-28 PROCEDURE — 76942 ECHO GUIDE FOR BIOPSY: CPT | Performed by: NURSE PRACTITIONER

## 2023-06-28 PROCEDURE — 2500000003 HC RX 250 WO HCPCS

## 2023-06-28 PROCEDURE — 20611 DRAIN/INJ JOINT/BURSA W/US: CPT

## 2023-06-28 PROCEDURE — 20553 NJX 1/MLT TRIGGER POINTS 3/>: CPT

## 2023-06-28 PROCEDURE — 20553 NJX 1/MLT TRIGGER POINTS 3/>: CPT | Performed by: NURSE PRACTITIONER

## 2023-06-28 RX ORDER — BUPIVACAINE HYDROCHLORIDE 5 MG/ML
10 INJECTION, SOLUTION EPIDURAL; INTRACAUDAL ONCE
Status: DISCONTINUED | OUTPATIENT
Start: 2023-06-28 | End: 2023-06-30 | Stop reason: HOSPADM

## 2023-06-28 RX ORDER — BUPIVACAINE HYDROCHLORIDE 5 MG/ML
2 INJECTION, SOLUTION EPIDURAL; INTRACAUDAL ONCE
Status: DISCONTINUED | OUTPATIENT
Start: 2023-06-28 | End: 2023-06-30 | Stop reason: HOSPADM

## 2023-06-28 RX ORDER — TRIAMCINOLONE ACETONIDE 40 MG/ML
80 INJECTION, SUSPENSION INTRA-ARTICULAR; INTRAMUSCULAR ONCE
Status: DISCONTINUED | OUTPATIENT
Start: 2023-06-28 | End: 2023-06-30 | Stop reason: HOSPADM

## 2023-06-28 RX ORDER — ETHYL CHLORIDE 100 %
AEROSOL, SPRAY (ML) TOPICAL PRN
Status: DISCONTINUED | OUTPATIENT
Start: 2023-06-28 | End: 2023-06-30 | Stop reason: HOSPADM

## 2023-06-28 RX ORDER — LIDOCAINE HYDROCHLORIDE 10 MG/ML
2 INJECTION, SOLUTION EPIDURAL; INFILTRATION; INTRACAUDAL; PERINEURAL ONCE
Status: DISCONTINUED | OUTPATIENT
Start: 2023-06-28 | End: 2023-06-30 | Stop reason: HOSPADM

## 2023-06-28 RX ORDER — LIDOCAINE HYDROCHLORIDE 10 MG/ML
10 INJECTION, SOLUTION EPIDURAL; INFILTRATION; INTRACAUDAL; PERINEURAL ONCE
Status: DISCONTINUED | OUTPATIENT
Start: 2023-06-28 | End: 2023-06-30 | Stop reason: HOSPADM

## 2023-07-11 ENCOUNTER — HOSPITAL ENCOUNTER (OUTPATIENT)
Dept: PAIN MANAGEMENT | Age: 63
Discharge: HOME | End: 2023-07-11
Payer: COMMERCIAL

## 2023-07-11 VITALS
OXYGEN SATURATION: 96 % | HEART RATE: 59 BPM | RESPIRATION RATE: 18 BRPM | TEMPERATURE: 96.4 F | SYSTOLIC BLOOD PRESSURE: 118 MMHG | DIASTOLIC BLOOD PRESSURE: 57 MMHG

## 2023-07-11 DIAGNOSIS — R52 PAIN MANAGEMENT: ICD-10-CM

## 2023-07-11 PROCEDURE — 2500000003 HC RX 250 WO HCPCS

## 2023-07-11 PROCEDURE — 62321 NJX INTERLAMINAR CRV/THRC: CPT

## 2023-07-11 PROCEDURE — 2580000003 HC RX 258

## 2023-07-11 PROCEDURE — A4216 STERILE WATER/SALINE, 10 ML: HCPCS

## 2023-07-11 PROCEDURE — 6360000002 HC RX W HCPCS

## 2023-07-11 RX ORDER — SODIUM CHLORIDE 9 MG/ML
5 INJECTION INTRAVENOUS ONCE
Status: DISCONTINUED | OUTPATIENT
Start: 2023-07-11 | End: 2023-07-13 | Stop reason: HOSPADM

## 2023-07-11 RX ORDER — LIDOCAINE HYDROCHLORIDE 10 MG/ML
5 INJECTION, SOLUTION EPIDURAL; INFILTRATION; INTRACAUDAL; PERINEURAL ONCE
Status: DISCONTINUED | OUTPATIENT
Start: 2023-07-11 | End: 2023-07-13 | Stop reason: HOSPADM

## 2023-07-11 RX ORDER — METHYLPREDNISOLONE ACETATE 80 MG/ML
80 INJECTION, SUSPENSION INTRA-ARTICULAR; INTRALESIONAL; INTRAMUSCULAR; SOFT TISSUE ONCE
Status: DISCONTINUED | OUTPATIENT
Start: 2023-07-11 | End: 2023-07-13 | Stop reason: HOSPADM

## 2023-07-11 ASSESSMENT — PAIN SCALES - GENERAL: PAINLEVEL_OUTOF10: 7

## 2023-07-11 ASSESSMENT — PAIN DESCRIPTION - PAIN TYPE: TYPE: CHRONIC PAIN

## 2023-07-11 ASSESSMENT — PAIN DESCRIPTION - LOCATION: LOCATION: NECK

## 2023-07-11 ASSESSMENT — PAIN - FUNCTIONAL ASSESSMENT: PAIN_FUNCTIONAL_ASSESSMENT: 0-10

## 2023-07-11 NOTE — INTERVAL H&P NOTE
Update History & Physical    The patient's History and Physical  was reviewed with the patient and I examined the patient. There was no change. The surgical site was confirmed by the patient and me. Plan: The risks, benefits, expected outcome, and alternative to the recommended procedure have been discussed with the patient. Patient understands and wants to proceed with the procedure.      Electronically signed by Eric Granados MD on 7/11/2023 at 12:33 PM

## 2023-07-17 ENCOUNTER — TELEPHONE (OUTPATIENT)
Dept: PAIN MANAGEMENT | Age: 63
End: 2023-07-17

## 2023-07-17 DIAGNOSIS — M51.16 DISPLACEMENT OF LUMBAR DISC WITH RADICULOPATHY: Chronic | ICD-10-CM

## 2023-07-17 DIAGNOSIS — G89.29 CHRONIC PAIN OF BOTH KNEES: ICD-10-CM

## 2023-07-17 DIAGNOSIS — M51.36 BULGING LUMBAR DISC: Chronic | ICD-10-CM

## 2023-07-17 DIAGNOSIS — B02.21 NEURALGIA, GENICULATE: Primary | Chronic | ICD-10-CM

## 2023-07-17 DIAGNOSIS — M25.561 CHRONIC PAIN OF BOTH KNEES: ICD-10-CM

## 2023-07-17 DIAGNOSIS — M25.562 CHRONIC PAIN OF BOTH KNEES: ICD-10-CM

## 2023-07-17 DIAGNOSIS — M51.9 LUMBAR DISC DISEASE: Chronic | ICD-10-CM

## 2023-07-17 NOTE — TELEPHONE ENCOUNTER
About 80% improvement so far with a pain score of 3/10 today, she had a headache for 3 days after cervical epidural injections.

## 2023-10-04 DIAGNOSIS — M47.816 FACET ARTHRITIS OF LUMBAR REGION: ICD-10-CM

## 2023-10-05 RX ORDER — NABUMETONE 500 MG/1
500 TABLET, FILM COATED ORAL 2 TIMES DAILY
Qty: 60 TABLET | Refills: 5 | OUTPATIENT
Start: 2023-10-05

## 2023-10-12 ENCOUNTER — TELEPHONE (OUTPATIENT)
Dept: PAIN MANAGEMENT | Age: 63
End: 2023-10-12

## 2023-10-12 NOTE — TELEPHONE ENCOUNTER
Patient called prescription line for refill of her medication. Prescription line instructed her per provider that she would not be able to receive any further refills until seen in the office, due to no show of her appointment. Call was forwarded to nurse line as patient was requesting to speak to [facility]. She stated that [facility] told her not to go without her medication and if she has any problems to call her. I spoke with the patient and explained that [facility] cannot issue any further refills because she has not been seen since June, and she has not called for any medication refills since June. Patient stated that she had an extra bottle because she was sick with COVID and couldn't take her medication. She said that she has been cutting her pills in half, and not taking as much. I let her know, that for that reason, [facility] will need to see her in the office and reassess her medication before she will do any refills. Patient verbalized understanding.

## 2023-11-20 ENCOUNTER — HOSPITAL ENCOUNTER (OUTPATIENT)
Dept: PAIN MANAGEMENT | Age: 63
Discharge: HOME | DRG: 682 | End: 2023-11-20
Payer: COMMERCIAL

## 2023-11-20 ENCOUNTER — APPOINTMENT (OUTPATIENT)
Dept: GENERAL RADIOLOGY | Age: 63
DRG: 682 | End: 2023-11-20
Payer: COMMERCIAL

## 2023-11-20 ENCOUNTER — HOSPITAL ENCOUNTER (INPATIENT)
Age: 63
LOS: 2 days | Discharge: HOME | DRG: 682 | End: 2023-11-22
Attending: STUDENT IN AN ORGANIZED HEALTH CARE EDUCATION/TRAINING PROGRAM
Payer: COMMERCIAL

## 2023-11-20 VITALS
SYSTOLIC BLOOD PRESSURE: 70 MMHG | OXYGEN SATURATION: 96 % | RESPIRATION RATE: 20 BRPM | HEIGHT: 67 IN | HEART RATE: 70 BPM | WEIGHT: 215 LBS | DIASTOLIC BLOOD PRESSURE: 42 MMHG | BODY MASS INDEX: 33.74 KG/M2 | TEMPERATURE: 97.4 F

## 2023-11-20 DIAGNOSIS — M54.50 CHRONIC BILATERAL LOW BACK PAIN WITHOUT SCIATICA: ICD-10-CM

## 2023-11-20 DIAGNOSIS — N18.9 ACUTE KIDNEY INJURY SUPERIMPOSED ON CKD (HCC): ICD-10-CM

## 2023-11-20 DIAGNOSIS — G47.09 OTHER INSOMNIA: ICD-10-CM

## 2023-11-20 DIAGNOSIS — G89.29 CHRONIC BILATERAL LOW BACK PAIN WITHOUT SCIATICA: ICD-10-CM

## 2023-11-20 DIAGNOSIS — I95.9 HYPOTENSION, UNSPECIFIED HYPOTENSION TYPE: ICD-10-CM

## 2023-11-20 DIAGNOSIS — R23.8 SKIN TURGOR POOR: ICD-10-CM

## 2023-11-20 DIAGNOSIS — R42 DIZZINESS: Primary | ICD-10-CM

## 2023-11-20 DIAGNOSIS — M25.562 CHRONIC PAIN OF BOTH KNEES: Primary | ICD-10-CM

## 2023-11-20 DIAGNOSIS — R26.9 ABNORMAL GAIT DUE TO MUSCLE WEAKNESS: ICD-10-CM

## 2023-11-20 DIAGNOSIS — M54.12 CERVICAL RADICULOPATHY: ICD-10-CM

## 2023-11-20 DIAGNOSIS — M62.81 ABNORMAL GAIT DUE TO MUSCLE WEAKNESS: ICD-10-CM

## 2023-11-20 DIAGNOSIS — N17.9 ACUTE KIDNEY INJURY SUPERIMPOSED ON CKD (HCC): ICD-10-CM

## 2023-11-20 DIAGNOSIS — G89.29 CHRONIC PAIN OF BOTH KNEES: Primary | ICD-10-CM

## 2023-11-20 DIAGNOSIS — R53.1 WEAKNESS: ICD-10-CM

## 2023-11-20 DIAGNOSIS — M25.561 CHRONIC PAIN OF BOTH KNEES: Primary | ICD-10-CM

## 2023-11-20 DIAGNOSIS — R42 DIZZINESS: ICD-10-CM

## 2023-11-20 DIAGNOSIS — U07.1 COVID: ICD-10-CM

## 2023-11-20 PROBLEM — N18.31 STAGE 3A CHRONIC KIDNEY DISEASE (CKD) (HCC): Status: ACTIVE | Noted: 2023-11-20

## 2023-11-20 PROBLEM — R09.89: Status: ACTIVE | Noted: 2023-11-20

## 2023-11-20 PROBLEM — E87.5 HYPERKALEMIA: Status: ACTIVE | Noted: 2023-11-20

## 2023-11-20 PROBLEM — R79.89 PRERENAL AZOTEMIA: Status: ACTIVE | Noted: 2023-11-20

## 2023-11-20 PROBLEM — E66.9 CLASS 1 OBESITY: Status: ACTIVE | Noted: 2023-11-20

## 2023-11-20 LAB
25(OH)D3 SERPL-MCNC: 54.9 NG/ML
ALBUMIN SERPL-MCNC: 3.6 G/DL (ref 3.5–5.2)
ALP SERPL-CCNC: 85 U/L (ref 35–104)
ALT SERPL-CCNC: 9 U/L (ref 5–33)
AMORPH SED URNS QL MICRO: ABNORMAL /HPF
ANION GAP SERPL CALCULATED.3IONS-SCNC: 11 MMOL/L (ref 7–19)
AST SERPL-CCNC: 13 U/L (ref 5–32)
B PARAP IS1001 DNA NPH QL NAA+NON-PROBE: NOT DETECTED
B PERT.PT PRMT NPH QL NAA+NON-PROBE: NOT DETECTED
BACTERIA #/AREA URNS HPF: ABNORMAL /HPF
BASOPHILS # BLD: 0.1 K/UL (ref 0–0.2)
BASOPHILS NFR BLD: 0.8 % (ref 0–1)
BILIRUB SERPL-MCNC: <0.2 MG/DL (ref 0.2–1.2)
BILIRUB UR QL STRIP: ABNORMAL
BUN SERPL-MCNC: 35 MG/DL (ref 8–23)
C PNEUM DNA NPH QL NAA+NON-PROBE: NOT DETECTED
CALCIUM SERPL-MCNC: 8.8 MG/DL (ref 8.8–10.2)
CHLORIDE SERPL-SCNC: 104 MMOL/L (ref 98–111)
CLARITY UR: ABNORMAL
CO2 SERPL-SCNC: 19 MMOL/L (ref 22–29)
COARSE GRAN CASTS #/AREA URNS LPF: ABNORMAL /LPF (ref 0–5)
COLOR UR: ABNORMAL
CREAT SERPL-MCNC: 1.7 MG/DL (ref 0.5–0.9)
CREAT UR-MCNC: 329 MG/DL (ref 28–217)
CRP SERPL HS-MCNC: 4.37 MG/DL (ref 0–0.5)
D DIMER PPP FEU-MCNC: 1.21 UG/ML FEU (ref 0–0.48)
EOSINOPHIL # BLD: 0.5 K/UL (ref 0–0.6)
EOSINOPHIL NFR BLD: 6.3 % (ref 0–5)
EOSINOPHIL URNS QL MICRO: NORMAL
ERYTHROCYTE [DISTWIDTH] IN BLOOD BY AUTOMATED COUNT: 15 % (ref 11.5–14.5)
FERRITIN SERPL-MCNC: 350.6 NG/ML (ref 13–150)
FLUAV H1 2009 PAN RNA NPH NAA+NON-PROBE: NOT DETECTED
FLUAV H1 RNA NPH QL NAA+NON-PROBE: NOT DETECTED
FLUAV H3 RNA NPH QL NAA+NON-PROBE: NOT DETECTED
FLUAV RNA NPH QL NAA+NON-PROBE: NOT DETECTED
FLUAV RNA NPH QL NAA+NON-PROBE: NOT DETECTED
FLUBV RNA NPH QL NAA+NON-PROBE: NOT DETECTED
GLUCOSE SERPL-MCNC: 102 MG/DL (ref 74–109)
GLUCOSE UR STRIP.AUTO-MCNC: NEGATIVE MG/DL
HADV DNA NPH QL NAA+NON-PROBE: NOT DETECTED
HCOV 229E RNA NPH QL NAA+NON-PROBE: NOT DETECTED
HCOV HKU1 RNA NPH QL NAA+NON-PROBE: NOT DETECTED
HCOV NL63 RNA NPH QL NAA+NON-PROBE: NOT DETECTED
HCOV OC43 RNA NPH QL NAA+NON-PROBE: NOT DETECTED
HCT VFR BLD AUTO: 31.7 % (ref 37–47)
HGB BLD-MCNC: 9.7 G/DL (ref 12–16)
HGB UR STRIP.AUTO-MCNC: NEGATIVE MG/L
HMPV RNA NPH QL NAA+NON-PROBE: NOT DETECTED
HPIV1 RNA NPH QL NAA+NON-PROBE: NOT DETECTED
HPIV2 RNA NPH QL NAA+NON-PROBE: NOT DETECTED
HPIV3 RNA NPH QL NAA+NON-PROBE: NOT DETECTED
HPIV4 RNA NPH QL NAA+NON-PROBE: NOT DETECTED
IMM GRANULOCYTES # BLD: 0 K/UL
KETONES UR STRIP.AUTO-MCNC: ABNORMAL MG/DL
LACTATE BLDV-SCNC: 1.3 MMOL/L (ref 0.5–1.9)
LEUKOCYTE ESTERASE UR QL STRIP.AUTO: ABNORMAL
LIPASE SERPL-CCNC: 50 U/L (ref 13–60)
LYMPHOCYTES # BLD: 2.7 K/UL (ref 1.1–4.5)
LYMPHOCYTES NFR BLD: 37.6 % (ref 20–40)
M PNEUMO DNA NPH QL NAA+NON-PROBE: NOT DETECTED
MCH RBC QN AUTO: 20.5 PG (ref 27–31)
MCHC RBC AUTO-ENTMCNC: 30.6 G/DL (ref 33–37)
MCV RBC AUTO: 67 FL (ref 81–99)
MONOCYTES # BLD: 0.7 K/UL (ref 0–0.9)
MONOCYTES NFR BLD: 9.6 % (ref 0–10)
NEUTROPHILS # BLD: 3.3 K/UL (ref 1.5–7.5)
NEUTS SEG NFR BLD: 45.6 % (ref 50–65)
NITRITE UR QL STRIP.AUTO: NEGATIVE
OSMOLALITY URINE: 323 MOSM/KG (ref 250–1200)
PH UR STRIP.AUTO: 5 [PH] (ref 5–8)
PLATELET # BLD AUTO: 265 K/UL (ref 130–400)
PMV BLD AUTO: 11.1 FL (ref 9.4–12.3)
POTASSIUM SERPL-SCNC: 5.2 MMOL/L (ref 3.5–5)
PROCALCITONIN: 0.2 NG/ML (ref 0–0.09)
PROT SERPL-MCNC: 6.7 G/DL (ref 6.6–8.7)
PROT UR STRIP.AUTO-MCNC: 100 MG/DL
RBC # BLD AUTO: 4.73 M/UL (ref 4.2–5.4)
RBC #/AREA URNS HPF: ABNORMAL /HPF (ref 0–2)
RSV RNA NPH QL NAA+NON-PROBE: NOT DETECTED
RV+EV RNA NPH QL NAA+NON-PROBE: NOT DETECTED
SARS-COV-2 RNA NPH QL NAA+NON-PROBE: DETECTED
SODIUM SERPL-SCNC: 134 MMOL/L (ref 136–145)
SODIUM UR-SCNC: <20 MMOL/L
SP GR UR STRIP.AUTO: 1.03 (ref 1–1.03)
SQUAMOUS #/AREA URNS HPF: ABNORMAL /HPF
UROBILINOGEN UR STRIP.AUTO-MCNC: 0.2 E.U./DL
WBC # BLD AUTO: 7.3 K/UL (ref 4.8–10.8)
WBC #/AREA URNS HPF: ABNORMAL /HPF (ref 0–5)

## 2023-11-20 PROCEDURE — 81001 URINALYSIS AUTO W/SCOPE: CPT

## 2023-11-20 PROCEDURE — 83690 ASSAY OF LIPASE: CPT

## 2023-11-20 PROCEDURE — 83935 ASSAY OF URINE OSMOLALITY: CPT

## 2023-11-20 PROCEDURE — 82306 VITAMIN D 25 HYDROXY: CPT

## 2023-11-20 PROCEDURE — 93005 ELECTROCARDIOGRAM TRACING: CPT | Performed by: STUDENT IN AN ORGANIZED HEALTH CARE EDUCATION/TRAINING PROGRAM

## 2023-11-20 PROCEDURE — 2140000000 HC CCU INTERMEDIATE R&B

## 2023-11-20 PROCEDURE — 36415 COLL VENOUS BLD VENIPUNCTURE: CPT

## 2023-11-20 PROCEDURE — 85379 FIBRIN DEGRADATION QUANT: CPT

## 2023-11-20 PROCEDURE — 82570 ASSAY OF URINE CREATININE: CPT

## 2023-11-20 PROCEDURE — 84300 ASSAY OF URINE SODIUM: CPT

## 2023-11-20 PROCEDURE — 2580000003 HC RX 258: Performed by: HOSPITALIST

## 2023-11-20 PROCEDURE — 87040 BLOOD CULTURE FOR BACTERIA: CPT

## 2023-11-20 PROCEDURE — 99215 OFFICE O/P EST HI 40 MIN: CPT

## 2023-11-20 PROCEDURE — 80053 COMPREHEN METABOLIC PANEL: CPT

## 2023-11-20 PROCEDURE — 85025 COMPLETE CBC W/AUTO DIFF WBC: CPT

## 2023-11-20 PROCEDURE — 83605 ASSAY OF LACTIC ACID: CPT

## 2023-11-20 PROCEDURE — 86140 C-REACTIVE PROTEIN: CPT

## 2023-11-20 PROCEDURE — 96360 HYDRATION IV INFUSION INIT: CPT

## 2023-11-20 PROCEDURE — 99285 EMERGENCY DEPT VISIT HI MDM: CPT

## 2023-11-20 PROCEDURE — 0202U NFCT DS 22 TRGT SARS-COV-2: CPT

## 2023-11-20 PROCEDURE — 82728 ASSAY OF FERRITIN: CPT

## 2023-11-20 PROCEDURE — 2580000003 HC RX 258: Performed by: STUDENT IN AN ORGANIZED HEALTH CARE EDUCATION/TRAINING PROGRAM

## 2023-11-20 PROCEDURE — 87205 SMEAR GRAM STAIN: CPT

## 2023-11-20 PROCEDURE — 99215 OFFICE O/P EST HI 40 MIN: CPT | Performed by: NURSE PRACTITIONER

## 2023-11-20 PROCEDURE — 71045 X-RAY EXAM CHEST 1 VIEW: CPT

## 2023-11-20 PROCEDURE — 84145 PROCALCITONIN (PCT): CPT

## 2023-11-20 RX ORDER — FERROUS SULFATE 325(65) MG
325 TABLET ORAL
Status: DISCONTINUED | OUTPATIENT
Start: 2023-11-21 | End: 2023-11-22 | Stop reason: HOSPADM

## 2023-11-20 RX ORDER — DOXYCYCLINE HYCLATE 100 MG/1
CAPSULE ORAL
COMMUNITY
Start: 2023-11-07

## 2023-11-20 RX ORDER — PREGABALIN 150 MG/1
150 CAPSULE ORAL 3 TIMES DAILY
Status: DISCONTINUED | OUTPATIENT
Start: 2023-11-20 | End: 2023-11-22 | Stop reason: HOSPADM

## 2023-11-20 RX ORDER — 0.9 % SODIUM CHLORIDE 0.9 %
750 INTRAVENOUS SOLUTION INTRAVENOUS ONCE
Status: COMPLETED | OUTPATIENT
Start: 2023-11-20 | End: 2023-11-21

## 2023-11-20 RX ORDER — MECLIZINE HYDROCHLORIDE 25 MG/1
TABLET ORAL
COMMUNITY
Start: 2023-08-28

## 2023-11-20 RX ORDER — DOXYCYCLINE HYCLATE 100 MG/1
100 CAPSULE ORAL EVERY 12 HOURS SCHEDULED
Status: DISCONTINUED | OUTPATIENT
Start: 2023-11-20 | End: 2023-11-22 | Stop reason: HOSPADM

## 2023-11-20 RX ORDER — TIRZEPATIDE 7.5 MG/.5ML
INJECTION, SOLUTION SUBCUTANEOUS
Status: ON HOLD | COMMUNITY
End: 2023-11-22 | Stop reason: HOSPADM

## 2023-11-20 RX ORDER — POTASSIUM CHLORIDE 7.45 MG/ML
10 INJECTION INTRAVENOUS PRN
Status: DISCONTINUED | OUTPATIENT
Start: 2023-11-20 | End: 2023-11-22 | Stop reason: HOSPADM

## 2023-11-20 RX ORDER — CETIRIZINE HYDROCHLORIDE 10 MG/1
10 TABLET ORAL DAILY PRN
Status: DISCONTINUED | OUTPATIENT
Start: 2023-11-20 | End: 2023-11-22 | Stop reason: HOSPADM

## 2023-11-20 RX ORDER — FLUTICASONE PROPIONATE 50 MCG
2 SPRAY, SUSPENSION (ML) NASAL DAILY PRN
Status: DISCONTINUED | OUTPATIENT
Start: 2023-11-20 | End: 2023-11-22 | Stop reason: HOSPADM

## 2023-11-20 RX ORDER — POTASSIUM CHLORIDE 20 MEQ/1
40 TABLET, EXTENDED RELEASE ORAL PRN
Status: DISCONTINUED | OUTPATIENT
Start: 2023-11-20 | End: 2023-11-22 | Stop reason: HOSPADM

## 2023-11-20 RX ORDER — PANTOPRAZOLE SODIUM 20 MG/1
20 TABLET, DELAYED RELEASE ORAL
Status: DISCONTINUED | OUTPATIENT
Start: 2023-11-21 | End: 2023-11-22 | Stop reason: HOSPADM

## 2023-11-20 RX ORDER — MAGNESIUM SULFATE 1 G/100ML
1000 INJECTION INTRAVENOUS PRN
Status: DISCONTINUED | OUTPATIENT
Start: 2023-11-20 | End: 2023-11-22 | Stop reason: HOSPADM

## 2023-11-20 RX ORDER — SERTRALINE HYDROCHLORIDE 100 MG/1
100 TABLET, FILM COATED ORAL DAILY
Status: DISCONTINUED | OUTPATIENT
Start: 2023-11-21 | End: 2023-11-22 | Stop reason: HOSPADM

## 2023-11-20 RX ORDER — ACETAMINOPHEN 650 MG/1
650 SUPPOSITORY RECTAL EVERY 4 HOURS PRN
Status: DISCONTINUED | OUTPATIENT
Start: 2023-11-20 | End: 2023-11-22 | Stop reason: HOSPADM

## 2023-11-20 RX ORDER — ATORVASTATIN CALCIUM 40 MG/1
40 TABLET, FILM COATED ORAL NIGHTLY
Status: DISCONTINUED | OUTPATIENT
Start: 2023-11-20 | End: 2023-11-22 | Stop reason: HOSPADM

## 2023-11-20 RX ORDER — PROPRANOLOL HYDROCHLORIDE 10 MG/1
20 TABLET ORAL 2 TIMES DAILY
Status: DISCONTINUED | OUTPATIENT
Start: 2023-11-20 | End: 2023-11-22 | Stop reason: HOSPADM

## 2023-11-20 RX ORDER — VITAMIN B COMPLEX
2000 TABLET ORAL DAILY
Status: DISCONTINUED | OUTPATIENT
Start: 2023-11-21 | End: 2023-11-22 | Stop reason: HOSPADM

## 2023-11-20 RX ORDER — SODIUM CHLORIDE 0.9 % (FLUSH) 0.9 %
5-40 SYRINGE (ML) INJECTION EVERY 12 HOURS SCHEDULED
Status: DISCONTINUED | OUTPATIENT
Start: 2023-11-20 | End: 2023-11-22 | Stop reason: HOSPADM

## 2023-11-20 RX ORDER — ZOLPIDEM TARTRATE 12.5 MG/1
12.5 TABLET, FILM COATED, EXTENDED RELEASE ORAL NIGHTLY PRN
Qty: 30 TABLET | Refills: 5 | Status: SHIPPED | OUTPATIENT
Start: 2023-11-20 | End: 2024-05-18

## 2023-11-20 RX ORDER — ACETAMINOPHEN 325 MG/1
650 TABLET ORAL EVERY 4 HOURS PRN
Status: DISCONTINUED | OUTPATIENT
Start: 2023-11-20 | End: 2023-11-22 | Stop reason: HOSPADM

## 2023-11-20 RX ORDER — GUAIFENESIN/DEXTROMETHORPHAN 100-10MG/5
10 SYRUP ORAL EVERY 4 HOURS PRN
Status: DISCONTINUED | OUTPATIENT
Start: 2023-11-20 | End: 2023-11-22 | Stop reason: HOSPADM

## 2023-11-20 RX ORDER — SODIUM CHLORIDE 9 MG/ML
INJECTION, SOLUTION INTRAVENOUS PRN
Status: DISCONTINUED | OUTPATIENT
Start: 2023-11-20 | End: 2023-11-22 | Stop reason: HOSPADM

## 2023-11-20 RX ORDER — TERBINAFINE HYDROCHLORIDE 250 MG/1
250 TABLET ORAL DAILY
Status: DISCONTINUED | OUTPATIENT
Start: 2023-11-21 | End: 2023-11-22 | Stop reason: HOSPADM

## 2023-11-20 RX ORDER — 0.9 % SODIUM CHLORIDE 0.9 %
750 INTRAVENOUS SOLUTION INTRAVENOUS ONCE
Status: DISCONTINUED | OUTPATIENT
Start: 2023-11-20 | End: 2023-11-20

## 2023-11-20 RX ORDER — TERBINAFINE HYDROCHLORIDE 250 MG/1
1 TABLET ORAL
COMMUNITY

## 2023-11-20 RX ORDER — ENOXAPARIN SODIUM 100 MG/ML
40 INJECTION SUBCUTANEOUS DAILY
Status: DISCONTINUED | OUTPATIENT
Start: 2023-11-21 | End: 2023-11-22 | Stop reason: HOSPADM

## 2023-11-20 RX ORDER — LEVOTHYROXINE SODIUM 0.03 MG/1
25 TABLET ORAL
Status: DISCONTINUED | OUTPATIENT
Start: 2023-11-21 | End: 2023-11-22 | Stop reason: HOSPADM

## 2023-11-20 RX ORDER — 0.9 % SODIUM CHLORIDE 0.9 %
1000 INTRAVENOUS SOLUTION INTRAVENOUS ONCE
Status: COMPLETED | OUTPATIENT
Start: 2023-11-20 | End: 2023-11-20

## 2023-11-20 RX ORDER — INSULIN GLARGINE 100 [IU]/ML
50 INJECTION, SOLUTION SUBCUTANEOUS DAILY
Status: DISCONTINUED | OUTPATIENT
Start: 2023-11-21 | End: 2023-11-22 | Stop reason: HOSPADM

## 2023-11-20 RX ORDER — PROMETHAZINE HYDROCHLORIDE 12.5 MG/1
12.5 TABLET ORAL EVERY 6 HOURS PRN
Status: DISCONTINUED | OUTPATIENT
Start: 2023-11-20 | End: 2023-11-22 | Stop reason: HOSPADM

## 2023-11-20 RX ORDER — PREGABALIN 150 MG/1
150 CAPSULE ORAL 3 TIMES DAILY
Qty: 90 CAPSULE | Refills: 5 | Status: SHIPPED | OUTPATIENT
Start: 2023-11-20 | End: 2024-05-18

## 2023-11-20 RX ORDER — SODIUM CHLORIDE 0.9 % (FLUSH) 0.9 %
5-40 SYRINGE (ML) INJECTION PRN
Status: DISCONTINUED | OUTPATIENT
Start: 2023-11-20 | End: 2023-11-22 | Stop reason: HOSPADM

## 2023-11-20 RX ORDER — MECLIZINE HYDROCHLORIDE 25 MG/1
25 TABLET ORAL EVERY 8 HOURS PRN
Status: DISCONTINUED | OUTPATIENT
Start: 2023-11-20 | End: 2023-11-22 | Stop reason: HOSPADM

## 2023-11-20 RX ORDER — HYDROCODONE BITARTRATE AND ACETAMINOPHEN 5; 325 MG/1; MG/1
1 TABLET ORAL EVERY 8 HOURS PRN
Qty: 90 TABLET | Refills: 0 | Status: SHIPPED | OUTPATIENT
Start: 2023-11-20 | End: 2023-12-20

## 2023-11-20 RX ADMIN — SODIUM CHLORIDE 1000 ML: 9 INJECTION, SOLUTION INTRAVENOUS at 19:38

## 2023-11-20 RX ADMIN — SODIUM CHLORIDE 750 ML: 9 INJECTION, SOLUTION INTRAVENOUS at 20:49

## 2023-11-20 ASSESSMENT — ENCOUNTER SYMPTOMS
SHORTNESS OF BREATH: 0
VOMITING: 0
BOWEL INCONTINENCE: 0
DIARRHEA: 0
CHEST TIGHTNESS: 0
ABDOMINAL PAIN: 0
EYE REDNESS: 0
EYE PAIN: 0
NAUSEA: 1
SORE THROAT: 1
COUGH: 1
BACK PAIN: 1
RHINORRHEA: 1
CONSTIPATION: 0

## 2023-11-20 ASSESSMENT — PAIN DESCRIPTION - PAIN TYPE
TYPE_2: CHRONIC PAIN
TYPE: CHRONIC PAIN

## 2023-11-20 ASSESSMENT — PAIN DESCRIPTION - ORIENTATION
ORIENTATION: LOWER
ORIENTATION_2: LOWER

## 2023-11-20 ASSESSMENT — PAIN SCALES - GENERAL
PAINLEVEL_OUTOF10: 7
PAINLEVEL_OUTOF10: 8

## 2023-11-20 ASSESSMENT — PAIN DESCRIPTION - LOCATION
LOCATION_2: NECK
LOCATION: BACK

## 2023-11-20 ASSESSMENT — PAIN DESCRIPTION - INTENSITY: RATING_2: 5

## 2023-11-20 NOTE — PROGRESS NOTES
[facility] Physical & Pain Medicine  Office Note    Patient Name: Shea Rucker    MR #: [id number]    Account #: [de-identified]    : 1960    Age: 58 y.o. Sex: female    Date: 2023    PCP: KEIKO Wright CNP    Chief Complaint:   Chief Complaint   Patient presents with    Back Pain     8/10    Neck Pain     5/10       History of Present Illness:     Shea Rucker is a 58 y.o. female who presents for 3 month follow up. Patient called while in parking lot stating that she was dizzy and did not feel well. She stated that she felt good when she left home but she did not feel well. Patient was encouraged to call EMS and not report to office. Patient refused to call EMS. Patient presented to office. Nurse called NP to room. Nurse was having difficulty taking BP. NP got 50 palpable. BP was too hard to hear. Nurse thought she heard 70/30. Patient's HR was irregular and distant. Patient states that when she gets these dizzy spells that white objects are so bright she cannot look at them. Patient is pale with poor skin turgor. Patient took all her medications this am which consists of HCTZ, Norvasc and propanolol. Patient states that her urine was dark this am. Patient has been sick on and off over the last 6 months. She has been in and out of the hospital. NP encouraged patient to go to ED. Patient wants to go home to [facility] to go to ED and she states that she will call her sister to come get her. Patient had bilateral SI joint injection and Bilateral lumbar trigger point injections on 2023. Patient had at least 80-85% relief of pain from procedure(s) for at least 8 weeks. After injection, patient was able to increase activity. Patient had less pain from aggravating factors. Patient was able to decrease pain medication usage. Patient received enough pain relief from injections that the patient would like to repeat the injection(s).      Patient had KIM of C6/C7 on

## 2023-11-20 NOTE — PROGRESS NOTES
Clinic Documentation      Education Provided:  [x] Review of Dane Sane  [x] Agreement Review  [x] PEG Score Calculated [x] PHQ Score Calculated [x] ORT Score Calculated    [] Compliance Issues Discussed [] Cognitive Behavior Needs [x] Exercise [] Review of Test [] Financial Issues  [x] Tobacco/Alcohol 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 Test Ordered [x] Prescription Ordered/Changed   [] Obtained Test Results / Consult Notes        Complete if patient is withholding blood thinner for procedure     Blood Thinner Patient is currently taking:      [] Plavix (Hold for 7 days)  [] Aspirin (Hold for 5 days)     [] Pletal (Hold for 2 days)  [] Pradaxa (Hold for 3 days)    [] Effient (Hold for 7 days)  [] Xarelto (Hold for 2 days)    [] Eliquis (Hold for 2 days)  [] Brilinta (Hold for 7 days)    [] Coumadin (Hold for 5 days) - (INR needs to be drawn the day prior to procedure- INR < 2.0)    [] Aggrenox (Hold for 7 days)        [] Patient will stop medication on their own.    [] Blood Thinner Form Faxed for approval to hold.    Provider form faxed to:     Assessment Completed by:  Electronically signed by Rossy Brewer RN on 11/20/2023 at 4:22 PM

## 2023-11-20 NOTE — PROGRESS NOTES
Pain unable to walk without stumbling. Patient here without family and unable to drive. Patient states she doesn't feel well at all. Patient is agreeable to go to ER per ambulance. EMS notified. Ayesha Lennox, manager, notified of patient condition and EMS called. Also called patient's sister, Jen Gamboa, and notified her that patient was going to ER. She states she will come to the ER.

## 2023-11-20 NOTE — PROGRESS NOTES
Patient called   from [employer] and requested someone be sent to her car with a wheelchair to assist her because she is weak and dizzy and she thought she could not walk to the office without passing out. Provider was notified and she instructed patient to call EMS if she felt to weak to walk and to not drive anywhere while feeling that way. Attempted to cancel office visit but patient refused to have visit cancelled due to she was out of pain medication. Patient stated she would walk to office from parking lot and \"if she fell it would be our fault\". Patient was again instructed to call EMS and she refused.

## 2023-11-20 NOTE — TELEPHONE ENCOUNTER
per tablet Alternate therapy    pregabalin (LYRICA) 150 MG capsule REORDER    zolpidem (AMBIEN CR) 12.5 MG extended release tablet REORDER      Patient has been out of medication for several months due to missing appointment. Patient was taking 1/2 tablet to stretch medication out.  Therefore will cut patient medication back to 5 mg tablets and quantity as last.     Continue medication with refill sent at appointment yes; refill sent to medical director at appointment no, see refill encounter dated 11/20/2023    Electronically signed by KEIKO Mancini CNP on 11/20/2023 at 4:42 PM

## 2023-11-21 ENCOUNTER — APPOINTMENT (OUTPATIENT)
Dept: NUCLEAR MEDICINE | Age: 63
DRG: 682 | End: 2023-11-21
Payer: COMMERCIAL

## 2023-11-21 ENCOUNTER — APPOINTMENT (OUTPATIENT)
Dept: ULTRASOUND IMAGING | Age: 63
DRG: 682 | End: 2023-11-21
Payer: COMMERCIAL

## 2023-11-21 LAB
25(OH)D3 SERPL-MCNC: 46.7 NG/ML
ANION GAP SERPL CALCULATED.3IONS-SCNC: 12 MMOL/L (ref 7–19)
BASOPHILS # BLD: 0.1 K/UL (ref 0–0.2)
BASOPHILS NFR BLD: 0.7 % (ref 0–1)
BILIRUB UR QL STRIP: NEGATIVE
BUN SERPL-MCNC: 35 MG/DL (ref 8–23)
CALCIUM SERPL-MCNC: 8.2 MG/DL (ref 8.8–10.2)
CHLORIDE SERPL-SCNC: 106 MMOL/L (ref 98–111)
CLARITY UR: CLEAR
CO2 SERPL-SCNC: 20 MMOL/L (ref 22–29)
COLOR UR: YELLOW
CREAT SERPL-MCNC: 1.6 MG/DL (ref 0.5–0.9)
CREAT UR-MCNC: 41.7 MG/DL (ref 28–217)
CRP SERPL HS-MCNC: 3.53 MG/DL (ref 0–0.5)
D DIMER PPP FEU-MCNC: 1.09 UG/ML FEU (ref 0–0.48)
EKG P AXIS: 19 DEGREES
EKG P-R INTERVAL: 184 MS
EKG Q-T INTERVAL: 420 MS
EKG QRS DURATION: 84 MS
EKG QTC CALCULATION (BAZETT): 421 MS
EKG T AXIS: 38 DEGREES
EOSINOPHIL # BLD: 0.6 K/UL (ref 0–0.6)
EOSINOPHIL NFR BLD: 6.8 % (ref 0–5)
ERYTHROCYTE [DISTWIDTH] IN BLOOD BY AUTOMATED COUNT: 15.3 % (ref 11.5–14.5)
GLUCOSE BLD-MCNC: 111 MG/DL (ref 70–99)
GLUCOSE BLD-MCNC: 149 MG/DL (ref 70–99)
GLUCOSE BLD-MCNC: 157 MG/DL (ref 70–99)
GLUCOSE BLD-MCNC: 224 MG/DL (ref 70–99)
GLUCOSE SERPL-MCNC: 126 MG/DL (ref 74–109)
GLUCOSE UR STRIP.AUTO-MCNC: NEGATIVE MG/DL
HCT VFR BLD AUTO: 30.9 % (ref 37–47)
HGB BLD-MCNC: 9.4 G/DL (ref 12–16)
HGB UR STRIP.AUTO-MCNC: NEGATIVE MG/L
IMM GRANULOCYTES # BLD: 0 K/UL
KETONES UR STRIP.AUTO-MCNC: NEGATIVE MG/DL
LEUKOCYTE ESTERASE UR QL STRIP.AUTO: NEGATIVE
LYMPHOCYTES # BLD: 2.8 K/UL (ref 1.1–4.5)
LYMPHOCYTES NFR BLD: 33.8 % (ref 20–40)
MCH RBC QN AUTO: 20.4 PG (ref 27–31)
MCHC RBC AUTO-ENTMCNC: 30.4 G/DL (ref 33–37)
MCV RBC AUTO: 67 FL (ref 81–99)
MICROALBUMIN UR-MCNC: <1.2 MG/DL (ref 0–19)
MICROALBUMIN/CREAT UR-RTO: NORMAL MG/G
MONOCYTES # BLD: 0.7 K/UL (ref 0–0.9)
MONOCYTES NFR BLD: 9.1 % (ref 0–10)
NEUTROPHILS # BLD: 4 K/UL (ref 1.5–7.5)
NEUTS SEG NFR BLD: 49.5 % (ref 50–65)
NITRITE UR QL STRIP.AUTO: NEGATIVE
PERFORMED ON: ABNORMAL
PH UR STRIP.AUTO: 5 [PH] (ref 5–8)
PLATELET # BLD AUTO: 275 K/UL (ref 130–400)
PMV BLD AUTO: 11 FL (ref 9.4–12.3)
POTASSIUM SERPL-SCNC: 4.4 MMOL/L (ref 3.5–5)
PROT UR STRIP.AUTO-MCNC: NEGATIVE MG/DL
RBC # BLD AUTO: 4.61 M/UL (ref 4.2–5.4)
SODIUM SERPL-SCNC: 138 MMOL/L (ref 136–145)
SODIUM UR-SCNC: 67 MMOL/L
SP GR UR STRIP.AUTO: 1.01 (ref 1–1.03)
UROBILINOGEN UR STRIP.AUTO-MCNC: 0.2 E.U./DL
WBC # BLD AUTO: 8.1 K/UL (ref 4.8–10.8)

## 2023-11-21 PROCEDURE — 82043 UR ALBUMIN QUANTITATIVE: CPT

## 2023-11-21 PROCEDURE — A9540 TC99M MAA: HCPCS | Performed by: STUDENT IN AN ORGANIZED HEALTH CARE EDUCATION/TRAINING PROGRAM

## 2023-11-21 PROCEDURE — 36415 COLL VENOUS BLD VENIPUNCTURE: CPT

## 2023-11-21 PROCEDURE — 3430000000 HC RX DIAGNOSTIC RADIOPHARMACEUTICAL: Performed by: STUDENT IN AN ORGANIZED HEALTH CARE EDUCATION/TRAINING PROGRAM

## 2023-11-21 PROCEDURE — 94760 N-INVAS EAR/PLS OXIMETRY 1: CPT

## 2023-11-21 PROCEDURE — 6370000000 HC RX 637 (ALT 250 FOR IP)

## 2023-11-21 PROCEDURE — 76770 US EXAM ABDO BACK WALL COMP: CPT

## 2023-11-21 PROCEDURE — 78580 LUNG PERFUSION IMAGING: CPT

## 2023-11-21 PROCEDURE — 93010 ELECTROCARDIOGRAM REPORT: CPT | Performed by: INTERNAL MEDICINE

## 2023-11-21 PROCEDURE — 2500000003 HC RX 250 WO HCPCS: Performed by: HOSPITALIST

## 2023-11-21 PROCEDURE — 85025 COMPLETE CBC W/AUTO DIFF WBC: CPT

## 2023-11-21 PROCEDURE — 6370000000 HC RX 637 (ALT 250 FOR IP): Performed by: HOSPITALIST

## 2023-11-21 PROCEDURE — 82570 ASSAY OF URINE CREATININE: CPT

## 2023-11-21 PROCEDURE — 85379 FIBRIN DEGRADATION QUANT: CPT

## 2023-11-21 PROCEDURE — 80048 BASIC METABOLIC PNL TOTAL CA: CPT

## 2023-11-21 PROCEDURE — 86140 C-REACTIVE PROTEIN: CPT

## 2023-11-21 PROCEDURE — 84300 ASSAY OF URINE SODIUM: CPT

## 2023-11-21 PROCEDURE — 2580000003 HC RX 258: Performed by: HOSPITALIST

## 2023-11-21 PROCEDURE — 82962 GLUCOSE BLOOD TEST: CPT

## 2023-11-21 PROCEDURE — 6360000002 HC RX W HCPCS: Performed by: HOSPITALIST

## 2023-11-21 PROCEDURE — 2140000000 HC CCU INTERMEDIATE R&B

## 2023-11-21 PROCEDURE — 81003 URINALYSIS AUTO W/O SCOPE: CPT

## 2023-11-21 PROCEDURE — 82306 VITAMIN D 25 HYDROXY: CPT

## 2023-11-21 RX ORDER — INSULIN LISPRO 100 [IU]/ML
0-4 INJECTION, SOLUTION INTRAVENOUS; SUBCUTANEOUS NIGHTLY
Status: DISCONTINUED | OUTPATIENT
Start: 2023-11-21 | End: 2023-11-22 | Stop reason: HOSPADM

## 2023-11-21 RX ORDER — ZOLPIDEM TARTRATE 5 MG/1
5 TABLET ORAL NIGHTLY PRN
Status: DISCONTINUED | OUTPATIENT
Start: 2023-11-21 | End: 2023-11-22 | Stop reason: HOSPADM

## 2023-11-21 RX ORDER — INSULIN LISPRO 100 [IU]/ML
0-8 INJECTION, SOLUTION INTRAVENOUS; SUBCUTANEOUS
Status: DISCONTINUED | OUTPATIENT
Start: 2023-11-21 | End: 2023-11-22 | Stop reason: HOSPADM

## 2023-11-21 RX ORDER — HYDROCODONE BITARTRATE AND ACETAMINOPHEN 5; 325 MG/1; MG/1
1 TABLET ORAL EVERY 6 HOURS PRN
Status: DISCONTINUED | OUTPATIENT
Start: 2023-11-21 | End: 2023-11-22 | Stop reason: HOSPADM

## 2023-11-21 RX ADMIN — SODIUM CHLORIDE, PRESERVATIVE FREE 10 ML: 5 INJECTION INTRAVENOUS at 21:44

## 2023-11-21 RX ADMIN — PROPRANOLOL HYDROCHLORIDE 20 MG: 10 TABLET ORAL at 10:14

## 2023-11-21 RX ADMIN — PROMETHAZINE HYDROCHLORIDE 12.5 MG: 12.5 TABLET ORAL at 01:28

## 2023-11-21 RX ADMIN — ENOXAPARIN SODIUM 40 MG: 100 INJECTION SUBCUTANEOUS at 10:14

## 2023-11-21 RX ADMIN — ATORVASTATIN CALCIUM 40 MG: 40 TABLET, FILM COATED ORAL at 00:04

## 2023-11-21 RX ADMIN — PREGABALIN 150 MG: 150 CAPSULE ORAL at 13:35

## 2023-11-21 RX ADMIN — ZOLPIDEM TARTRATE 5 MG: 5 TABLET ORAL at 02:40

## 2023-11-21 RX ADMIN — SERTRALINE HYDROCHLORIDE 100 MG: 100 TABLET ORAL at 10:13

## 2023-11-21 RX ADMIN — HYDROCODONE BITARTRATE AND ACETAMINOPHEN 1 TABLET: 5; 325 TABLET ORAL at 10:19

## 2023-11-21 RX ADMIN — PROPRANOLOL HYDROCHLORIDE 20 MG: 10 TABLET ORAL at 01:29

## 2023-11-21 RX ADMIN — ZOLPIDEM TARTRATE 5 MG: 5 TABLET ORAL at 21:56

## 2023-11-21 RX ADMIN — TERBINAFINE TABLETS 250 MG 250 MG: 250 TABLET ORAL at 10:14

## 2023-11-21 RX ADMIN — SODIUM BICARBONATE: 84 INJECTION, SOLUTION INTRAVENOUS at 14:56

## 2023-11-21 RX ADMIN — LEVOTHYROXINE SODIUM 25 MCG: 25 TABLET ORAL at 06:16

## 2023-11-21 RX ADMIN — SODIUM CHLORIDE, PRESERVATIVE FREE 10 ML: 5 INJECTION INTRAVENOUS at 10:16

## 2023-11-21 RX ADMIN — DOXYCYCLINE HYCLATE 100 MG: 100 CAPSULE ORAL at 21:44

## 2023-11-21 RX ADMIN — PROPRANOLOL HYDROCHLORIDE 20 MG: 10 TABLET ORAL at 21:55

## 2023-11-21 RX ADMIN — ATORVASTATIN CALCIUM 40 MG: 40 TABLET, FILM COATED ORAL at 21:44

## 2023-11-21 RX ADMIN — INSULIN LISPRO 2 UNITS: 100 INJECTION, SOLUTION INTRAVENOUS; SUBCUTANEOUS at 13:34

## 2023-11-21 RX ADMIN — PREGABALIN 150 MG: 150 CAPSULE ORAL at 00:04

## 2023-11-21 RX ADMIN — PREGABALIN 150 MG: 150 CAPSULE ORAL at 10:14

## 2023-11-21 RX ADMIN — PREGABALIN 150 MG: 150 CAPSULE ORAL at 21:44

## 2023-11-21 RX ADMIN — HYDROCODONE BITARTRATE AND ACETAMINOPHEN 1 TABLET: 5; 325 TABLET ORAL at 02:40

## 2023-11-21 RX ADMIN — DOXYCYCLINE HYCLATE 100 MG: 100 CAPSULE ORAL at 10:13

## 2023-11-21 RX ADMIN — Medication 2000 UNITS: at 10:13

## 2023-11-21 RX ADMIN — SODIUM BICARBONATE: 84 INJECTION, SOLUTION INTRAVENOUS at 00:09

## 2023-11-21 RX ADMIN — PANTOPRAZOLE SODIUM 20 MG: 20 TABLET, DELAYED RELEASE ORAL at 06:16

## 2023-11-21 RX ADMIN — DOXYCYCLINE HYCLATE 100 MG: 100 CAPSULE ORAL at 00:04

## 2023-11-21 RX ADMIN — Medication 5 MILLICURIE: at 12:30

## 2023-11-21 RX ADMIN — FERROUS SULFATE TAB 325 MG (65 MG ELEMENTAL FE) 325 MG: 325 (65 FE) TAB at 10:14

## 2023-11-21 ASSESSMENT — PAIN SCALES - GENERAL
PAINLEVEL_OUTOF10: 5
PAINLEVEL_OUTOF10: 8
PAINLEVEL_OUTOF10: 8
PAINLEVEL_OUTOF10: 1

## 2023-11-21 ASSESSMENT — PAIN DESCRIPTION - LOCATION
LOCATION: ABDOMEN
LOCATION: HIP

## 2023-11-21 ASSESSMENT — PAIN DESCRIPTION - DESCRIPTORS
DESCRIPTORS: ACHING
DESCRIPTORS: ACHING

## 2023-11-21 ASSESSMENT — ENCOUNTER SYMPTOMS
SHORTNESS OF BREATH: 0
BACK PAIN: 1
ABDOMINAL PAIN: 0
ABDOMINAL DISTENTION: 0
DIARRHEA: 1

## 2023-11-21 ASSESSMENT — PAIN - FUNCTIONAL ASSESSMENT: PAIN_FUNCTIONAL_ASSESSMENT: PREVENTS OR INTERFERES SOME ACTIVE ACTIVITIES AND ADLS

## 2023-11-21 ASSESSMENT — PAIN DESCRIPTION - ORIENTATION: ORIENTATION: LEFT;DISTAL

## 2023-11-21 ASSESSMENT — PAIN SCALES - WONG BAKER: WONGBAKER_NUMERICALRESPONSE: 0

## 2023-11-21 NOTE — H&P
[facility] - History & Physical      PCP: KEIKO Troncoso CNP    Date of Admission: 11/20/2023    Date of Service: 11/20/2023    Chief Complaint:  Dizziness    History Of Present Illness: The patient is a 58 y.o. female who presented to [facility] ED for evaluation of dizziness. Pt has history of chronic neck and back pain, diabetes, HTN and hypothyroidism. Pt reports malaise, fatigue associated with subjective fevers, chills and headaches over past 4 days. She is here with her sister who tells me that she has recently been ill with nausea and vomiting. Pt relates that today she experienced dizziness and lightheadedness today while in parking lot of her pain management clinic. Pt relates that she was eventually able to make it in the clinic at which time her blood pressure was noted to be low. Pt denies chest pain, abdominal pain as well as shortness of breath. She gives history of covid 19 infection in past and relates that she has noticed that she is sick more often that usual following this. In ED,  bp 76/43, recheck with fluid administration at 87/71, hr 66b/min, creatinine 1.7/bun 35-creatinine 0.6 in 2014, potassium 5.2, sodium 134, lipase 50, wbc 7.3k, hgb 9.7, platelets 331H, cxr-No acute cardiopulmonary disease. Pt is admitted inpatient to hosptialist.     Past Medical History:        Diagnosis Date    Arthritis     Bilateral sacroiliitis (HCC)     Chronic pain     Diabetes mellitus ([address])     Hypertension     Thyroid disease        Past Surgical History:        Procedure Laterality Date    CARDIAC SURGERY  1998    heart cath    CERVICAL SPINE SURGERY      HYSTERECTOMY (CERVIX STATUS UNKNOWN)      JOINT REPLACEMENT      bilateral knees    OTHER SURGICAL HISTORY  11/09/2022    removal of lap band       Home Medications:  Prior to Admission medications    Medication Sig Start Date End Date Taking?  Authorizing Provider   doxycycline hyclate (VIBRAMYCIN) 100 MG capsule TAKE

## 2023-11-21 NOTE — CONSULTS
Nephrology ([facility]) Consult Note      Patient:  Rogelio Ricketts  YOB: 1960  Date of Service: 11/21/2023  MRN: [id number]   Acct: [de-identified]   Primary Care Physician: Mexican Radha, APRN - CNP  Advance Directive: Full Code  Admit Date: 11/20/2023       Hospital Day: 1  Referring Provider: Juanito Jovel MD    Patient independently seen and examined, Chart, Consults, Notes, Operative notes, Labs, Cardiology, and Radiology studies reviewed as available. Chief complaint: Abnormal labs. Subjective:  Rogelio Ricketts is a 58 y.o. female for whom we were consulted for evaluation and treatment of acute kidney injury. She denies any history of chronic kidney disease. She has history of bilateral knee pain and neck pain. She went to pain management center yesterday where she was found to have very low blood pressure. She was directed to go to the emergency room. On arrival to the ER her blood pressure was 70/40 mmHg. She denies any nausea vomiting or diarrhea. However her intake of fluid has been very poor as she has been complaining of nausea. Hospital course markable for IV fluid resuscitation with IV fluid bolus and maintenance IV fluid. Her renal function is slowly improving. This morning she feels well and has poor energy, denies any shortness of breath.     Allergies:  Zofran [ondansetron], Ethyl chloride, Tape [adhesive tape], and Tramadol    Medicines:  Current Facility-Administered Medications   Medication Dose Route Frequency Provider Last Rate Last Admin    zolpidem (AMBIEN) tablet 5 mg  5 mg Oral Nightly PRN Calvin Manzano MD   5 mg at 11/21/23 0240    HYDROcodone-acetaminophen (Shikha Bound) 5-325 MG per tablet 1 tablet  1 tablet Oral Q6H PRN Calvin Manzano MD   1 tablet at 11/21/23 1019    sodium bicarbonate 150 mEq in dextrose 5 % 1,000 mL infusion   IntraVENous Continuous Calvin Manzano  mL/hr at 11/21/23 0009 New Bag at 11/21/23 0009    sodium chloride

## 2023-11-21 NOTE — ED PROVIDER NOTES
either signs or Co-signs this chart in the absence of a cardiologist.    NSR without ischemic changes    RADIOLOGY:  Non-plain film images such as CT, Ultrasound and MRI are read by the radiologist. Plain radiographic images are visualized and preliminarily interpreted bythe emergency physician with the below findings:      XR CHEST PORTABLE   Final Result       1. No acute cardiopulmonary disease. 2. Prominent heart size. .           ______________________________________    Electronically signed by: Suzie Arthur D.O.    Date:     11/20/2023   Time:    19:28       US RENAL COMPLETE    (Results Pending)           LABS:  Labs Reviewed   RESPIRATORY PANEL, MOLECULAR, WITH COVID-19 - Abnormal; Notable for the following components:       Result Value    SARS-CoV-2, PCR DETECTED (*)     All other components within normal limits   CBC WITH AUTO DIFFERENTIAL - Abnormal; Notable for the following components:    Hemoglobin 9.7 (*)     Hematocrit 31.7 (*)     MCV 67.0 (*)     MCH 20.5 (*)     MCHC 30.6 (*)     RDW 15.0 (*)     Neutrophils % 45.6 (*)     Eosinophils % 6.3 (*)     All other components within normal limits   COMPREHENSIVE METABOLIC PANEL W/ REFLEX TO MG FOR LOW K - Abnormal; Notable for the following components:    Sodium 134 (*)     Potassium reflex Magnesium 5.2 (*)     CO2 19 (*)     BUN 35 (*)     Creatinine 1.7 (*)     Est, Glom Filt Rate 33 (*)     All other components within normal limits   URINALYSIS WITH REFLEX TO CULTURE - Abnormal; Notable for the following components:    Color, UA DARK YELLOW (*)     Clarity, UA CLOUDY (*)     Bilirubin Urine SMALL (*)     Ketones, Urine TRACE (*)     Protein,  (*)     Leukocyte Esterase, Urine SMALL (*)     All other components within normal limits   CREATININE, RANDOM URINE - Abnormal; Notable for the following components:    Creatinine, Ur 329.0 (*)     All other components within normal limits   MICROSCOPIC URINALYSIS - Abnormal;

## 2023-11-21 NOTE — PROGRESS NOTES
[facility] Hospital[facility]      Patient:  Prentiss Frankel  YOB: 1960  Date of Service: 11/21/2023  MRN: [id number]   Acct: [de-identified]   Primary Care Physician: KEIKO Forbes CNP  Advance Directive: Full Code  Admit Date: 11/20/2023       Hospital Day: 1  Portions of this note have been copied forward, however, changed to reflect the most current clinical status of this patient. CHIEF COMPLAINT   Dizziness    SUBJECTIVE:  Resting in the bed, denies shortness of breath or chest pain. Getting appetite back     CUMULATIVE HOSPITAL COURSE:  Mrs. Prentiss Frankel is a 58year old  american lady. She suffers from chronic bilateral knee and back and neck pains for which she sees pain management. She had been on the way to see her pain management doc when she felt dizzy in the parking lot. She was found to have low blood pressure there, and was referred to the ED for further evaluation. She has been on Doxycycline 100mg BID since about the 20th when she was diagnosed with the Lyme disease. She states that she also has sinusitis. She was found on ED triage to have a blood pressure of 76/43mmHg, better than the 70/42mmHg in the pain management clinic but still less than ideal. She was bolused a liter of NS in the ED and she has imporved gradually to 100/69, a valuse whoch was obtained after her IVF bolus had finished. She has been ordered another 3/4 liters as her fluid challenge was less than 30cc/kg. She does not have prior full labs here following 30NOV2014, however she does have a GF of 56 from 6OCT21. Her Cr of 1.4 tonight is higher than normal limits, and higher than the 0.6 recorded from 2014. She has been diagnosed with LÁZARO on CKD3a and will be treated by the hospitalist team for same. Nephrology consulted, to evaluate and recommendation. Renal US, No significant abnormality on renal sonography. Urine electrolytes. Medium dose insulin initiated. Will continue IVF.  Elevated d-dimer,
monitor while inpatient    Goals:  Goals: PO intake 75% or greater, Meet at least 75% of estimated needs    Nutrition Monitoring and Evaluation:   Food/Nutrient Intake Outcomes: Food and Nutrient Intake, Supplement Intake  Physical Signs/Symptoms Outcomes: Biochemical Data, Nutrition Focused Physical Findings, Weight    Carmella Olmos MS, RD, LD  Contact: [phone]
No    Utilities  In the past 12 months, has the electric, gas, oil or water company threatened to shut off services in your home? Yes    Education  Do you want help with school or training? For example, starting or completing job training or getting a high school diploma, GED, or equivalent? No  Do you speak a language other than English at home? No    Employment  Do you want help finding or keeping work or a job? I do not need or want help    Safety  How often does anyone including family and friends, physically hurt you? Never  How often does anyone including family and friends, insult or talk down to you? Never  How often does anyone including family and friends, threaten you with harm? Never  How often does anyone including family and friends, scream or curse at you? Never    Basic Daily Needs  Think about the place you live. Do you have problems with any of the following? None of the above    Family & Community Support  How often do you feel lonely or isolated from those around you? Never    Care Management consult needed?  No       .      Electronically signed by Ro Velez RN on 11/20/2023 at 11:19 PM

## 2023-11-22 VITALS
HEIGHT: 67 IN | WEIGHT: 217.4 LBS | DIASTOLIC BLOOD PRESSURE: 62 MMHG | TEMPERATURE: 98.1 F | BODY MASS INDEX: 34.12 KG/M2 | OXYGEN SATURATION: 98 % | SYSTOLIC BLOOD PRESSURE: 159 MMHG | RESPIRATION RATE: 16 BRPM | HEART RATE: 75 BPM

## 2023-11-22 LAB
ALBUMIN SERPL-MCNC: 3 G/DL (ref 3.5–5.2)
ALP SERPL-CCNC: 98 U/L (ref 35–104)
ALT SERPL-CCNC: 7 U/L (ref 5–33)
ANION GAP SERPL CALCULATED.3IONS-SCNC: 9 MMOL/L (ref 7–19)
AST SERPL-CCNC: 11 U/L (ref 5–32)
BASOPHILS # BLD: 0.1 K/UL (ref 0–0.2)
BASOPHILS NFR BLD: 0.7 % (ref 0–1)
BILIRUB SERPL-MCNC: <0.2 MG/DL (ref 0.2–1.2)
BUN SERPL-MCNC: 27 MG/DL (ref 8–23)
CALCIUM SERPL-MCNC: 8.3 MG/DL (ref 8.8–10.2)
CHLORIDE SERPL-SCNC: 105 MMOL/L (ref 98–111)
CO2 SERPL-SCNC: 27 MMOL/L (ref 22–29)
CREAT SERPL-MCNC: 1 MG/DL (ref 0.5–0.9)
CRP SERPL HS-MCNC: 1.79 MG/DL (ref 0–0.5)
D DIMER PPP FEU-MCNC: 1.16 UG/ML FEU (ref 0–0.48)
EOSINOPHIL # BLD: 0.4 K/UL (ref 0–0.6)
EOSINOPHIL NFR BLD: 5.1 % (ref 0–5)
ERYTHROCYTE [DISTWIDTH] IN BLOOD BY AUTOMATED COUNT: 14.7 % (ref 11.5–14.5)
GLUCOSE BLD-MCNC: 145 MG/DL (ref 70–99)
GLUCOSE SERPL-MCNC: 159 MG/DL (ref 74–109)
HCT VFR BLD AUTO: 27.4 % (ref 37–47)
HGB BLD-MCNC: 8.6 G/DL (ref 12–16)
IMM GRANULOCYTES # BLD: 0 K/UL
IRON SATN MFR SERPL: 34 % (ref 14–50)
IRON SERPL-MCNC: 60 UG/DL (ref 37–145)
LYMPHOCYTES # BLD: 2.7 K/UL (ref 1.1–4.5)
LYMPHOCYTES NFR BLD: 38.2 % (ref 20–40)
MCH RBC QN AUTO: 20.3 PG (ref 27–31)
MCHC RBC AUTO-ENTMCNC: 31.4 G/DL (ref 33–37)
MCV RBC AUTO: 64.6 FL (ref 81–99)
MONOCYTES # BLD: 0.6 K/UL (ref 0–0.9)
MONOCYTES NFR BLD: 9.1 % (ref 0–10)
NEUTROPHILS # BLD: 3.3 K/UL (ref 1.5–7.5)
NEUTS SEG NFR BLD: 46.8 % (ref 50–65)
PERFORMED ON: ABNORMAL
PLATELET # BLD AUTO: 298 K/UL (ref 130–400)
PMV BLD AUTO: 12 FL (ref 9.4–12.3)
POTASSIUM SERPL-SCNC: 3.8 MMOL/L (ref 3.5–5)
PROT SERPL-MCNC: 5.9 G/DL (ref 6.6–8.7)
RBC # BLD AUTO: 4.24 M/UL (ref 4.2–5.4)
SODIUM SERPL-SCNC: 141 MMOL/L (ref 136–145)
TIBC SERPL-MCNC: 175 UG/DL (ref 250–400)
WBC # BLD AUTO: 7.1 K/UL (ref 4.8–10.8)

## 2023-11-22 PROCEDURE — 6360000002 HC RX W HCPCS: Performed by: HOSPITALIST

## 2023-11-22 PROCEDURE — 85025 COMPLETE CBC W/AUTO DIFF WBC: CPT

## 2023-11-22 PROCEDURE — 83550 IRON BINDING TEST: CPT

## 2023-11-22 PROCEDURE — 2580000003 HC RX 258: Performed by: HOSPITALIST

## 2023-11-22 PROCEDURE — 80053 COMPREHEN METABOLIC PANEL: CPT

## 2023-11-22 PROCEDURE — 36415 COLL VENOUS BLD VENIPUNCTURE: CPT

## 2023-11-22 PROCEDURE — 85379 FIBRIN DEGRADATION QUANT: CPT

## 2023-11-22 PROCEDURE — 86140 C-REACTIVE PROTEIN: CPT

## 2023-11-22 PROCEDURE — 2500000003 HC RX 250 WO HCPCS: Performed by: HOSPITALIST

## 2023-11-22 PROCEDURE — 82962 GLUCOSE BLOOD TEST: CPT

## 2023-11-22 PROCEDURE — 83540 ASSAY OF IRON: CPT

## 2023-11-22 PROCEDURE — 6370000000 HC RX 637 (ALT 250 FOR IP): Performed by: HOSPITALIST

## 2023-11-22 PROCEDURE — 94760 N-INVAS EAR/PLS OXIMETRY 1: CPT

## 2023-11-22 RX ORDER — GUAIFENESIN/DEXTROMETHORPHAN 100-10MG/5
10 SYRUP ORAL EVERY 4 HOURS PRN
Qty: 120 ML | Refills: 0 | Status: SHIPPED | OUTPATIENT
Start: 2023-11-22 | End: 2023-12-02

## 2023-11-22 RX ADMIN — PANTOPRAZOLE SODIUM 20 MG: 20 TABLET, DELAYED RELEASE ORAL at 05:03

## 2023-11-22 RX ADMIN — LEVOTHYROXINE SODIUM 25 MCG: 25 TABLET ORAL at 05:03

## 2023-11-22 RX ADMIN — PREGABALIN 150 MG: 150 CAPSULE ORAL at 09:38

## 2023-11-22 RX ADMIN — DOXYCYCLINE HYCLATE 100 MG: 100 CAPSULE ORAL at 09:38

## 2023-11-22 RX ADMIN — FERROUS SULFATE TAB 325 MG (65 MG ELEMENTAL FE) 325 MG: 325 (65 FE) TAB at 09:38

## 2023-11-22 RX ADMIN — SODIUM BICARBONATE: 84 INJECTION, SOLUTION INTRAVENOUS at 05:03

## 2023-11-22 RX ADMIN — ENOXAPARIN SODIUM 40 MG: 100 INJECTION SUBCUTANEOUS at 09:39

## 2023-11-22 RX ADMIN — TERBINAFINE TABLETS 250 MG 250 MG: 250 TABLET ORAL at 09:38

## 2023-11-22 RX ADMIN — INSULIN GLARGINE 50 UNITS: 100 INJECTION, SOLUTION SUBCUTANEOUS at 09:40

## 2023-11-22 RX ADMIN — SERTRALINE HYDROCHLORIDE 100 MG: 100 TABLET ORAL at 09:38

## 2023-11-22 RX ADMIN — PROPRANOLOL HYDROCHLORIDE 20 MG: 10 TABLET ORAL at 09:38

## 2023-11-22 RX ADMIN — SODIUM CHLORIDE, PRESERVATIVE FREE 10 ML: 5 INJECTION INTRAVENOUS at 09:40

## 2023-11-22 RX ADMIN — Medication 2000 UNITS: at 09:38

## 2023-11-22 NOTE — DISCHARGE SUMMARY
sounds: Normal heart sounds. Pulmonary:      Effort: Pulmonary effort is normal.      Breath sounds: Normal breath sounds. Abdominal:      General: Bowel sounds are normal. There is no distension. Palpations: Abdomen is soft. Tenderness: There is no abdominal tenderness. Musculoskeletal:      Cervical back: Normal range of motion. Right lower leg: No edema. Left lower leg: No edema. Skin:     General: Skin is warm and dry. Neurological:      Mental Status: She is alert and oriented to person, place, and time. Psychiatric:         Mood and Affect: Mood normal.         Behavior: Behavior normal.         Discharge Medications:         Medication List        START taking these medications      guaiFENesin-dextromethorphan 100-10 MG/5ML syrup  Commonly known as: ROBITUSSIN DM  Take 10 mLs by mouth every 4 hours as needed for Cough     Lantus SoloStar 100 UNIT/ML injection pen  Generic drug: insulin glargine            CONTINUE taking these medications      atorvastatin 40 MG tablet  Commonly known as: LIPITOR     BD Pen Needle Tere 2nd Gen 32G X 4 MM Misc  Generic drug: Insulin Pen Needle     cetirizine 10 MG tablet  Commonly known as: ZYRTEC     doxycycline hyclate 100 MG capsule  Commonly known as: VIBRAMYCIN     ferrous sulfate 325 (65 Fe) MG tablet  Commonly known as: IRON 325     fluticasone 50 MCG/ACT nasal spray  Commonly known as: FLONASE     HUMALOG KWIKPEN SC     HYDROcodone-acetaminophen 5-325 MG per tablet  Commonly known as: Norco  Take 1 tablet by mouth every 8 hours as needed for Pain for up to 30 days.  Intended 30 day supply Max Daily Amount: 3 tablets     levothyroxine 25 MCG tablet  Commonly known as: SYNTHROID     meclizine 25 MG tablet  Commonly known as: ANTIVERT     metFORMIN 1000 MG tablet  Commonly known as: GLUCOPHAGE     omeprazole 20 MG delayed release capsule  Commonly known as: PRILOSEC     OneTouch Delica Plus [id number] Misc     OneTouch Ultra strip  Generic

## 2023-11-26 LAB
BACTERIA BLD CULT ORG #2: NORMAL
BACTERIA BLD CULT: NORMAL

## 2024-01-09 DIAGNOSIS — M25.561 CHRONIC PAIN OF BOTH KNEES: ICD-10-CM

## 2024-01-09 DIAGNOSIS — M54.12 CERVICAL RADICULOPATHY: ICD-10-CM

## 2024-01-09 DIAGNOSIS — M25.562 CHRONIC PAIN OF BOTH KNEES: ICD-10-CM

## 2024-01-09 DIAGNOSIS — G89.29 CHRONIC PAIN OF BOTH KNEES: ICD-10-CM

## 2024-01-09 DIAGNOSIS — G89.29 CHRONIC BILATERAL LOW BACK PAIN WITHOUT SCIATICA: ICD-10-CM

## 2024-01-09 DIAGNOSIS — M54.50 CHRONIC BILATERAL LOW BACK PAIN WITHOUT SCIATICA: ICD-10-CM

## 2024-01-09 RX ORDER — HYDROCODONE BITARTRATE AND ACETAMINOPHEN 5; 325 MG/1; MG/1
1 TABLET ORAL EVERY 8 HOURS PRN
Qty: 90 TABLET | Refills: 0 | Status: SHIPPED | OUTPATIENT
Start: 2024-01-09 | End: 2024-02-08

## 2024-01-31 NOTE — PLAN OF CARE
2024    Daniele Sharma (:  1981) is a 42 y.o. male, here for a preventive medicine evaluation.    Patient Active Problem List   Diagnosis    PAC (premature atrial contraction)       Review of Systems   Constitutional:  Negative for activity change, appetite change, chills, diaphoresis, fatigue, fever and unexpected weight change.   HENT:  Negative for congestion, dental problem, ear discharge, ear pain, facial swelling, hearing loss, mouth sores, nosebleeds, postnasal drip, rhinorrhea, sinus pressure, sneezing, sore throat, tinnitus, trouble swallowing and voice change.    Eyes:  Negative for photophobia, pain, discharge, redness, itching and visual disturbance.   Respiratory:  Negative for apnea, cough, choking, chest tightness, shortness of breath and wheezing.    Cardiovascular:  Negative for chest pain, palpitations and leg swelling.   Gastrointestinal:  Negative for abdominal distention, abdominal pain, anal bleeding, blood in stool, constipation, diarrhea and nausea.   Endocrine: Negative for cold intolerance, heat intolerance, polydipsia, polyphagia and polyuria.   Genitourinary:  Negative for difficulty urinating, dysuria, flank pain, frequency, genital sores, hematuria, penile discharge, penile pain, scrotal swelling, testicular pain and urgency.   Musculoskeletal:  Negative for arthralgias, back pain, gait problem, joint swelling, myalgias and neck pain.   Skin:  Negative for rash.   Neurological:  Negative for dizziness, seizures, syncope, weakness, light-headedness, numbness and headaches.   Hematological:  Negative for adenopathy.   Psychiatric/Behavioral:  Negative for behavioral problems, confusion, self-injury and suicidal ideas. The patient is not nervous/anxious and is not hyperactive.        Prior to Visit Medications    Medication Sig Taking? Authorizing Provider   omeprazole (PRILOSEC) 20 MG delayed release capsule Take 1 capsule by mouth Daily Yes Marino Quinn MD  Goal Outcome Evaluation:           Progress: improving  Outcome Summary: vss, denies pain, breathing improved today, cont to monitor

## 2024-02-06 ENCOUNTER — HOSPITAL ENCOUNTER (OUTPATIENT)
Dept: PAIN MANAGEMENT | Age: 64
Discharge: HOME | End: 2024-02-06
Payer: COMMERCIAL

## 2024-02-06 VITALS
TEMPERATURE: 98 F | RESPIRATION RATE: 20 BRPM | OXYGEN SATURATION: 95 % | DIASTOLIC BLOOD PRESSURE: 69 MMHG | BODY MASS INDEX: 34.37 KG/M2 | HEART RATE: 73 BPM | WEIGHT: 219 LBS | SYSTOLIC BLOOD PRESSURE: 114 MMHG | HEIGHT: 67 IN

## 2024-02-06 PROCEDURE — 99215 OFFICE O/P EST HI 40 MIN: CPT

## 2024-02-06 RX ORDER — TIRZEPATIDE 7.5 MG/.5ML
INJECTION, SOLUTION SUBCUTANEOUS
COMMUNITY
Start: 2023-12-26

## 2024-02-06 RX ORDER — IBUPROFEN 200 MG
600 TABLET ORAL EVERY 6 HOURS PRN
COMMUNITY

## 2024-02-06 RX ORDER — BENZONATATE 200 MG/1
CAPSULE ORAL
COMMUNITY
Start: 2023-12-18

## 2024-02-06 RX ORDER — LISINOPRIL 20 MG/1
20 TABLET ORAL DAILY
COMMUNITY
Start: 2023-12-26

## 2024-02-06 ASSESSMENT — PAIN DESCRIPTION - LOCATION: LOCATION_2: NECK

## 2024-02-06 ASSESSMENT — PAIN DESCRIPTION - PAIN TYPE
TYPE_2: CHRONIC PAIN
TYPE: CHRONIC PAIN

## 2024-02-06 ASSESSMENT — PAIN DESCRIPTION - INTENSITY: RATING_2: 10

## 2024-02-06 ASSESSMENT — ENCOUNTER SYMPTOMS
CONSTIPATION: 0
BACK PAIN: 1
BOWEL INCONTINENCE: 0

## 2024-02-06 ASSESSMENT — PAIN DESCRIPTION - ORIENTATION
ORIENTATION: LOWER
ORIENTATION_2: LOWER

## 2024-02-06 ASSESSMENT — PAIN SCALES - GENERAL: PAINLEVEL_OUTOF10: 8

## 2024-02-06 NOTE — PROGRESS NOTES
Clinic Documentation      Education Provided:  [x] Review of Gaston  [x] Agreement Review  [x] PEG Score Calculated [x] PHQ Score Calculated [x] ORT Score Calculated    [] Compliance Issues Discussed [] Cognitive Behavior Needs [x] Exercise [] Review of Test [] Financial Issues  [x] Tobacco/Alcohol Use Reviewed [x] Teaching [] New Patient [] Picture Obtained    Physician Plan:  [] Outgoing Referral  [] Pharmacy Consult  [x] Test Ordered [x] Prescription Ordered/Changed   [] Obtained Test Results / Consult Notes        Complete if patient is withholding blood thinner for procedure     Blood Thinner Patient is currently taking:      [] Plavix (Hold for 7 days)  [] Aspirin (Hold for 5 days)     [] Pletal (Hold for 2 days)  [] Pradaxa (Hold for 3 days)    [] Effient (Hold for 7 days)  [] Xarelto (Hold for 2 days)    [] Eliquis (Hold for 2 days)  [] Brilinta (Hold for 7 days)    [] Coumadin (Hold for 5 days) - (INR needs to be drawn the day prior to procedure- INR < 2.0)    [] Aggrenox (Hold for 7 days)        [] Patient will stop medication on their own.    [] Blood Thinner Form Faxed for approval to hold.   Provider form faxed to:     Assessment Completed by:  Electronically signed by Ashlee Leiva RN on 2/6/2024 at 4:33 PM

## 2024-02-06 NOTE — PROGRESS NOTES
[facility] Physical & Pain Medicine  Office Note    Patient Name: Triny Fernandes    MR #: [id number]    Account #: [id number]    : 1960    Age: 63 y.o.    Sex: female    Date: 2024    PCP: Roberto Terry APRN - CNP    Chief Complaint:   Chief Complaint   Patient presents with    Back Pain     8/10    Neck Pain     10/10       History of Present Illness:     Triny Fernandes is a 63 y.o. female who presents for 3 month follow up.     Patient called while in parking lot stating that she was dizzy and did not feel well. She stated that she felt good when she left home but she did not feel well. Patient was encouraged to call EMS and not report to office. Patient refused to call EMS. Patient presented to office.     Nurse called NP to room. Nurse was having difficulty taking BP. NP got 50 palpable. BP was too hard to hear. Nurse thought she heard 70/30. Patient's HR was irregular and distant. Patient states that when she gets these dizzy spells that white objects are so bright she cannot look at them. Patient is pale with poor skin turgor. Patient took all her medications this am which consists of HCTZ, Norvasc and propanolol. Patient states that her urine was dark this am. Patient has been sick on and off over the last 6 months. She has been in and out of the hospital. NP encouraged patient to go to ED. Patient wants to go home to [place] to go to ED and she states that she will call her sister to come get her.     Patient had bilateral SI joint injection and Bilateral lumbar trigger point injections on 2023. Patient had at least 80-85% relief of pain from procedure(s) for at least 8 weeks. After injection, patient was able to increase activity. Patient had less pain from aggravating factors. Patient was able to decrease pain medication usage. Patient received enough pain relief from injections that the patient would like to repeat the injection(s).     Patient had KIM of C6/C7 on

## 2024-02-08 DIAGNOSIS — G89.29 CHRONIC BILATERAL LOW BACK PAIN WITHOUT SCIATICA: ICD-10-CM

## 2024-02-08 DIAGNOSIS — G89.29 CHRONIC PAIN OF BOTH KNEES: ICD-10-CM

## 2024-02-08 DIAGNOSIS — M25.562 CHRONIC PAIN OF BOTH KNEES: ICD-10-CM

## 2024-02-08 DIAGNOSIS — M54.50 CHRONIC BILATERAL LOW BACK PAIN WITHOUT SCIATICA: ICD-10-CM

## 2024-02-08 DIAGNOSIS — M25.561 CHRONIC PAIN OF BOTH KNEES: ICD-10-CM

## 2024-02-08 DIAGNOSIS — M54.12 CERVICAL RADICULOPATHY: ICD-10-CM

## 2024-02-09 RX ORDER — HYDROCODONE BITARTRATE AND ACETAMINOPHEN 5; 325 MG/1; MG/1
1 TABLET ORAL EVERY 8 HOURS PRN
Qty: 90 TABLET | Refills: 0 | Status: SHIPPED | OUTPATIENT
Start: 2024-02-09 | End: 2024-03-10

## 2024-02-14 NOTE — PROGRESS NOTES
LOV 11/13/23  Last labs 11/13/23  Last refill on 12/04/23, for #135 tabs, with 0 refills  sertraline 100 MG Oral Tab     Psychiatric Non-Scheduled (Anti-Anxiety) [id number]/2024 08:12 AM   Protocol Details In person appointment or virtual visit in the past 6 mos or appointment in next 3 mos    Depression Screening completed within the past 12 months     Future Appointments   Date Time Provider Department Center   3/12/2024 11:20 AM Rosy Biggs APRN [facility]     Order(s) pending, please review. Thank you.         two fingers). The pain is associated with an unknown factor. The pain is present in the midline and right side. The quality of the pain is described as aching and shooting. The pain is at a severity of 4/10. The pain is moderate. The symptoms are aggravated by bending and position. Associated symptoms include leg pain and weakness. Pertinent negatives include no headaches, numbness or tingling. She has tried oral narcotics, NSAIDs and muscle relaxants for the symptoms. The treatment provided mild relief. Current Pain Assement  Pain Assessment  Pain Assessment: 0-10  Pain Level: 4  Pain Type: Chronic pain  Pain Location: Back, Neck  Pain Orientation: Lower, Upper  Pain Radiating Towards: into bilateral lower extremities  Pain Descriptors: Aching, Constant, Throbbing  Pain Frequency: Continuous  Pain Onset: On-going  Clinical Progression: Not changed  Effect of Pain on Daily Activities: limits activity  Patient's Stated Pain Goal: No pain  Pain Intervention(s): Medication (see eMar), Repositioned, Rest  Response to Pain Intervention: Patient Satisfied  Multiple Pain Sites: Yes  POSS Score (Patient Ctrl Analgesia): 1      Employment: still works    Previous Injury: Yes []  No  [x]    Previous Physical Therapy In the last 6 months? Yes [x]  No []  Did Physical Therapy make the pain better or worse? Worse  []   Better [x]    MRI in the two last year? Yes []  No  [x] OF:     Injections in the past? Yes [x]  No []  Did the injections help relieve the pain? Yes [x]  No []    Past Medical Histoy  Past Medical History:   Diagnosis Date    Arthritis     Diabetes mellitus ([facility] [address]     Hypertension     Thyroid disease        Surgery History  Past Surgical History:   Procedure Laterality Date    CARDIAC SURGERY  1998    heart cath    CERVICAL SPINE SURGERY      HYSTERECTOMY      JOINT REPLACEMENT      bilateral knees        Family History  family history is not on file.      Social History    Social History   Substance well-developed and well-nourished. No distress. HENT:   Head: Normocephalic. Right Ear: External ear normal.   Left Ear: External ear normal.   Nose: Nose normal.   Eyes: Conjunctivae and EOM are normal. Pupils are equal, round, and reactive to light. Neck: Normal range of motion. Neck supple. Cardiovascular: Normal rate and regular rhythm. Pulmonary/Chest: Effort normal and breath sounds normal.   Abdominal: Soft. Bowel sounds are normal. There is no hepatosplenomegaly. Musculoskeletal:        Cervical back: She exhibits decreased range of motion, tenderness, pain and spasm. Lumbar back: She exhibits decreased range of motion, tenderness, pain and spasm. Back:         Right hand: She exhibits tenderness (with numbness and tingling at times). Hands:  Neurological: She is alert and oriented to person, place, and time. She has normal strength and normal reflexes. No cranial nerve deficit or sensory deficit. Skin: Skin is warm and dry. Psychiatric: She has a normal mood and affect. Her behavior is normal. Judgment and thought content normal.   Nursing note and vitals reviewed. Recent Imaging:  Xray Cervical Spine per record found in [facility] - 2/7/18  fusion of C5, 6, 7 noted. Severe degeneration in C4 interspace with bilateral Luschka joint proliferation with right worse on left. Read by Dr. Good Ibrahim.       Xray of Lumbar spine per record found in [facility] - 2/7/18 - unremarkable    Assessment:    Patient Active Problem List   Diagnosis    Knee pain    Bilateral knee pain    Neuralgia, geniculate    Neuralgia, geniculate    Lumbar disc disease    Lumbar disc disease    Lumbar disc disease    Neuralgia, geniculate    DDD (degenerative disc disease), lumbar    Myofascial pain    Displacement of lumbar disc with radiculopathy    DDD (degenerative disc disease), lumbar    DDD (degenerative disc disease), lumbar    Other insomnia    Cervical radiculopathy    Cervicalgia Plan:  1. MRI of Cervical Spine due to worsening radicular pain and history of previous cervical surgery. 2. MRI of Lumbar spine due to worsening pain  3. One time dose of valium for both MRI's with script given to patient. 4. Continue current pain medication  5. Continue PT to knee and start PT on Cervical and Lumbar Spine  6. Will continue to prescribe Ambien as previously prescribed by Dr. Metta Nyhan approved by Dr. Wandy Summers. 7. Schedule Cervical trigger points and Lumbar Epidural of L3/L4. 8. Follow up after procedure. Discussion: Discussed exam findings and plan of care with patient. Patient agreed with POC and questions were asked and answered. Activity: discussed exercise as beneficial to pain reduction, encouraged stretching exercise with a focus on torso strengthening. Education Provided:  [x] Review of Watt Chum [x] Agreement Review [] ORT Score  [] Review of Test  [x] Compliance Issues Discussed [x] Cognitive Behavior Needs [x] Exercise  [] Financial Issues [x] Teaching  []  Smoking Cessation    Controlled Substance Monitoring:   Discussed with patient possible medication side effects, risk of tolerance, dependence and alternative treatments. Discussed the growing epidemic in the [place] with the overprescribing and at times the abuse of narcotics. Discussed the detrimental effects of long term narcotic use in younger patients. Patient encouraged to set daily goals of exercising and if on narcotics decreasing daily narcotic intake. Discussed with the patient about the development of hyperalgesia with long term narcotic intake.      CC:  KEIKO Monroy - CNP, 7/11/2018 at 5:53 PM

## 2024-02-25 PROBLEM — F11.90 CHRONIC, CONTINUOUS USE OF OPIOIDS: Status: ACTIVE | Noted: 2024-02-25

## 2024-02-27 ENCOUNTER — HOSPITAL ENCOUNTER (OUTPATIENT)
Dept: PAIN MANAGEMENT | Age: 64
Discharge: HOME | End: 2024-02-27
Payer: COMMERCIAL

## 2024-02-27 VITALS
TEMPERATURE: 96.6 F | SYSTOLIC BLOOD PRESSURE: 119 MMHG | RESPIRATION RATE: 18 BRPM | OXYGEN SATURATION: 96 % | DIASTOLIC BLOOD PRESSURE: 65 MMHG | HEART RATE: 58 BPM

## 2024-02-27 DIAGNOSIS — M51.9 LUMBAR DISC DISEASE: Chronic | ICD-10-CM

## 2024-02-27 DIAGNOSIS — M51.16 DISPLACEMENT OF LUMBAR DISC WITH RADICULOPATHY: Primary | Chronic | ICD-10-CM

## 2024-02-27 DIAGNOSIS — M51.36 BULGING LUMBAR DISC: Chronic | ICD-10-CM

## 2024-02-27 DIAGNOSIS — M25.561 CHRONIC PAIN OF BOTH KNEES: ICD-10-CM

## 2024-02-27 DIAGNOSIS — G89.29 CHRONIC PAIN OF BOTH KNEES: ICD-10-CM

## 2024-02-27 DIAGNOSIS — G51.8 NEURALGIA, GENICULATE: Chronic | ICD-10-CM

## 2024-02-27 DIAGNOSIS — R52 PAIN MANAGEMENT: ICD-10-CM

## 2024-02-27 DIAGNOSIS — M25.562 CHRONIC PAIN OF BOTH KNEES: ICD-10-CM

## 2024-02-27 PROCEDURE — 2500000003 HC RX 250 WO HCPCS

## 2024-02-27 PROCEDURE — A4216 STERILE WATER/SALINE, 10 ML: HCPCS

## 2024-02-27 PROCEDURE — 2580000003 HC RX 258

## 2024-02-27 PROCEDURE — 6360000002 HC RX W HCPCS

## 2024-02-27 PROCEDURE — 62321 NJX INTERLAMINAR CRV/THRC: CPT

## 2024-02-27 RX ORDER — METHYLPREDNISOLONE ACETATE 80 MG/ML
80 INJECTION, SUSPENSION INTRA-ARTICULAR; INTRALESIONAL; INTRAMUSCULAR; SOFT TISSUE ONCE
Status: DISCONTINUED | OUTPATIENT
Start: 2024-02-27 | End: 2024-02-29 | Stop reason: HOSPADM

## 2024-02-27 RX ORDER — SODIUM CHLORIDE 9 MG/ML
5 INJECTION, SOLUTION INTRAMUSCULAR; INTRAVENOUS; SUBCUTANEOUS ONCE
Status: DISCONTINUED | OUTPATIENT
Start: 2024-02-27 | End: 2024-02-29 | Stop reason: HOSPADM

## 2024-02-27 RX ORDER — LIDOCAINE HYDROCHLORIDE 10 MG/ML
5 INJECTION, SOLUTION EPIDURAL; INFILTRATION; INTRACAUDAL; PERINEURAL ONCE
Status: DISCONTINUED | OUTPATIENT
Start: 2024-02-27 | End: 2024-02-29 | Stop reason: HOSPADM

## 2024-02-27 ASSESSMENT — PAIN - FUNCTIONAL ASSESSMENT: PAIN_FUNCTIONAL_ASSESSMENT: 0-10

## 2024-02-27 NOTE — INTERVAL H&P NOTE
Update History & Physical    The patient's History and Physical  was reviewed with the patient and I examined the patient. There was no change. The surgical site was confirmed by the patient and me.     Plan: The risks, benefits, expected outcome, and alternative to the recommended procedure have been discussed with the patient. Patient understands and wants to proceed with the procedure.     Electronically signed by Jason Ramos MD on 2/27/2024 at 12:05 PM

## 2024-02-27 NOTE — PROGRESS NOTES
Procedure:  Level of Consciousness: [x]Alert [x]Oriented []Disoriented []Lethargic  Anxiety Level: [x]Calm []Anxious []Depressed []Other  Skin: []Warm [x]Dry []Cool []Moist []Intact []Other  Cardiovascular: [x]Palpitations: [x]Never []Occasionally []Frequently  Chest Pain: [x]No []Yes  Respiratory:  [x]Unlabored []Labored []Cough ([] Productive []Unproductive)  HCG Required: [x]No []Yes   Results: []Negative []Positive  Knowledge Level:        [x]Patient/Other verbalized understanding of pre-procedure instructions.        [x]Assessment of post-op care needs (transportation, responsible caregiver)        []Able to discuss health care problems and how to deal with it.  Factors that Affect Teaching:        Language Barrier: [x]No []Yes - why:        Hearing Loss:        [x]No []Yes            Corrective Device:  []Yes []No        Vision Loss:           [x]No []Yes            Corrective Device:  []Yes []No        Memory Loss:       [x]No []Yes            []Short Term []Long Term  Motivational Level:  [x]Asks Questions                  []Extremely Anxious       [x]Seems Interested               []Seems Uninterested                  []Denies need for Education  Risk for Injury:  [x]Patient oriented to person, place and time  []History of frequent falls/loss of balance  Nutritional:  []Change in appetite   []Weight Gain   []Weight Loss  Functional:  []Requires assistance with ADL's

## 2024-03-04 ENCOUNTER — TELEPHONE (OUTPATIENT)
Dept: PAIN MANAGEMENT | Age: 64
End: 2024-03-04

## 2024-03-04 DIAGNOSIS — M51.9 LUMBAR DISC DISEASE: Chronic | ICD-10-CM

## 2024-03-04 DIAGNOSIS — M51.36 BULGING LUMBAR DISC: Chronic | ICD-10-CM

## 2024-03-04 DIAGNOSIS — M25.561 CHRONIC PAIN OF BOTH KNEES: ICD-10-CM

## 2024-03-04 DIAGNOSIS — M25.562 CHRONIC PAIN OF BOTH KNEES: ICD-10-CM

## 2024-03-04 DIAGNOSIS — G89.29 CHRONIC PAIN OF BOTH KNEES: ICD-10-CM

## 2024-03-04 DIAGNOSIS — G51.8 NEURALGIA, GENICULATE: Primary | Chronic | ICD-10-CM

## 2024-03-04 DIAGNOSIS — M51.16 DISPLACEMENT OF LUMBAR DISC WITH RADICULOPATHY: Chronic | ICD-10-CM

## 2024-03-13 DIAGNOSIS — M54.12 CERVICAL RADICULOPATHY: ICD-10-CM

## 2024-03-13 DIAGNOSIS — M25.561 CHRONIC PAIN OF BOTH KNEES: ICD-10-CM

## 2024-03-13 DIAGNOSIS — G89.29 CHRONIC PAIN OF BOTH KNEES: ICD-10-CM

## 2024-03-13 DIAGNOSIS — G89.29 CHRONIC BILATERAL LOW BACK PAIN WITHOUT SCIATICA: ICD-10-CM

## 2024-03-13 DIAGNOSIS — M54.50 CHRONIC BILATERAL LOW BACK PAIN WITHOUT SCIATICA: ICD-10-CM

## 2024-03-13 DIAGNOSIS — M25.562 CHRONIC PAIN OF BOTH KNEES: ICD-10-CM

## 2024-03-13 RX ORDER — HYDROCODONE BITARTRATE AND ACETAMINOPHEN 5; 325 MG/1; MG/1
1 TABLET ORAL EVERY 8 HOURS PRN
Qty: 90 TABLET | Refills: 0 | Status: SHIPPED | OUTPATIENT
Start: 2024-03-13 | End: 2024-04-12

## 2024-03-14 ENCOUNTER — HOSPITAL ENCOUNTER (OUTPATIENT)
Dept: PAIN MANAGEMENT | Age: 64
Discharge: HOME | End: 2024-03-14
Payer: COMMERCIAL

## 2024-03-14 VITALS
OXYGEN SATURATION: 98 % | SYSTOLIC BLOOD PRESSURE: 135 MMHG | RESPIRATION RATE: 18 BRPM | HEART RATE: 68 BPM | TEMPERATURE: 96.6 F | DIASTOLIC BLOOD PRESSURE: 64 MMHG

## 2024-03-14 DIAGNOSIS — M25.562 CHRONIC PAIN OF BOTH KNEES: ICD-10-CM

## 2024-03-14 DIAGNOSIS — G89.29 CHRONIC PAIN OF BOTH KNEES: ICD-10-CM

## 2024-03-14 DIAGNOSIS — M51.16 DISPLACEMENT OF LUMBAR DISC WITH RADICULOPATHY: Primary | Chronic | ICD-10-CM

## 2024-03-14 DIAGNOSIS — M51.36 BULGING LUMBAR DISC: Chronic | ICD-10-CM

## 2024-03-14 DIAGNOSIS — M51.9 LUMBAR DISC DISEASE: Chronic | ICD-10-CM

## 2024-03-14 DIAGNOSIS — M25.561 CHRONIC PAIN OF BOTH KNEES: ICD-10-CM

## 2024-03-14 DIAGNOSIS — G51.8 NEURALGIA, GENICULATE: Chronic | ICD-10-CM

## 2024-03-14 PROCEDURE — 6360000002 HC RX W HCPCS

## 2024-03-14 PROCEDURE — 20553 NJX 1/MLT TRIGGER POINTS 3/>: CPT

## 2024-03-14 PROCEDURE — 2500000003 HC RX 250 WO HCPCS

## 2024-03-14 PROCEDURE — 20611 DRAIN/INJ JOINT/BURSA W/US: CPT

## 2024-03-14 RX ORDER — TRIAMCINOLONE ACETONIDE 40 MG/ML
80 INJECTION, SUSPENSION INTRA-ARTICULAR; INTRAMUSCULAR ONCE
Status: DISCONTINUED | OUTPATIENT
Start: 2024-03-14 | End: 2024-03-16 | Stop reason: HOSPADM

## 2024-03-14 RX ORDER — LIDOCAINE HYDROCHLORIDE 10 MG/ML
2 INJECTION, SOLUTION EPIDURAL; INFILTRATION; INTRACAUDAL; PERINEURAL ONCE
Status: DISCONTINUED | OUTPATIENT
Start: 2024-03-14 | End: 2024-03-16 | Stop reason: HOSPADM

## 2024-03-14 RX ORDER — BUPIVACAINE HYDROCHLORIDE 5 MG/ML
15 INJECTION, SOLUTION EPIDURAL; INTRACAUDAL ONCE
Status: DISCONTINUED | OUTPATIENT
Start: 2024-03-14 | End: 2024-03-16 | Stop reason: HOSPADM

## 2024-03-14 RX ORDER — BUPIVACAINE HYDROCHLORIDE 5 MG/ML
2 INJECTION, SOLUTION EPIDURAL; INTRACAUDAL ONCE
Status: DISCONTINUED | OUTPATIENT
Start: 2024-03-14 | End: 2024-03-16 | Stop reason: HOSPADM

## 2024-03-14 RX ORDER — LIDOCAINE HYDROCHLORIDE 10 MG/ML
15 INJECTION, SOLUTION EPIDURAL; INFILTRATION; INTRACAUDAL; PERINEURAL ONCE
Status: DISCONTINUED | OUTPATIENT
Start: 2024-03-14 | End: 2024-03-16 | Stop reason: HOSPADM

## 2024-03-14 NOTE — PROCEDURES
maximal tenderness was palpated over the bilaterally Lumbar Muscles - Lower Latissimus,  Erector Spinae, Lumbar Paraspinous. The skin overlying these areas was marked with a skin marker. The areas were prepped using aseptic technique with CHG prep. The skin overlying the proposed injection sites were then sprayed with Gebauer's Solution while protecting patient eyes. Each trigger point of the Lumbar Muscles - Lower Latissimus,  Erector Spinae, Lumbar Paraspinous was injected after negative aspiration was injected with approximately 1-2 ml of a solution of 5 ml of 1% Lidocaine Plain and 5 ml of 0.5% Marcaine Plain    Discharge:    The patient tolerated the procedure well. There were no complications during the procedure and the patient was discharged home with discharge instructions.    The patient has been instructed to contact the office should there be any complications or questions to arise between today and their next appointment.    Plan:    Keep previously scheduled follow up appointment    KEIKO Encarnacion CNP, 3/14/2024 at 11:23 AM

## 2024-03-14 NOTE — DISCHARGE INSTRUCTIONS
site(s) will be numb for a few hours after the procedure    [x] I understand if a steroid was used it will take effect in 3 - 7 days. I understand that as the numbing medication wears off, the pain may return until the steroids start working.    [x] I understand that today I will rest for the next 24 hours and drink plenty of water.    [] For Botox for Migraines please do not wear anything constricting around the forehead for 7-10 days post injection. Examples headband, hats, or bandana    [] For Botox for Spasticity start therapy for the affected limb in two weeks.    [] Additional instructions: ______________________________________________ ___________________________________________________________________    Sedation:  Was oral sedation given?    [] Yes  [x] No    I understand that if I took an oral nerve calming medication I will not drive for  [] 24 hours after taking the medication.    [x] I have received a copy of my discharge instructions and understand the above instructions to the best of my knowledge    Patient Discharged:  [x] Home  [] Hospital  [] Other  ____________________________________________    Via:  [x] Ambulatory  [] Wheelchair   [] Stretcher [] EMS       Accompanied By:   [] Significant other  [] Family Member  [] Friend   [] Hospital Staff  []  Ambulance Staff  [] Other_______________________________________________    Plan:  [] Will return to the office in   [] 1 month   [] 3 months for:  [] Procedure Follow-up  [] Office Visit   [] Planned Procedure      Patient Signature: _____________________________________________________    Staff Signature: _______________________________________________________        If you have questions, problems or concerns you may call [phone] and follow the prompts    ELAINE Chaidez, [place]-BC

## 2024-04-18 DIAGNOSIS — M25.562 CHRONIC PAIN OF BOTH KNEES: ICD-10-CM

## 2024-04-18 DIAGNOSIS — M54.12 CERVICAL RADICULOPATHY: ICD-10-CM

## 2024-04-18 DIAGNOSIS — G89.29 CHRONIC PAIN OF BOTH KNEES: ICD-10-CM

## 2024-04-18 DIAGNOSIS — G89.29 CHRONIC BILATERAL LOW BACK PAIN WITHOUT SCIATICA: ICD-10-CM

## 2024-04-18 DIAGNOSIS — M25.561 CHRONIC PAIN OF BOTH KNEES: ICD-10-CM

## 2024-04-18 DIAGNOSIS — M54.50 CHRONIC BILATERAL LOW BACK PAIN WITHOUT SCIATICA: ICD-10-CM

## 2024-04-22 RX ORDER — HYDROCODONE BITARTRATE AND ACETAMINOPHEN 5; 325 MG/1; MG/1
1 TABLET ORAL EVERY 8 HOURS PRN
Qty: 90 TABLET | Refills: 0 | Status: SHIPPED | OUTPATIENT
Start: 2024-04-22 | End: 2024-05-22

## (undated) DEVICE — COPILOT KIT INCLUDES BLEEDBACK CONTROL VALVE / GUIDE WIRE INTRODUCER / TORQUE DEVICE: Brand: ACCESSORIES

## (undated) DEVICE — A2000 MULTI-USE SYRINGE KIT, P/N 701277-003KIT CONTENTS: 100ML CONTRAST RESERVOIR AND TUBING WITH CONTRAST SPIKE AND CLAMP: Brand: A2000 MULTI-USE SYRINGE KIT

## (undated) DEVICE — GLIDESHEATH SLENDER STAINLESS STEEL KIT: Brand: GLIDESHEATH SLENDER

## (undated) DEVICE — TR BAND RADIAL ARTERY COMPRESSION DEVICE: Brand: TR BAND

## (undated) DEVICE — GW PRESSUREWIRE X WIRELESS FFR 175CM

## (undated) DEVICE — PK CATH LAB 60

## (undated) DEVICE — CATH DIAG EXPO .052 PIG145 6F 110CM

## (undated) DEVICE — MODEL AT P65, P/N 701554-001KIT CONTENTS: HAND CONTROLLER, 3-WAY HIGH-PRESSURE STOPCOCK WITH ROTATING END AND PREMIUM HIGH-PRESSURE TUBING: Brand: ANGIOTOUCH® KIT

## (undated) DEVICE — RADIFOCUS OPTITORQUE ANGIOGRAPHIC CATHETER: Brand: OPTITORQUE

## (undated) DEVICE — GW PERIPH GUIDERIGHT STD/EXCHNG/J/TIP SS 0.038IN 5X260CM

## (undated) DEVICE — CATH GUIDE LAUNCHER EBU3.5 6F 100CM

## (undated) DEVICE — ELECTRODE,RT,STRESS,FOAM,50PK: Brand: MEDLINE

## (undated) DEVICE — MODEL BT2000 P/N 700287-012KIT CONTENTS: MANIFOLD WITH SALINE AND CONTRAST PORTS, SALINE TUBING WITH SPIKE AND HAND SYRINGE, TRANSDUCER: Brand: BT2000 AUTOMATED MANIFOLD KIT